# Patient Record
Sex: MALE | Race: WHITE | NOT HISPANIC OR LATINO | Employment: OTHER | URBAN - METROPOLITAN AREA
[De-identification: names, ages, dates, MRNs, and addresses within clinical notes are randomized per-mention and may not be internally consistent; named-entity substitution may affect disease eponyms.]

---

## 2017-01-11 DIAGNOSIS — I10 ESSENTIAL HYPERTENSION, BENIGN: Primary | ICD-10-CM

## 2017-01-11 DIAGNOSIS — E78.5 HYPERLIPIDEMIA LDL GOAL <100: ICD-10-CM

## 2017-01-11 NOTE — TELEPHONE ENCOUNTER
Amlodipine-Benazepril       Last Written Prescription Date: 02/17/16  Last Fill Quantity: 90, # refills: 3    Last Office Visit with [facility], [facility] or [facility] prescribing provider:  08/08/16   Future Office Visit:        BP Readings from Last 3 Encounters:   08/08/16 110/66   02/17/16 114/60   08/17/15 120/72       Atenolol      Last Written Prescription Date: 02/17/16  Last Fill Quantity: 90, # refills: 3    Last Office Visit with [facility], [facility] or [facility] prescribing provider:  08/08/16   Future Office Visit:        BP Readings from Last 3 Encounters:   08/08/16 110/66   02/17/16 114/60   08/17/15 120/72       Gabapentin      Last Written Prescription Date:  02/17/16  Last Fill Quantity: 90,   # refills: 3  Last Office Visit with [facility], [facility] or [facility] prescribing provider: 08/08/16  Future Office visit:       Routing refill request to provider for review/approval because:  Drug not on the [facility], [place] [facility] or "[employer] refill protocol or controlled substance    Lipitor     Last Written Prescription Date: 02/17/16  Last Fill Quantity: 45, # refills: 3  Last Office Visit with [facility], [facility] or "[employer] prescribing provider: 08/08/16       CHOL      148   2/17/2016  HDL       85   2/17/2016  LDL       39   2/17/2016  TRIG      122   2/17/2016  CHOLHDLRATIO      1.7   8/17/2015    Labs showing if normal/abnormal  Lab Results   Component Value Date    CHOL 148 02/17/2016    TRIG 122 02/17/2016    HDL 85 02/17/2016    LDL 39 02/17/2016    VLDL 22 08/17/2015    CHOLHDLRATIO 1.7 08/17/2015

## 2017-01-16 RX ORDER — GABAPENTIN 300 MG/1
CAPSULE ORAL
Qty: 90 CAPSULE | Refills: 3 | Status: SHIPPED | OUTPATIENT
Start: 2017-01-16 | End: 2018-02-13

## 2017-01-16 RX ORDER — ATORVASTATIN CALCIUM 80 MG/1
40 TABLET, FILM COATED ORAL DAILY
Qty: 45 TABLET | Refills: 0 | Status: SHIPPED | OUTPATIENT
Start: 2017-01-16 | End: 2017-04-28

## 2017-01-16 RX ORDER — AMLODIPINE AND BENAZEPRIL HYDROCHLORIDE 10; 20 MG/1; MG/1
1 CAPSULE ORAL DAILY
Qty: 90 CAPSULE | Refills: 0 | Status: SHIPPED | OUTPATIENT
Start: 2017-01-16 | End: 2017-04-28

## 2017-01-16 RX ORDER — ATENOLOL 25 MG/1
25 TABLET ORAL DAILY
Qty: 90 TABLET | Refills: 0 | Status: SHIPPED | OUTPATIENT
Start: 2017-01-16 | End: 2017-04-28

## 2017-01-16 NOTE — TELEPHONE ENCOUNTER
Routing refill request to provider for review/approval because:  Drug not on the [facility] refill protocol-Gabapentin. Pt due for diabetes labs and OV in February.

## 2017-01-19 DIAGNOSIS — E11.21 TYPE 2 DIABETES MELLITUS WITH DIABETIC NEPHROPATHY (H): Primary | ICD-10-CM

## 2017-01-19 NOTE — TELEPHONE ENCOUNTER
Metformin         Last Written Prescription Date: 02/17/2016  Last Fill Quantity: 180, # refills: 3  Last Office Visit with [facility] or [facility] prescribing provider:  02/17/2016   Next 5 appointments (look out 90 days)     Feb 07, 2017  8:20 AM   PHYSICAL with Rakesh Clark MD   [facility] ([facility])    [address]  [facility] [address] [phone]                   BP Readings from Last 3 Encounters:   08/08/16 110/66   02/17/16 114/60   08/17/15 120/72     MICROL      243   2/17/2016  No results found for this basename: microalbumin  CREATININE   Date Value Ref Range Status   08/08/2016 0.67 0.66 - 1.25 mg/dL Final   ]  GFR ESTIMATE   Date Value Ref Range Status   08/08/2016 >90  Non  GFR Calc   >60 mL/min/1.7m2 Final   02/17/2016 >90  Non  GFR Calc   >60 mL/min/1.7m2 Final   08/17/2015 >90  Non  GFR Calc   >60 mL/min/1.7m2 Final     GFR ESTIMATE IF BLACK   Date Value Ref Range Status   08/08/2016 >90   GFR Calc   >60 mL/min/1.7m2 Final   02/17/2016 >90   GFR Calc   >60 mL/min/1.7m2 Final   08/17/2015 >90   GFR Calc   >60 mL/min/1.7m2 Final     CHOL      148   2/17/2016  HDL       85   2/17/2016  LDL       39   2/17/2016  TRIG      122   2/17/2016  CHOLHDLRATIO      1.7   8/17/2015  AST       32   8/8/2016  ALT       32   8/8/2016  A1C      5.3   8/8/2016  A1C      5.0   2/17/2016  A1C      5.6   8/17/2015  A1C      5.3   10/13/2014  A1C      5.9   7/18/2014  POTASSIUM   Date Value Ref Range Status   08/08/2016 4.5 3.4 - 5.3 mmol/L Final

## 2017-02-03 ENCOUNTER — OFFICE VISIT (OUTPATIENT)
Dept: UROLOGY | Facility: CLINIC | Age: 72
End: 2017-02-03
Payer: COMMERCIAL

## 2017-02-03 VITALS
DIASTOLIC BLOOD PRESSURE: 72 MMHG | SYSTOLIC BLOOD PRESSURE: 128 MMHG | WEIGHT: 205 LBS | BODY MASS INDEX: 27.77 KG/M2 | HEART RATE: 78 BPM | HEIGHT: 72 IN

## 2017-02-03 DIAGNOSIS — C61 PROSTATE CANCER (H): Primary | ICD-10-CM

## 2017-02-03 DIAGNOSIS — N40.1 HYPERTROPHY OF PROSTATE WITH URINARY OBSTRUCTION: ICD-10-CM

## 2017-02-03 DIAGNOSIS — N13.8 HYPERTROPHY OF PROSTATE WITH URINARY OBSTRUCTION: ICD-10-CM

## 2017-02-03 LAB — PSA SERPL-MCNC: 0.04 NG/ML (ref 0–4)

## 2017-02-03 PROCEDURE — 36415 COLL VENOUS BLD VENIPUNCTURE: CPT | Performed by: UROLOGY

## 2017-02-03 PROCEDURE — 99212 OFFICE O/P EST SF 10 MIN: CPT | Performed by: UROLOGY

## 2017-02-03 PROCEDURE — 84153 ASSAY OF PSA TOTAL: CPT | Performed by: UROLOGY

## 2017-02-03 ASSESSMENT — PAIN SCALES - GENERAL: PAINLEVEL: NO PAIN (0)

## 2017-02-03 NOTE — PROGRESS NOTES
Kyler is a 71-year-old male who underwent radiation therapy for adenocarcinoma of the prostate in 2012. His PSA remains very low. Today is 0.04. He is having no difficulty voiding or hematuria. He sees Dr. Clark next week for his annual physical.  Past medical history: High blood pressure, type 2 diabetes, hyperlipidemia, eye surgery, nonsmoker  Medications: Baby aspirin, amlodipine/Benzapril, Tenormin, Lipitor, baclofen, calcium carbonate/vitamin D, Neurontin, Glucophage, multivitamin  Allergies: None  Exam: Normal appearance, normal vital signs, alert and oriented, normocephalic, normal respirations, neuro grossly intact  Assessment: History of adenocarcinoma the prostate grade 3+4  Plan: Yearly PSA with Dr. Clark            See me if 3 consecutive rises of PSA or PSA greater than 0.5

## 2017-02-03 NOTE — Clinical Note
2/3/2017       RE: Kyler Rodriguez  [address] [facility] [address]     Dear Colleague,    Thank you for referring your patient, Kyler Rodriguez, to the [facility] [place] [facility]. Please see a copy of my visit note below.    Kyler is a 71-year-old male who underwent radiation therapy for adenocarcinoma of the prostate in 2012. His PSA remains very low. Today is 0.04. He is having no difficulty voiding or hematuria. He sees Dr. Clark next week for his annual physical.  Past medical history: High blood pressure, type 2 diabetes, hyperlipidemia, eye surgery, nonsmoker  Medications: Baby aspirin, amlodipine/Benzapril, Tenormin, Lipitor, baclofen, calcium carbonate/vitamin D, Neurontin, Glucophage, multivitamin  Allergies: None  Exam: Normal appearance, normal vital signs, alert and oriented, normocephalic, normal respirations, neuro grossly intact  Assessment: History of adenocarcinoma the prostate grade 3+4  Plan: Yearly PSA with Dr. Clark            See me if 3 consecutive rises of PSA or PSA greater than 0.5        Again, thank you for allowing me to participate in the care of your patient.      Sincerely,    Keon Espinoza MD

## 2017-02-07 ENCOUNTER — OFFICE VISIT (OUTPATIENT)
Dept: INTERNAL MEDICINE | Facility: CLINIC | Age: 72
End: 2017-02-07
Payer: COMMERCIAL

## 2017-02-07 VITALS
BODY MASS INDEX: 27.45 KG/M2 | HEART RATE: 72 BPM | HEIGHT: 72 IN | SYSTOLIC BLOOD PRESSURE: 128 MMHG | WEIGHT: 202.7 LBS | TEMPERATURE: 98.1 F | RESPIRATION RATE: 16 BRPM | OXYGEN SATURATION: 97 % | DIASTOLIC BLOOD PRESSURE: 60 MMHG

## 2017-02-07 DIAGNOSIS — C61 PROSTATE CANCER (H): ICD-10-CM

## 2017-02-07 DIAGNOSIS — Z00.00 ROUTINE GENERAL MEDICAL EXAMINATION AT A HEALTH CARE FACILITY: Primary | ICD-10-CM

## 2017-02-07 DIAGNOSIS — I10 ESSENTIAL HYPERTENSION, BENIGN: ICD-10-CM

## 2017-02-07 DIAGNOSIS — H61.22 IMPACTED CERUMEN OF LEFT EAR: ICD-10-CM

## 2017-02-07 DIAGNOSIS — E78.5 HYPERLIPIDEMIA LDL GOAL <100: ICD-10-CM

## 2017-02-07 DIAGNOSIS — E11.21 TYPE 2 DIABETES MELLITUS WITH DIABETIC NEPHROPATHY, WITHOUT LONG-TERM CURRENT USE OF INSULIN (H): ICD-10-CM

## 2017-02-07 LAB
ALBUMIN SERPL-MCNC: 3.8 G/DL (ref 3.4–5)
ALBUMIN UR-MCNC: 30 MG/DL
ALP SERPL-CCNC: 79 U/L (ref 40–150)
ALT SERPL W P-5'-P-CCNC: 48 U/L (ref 0–70)
ANION GAP SERPL CALCULATED.3IONS-SCNC: 10 MMOL/L (ref 3–14)
APPEARANCE UR: CLEAR
AST SERPL W P-5'-P-CCNC: 54 U/L (ref 0–45)
BACTERIA #/AREA URNS HPF: ABNORMAL /HPF
BILIRUB SERPL-MCNC: 1 MG/DL (ref 0.2–1.3)
BILIRUB UR QL STRIP: ABNORMAL
BUN SERPL-MCNC: 11 MG/DL (ref 7–30)
CALCIUM SERPL-MCNC: 9.4 MG/DL (ref 8.5–10.1)
CHLORIDE SERPL-SCNC: 100 MMOL/L (ref 94–109)
CHOLEST SERPL-MCNC: 149 MG/DL
CO2 SERPL-SCNC: 29 MMOL/L (ref 20–32)
COLOR UR AUTO: YELLOW
CREAT SERPL-MCNC: 0.72 MG/DL (ref 0.66–1.25)
CREAT UR-MCNC: 170 MG/DL
ERYTHROCYTE [DISTWIDTH] IN BLOOD BY AUTOMATED COUNT: 12.1 % (ref 10–15)
GFR SERPL CREATININE-BSD FRML MDRD: ABNORMAL ML/MIN/1.7M2
GLUCOSE SERPL-MCNC: 118 MG/DL (ref 70–99)
GLUCOSE UR STRIP-MCNC: NEGATIVE MG/DL
HBA1C MFR BLD: 5.4 % (ref 4.3–6)
HCT VFR BLD AUTO: 45.9 % (ref 40–53)
HDLC SERPL-MCNC: 90 MG/DL
HGB BLD-MCNC: 15.7 G/DL (ref 13.3–17.7)
HGB UR QL STRIP: ABNORMAL
HYALINE CASTS #/AREA URNS LPF: ABNORMAL /LPF (ref 0–2)
KETONES UR STRIP-MCNC: 40 MG/DL
LDLC SERPL CALC-MCNC: 44 MG/DL
LEUKOCYTE ESTERASE UR QL STRIP: NEGATIVE
MCH RBC QN AUTO: 32.4 PG (ref 26.5–33)
MCHC RBC AUTO-ENTMCNC: 34.2 G/DL (ref 31.5–36.5)
MCV RBC AUTO: 95 FL (ref 78–100)
MICROALBUMIN UR-MCNC: 92 MG/L
MICROALBUMIN/CREAT UR: 54.35 MG/G CR (ref 0–17)
MUCOUS THREADS #/AREA URNS LPF: PRESENT /LPF
NITRATE UR QL: NEGATIVE
NONHDLC SERPL-MCNC: 59 MG/DL
PH UR STRIP: 5.5 PH (ref 5–7)
PLATELET # BLD AUTO: 134 10E9/L (ref 150–450)
POTASSIUM SERPL-SCNC: 4.4 MMOL/L (ref 3.4–5.3)
PROT SERPL-MCNC: 7.6 G/DL (ref 6.8–8.8)
RBC # BLD AUTO: 4.85 10E12/L (ref 4.4–5.9)
RBC #/AREA URNS AUTO: ABNORMAL /HPF (ref 0–2)
SODIUM SERPL-SCNC: 139 MMOL/L (ref 133–144)
SP GR UR STRIP: 1.01 (ref 1–1.03)
TRIGL SERPL-MCNC: 77 MG/DL
TSH SERPL DL<=0.005 MIU/L-ACNC: 2.78 MU/L (ref 0.4–4)
URN SPEC COLLECT METH UR: ABNORMAL
UROBILINOGEN UR STRIP-ACNC: 1 EU/DL (ref 0.2–1)
WBC # BLD AUTO: 3.8 10E9/L (ref 4–11)
WBC #/AREA URNS AUTO: ABNORMAL /HPF (ref 0–2)

## 2017-02-07 PROCEDURE — 84443 ASSAY THYROID STIM HORMONE: CPT | Performed by: INTERNAL MEDICINE

## 2017-02-07 PROCEDURE — 83036 HEMOGLOBIN GLYCOSYLATED A1C: CPT | Performed by: INTERNAL MEDICINE

## 2017-02-07 PROCEDURE — 85027 COMPLETE CBC AUTOMATED: CPT | Performed by: INTERNAL MEDICINE

## 2017-02-07 PROCEDURE — 82043 UR ALBUMIN QUANTITATIVE: CPT | Performed by: INTERNAL MEDICINE

## 2017-02-07 PROCEDURE — 80053 COMPREHEN METABOLIC PANEL: CPT | Performed by: INTERNAL MEDICINE

## 2017-02-07 PROCEDURE — 99397 PER PM REEVAL EST PAT 65+ YR: CPT | Mod: 25 | Performed by: INTERNAL MEDICINE

## 2017-02-07 PROCEDURE — 69210 REMOVE IMPACTED EAR WAX UNI: CPT | Performed by: INTERNAL MEDICINE

## 2017-02-07 PROCEDURE — 36415 COLL VENOUS BLD VENIPUNCTURE: CPT | Performed by: INTERNAL MEDICINE

## 2017-02-07 PROCEDURE — 81001 URINALYSIS AUTO W/SCOPE: CPT | Performed by: INTERNAL MEDICINE

## 2017-02-07 PROCEDURE — 80061 LIPID PANEL: CPT | Performed by: INTERNAL MEDICINE

## 2017-02-07 NOTE — PROGRESS NOTES
SUBJECTIVE:                                                            Kyler Rodriguez is a 71 year old male who presents for Preventive Visit.      Are you in the first 12 months of your Medicare Part B coverage?  No    Healthy Habits:    Do you get at least three servings of calcium containing foods daily (dairy, green leafy vegetables, etc.)? yes    Amount of exercise or daily activities, outside of work: NO    Problems taking medications regularly No    Medication side effects: No    Have you had an eye exam in the past two years? yes    Do you see a dentist twice per year? yes    Do you have sleep apnea, excessive snoring or daytime drowsiness?no    COGNITIVE SCREEN  1) Repeat 3 items (Banana, Sunrise, Chair)    2) Clock draw: NORMAL  3) 3 item recall: Recalls 3 objects  Results: 3 items recalled: COGNITIVE IMPAIRMENT LESS LIKELY    Mini-CogTM Copyright S Jorge. Licensed by the author for use in [facility]; reprinted with permission (nick@[facility]). All rights reserved.                All Histories reviewed and updated in EPIC as appropriate.  Social History   Substance Use Topics     Smoking status: Never Smoker      Smokeless tobacco: Never Used     Alcohol Use: 0.0 oz/week     0 Standard drinks or equivalent per week      Comment: 2-3 drinks 2x weekly       The patient does not drink >3 drinks per day nor >7 drinks per week.    Today's PHQ-2 Score:   PHQ-2 ( 1999 [employer]) 2/7/2017 2/7/2017   Q1: Little interest or pleasure in doing things 0 0   Q2: Feeling down, depressed or hopeless 0 0   PHQ-2 Score 0 0       Do you feel safe in your environment - Yes    Do you have a Health Care Directive?: Yes: Advance Directive has been received and scanned.    Current providers sharing in care for this patient include:   Patient Care Team:  Rakesh Clark MD as PCP - General (Internal Medicine)      Hearing impairment: No    Ability to successfully perform activities of daily living: Yes, no  assistance needed     Fall risk:  Fallen 2 or more times in the past year?: No  Any fall with injury in the past year?: No    Home safety:  none identified      The following health maintenance items are reviewed in Epic and correct as of today:  Health Maintenance   Topic Date Due     HEPATITIS C SCREENING  07/15/1963     FOOT EXAM Q1 YEAR( NO INBASKET)  10/13/2015     LIPID MONITORING Q6 MO( NO INBASKET)  08/17/2016     EYE EXAM Q1 YEAR( NO INBASKET)  02/01/2017     A1C Q6 MO( NO INBASKET)  02/08/2017     FALL RISK ASSESSMENT  02/17/2017     MICROALBUMIN Q1 YEAR( NO INBASKET)  02/17/2017     CREATININE Q1 YEAR (NO INBASKET)  08/08/2017     INFLUENZA VACCINE (SYSTEM ASSIGNED)  09/01/2017     TETANUS Q10 YR  09/20/2017     TSH W/ FREE T4 REFLEX Q2 YEAR (NO INBASKET)  02/17/2018     ADVANCE DIRECTIVE PLANNING Q5 YRS (NO INBASKET)  09/21/2021     COLON CANCER SCREEN (SYSTEM ASSIGNED)  05/23/2024     PNEUMOCOCCAL  Completed     AORTIC ANEURYSM SCREENING (SYSTEM ASSIGNED)  Completed         No acute complaints, no medication change or new medical conditions.  Has H/O DM. On diet , exercise and PO medications. Blood sugars are controlled. No parestesias. No hypoglycemias.  Has h/o HTN. on medical treatment. BP has been controlled. No side effects from medications. No CP, HA, dizziness. good compliance with medications and low salt diet.  Has H/O hyperlipidemia. On medical treatment and diet. No side effects. No muscle weakness, myalgias or upset stomach.   Has h/o prostate cancer in remission. Follows with urology . Needs PSA yearly now.    Concern for decreased hearing in the left ear .      ROS:  C: NEGATIVE for fever, chills, change in weight  I: NEGATIVE for worrisome rashes, moles or lesions  E: NEGATIVE for vision changes or irritation  E/M: NEGATIVE for ear, mouth and throat problems  R: NEGATIVE for significant cough or SOB  B: NEGATIVE for masses, tenderness or discharge  CV: NEGATIVE for chest pain,  palpitations or peripheral edema  GI: NEGATIVE for nausea, abdominal pain, heartburn, or change in bowel habits  : NEGATIVE for frequency, dysuria, or hematuria  M: NEGATIVE for significant arthralgias or myalgia  N: NEGATIVE for weakness, dizziness or paresthesias  E: NEGATIVE for temperature intolerance, skin/hair changes  H: NEGATIVE for bleeding problems  P: NEGATIVE for changes in mood or affect    Problem list, Medication list, Allergies, and Medical/Social/Surgical histories reviewed in [facility] and updated as appropriate.  OBJECTIVE:                                                            /60 mmHg  Pulse 72  Temp(Src) 98.1  F (36.7  C) (Oral)  Resp 16  Ht 6' (1.829 m)  Wt 202 lb 11.2 oz (91.944 kg)  BMI 27.49 kg/m2  SpO2 97% Estimated body mass index is 27.49 kg/(m^2) as calculated from the following:    Height as of this encounter: 6' (1.829 m).    Weight as of this encounter: 202 lb 11.2 oz (91.944 kg).  EXAM:   GENERAL: healthy, alert and no distress  EYES: Eyes grossly normal to inspection, PERRL and conjunctivae and sclerae normal  HENT: ear canals - left occluded with brown, soft cerumen,post removal TM's normal, nose and mouth without ulcers or lesions  NECK: no adenopathy, no asymmetry, masses, or scars and thyroid normal to palpation  RESP: lungs clear to auscultation - no rales, rhonchi or wheezes  CV: regular rate and rhythm, normal S1 S2, no S3 or S4, no murmur, click or rub, no peripheral edema and peripheral pulses strong  ABDOMEN: soft, nontender, no hepatosplenomegaly, no masses and bowel sounds normal  MS: no gross musculoskeletal defects noted, no edema  SKIN: no suspicious lesions or rashes  NEURO: Normal strength and tone, mentation intact and speech normal  PSYCH: mentation appears normal, affect normal/bright    ASSESSMENT / PLAN:                                                                ICD-10-CM    1. Routine general medical examination at a health care facility  Z00.00 Lipid panel reflex to direct LDL     Hemoglobin A1c     CBC with platelets     Comprehensive metabolic panel     TSH with free T4 reflex     *UA reflex to Microscopic and Culture ([facility] and [facility] (except [place] and [place])     Albumin Random Urine Quantitative   2. Prostate cancer (H) C61    3. Type 2 diabetes mellitus with diabetic nephropathy, without long-term current use of insulin (H) E11.21 Hemoglobin A1c     CBC with platelets     Comprehensive metabolic panel     TSH with free T4 reflex     *UA reflex to Microscopic and Culture ([facility] and [facility] (except [place] and [place])     Albumin Random Urine Quantitative   4. Hyperlipidemia LDL goal <100 E78.5    5. Essential hypertension, benign I10        End of Life Planning:  Patient currently has an advanced directive: Yes.  Practitioner is supportive of decision.    COUNSELING:  Reviewed preventive health counseling, as reflected in patient instructions       Regular exercise       Healthy diet/nutrition       Vision screening       Hearing screening       Dental care       Colon cancer screening       Prostate cancer screening    Left ear canal cerumen was removed with curette, improved hearing reported.       Estimated body mass index is 27.49 kg/(m^2) as calculated from the following:    Height as of this encounter: 6' (1.829 m).    Weight as of this encounter: 202 lb 11.2 oz (91.944 kg).     reports that he has never smoked. He has never used smokeless tobacco.      Appropriate preventive services were discussed with this patient, including applicable screening as appropriate for cardiovascular disease, diabetes, osteopenia/osteoporosis, and glaucoma.  As appropriate for age/gender, discussed screening for colorectal cancer, prostate cancer, breast cancer, and cervical cancer. Checklist reviewing preventive services available has been given to the patient.    Reviewed patients plan of care and  provided an AVS. The Intermediate Care Plan ( asthma action plan, low back pain action plan, and migraine action plan) for Kyler meets the Care Plan requirement. This Care Plan has been established and reviewed with the Patient.    Counseling Resources:  ATP IV Guidelines  Pooled Cohorts Equation Calculator  Breast Cancer Risk Calculator  FRAX Risk Assessment  ICSI Preventive Guidelines  Dietary Guidelines for Americans, 2010  USDA's MyPlate  ASA Prophylaxis  Lung CA Screening    Rakesh Clark MD  [facility]

## 2017-02-07 NOTE — NURSING NOTE
Chief Complaint   Patient presents with     Medicare Visit     annual wellness exam/ fasting       Initial /60 mmHg  Pulse 72  Temp(Src) 98.1  F (36.7  C) (Oral)  Resp 16  Ht 6' (1.829 m)  Wt 202 lb 11.2 oz (91.944 kg)  BMI 27.49 kg/m2  SpO2 97% Estimated body mass index is 27.49 kg/(m^2) as calculated from the following:    Height as of this encounter: 6' (1.829 m).    Weight as of this encounter: 202 lb 11.2 oz (91.944 kg).  Medication Reconciliation: complete

## 2017-02-07 NOTE — MR AVS SNAPSHOT
After Visit Summary   2/7/2017    Kyler Rodriguez    MRN: [id number]           Patient Information     Date Of Birth          1945        Visit Information        Provider Department      2/7/2017 8:20 AM Rakesh Clark MD [facility]        Today's Diagnoses     Routine general medical examination at a health care facility    -  1     Prostate cancer (H)         Type 2 diabetes mellitus with diabetic nephropathy, without long-term current use of insulin (H)         Hyperlipidemia LDL goal <100         Essential hypertension, benign           Care Instructions    You can reach your [facility] Care Team any time of the day by calling [phone].  This number will put you in touch with the 24 hour nurse line even if the clinic is closed.  The clinic hours for [facility] are:  Monday through Thursday 7am to 6pm   Friday 7am to 5pm   Saturday 8am to 12p for Pediatrics only.    To  contact your  please call [phone].  This is a direct number for your care team during clinic hours.    [facility] is now open for your convenience:  Monday through Friday 7:30am to 7pm  Saturday and Sunday 9am to 3pm  They are closed on all major holidays.          Follow-ups after your visit        Who to contact     If you have questions or need follow up information about today's clinic visit or your schedule please contact [facility] directly at [phone].  Normal or non-critical lab and imaging results will be communicated to you by MyChart, letter or phone within 4 business days after the clinic has received the results. If you do not hear from us within 7 days, please contact the clinic through MyChart or phone. If you have a critical or abnormal lab result, we will notify you by phone as soon as possible.  Submit refill requests through [employer] or call your pharmacy and they will forward the refill request to us. Please allow 3 business days for  "your refill to be completed.          Additional Information About Your Visit        MyChart Information     [employer] lets you send messages to your doctor, view your test results, renew your prescriptions, schedule appointments and more. To sign up, go to [email or web address] . Click on \"Log in\" on the left side of the screen, which will take you to the Welcome page. Then click on \"Sign up Now\" on the right side of the page.     You will be asked to enter the access code listed below, as well as some personal information. Please follow the directions to create your username and password.     Your access code is: [email or web address]  Expires: 2017  8:43 AM     Your access code will  in 90 days. If you need help or a new code, please call your [place] clinic or [phone].        Care EveryWhere ID     This is your [facility] EveryWhere ID. This could be used by other organizations to access your [place] medical records  [id number]        Your Vitals Were     Pulse Temperature Respirations Height BMI (Body Mass Index) Pulse Oximetry    72 98.1  F (36.7  C) (Oral) 16 6' (1.829 m) 27.49 kg/m2 97%       Blood Pressure from Last 3 Encounters:   17 128/60   17 128/72   16 110/66    Weight from Last 3 Encounters:   17 202 lb 11.2 oz (91.944 kg)   17 205 lb (92.987 kg)   16 199 lb 6.4 oz (90.447 kg)              We Performed the Following     *UA reflex to Microscopic and Culture ([facility] and [facility] (except [place] and [place])     Albumin Random Urine Quantitative     CBC with platelets     Comprehensive metabolic panel     Hemoglobin A1c     Lipid panel reflex to direct LDL     TSH with free T4 reflex        Primary Care Provider Office Phone # Fax #    Rakesh Clark [phone]       [facility] [address] [facility] [address]        Thank you!     Thank you for choosing [facility]  for your care. " Our goal is always to provide you with excellent care. Hearing back from our patients is one way we can continue to improve our services. Please take a few minutes to complete the written survey that you may receive in the mail after your visit with us. Thank you!             Your Updated Medication List - Protect others around you: Learn how to safely use, store and throw away your medicines at www.disposemymeds.org.          This list is accurate as of: 2/7/17  8:43 AM.  Always use your most recent med list.                   Brand Name Dispense Instructions for use    ACCU-CHEK COMPLETE Kit     1    test daily       amLODIPine-benazepril 10-20 MG per capsule    LOTREL    90 capsule    Take 1 capsule by mouth daily       aspirin 81 MG tablet      1 tablet daily       atenolol 25 MG tablet    TENORMIN    90 tablet    Take 1 tablet (25 mg) by mouth daily       atorvastatin 80 MG tablet    LIPITOR    45 tablet    Take 0.5 tablets (40 mg) by mouth daily       baclofen 10 MG tablet    LIORESAL    90 tablet    Take 1 tablet daily       blood glucose monitoring test strip    ACCU-CHEK COMPACT DRUM    100 strip    by In Vitro route daily. Test one time daily or as directed.       CALTRATE 600+D PLUS PO      Take  by mouth.       gabapentin 300 MG capsule    NEURONTIN    90 capsule    Take 1 capsule by mouth. 1po hs       metFORMIN 1000 MG tablet    GLUCOPHAGE    180 tablet    Take 1 tablet (1,000 mg) by mouth 2 times daily (with meals) with breakfast and dinner.       Multiple vitamin  s Tabs

## 2017-03-02 ENCOUNTER — APPOINTMENT (OUTPATIENT)
Dept: CT IMAGING | Facility: CLINIC | Age: 72
End: 2017-03-02
Attending: EMERGENCY MEDICINE
Payer: MEDICARE

## 2017-03-02 ENCOUNTER — HOSPITAL ENCOUNTER (EMERGENCY)
Facility: CLINIC | Age: 72
Discharge: HOME | End: 2017-03-02
Attending: EMERGENCY MEDICINE | Admitting: EMERGENCY MEDICINE
Payer: MEDICARE

## 2017-03-02 VITALS
RESPIRATION RATE: 18 BRPM | OXYGEN SATURATION: 97 % | DIASTOLIC BLOOD PRESSURE: 91 MMHG | HEART RATE: 69 BPM | TEMPERATURE: 98.2 F | SYSTOLIC BLOOD PRESSURE: 148 MMHG

## 2017-03-02 DIAGNOSIS — R31.0 GROSS HEMATURIA: ICD-10-CM

## 2017-03-02 DIAGNOSIS — N28.9 KIDNEY LESION, NATIVE, RIGHT: ICD-10-CM

## 2017-03-02 DIAGNOSIS — D69.6 THROMBOCYTOPENIA (H): ICD-10-CM

## 2017-03-02 DIAGNOSIS — K40.20 BILATERAL INGUINAL HERNIA WITHOUT OBSTRUCTION OR GANGRENE, RECURRENCE NOT SPECIFIED: ICD-10-CM

## 2017-03-02 LAB
ALBUMIN UR-MCNC: ABNORMAL MG/DL
ANION GAP SERPL CALCULATED.3IONS-SCNC: 10 MMOL/L (ref 3–14)
APPEARANCE UR: ABNORMAL
BACTERIA #/AREA URNS HPF: ABNORMAL /HPF
BILIRUB UR QL STRIP: NEGATIVE
BUN SERPL-MCNC: 8 MG/DL (ref 7–30)
CALCIUM SERPL-MCNC: 9.1 MG/DL (ref 8.5–10.1)
CHLORIDE SERPL-SCNC: 102 MMOL/L (ref 94–109)
CO2 SERPL-SCNC: 27 MMOL/L (ref 20–32)
COLOR UR AUTO: ABNORMAL
CREAT SERPL-MCNC: 0.68 MG/DL (ref 0.66–1.25)
ERYTHROCYTE [DISTWIDTH] IN BLOOD BY AUTOMATED COUNT: 12 % (ref 10–15)
GFR SERPL CREATININE-BSD FRML MDRD: ABNORMAL ML/MIN/1.7M2
GLUCOSE SERPL-MCNC: 141 MG/DL (ref 70–99)
GLUCOSE UR STRIP-MCNC: 50 MG/DL
HCT VFR BLD AUTO: 47.5 % (ref 40–53)
HGB BLD-MCNC: 16.5 G/DL (ref 13.3–17.7)
HGB UR QL STRIP: ABNORMAL
KETONES UR STRIP-MCNC: NEGATIVE MG/DL
LEUKOCYTE ESTERASE UR QL STRIP: NEGATIVE
MCH RBC QN AUTO: 32.7 PG (ref 26.5–33)
MCHC RBC AUTO-ENTMCNC: 34.7 G/DL (ref 31.5–36.5)
MCV RBC AUTO: 94 FL (ref 78–100)
NITRATE UR QL: NEGATIVE
PH UR STRIP: 7 PH (ref 5–7)
PLATELET # BLD AUTO: 108 10E9/L (ref 150–450)
POTASSIUM SERPL-SCNC: 3.6 MMOL/L (ref 3.4–5.3)
RBC # BLD AUTO: 5.05 10E12/L (ref 4.4–5.9)
RBC #/AREA URNS AUTO: >182 /HPF (ref 0–2)
SODIUM SERPL-SCNC: 139 MMOL/L (ref 133–144)
SP GR UR STRIP: 1.02 (ref 1–1.03)
URN SPEC COLLECT METH UR: ABNORMAL
UROBILINOGEN UR STRIP-MCNC: 0 MG/DL (ref 0–2)
WBC # BLD AUTO: 3.8 10E9/L (ref 4–11)
WBC #/AREA URNS AUTO: 0 /HPF (ref 0–2)

## 2017-03-02 PROCEDURE — 99284 EMERGENCY DEPT VISIT MOD MDM: CPT | Mod: 25

## 2017-03-02 PROCEDURE — 80048 BASIC METABOLIC PNL TOTAL CA: CPT | Performed by: EMERGENCY MEDICINE

## 2017-03-02 PROCEDURE — 74176 CT ABD & PELVIS W/O CONTRAST: CPT

## 2017-03-02 PROCEDURE — 51702 INSERT TEMP BLADDER CATH: CPT

## 2017-03-02 PROCEDURE — 85027 COMPLETE CBC AUTOMATED: CPT | Performed by: EMERGENCY MEDICINE

## 2017-03-02 PROCEDURE — 51798 US URINE CAPACITY MEASURE: CPT

## 2017-03-02 PROCEDURE — 81001 URINALYSIS AUTO W/SCOPE: CPT | Performed by: EMERGENCY MEDICINE

## 2017-03-02 ASSESSMENT — ENCOUNTER SYMPTOMS
DYSURIA: 1
HEMATURIA: 1
NAUSEA: 0
DIFFICULTY URINATING: 1
VOMITING: 0
BACK PAIN: 1
FEVER: 0

## 2017-03-02 NOTE — ED AVS SNAPSHOT
[facility] Emergency Department    [address]    [facility] [address]    Phone:  [phone]    Fax:  [phone]                                       Kyler Rodriguez   MRN: [id number]    Department:  [facility] Emergency Department   Date of Visit:  3/2/2017           Patient Information     Date Of Birth          1945        Your diagnoses for this visit were:     Gross hematuria     Kidney lesion, native, right     Bilateral inguinal hernia without obstruction or gangrene, recurrence not specified     Thrombocytopenia (H)        You were seen by Mateo Jeffery MD.      Follow-up Information     Follow up with Keon Espinoza MD. Schedule an appointment as soon as possible for a visit in 2 days.    Specialty:  Urology    Contact information:    [facility]OLOG[facility]  [address]  [facility] [address] [phone]          Follow up with [facility] Emergency Department.    Specialty:  EMERGENCY MEDICINE    Why:  If symptoms worsen    Contact information:    [address]  [facility] [address] [phone]        Discharge Instructions       Follow-up:  Please follow-up with Urology in 2-3 days for re-evaluation and discussion of your visit to the emergency department today.    Home treatments:  Recommended home therapies include fluids, and close monitoring of urination    Return precautions:  Warning signs which should prompt you to return to the ER include worsening bleeding, production of clots, inability to urinate, abdominal pain, fevers, flank pain, or any other new or troubling symptoms.  We are always happy to see you again.        Hematuria: Possible Causes     Many things can lead to blood in the urine (hematuria). The blood may be seen with the eye. Or it may only be seen when the urine is looked at under a microscope. Some of the most common causes of blood in the urine are listed below. Often, no cause  for the blood can be found. This is called idiopathic hematuria.    Kidney or bladder stones are collections of crystals. They form in the urine. Stones may be found anywhere in the urinary tract. But they form most often in the kidneys or bladder. In addition to blood in the urine, they can cause severe pain.   BPH stands for benign prostatic hyperplasia. It is enlargement of the prostate gland. It happens as men age. BPH often causes problems with urination. It sometimes causes blood in the urine.   A urinary tract infection (UTI) is due to bacteria growing in the urinary tract. It can cause blood in the urine. Other symptoms include burning or pain with urination. You may need to urinate often or urgently. You may also have a fever.     Damage to the urinary tract may cause blood in the urine. This damage may be due to a blow or accident. It may also result from the use of a urinary catheter. Very hard exercise may sometimes irritate the urinary tract and cause bleeding.   Cancer may occur anywhere in the urinary tract. A tumor may sometimes cause no symptoms other than bleeding. Other possible causes of bleeding include:    Prostatitis (infection of the prostate gland)    Taking anticoagulant medications    Blockage in the urinary tract    Disease or inflammation of the kidney    Cystic diseases of the kidneys    Sickle cell anemia    Tumors of the urinary tract     [id number] [employer]. [address] [place] [address], [place] [address]. All rights reserved. This information is not intended as a substitute for professional medical care. Always follow your healthcare professional's instructions.              24 Hour Appointment Hotline       To make an appointment at any [facility], call [phone] ([phone]). If you don't have a family doctor or clinic, we will help you find one. [facility] clinics are conveniently located to serve the needs of you and your family.             Review of your  medicines      Our records show that you are taking the medicines listed below. If these are incorrect, please call your family doctor or clinic.        Dose / Directions Last dose taken    ACCU-CHEK COMPLETE Kit   Quantity:  1        test daily   Refills:  0        amLODIPine-benazepril 10-20 MG per capsule   Commonly known as:  LOTREL   Dose:  1 capsule   Quantity:  90 capsule        Take 1 capsule by mouth daily   Refills:  0        aspirin 81 MG tablet        1 tablet daily   Refills:  0        atenolol 25 MG tablet   Commonly known as:  TENORMIN   Dose:  25 mg   Quantity:  90 tablet        Take 1 tablet (25 mg) by mouth daily   Refills:  0        atorvastatin 80 MG tablet   Commonly known as:  LIPITOR   Dose:  40 mg   Quantity:  45 tablet        Take 0.5 tablets (40 mg) by mouth daily   Refills:  0        baclofen 10 MG tablet   Commonly known as:  LIORESAL   Quantity:  90 tablet        Take 1 tablet daily   Refills:  0        blood glucose monitoring test strip   Commonly known as:  ACCU-CHEK COMPACT DRUM   Quantity:  100 strip        by In Vitro route daily. Test one time daily or as directed.   Refills:  1        CALTRATE 600+D PLUS PO        Take  by mouth.   Refills:  0        gabapentin 300 MG capsule   Commonly known as:  NEURONTIN   Quantity:  90 capsule        Take 1 capsule by mouth. 1po hs   Refills:  3        metFORMIN 1000 MG tablet   Commonly known as:  GLUCOPHAGE   Dose:  1000 mg   Quantity:  180 tablet        Take 1 tablet (1,000 mg) by mouth 2 times daily (with meals) with breakfast and dinner.   Refills:  0        Multiple vitamin  s Tabs        Refills:  0                Procedures and tests performed during your visit     Basic metabolic panel (BMP)    CBC (platelets, no diff)    CT Abdomen Pelvis w/o Contrast    Pulse oximetry nursing    UA with Microscopic      Orders Needing Specimen Collection     None      Pending Results     Date and Time Order Name Status Description    3/2/2017 0993  CT Abdomen Pelvis w/o Contrast Preliminary             Pending Culture Results     No orders found from 2/28/2017 to 3/3/2017.             Test Results from your hospital stay     3/2/2017 10:44 AM - Interface, Flexilab Results      Component Results     Component Value Ref Range & Units Status    Color Urine Red  Final    Appearance Urine Cloudy  Final    Glucose Urine 50 (A) NEG mg/dL Final    Bilirubin Urine Negative NEG Final    Ketones Urine Negative NEG mg/dL Final    Specific Gravity Urine 1.017 1.003 - 1.035 Final    Blood Urine Large (A) NEG Final    pH Urine 7.0 5.0 - 7.0 pH Final    Protein Albumin Urine >499  Reviewed, acceptable   (A) NEG mg/dL Final    Urobilinogen mg/dL 0.0 0.0 - 2.0 mg/dL Final    Nitrite Urine Negative NEG Final    Leukocyte Esterase Urine Negative NEG Final    Source Clean catch urine  Final    WBC Urine 0 0 - 2 /HPF Final    RBC Urine >182 (H) 0 - 2 /HPF Final    Bacteria Urine Many (A) NEG /HPF Final         3/2/2017  9:42 AM - Interface, Flexilab Results      Component Results     Component Value Ref Range & Units Status    WBC 3.8 (L) 4.0 - 11.0 10e9/L Final    RBC Count 5.05 4.4 - 5.9 10e12/L Final    Hemoglobin 16.5 13.3 - 17.7 g/dL Final    Hematocrit 47.5 40.0 - 53.0 % Final    MCV 94 78 - 100 fl Final    MCH 32.7 26.5 - 33.0 pg Final    MCHC 34.7 31.5 - 36.5 g/dL Final    RDW 12.0 10.0 - 15.0 % Final    Platelet Count 108 (L) 150 - 450 10e9/L Final         3/2/2017 10:05 AM - Interface, Flexilab Results      Component Results     Component Value Ref Range & Units Status    Sodium 139 133 - 144 mmol/L Final    Potassium 3.6 3.4 - 5.3 mmol/L Final    Chloride 102 94 - 109 mmol/L Final    Carbon Dioxide 27 20 - 32 mmol/L Final    Anion Gap 10 3 - 14 mmol/L Final    Glucose 141 (H) 70 - 99 mg/dL Final    Urea Nitrogen 8 7 - 30 mg/dL Final    Creatinine 0.68 0.66 - 1.25 mg/dL Final    GFR Estimate >90  Non  GFR Calc   >60 mL/min/1.7m2 Final    GFR Estimate If  Black >90   GFR Calc   >60 mL/min/1.7m2 Final    Calcium 9.1 8.5 - 10.1 mg/dL Final         3/2/2017 10:10 AM - Interface, Radiant Ib      Narrative     CT ABDOMEN/PELVIS WITHOUT CONTRAST March 2, 2017 9:44 AM     HISTORY: Right flank pain, hematuria.    TECHNIQUE: Axial images with reconstructions. No IV contrast.  Radiation dose for this scan was reduced using automated exposure  control, adjustment of the mA and/or kV according to patient size, or  iterative reconstruction technique.    COMPARISON: 8/19/2011.    FINDINGS: There may well be a fat-containing hernia at the esophageal  hiatus and this contains a small amount of fluid. Prostate  enlargement. There is diffuse bladder wall thickening. There is some  dependent increased density within the bladder, possibly representing  hemorrhage. These findings could relate to cystitis, outlet  obstruction, or neoplasm. Clinical correlation recommended. There  appears to be a small right posterior bladder diverticulum.  Incidentally noted colonic diverticulosis. There are bilateral  fat-containing inguinal hernias. Within the right inguinal canal,  there is some focal fluid density versus an undescended testicle and  clinical correlation is recommended. There are two right renal lesions  which are indeterminate based upon density. These measure 1.7 and 1.8  cm. No urinary tract stone or hydronephrosis. Unremarkable appendix.  There is subtle peripheral liver lobulation. This could relate to  cirrhosis and clinical correlation is recommended. There are a couple  of tiny stable lung base nodules.        Impression     IMPRESSION:  1. Bladder wall thickening which could relate to cystitis, outlet  obstruction, neoplasm. There may well be some hemorrhage in the  bladder.  2. Bilateral inguinal hernias.  3. Two indeterminate right renal lesions. Follow-up imaging  recommended.  4. There is a suggestion of subtle liver lobulation suggesting  cirrhosis.  Clinical correlation.  5. Additional findings discussed above.                  Clinical Quality Measure: Blood Pressure Screening     Your blood pressure was checked while you were in the emergency department today. The last reading we obtained was  BP: (!) 148/91 . Please read the guidelines below about what these numbers mean and what you should do about them.  If your systolic blood pressure (the top number) is less than 120 and your diastolic blood pressure (the bottom number) is less than 80, then your blood pressure is normal. There is nothing more that you need to do about it.  If your systolic blood pressure (the top number) is 120-139 or your diastolic blood pressure (the bottom number) is 80-89, your blood pressure may be higher than it should be. You should have your blood pressure rechecked within a year by a primary care provider.  If your systolic blood pressure (the top number) is 140 or greater or your diastolic blood pressure (the bottom number) is 90 or greater, you may have high blood pressure. High blood pressure is treatable, but if left untreated over time it can put you at risk for heart attack, stroke, or kidney failure. You should have your blood pressure rechecked by a primary care provider within the next 4 weeks.  If your provider in the emergency department today gave you specific instructions to follow-up with your doctor or provider even sooner than that, you should follow that instruction and not wait for up to 4 weeks for your follow-up visit.        Thank you for choosing [facility]       Thank you for choosing [facility] for your care. Our goal is always to provide you with excellent care. Hearing back from our patients is one way we can continue to improve our services. Please take a few minutes to complete the written survey that you may receive in the mail after you visit with us. Thank you!        Care EveryWhere ID     This is your Care EveryWhere ID. This could be used by other  organizations to access your [facility] medical records  [id number]        After Visit Summary       This is your record. Keep this with you and show to your community pharmacist(s) and doctor(s) at your next visit.

## 2017-03-02 NOTE — ED PROVIDER NOTES
History     Chief Complaint:  Hematuria      The history is provided by the patient.      Kyler Rodriguez is a 71 year old male with history of type 2 DM and prostate cancer (5 years clear 02/17) on aspirin 81 mg who presents with hematuria.  Patient reports undergoing a physical a month prior where there was a trace of blood in his urine and was recommended recheck in 30 days; this is scheduled for next week.  This morning he woke up and urinated at which point it was entirely red with a large clot of blood.  He had a second episode of similar urination following this prompting visit to the emergency department.  He notes some difficulty starting urination during the nights lately and yesterday there was some burning with urination.  He endorses low back pain the last two days, worse on the right side with radiation towards his buttock.  Patient follows with [facility] for his prior prostate cancer reporting he was told to do annual checks with his primary.  He denies fevers, nausea, vomiting, testicular pain, or other complaint.      Allergies:  No known drug allergies      Medications:    Metformin  Amlodipine  Atenolol  Gabapentin  Lipitor  Baclofen  Ritesh+D  Aspirin 81 mg    Past Medical History:    RBBB  Prostate cancer  Type 2 DM  Hyperlipidemia  Hypertrophy of prostate  HTN    Past Surgical History:    Varicose vein stripping  Colonoscopy  Tonsillectomy  Vasectomy  Phacoemulsification clear cornea with IOL, endoscopic cyclophotocoagulation, combined    Family History:    Father - Colorectal cancer, prostate cancer  Brother - Fibromyalgia    Social History:  Presents with spouse, Celeste   Urologist: Dr. Espinoza [facility]   Tobacco use: Never  Alcohol use: 4 drinks daily  PCP: Rakesh Clark    Marital Status:         Review of Systems   Constitutional: Negative for fever.   Gastrointestinal: Negative for nausea and vomiting.   Genitourinary: Positive for difficulty  urinating, dysuria and hematuria. Negative for testicular pain.   Musculoskeletal: Positive for back pain.   All other systems reviewed and are negative.      Physical Exam     Patient Vitals for the past 24 hrs:   BP Temp Temp src Pulse Heart Rate Resp SpO2   03/02/17 1235 (!) 148/91 - - - 75 18 97 %   03/02/17 1232 (!) 148/91 - - - - - 98 %   03/02/17 1127 - - - - - - 96 %   03/02/17 1125 157/85 - - - - - -   03/02/17 0847 165/84 98.2  F (36.8  C) Temporal 69 - 18 99 %        Physical Exam  General:  Well-nourished  Speaking in full sentences  Eyes:  Conjunctiva without injection or scleral icterus  PERRL  ENT:  Moist mucous membranes  Posterior oropharynx clear without erythema or exudate  Nares patent  Pinnae normal  Neck:  Full ROM  No stiffness appreciated  Resp:  Lungs CTAB  No crackles, wheezing or audible rubs  Good air movement  CV:   Normal rate, regular rhythm  S1 and S2 present  No murmur, gallop or rub  GI:  BS present  Abdomen soft without distention  Non-tender to light and deep palpation  No CVA tenderness  No guarding or rebound tenderness  Skin:  Warm, dry, well perfused  No rashes or open wounds on exposed skin  MSK:  Moves all extremities  No focal deformities or swelling  Neuro:  Alert  Answers questions appropriately  Moves all extremities equally  Gait stable  Psych:  Normal affect, normal mood    Emergency Department Course   Imaging:  Radiographic findings were communicated with the patient and family who voiced understanding of the findings.    CT Abdomen/pelvis without contrast:  IMPRESSION:  1. Bladder wall thickening which could relate to cystitis, outlet obstruction, neoplasm. There may well be some hemorrhage in the bladder.  2. Bilateral inguinal hernias.  3. Two indeterminate right renal lesions. Follow-up imaging recommended.  4. There is a suggestion of subtle liver lobulation suggesting cirrhosis. Clinical correlation.  5. Additional findings discussed above.    Preliminary  result per radiology.    Laboratory:  CBC: WBC 3.8 (L),  (L) ow WNL (HGB 16.5)   BMP: Glucose 141 (H) ow WNL (Creatinine 0.68)     UA: Glucose 50, Blood large, Albumin>499, RBC<182 (H), Bacteria many, o/w Negative     Emergency Department Course:  Past medical records, nursing notes, and vitals reviewed.  0904: I performed an exam of the patient and obtained history, as documented above.   IV inserted and blood drawn.   The patient was sent for a CT while in the emergency department, findings above.   Thornton catheter placed and bladder irrigated then thornton removed.  1014: I rechecked the patient. Explained findings to patient and spouse.   I personally reviewed the laboratory results with the Patient and spouse and answered all related questions prior to discharge.   1244: I rechecked the patient.  Findings and plan explained to the Patient and spouse. Patient discharged home with instructions regarding supportive care, medications, and reasons to return. The importance of close follow-up was reviewed.      Impression & Plan    Medical Decision Making:  Kyler Rodriguez is a very pleasant 71 year old male with past medical history significant for prostate cancer presenting to the emergency department for evaluation of hematuria.  VS on presentation reveal elevated blood pressure which improved during his emergency department course.  A broad differential was considered including, but not limited to, cystitis, prostatic hypertrophy, prostatitis, nephrolithiasis, malignancy, coagulopathy, among others.  At this point, exact cause of patient's hematuria not entirely clear.  Urinalysis did reveal significant hematuria although no current findings of an infection with negative nitrites, negative leukocyte esterase, and 0 WBC's.  Given patient's advanced age and prior history of prostate cancer, advanced imaging was obtained.  Bladder wall is noted to be thickened, related to possible cystitis, obstruction versus  neoplasm.  Incidentally noted are 2 indeterminate right renal lesions as well as subtle liver lobulation, the results of which were relayed to the patient as well as the need for close follow up.  There is no current evidence of ureteral obstruction nor ureteral stones.  Hemoglobin presently normal at 16.5.  Patient does have thrombocytopenia at 108 though this is roughly within range of previous values.  He is otherwise not on anticoagulation complicating current presentation aside from Aspirin.  Here in the emergency department, a thornton catheter was placed and bladder was thoroughly irrigated.  Resultant urine outflow was pink tinged without ongoing clots.  Thornton was successfully removed and he was able to void spontaneously.  At this point, given the patient's stability in symptoms, above workup, and stable VS I feel outpatient management is safe and reasonable.  I have recommended he follow up closely with his urologist, Dr. Espinoza, later this week or early next week for evaluation and likely cystoscopy for further evaluation of possible bladder malignancy.  Patient encouraged to monitor symptoms closely.  We discussed the possibility of bladder outlet obstruction although given he is able to void spontaneously here in the ED I feel that indwelling thornton catheter poses increased risk for urinary infection.  Nonetheless, patient encouraged to return to the ER with worsening pain, inability to urinate, fever, chills, vomiting, or any other concerns.  Patient and wife felt comfortable returning home at this time and all questions answered prior to discharge.     Diagnosis:    ICD-10-CM   1. Gross hematuria R31.0   2. Kidney lesion, native, right N28.9   3. Bilateral inguinal hernia without obstruction or gangrene, recurrence not specified K40.20   4. Thrombocytopenia (H) D69.6       Disposition:  Discharged to home with plan as outlined.      Gabo Alcaraz  3/2/2017   [facility] EMERGENCY  DEPARTMENT    I, Gabo Alcaraz, am serving as a scribe at 9:04 AM on 3/2/2017 to document services personally performed by Mateo Jeffery MD based on my observations and the provider's statements to me.         Mateo Jeffery MD  03/02/17 7481

## 2017-03-02 NOTE — ED NOTES
In Triage: ABC's intact. Alert and oriented x 3.  Pt reports waking up this morning and urinating blood. Denies pain or this happening in the past. States when he had a physical exam about one month ago, there was a trace amount of blood in his urine. Denies other s/s.

## 2017-03-02 NOTE — DISCHARGE INSTRUCTIONS
Follow-up:  Please follow-up with Urology in 2-3 days for re-evaluation and discussion of your visit to the emergency department today.    Home treatments:  Recommended home therapies include fluids, and close monitoring of urination    Return precautions:  Warning signs which should prompt you to return to the ER include worsening bleeding, production of clots, inability to urinate, abdominal pain, fevers, flank pain, or any other new or troubling symptoms.  We are always happy to see you again.        Hematuria: Possible Causes     Many things can lead to blood in the urine (hematuria). The blood may be seen with the eye. Or it may only be seen when the urine is looked at under a microscope. Some of the most common causes of blood in the urine are listed below. Often, no cause for the blood can be found. This is called idiopathic hematuria.    Kidney or bladder stones are collections of crystals. They form in the urine. Stones may be found anywhere in the urinary tract. But they form most often in the kidneys or bladder. In addition to blood in the urine, they can cause severe pain.   BPH stands for benign prostatic hyperplasia. It is enlargement of the prostate gland. It happens as men age. BPH often causes problems with urination. It sometimes causes blood in the urine.   A urinary tract infection (UTI) is due to bacteria growing in the urinary tract. It can cause blood in the urine. Other symptoms include burning or pain with urination. You may need to urinate often or urgently. You may also have a fever.     Damage to the urinary tract may cause blood in the urine. This damage may be due to a blow or accident. It may also result from the use of a urinary catheter. Very hard exercise may sometimes irritate the urinary tract and cause bleeding.   Cancer may occur anywhere in the urinary tract. A tumor may sometimes cause no symptoms other than bleeding. Other possible causes of bleeding include:    Prostatitis  (infection of the prostate gland)    Taking anticoagulant medications    Blockage in the urinary tract    Disease or inflammation of the kidney    Cystic diseases of the kidneys    Sickle cell anemia    Tumors of the urinary tract     [id number] [employer]. [address] [place] [address], [place] [address]. All rights reserved. This information is not intended as a substitute for professional medical care. Always follow your healthcare professional's instructions.

## 2017-03-02 NOTE — ED AVS SNAPSHOT
[facility] Emergency Department    [address]    [facility] [address]    Phone:  [phone]    Fax:  [phone]                                       Kyler Rodriguez   MRN: [id number]    Department:  [facility] Emergency Department   Date of Visit:  3/2/2017           After Visit Summary Signature Page     I have received my discharge instructions, and my questions have been answered. I have discussed any challenges I see with this plan with the nurse or doctor.    ..........................................................................................................................................  Patient/Patient Representative Signature      ..........................................................................................................................................  Patient Representative Print Name and Relationship to Patient    ..................................................               ................................................  Date                                            Time    ..........................................................................................................................................  Reviewed by Signature/Title    ...................................................              ..............................................  Date                                                            Time

## 2017-03-03 ENCOUNTER — TELEPHONE (OUTPATIENT)
Dept: INTERNAL MEDICINE | Facility: CLINIC | Age: 72
End: 2017-03-03

## 2017-03-03 NOTE — TELEPHONE ENCOUNTER
Pt calls, reports he was instructed by PCP after 02/07 labs to have a f/u UA in 1 month d/t trace blood. Indicates he was in ER yesterday with hematuria. He's f/u with Urologist on Wed. Wanted to let PCP know that there were labs, UA, and CT scan done in ER and does not plan to f/u with the UA recommended by PCP d/t these.    Sent to MD as FYI.

## 2017-03-08 ENCOUNTER — OFFICE VISIT (OUTPATIENT)
Dept: UROLOGY | Facility: CLINIC | Age: 72
End: 2017-03-08
Payer: COMMERCIAL

## 2017-03-08 VITALS
DIASTOLIC BLOOD PRESSURE: 60 MMHG | WEIGHT: 200 LBS | HEART RATE: 60 BPM | HEIGHT: 73 IN | BODY MASS INDEX: 26.51 KG/M2 | SYSTOLIC BLOOD PRESSURE: 128 MMHG

## 2017-03-08 DIAGNOSIS — R31.0 GROSS HEMATURIA: ICD-10-CM

## 2017-03-08 DIAGNOSIS — N40.1 HYPERTROPHY OF PROSTATE WITH URINARY OBSTRUCTION: Primary | ICD-10-CM

## 2017-03-08 DIAGNOSIS — N13.8 HYPERTROPHY OF PROSTATE WITH URINARY OBSTRUCTION: Primary | ICD-10-CM

## 2017-03-08 LAB
ALBUMIN UR-MCNC: 100 MG/DL
APPEARANCE UR: CLEAR
BILIRUB UR QL STRIP: ABNORMAL
COLOR UR AUTO: YELLOW
GLUCOSE UR STRIP-MCNC: NEGATIVE MG/DL
HGB UR QL STRIP: ABNORMAL
KETONES UR STRIP-MCNC: 15 MG/DL
LEUKOCYTE ESTERASE UR QL STRIP: NEGATIVE
NITRATE UR QL: NEGATIVE
PH UR STRIP: 5.5 PH (ref 5–7)
SP GR UR STRIP: 1.02 (ref 1–1.03)
URN SPEC COLLECT METH UR: ABNORMAL
UROBILINOGEN UR STRIP-ACNC: 2 EU/DL (ref 0.2–1)

## 2017-03-08 PROCEDURE — 88120 CYTP URNE 3-5 PROBES EA SPEC: CPT | Performed by: UROLOGY

## 2017-03-08 PROCEDURE — 99212 OFFICE O/P EST SF 10 MIN: CPT | Mod: 25 | Performed by: UROLOGY

## 2017-03-08 PROCEDURE — 52000 CYSTOURETHROSCOPY: CPT | Performed by: UROLOGY

## 2017-03-08 PROCEDURE — 81003 URINALYSIS AUTO W/O SCOPE: CPT | Performed by: UROLOGY

## 2017-03-08 RX ORDER — CIPROFLOXACIN 500 MG/1
500 TABLET, FILM COATED ORAL ONCE
Qty: 1 TABLET | Refills: 0 | Status: SHIPPED | OUTPATIENT
Start: 2017-03-08 | End: 2017-03-08

## 2017-03-08 ASSESSMENT — PAIN SCALES - GENERAL: PAINLEVEL: NO PAIN (0)

## 2017-03-08 NOTE — MR AVS SNAPSHOT
"              After Visit Summary   3/8/2017    Kyler Rodriguez    MRN: [id number]           Patient Information     Date Of Birth          1945        Visit Information        Provider Department      3/8/2017 9:20 AM Keon Espinoza MD; UA CYF [facility] [place]        Today's Diagnoses     Hypertrophy of prostate with urinary obstruction    -  1    Gross hematuria          Care Instructions    Please Ritesh [phone] to schedule CT Scan        Follow-ups after your visit        Future tests that were ordered for you today     Open Future Orders        Priority Expected Expires Ordered    CT Abdomen Pelvis w Contrast [BYE036] Routine  3/8/2018 3/8/2017            Who to contact     If you have questions or need follow up information about today's clinic visit or your schedule please contact [facility] [place] directly at [phone].  Normal or non-critical lab and imaging results will be communicated to you by MyChart, letter or phone within 4 business days after the clinic has received the results. If you do not hear from us within 7 days, please contact the clinic through MyChart or phone. If you have a critical or abnormal lab result, we will notify you by phone as soon as possible.  Submit refill requests through [employer] or call your pharmacy and they will forward the refill request to us. Please allow 3 business days for your refill to be completed.          Additional Information About Your Visit        Care EveryWhere ID     This is your Care EveryWhere ID. This could be used by other organizations to access your [place] medical records  [id number]        Your Vitals Were     Pulse Height BMI (Body Mass Index)             60 1.854 m (6' 1\") 26.39 kg/m2          Blood Pressure from Last 3 Encounters:   03/08/17 128/60   03/02/17 (!) 148/91   02/07/17 128/60    Weight from Last 3 Encounters:   03/08/17 90.7 kg (200 lb)   02/07/17 " 91.9 kg (202 lb 11.2 oz)   02/03/17 93 kg (205 lb)              We Performed the Following     CYSTOSCOPY AND TREATMENT (93844)     FISH BLADDER CANCER     UA without Microscopic          Today's Medication Changes          These changes are accurate as of: 3/8/17  9:36 AM.  If you have any questions, ask your nurse or doctor.               Start taking these medicines.        Dose/Directions    ciprofloxacin 500 MG tablet   Commonly known as:  CIPRO   Used for:  Gross hematuria   Started by:  Keon Espinoza MD        Dose:  500 mg   Take 1 tablet (500 mg) by mouth once for 1 dose   Quantity:  1 tablet   Refills:  0            Where to get your medicines      These medications were sent to [facility], [place] - [address]  [address] [facility] [address], [facility] [address]     Phone:  [phone]     ciprofloxacin 500 MG tablet                Primary Care Provider Office Phone # Fax #    Rakesh Clark [phone]       [facility] [address] [place] [address]        Thank you!     Thank you for choosing [facility] [place]  for your care. Our goal is always to provide you with excellent care. Hearing back from our patients is one way we can continue to improve our services. Please take a few minutes to complete the written survey that you may receive in the mail after your visit with us. Thank you!             Your Updated Medication List - Protect others around you: Learn how to safely use, store and throw away your medicines at www.disposemymeds.org.          This list is accurate as of: 3/8/17  9:36 AM.  Always use your most recent med list.                   Brand Name Dispense Instructions for use    ACCU-CHEK COMPLETE Kit     1    test daily       amLODIPine-benazepril 10-20 MG per capsule    LOTREL    90 capsule    Take 1 capsule by mouth daily       aspirin 81 MG tablet      1 tablet daily       atenolol 25  MG tablet    TENORMIN    90 tablet    Take 1 tablet (25 mg) by mouth daily       atorvastatin 80 MG tablet    LIPITOR    45 tablet    Take 0.5 tablets (40 mg) by mouth daily       baclofen 10 MG tablet    LIORESAL    90 tablet    Take 1 tablet daily       blood glucose monitoring test strip    ACCU-CHEK COMPACT DRUM    100 strip    by In Vitro route daily. Test one time daily or as directed.       CALTRATE 600+D PLUS PO      Take  by mouth.       ciprofloxacin 500 MG tablet    CIPRO    1 tablet    Take 1 tablet (500 mg) by mouth once for 1 dose       gabapentin 300 MG capsule    NEURONTIN    90 capsule    Take 1 capsule by mouth. 1po hs       metFORMIN 1000 MG tablet    GLUCOPHAGE    180 tablet    Take 1 tablet (1,000 mg) by mouth 2 times daily (with meals) with breakfast and dinner.       Multiple vitamin  s Tabs

## 2017-03-08 NOTE — LETTER
3/8/2017       RE: Kyler Rodrigeuz  [address] [facility] [address]     Dear Colleague,    Thank you for referring your patient, Kyler Rodriguez, to the [facility] [place] at [facility]. Please see a copy of my visit note below.    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 He was seen a month ago doing well. Unfortunately he was in the emergency room a week or so ago with gross hematuria. His CT scan without contrast shows a thickened bladder wall. Urinalysis showed only blood and protein. He has 2 indeterminate right renal masses, one at the upper pole and one at the lower pole which was seen on 2011 CT scan without contrast and probably has changed little. He now returns for cystoscopy, urine fish test and may need a CT scan with contrast  Past medical history recently reviewed  Medications: Multiple vitamin, Glucophage, Neurontin, Caltrate, baclofen, Lipitor, Tenormin, baby aspirin, Lotrel  Allergies: None  Urinalysis: PH 5.5, specific gravity 1.020, large blood, negative leukocyte esterase and nitrite. Minimal protein  Flexible cystoscopy-normal urethra, inflamed and friable prostatic fossa, very trabeculated bladder with patchy subcutaneous hemorrhages, no tumors are raised erythema, no stones.  Normal appearance, normal vital signs, alert and oriented, normocephalic, normal respirations, normal external genitalia-uncircumcised, no glanular lesions.  Assessment: Gross hematuria probably related to radiation, 2 right renal lesions-probably cysts  Plan: Cipro 500 mg ×1 today. Stop baby aspirin, urine fish test, CT scan with contrast    Again, thank you for allowing me to participate in the care of your patient.      Sincerely,    Keon Espinoza MD

## 2017-03-10 ENCOUNTER — HOSPITAL ENCOUNTER (OUTPATIENT)
Dept: CT IMAGING | Facility: CLINIC | Age: 72
Discharge: HOME | End: 2017-03-10
Attending: UROLOGY | Admitting: UROLOGY
Payer: MEDICARE

## 2017-03-10 DIAGNOSIS — N42.1: Primary | ICD-10-CM

## 2017-03-10 DIAGNOSIS — R31.0 GROSS HEMATURIA: ICD-10-CM

## 2017-03-10 LAB — RADIOLOGIST FLAGS: ABNORMAL

## 2017-03-10 PROCEDURE — 25500064 ZZH RX 255 OP 636: Performed by: RADIOLOGY

## 2017-03-10 PROCEDURE — 74178 CT ABD&PLV WO CNTR FLWD CNTR: CPT

## 2017-03-10 PROCEDURE — 25000128 H RX IP 250 OP 636: Performed by: RADIOLOGY

## 2017-03-10 RX ORDER — FINASTERIDE 5 MG/1
5 TABLET, FILM COATED ORAL DAILY
Qty: 90 TABLET | Refills: 3 | Status: SHIPPED | OUTPATIENT
Start: 2017-03-10 | End: 2017-08-18

## 2017-03-10 RX ORDER — IOPAMIDOL 755 MG/ML
500 INJECTION, SOLUTION INTRAVASCULAR ONCE
Status: COMPLETED | OUTPATIENT
Start: 2017-03-10 | End: 2017-03-10

## 2017-03-10 RX ADMIN — SODIUM CHLORIDE 65 ML: 9 INJECTION, SOLUTION INTRAVENOUS at 07:54

## 2017-03-10 RX ADMIN — IOPAMIDOL 100 ML: 755 INJECTION, SOLUTION INTRAVENOUS at 07:54

## 2017-03-16 LAB — COPATH REPORT: NORMAL

## 2017-04-28 DIAGNOSIS — E78.5 HYPERLIPIDEMIA LDL GOAL <100: ICD-10-CM

## 2017-04-28 DIAGNOSIS — E11.21 TYPE 2 DIABETES MELLITUS WITH DIABETIC NEPHROPATHY (H): ICD-10-CM

## 2017-04-28 DIAGNOSIS — I10 ESSENTIAL HYPERTENSION, BENIGN: ICD-10-CM

## 2017-04-28 DIAGNOSIS — M62.830 BACK MUSCLE SPASM: ICD-10-CM

## 2017-04-28 RX ORDER — ATENOLOL 25 MG/1
TABLET ORAL
Qty: 90 TABLET | Refills: 1 | Status: SHIPPED | OUTPATIENT
Start: 2017-04-28 | End: 2017-12-05

## 2017-04-28 RX ORDER — ATORVASTATIN CALCIUM 80 MG/1
TABLET, FILM COATED ORAL
Qty: 45 TABLET | Refills: 1 | Status: SHIPPED | OUTPATIENT
Start: 2017-04-28 | End: 2017-12-05

## 2017-04-28 RX ORDER — AMLODIPINE AND BENAZEPRIL HYDROCHLORIDE 10; 20 MG/1; MG/1
CAPSULE ORAL
Qty: 90 CAPSULE | Refills: 1 | Status: SHIPPED | OUTPATIENT
Start: 2017-04-28 | End: 2017-12-05

## 2017-04-28 RX ORDER — BACLOFEN 10 MG/1
TABLET ORAL
Qty: 90 TABLET | Refills: 0 | Status: SHIPPED | OUTPATIENT
Start: 2017-04-28 | End: 2017-07-05

## 2017-04-28 NOTE — TELEPHONE ENCOUNTER
Last OV-2/7/17      Lab Results   Component Value Date    A1C 5.4 02/07/2017    A1C 5.3 08/08/2016    A1C 5.0 02/17/2016    A1C 5.6 08/17/2015    A1C 5.3 10/13/2014

## 2017-07-05 DIAGNOSIS — M62.830 BACK MUSCLE SPASM: ICD-10-CM

## 2017-07-05 RX ORDER — BACLOFEN 10 MG/1
TABLET ORAL
Qty: 90 TABLET | Refills: 0 | Status: SHIPPED | OUTPATIENT
Start: 2017-07-05 | End: 2017-10-10

## 2017-07-05 NOTE — TELEPHONE ENCOUNTER
Baclofen      Last Written Prescription Date:  04/28/17  Last Fill Quantity: 90,   # refills: 0  Last Office Visit with [facility] prescribing provider: 02/07/17  Future Office visit:    Next 5 appointments (look out 90 days)     Aug 11, 2017  8:00 AM CDT   SHORT with Rakesh Clark MD   [facility]    [address]  [facility] [address]   [phone]                   Routing refill request to provider for review/approval because:  Drug not on the [facility] refill protocol or controlled substance

## 2017-07-18 ENCOUNTER — TRANSFERRED RECORDS (OUTPATIENT)
Dept: HEALTH INFORMATION MANAGEMENT | Facility: CLINIC | Age: 72
End: 2017-07-18

## 2017-08-11 ENCOUNTER — OFFICE VISIT (OUTPATIENT)
Dept: INTERNAL MEDICINE | Facility: CLINIC | Age: 72
End: 2017-08-11
Payer: COMMERCIAL

## 2017-08-11 VITALS
WEIGHT: 181 LBS | SYSTOLIC BLOOD PRESSURE: 116 MMHG | HEART RATE: 62 BPM | DIASTOLIC BLOOD PRESSURE: 62 MMHG | TEMPERATURE: 98.1 F | BODY MASS INDEX: 23.99 KG/M2 | HEIGHT: 73 IN | OXYGEN SATURATION: 99 %

## 2017-08-11 DIAGNOSIS — M79.672 LEFT FOOT PAIN: ICD-10-CM

## 2017-08-11 DIAGNOSIS — E78.5 HYPERLIPIDEMIA LDL GOAL <100: ICD-10-CM

## 2017-08-11 DIAGNOSIS — I10 ESSENTIAL HYPERTENSION, BENIGN: ICD-10-CM

## 2017-08-11 DIAGNOSIS — E11.21 TYPE 2 DIABETES MELLITUS WITH DIABETIC NEPHROPATHY, WITHOUT LONG-TERM CURRENT USE OF INSULIN (H): Primary | ICD-10-CM

## 2017-08-11 LAB
ALBUMIN SERPL-MCNC: 3.7 G/DL (ref 3.4–5)
ALP SERPL-CCNC: 109 U/L (ref 40–150)
ALT SERPL W P-5'-P-CCNC: 22 U/L (ref 0–70)
ANION GAP SERPL CALCULATED.3IONS-SCNC: 7 MMOL/L (ref 3–14)
AST SERPL W P-5'-P-CCNC: 19 U/L (ref 0–45)
BILIRUB SERPL-MCNC: 0.4 MG/DL (ref 0.2–1.3)
BUN SERPL-MCNC: 11 MG/DL (ref 7–30)
CALCIUM SERPL-MCNC: 9.4 MG/DL (ref 8.5–10.1)
CHLORIDE SERPL-SCNC: 102 MMOL/L (ref 94–109)
CO2 SERPL-SCNC: 29 MMOL/L (ref 20–32)
CREAT SERPL-MCNC: 0.75 MG/DL (ref 0.66–1.25)
GFR SERPL CREATININE-BSD FRML MDRD: NORMAL ML/MIN/1.7M2
GLUCOSE SERPL-MCNC: 99 MG/DL (ref 70–99)
HBA1C MFR BLD: 5.1 % (ref 4.3–6)
POTASSIUM SERPL-SCNC: 4.4 MMOL/L (ref 3.4–5.3)
PROT SERPL-MCNC: 7.5 G/DL (ref 6.8–8.8)
SODIUM SERPL-SCNC: 138 MMOL/L (ref 133–144)
URATE SERPL-MCNC: 5.2 MG/DL (ref 3.5–7.2)

## 2017-08-11 PROCEDURE — 36415 COLL VENOUS BLD VENIPUNCTURE: CPT | Performed by: INTERNAL MEDICINE

## 2017-08-11 PROCEDURE — 99214 OFFICE O/P EST MOD 30 MIN: CPT | Performed by: INTERNAL MEDICINE

## 2017-08-11 PROCEDURE — 83036 HEMOGLOBIN GLYCOSYLATED A1C: CPT | Performed by: INTERNAL MEDICINE

## 2017-08-11 PROCEDURE — 84550 ASSAY OF BLOOD/URIC ACID: CPT | Performed by: INTERNAL MEDICINE

## 2017-08-11 PROCEDURE — 80053 COMPREHEN METABOLIC PANEL: CPT | Performed by: INTERNAL MEDICINE

## 2017-08-11 NOTE — PROGRESS NOTES
.  SUBJECTIVE:                                                    Kyler Rodriguez is a 72 year old male who presents to clinic today for the following health issues:    Patient is seen for a follow up visit.  No acute complaints, no medication change or new medical conditions.      Diabetes Follow-up      Patient is checking blood sugars: rarely.  Results avg 120    Diabetic concerns: None     Symptoms of hypoglycemia (low blood sugar): none     Paresthesias (numbness or burning in feet) or sores: No     Date of last diabetic eye exam: 4-2017  Has H/O DM. On diet , exercise and Metformin. Blood sugars are controlled. No parestesias. No hypoglycemias.    Hyperlipidemia Follow-Up      Rate your low fat/cholesterol diet?: good    Taking statin?  Yes, no muscle aches from statin    Other lipid medications/supplements?:  none  Has H/O hyperlipidemia. On medical treatment and diet. No side effects. No muscle weakness, myalgias or upset stomach.     Hypertension Follow-up      Outpatient blood pressures are not being checked.    Low Salt Diet: no added salt  Has h/o HTN. on medical treatment. BP has been controlled. No side effects from medications. No CP, HA, dizziness. good compliance with medications and low salt diet.    Has h/o prostate cancer, follows with urology. Has had episode of hematuria. On Proscar. Reports improved symptoms.       Amount of exercise or physical activity: walks    Problems taking medications regularly: No    Medication side effects: none  Diet: low salt, low fat/cholesterol and diabetic    Reports left foot pain- on the top of the foot with ambulation, also left toe pain on and off       Problem list and histories reviewed & adjusted, as indicated.  Additional history: as documented    Patient Active Problem List   Diagnosis     Essential hypertension, benign     Other and unspecified disc disorder of cervical region     Hypertrophy of prostate with urinary obstruction     Type 2 diabetes  mellitus with diabetic nephropathy (H)     HYPERLIPIDEMIA LDL GOAL <100     Prostate cancer (H)     Advance Care Planning     RBBB     Past Surgical History:   Procedure Laterality Date     C NONSPECIFIC PROCEDURE  1998    varicose vein stripping     C NONSPECIFIC PROCEDURE  1998    colonoscopy     CYSTOSCOPY       EYE SURGERY      cataract left eye     HC REMOVAL OF TONSILS,12+ Y/O       HC REMOVAL OF TONSILS,<11 Y/O       HC VASECTOMY UNILAT/BILAT W POSTOP SEMEN       PHACOEMULSIFICATION CLEAR CORNEA W/ STANDARD IOL IMPLANT, ENDOSCOPIC CYCLOPHOTOCOAGULATION, COMBINED  7/31/2014    Procedure: COMBINED PHACOEMULSIFICATION CLEAR CORNEA WITH STANDARD INTRAOCULAR LENS IMPLANT, ENDOSCOPIC CYCLOPHOTOCOAGULATION;  Surgeon: Leroy Crews MD;  Location:  [facility]     VASECTOMY         Social History   Substance Use Topics     Smoking status: Never Smoker     Smokeless tobacco: Never Used     Alcohol use 0.0 oz/week     0 Standard drinks or equivalent per week      Comment: 4 drinks/daily     Family History   Problem Relation Age of Onset     Cancer - colorectal Father      derceased@[email or web address]     Prostate Cancer Father      Musculoskeletal Disorder Brother      fibermyalgia     Prostate Cancer Paternal Grandfather          Current Outpatient Prescriptions   Medication Sig Dispense Refill     baclofen (LIORESAL) 10 MG tablet TAKE 1 TABLET EVERY DAY 90 tablet 0     atenolol (TENORMIN) 25 MG tablet TAKE 1 TABLET EVERY DAY 90 tablet 1     amLODIPine-benazepril (LOTREL) 10-20 MG per capsule TAKE 1 CAPSULE EVERY DAY 90 capsule 1     metFORMIN (GLUCOPHAGE) 1000 MG tablet TAKE 1 TABLET TWICE DAILY WITH BREAKFAST  AND WITH DINNER 180 tablet 1     atorvastatin (LIPITOR) 80 MG tablet TAKE 1/2 TABLET EVERY DAY 45 tablet 1     finasteride (PROSCAR) 5 MG tablet Take 1 tablet (5 mg) by mouth daily 90 tablet 3     gabapentin (NEURONTIN) 300 MG capsule Take 1 capsule by mouth. 1po hs 90 capsule 3     blood glucose (ACCU-CHEK COMPACT DRUM)  "test strip by In Vitro route daily. Test one time daily or as directed. 100 strip 1     Calcium Carbonate-Vit D-Min (CALTRATE 600+D PLUS PO) Take  by mouth.       ASPIRIN 81 MG OR TABS 1 tablet daily  0     ACCU-CHEK COMPLETE KIT test daily 1 0     MULTIPLE VITAMINS OR TABS            Reviewed and updated as needed this visit by clinical staffTobacco  Allergies  Meds  Med Hx  Surg Hx  Fam Hx  Soc Hx      Reviewed and updated as needed this visit by Provider         ROS:  Constitutional, HEENT, cardiovascular, pulmonary, gi and gu systems are negative, except as otherwise noted.      OBJECTIVE:   /62 (BP Location: Left arm, Patient Position: Sitting, Cuff Size: Adult Large)  Pulse 62  Temp 98.1  F (36.7  C) (Oral)  Ht 6' 1\" (1.854 m)  Wt 181 lb (82.1 kg)  SpO2 99%  BMI 23.88 kg/m2  Body mass index is 23.88 kg/(m^2).   GENERAL: healthy, alert and no distress  NECK: no adenopathy, no asymmetry, masses, or scars and thyroid normal to palpation  RESP: lungs clear to auscultation - no rales, rhonchi or wheezes  CV: regular rate and rhythm, normal S1 S2, no S3 or S4, no murmur, click or rub, no peripheral edema and peripheral pulses strong  ABDOMEN: soft, nontender, no hepatosplenomegaly, no masses and bowel sounds normal  MS: no gross musculoskeletal defects noted, no edema, OA changed of the left foot and great toe    Diagnostic Test Results:  Results for orders placed or performed in visit on 08/11/17   Hemoglobin A1c   Result Value Ref Range    Hemoglobin A1C 5.1 4.3 - 6.0 %       ASSESSMENT/PLAN:     Problem List Items Addressed This Visit     Essential hypertension, benign    Relevant Orders    Comprehensive metabolic panel (Completed)    Type 2 diabetes mellitus with diabetic nephropathy (H) - Primary    Relevant Orders    Hemoglobin A1c (Completed)    HYPERLIPIDEMIA LDL GOAL <100      Other Visit Diagnoses     Left foot pain        Relevant Orders    Uric acid (Completed)           Assess lab " work  Cont treatment   Monitor BG, HTN   Follow up with urology     Follow-Up:in 6 months     Rakesh Clark MD  [facility]

## 2017-08-11 NOTE — NURSING NOTE
"Chief Complaint   Patient presents with     Diabetes     DM, HTN, Lipids       Initial /62 (BP Location: Left arm, Patient Position: Sitting, Cuff Size: Adult Large)  Pulse 62  Temp 98.1  F (36.7  C) (Oral)  Ht 6' 1\" (1.854 m)  Wt 181 lb (82.1 kg)  SpO2 99%  BMI 23.88 kg/m2 Estimated body mass index is 23.88 kg/(m^2) as calculated from the following:    Height as of this encounter: 6' 1\" (1.854 m).    Weight as of this encounter: 181 lb (82.1 kg).  Medication Reconciliation: complete    "

## 2017-08-11 NOTE — MR AVS SNAPSHOT
"              After Visit Summary   8/11/2017    Kyler Rodriguez    MRN: [id number]           Patient Information     Date Of Birth          1945        Visit Information        Provider Department      8/11/2017 8:00 AM Rakesh Clark MD [facility]        Today's Diagnoses     Type 2 diabetes mellitus with diabetic nephropathy, without long-term current use of insulin (H)    -  1    Essential hypertension, benign        Hyperlipidemia LDL goal <100        Left foot pain           Follow-ups after your visit        Your next 10 appointments already scheduled     Aug 16, 2017 10:40 AM CDT   Return Visit with Keon Espinoza MD   [facility] [place] ([facility])    [address]  [address]  [facility] [address] [phone]              Who to contact     If you have questions or need follow up information about today's clinic visit or your schedule please contact [facility] directly at [phone].  Normal or non-critical lab and imaging results will be communicated to you by [employer], letter or phone within 4 business days after the clinic has received the results. If you do not hear from us within 7 days, please contact the clinic through [employer] or phone. If you have a critical or abnormal lab result, we will notify you by phone as soon as possible.  Submit refill requests through [employer] or call your pharmacy and they will forward the refill request to us. Please allow 3 business days for your refill to be completed.          Additional Information About Your Visit        MyChart Information     [employer] lets you send messages to your doctor, view your test results, renew your prescriptions, schedule appointments and more. To sign up, go to [email or web address] . Click on \"Log in\" on the left side of the screen, which will take you to the Welcome page. Then click on \"Sign up Now\" on the right side of the page. " "    You will be asked to enter the access code listed below, as well as some personal information. Please follow the directions to create your username and password.     Your access code is: [email or web address]  Expires: 2017  9:53 AM     Your access code will  in 90 days. If you need help or a new code, please call your [place] clinic or [phone].        Care EveryWhere ID     This is your Care EveryWhere ID. This could be used by other organizations to access your [place] medical records  [id number]        Your Vitals Were     Pulse Temperature Height Pulse Oximetry BMI (Body Mass Index)       62 98.1  F (36.7  C) (Oral) 6' 1\" (1.854 m) 99% 23.88 kg/m2        Blood Pressure from Last 3 Encounters:   17 116/62   17 128/60   17 (!) 148/91    Weight from Last 3 Encounters:   17 181 lb (82.1 kg)   17 200 lb (90.7 kg)   17 202 lb 11.2 oz (91.9 kg)              We Performed the Following     Comprehensive metabolic panel     Hemoglobin A1c     Uric acid        Primary Care Provider Office Phone # Fax #    Rakesh Clark [phone]       [address] [facility] [address]        Equal Access to Services     TI CONNORS : Hadii aad ku hadasho Soomaali, waaxda luqadaha, qaybta kaalmada flaco, wesley holloway . So [facility] [phone].    ATENCIÓN: Si habla español, tiene a olivsa disposición servicios gratuitos de asistencia lingüística. Lyndsay al [phone].    We comply with applicable federal civil rights laws and [place] laws. We do not discriminate on the basis of race, color, national origin, age, disability sex, sexual orientation or gender identity.            Thank you!     Thank you for choosing [facility]  for your care. Our goal is always to provide you with excellent care. Hearing back from our patients is one way we can continue to improve our services. Please take a few minutes to complete " the written survey that you may receive in the mail after your visit with us. Thank you!             Your Updated Medication List - Protect others around you: Learn how to safely use, store and throw away your medicines at www.disposemymeds.org.          This list is accurate as of: 8/11/17  9:53 AM.  Always use your most recent med list.                   Brand Name Dispense Instructions for use Diagnosis    ACCU-CHEK COMPLETE Kit     1    test daily    Type II or unspecified type diabetes mellitus without mention of complication, not stated as uncontrolled       amLODIPine-benazepril 10-20 MG per capsule    LOTREL    90 capsule    TAKE 1 CAPSULE EVERY DAY    Essential hypertension, benign       aspirin 81 MG tablet      1 tablet daily    Essential hypertension, benign, Type II or unspecified type diabetes mellitus without mention of complication, not stated as uncontrolled, Pure hypercholesterolemia, Hypertrophy of prostate with urinary obstruction and other lower urinary tract symptoms (LUTS), Elevated prostate specific antigen (PSA)       atenolol 25 MG tablet    TENORMIN    90 tablet    TAKE 1 TABLET EVERY DAY    Essential hypertension, benign       atorvastatin 80 MG tablet    LIPITOR    45 tablet    TAKE 1/2 TABLET EVERY DAY    Hyperlipidemia LDL goal <100       baclofen 10 MG tablet    LIORESAL    90 tablet    TAKE 1 TABLET EVERY DAY    Back muscle spasm       blood glucose monitoring test strip    ACCU-CHEK COMPACT DRUM    100 strip    by In Vitro route daily. Test one time daily or as directed.    Type II or unspecified type diabetes mellitus without mention of complication, not stated as uncontrolled       CALTRATE 600+D PLUS PO      Take  by mouth.    Type 2 diabetes, HbA1c goal < 7% (H), Hyperlipidemia LDL goal <100, Prostate cancer (H), Essential hypertension, benign       finasteride 5 MG tablet    PROSCAR    90 tablet    Take 1 tablet (5 mg) by mouth daily    Prostatic hemorrhage       gabapentin 300  MG capsule    NEURONTIN    90 capsule    Take 1 capsule by mouth. 1po hs    Essential hypertension, benign       metFORMIN 1000 MG tablet    GLUCOPHAGE    180 tablet    TAKE 1 TABLET TWICE DAILY WITH BREAKFAST  AND WITH DINNER    Type 2 diabetes mellitus with diabetic nephropathy (H)       Multiple vitamin  s Tabs

## 2017-08-16 ENCOUNTER — OFFICE VISIT (OUTPATIENT)
Dept: UROLOGY | Facility: CLINIC | Age: 72
End: 2017-08-16
Payer: COMMERCIAL

## 2017-08-16 VITALS
HEART RATE: 70 BPM | HEIGHT: 72 IN | WEIGHT: 185 LBS | BODY MASS INDEX: 25.06 KG/M2 | SYSTOLIC BLOOD PRESSURE: 134 MMHG | DIASTOLIC BLOOD PRESSURE: 68 MMHG

## 2017-08-16 DIAGNOSIS — C61 PROSTATE CANCER (H): ICD-10-CM

## 2017-08-16 DIAGNOSIS — N13.8 HYPERTROPHY OF PROSTATE WITH URINARY OBSTRUCTION: Primary | ICD-10-CM

## 2017-08-16 DIAGNOSIS — N40.1 HYPERTROPHY OF PROSTATE WITH URINARY OBSTRUCTION: Primary | ICD-10-CM

## 2017-08-16 DIAGNOSIS — N28.89 RENAL MASS, RIGHT: ICD-10-CM

## 2017-08-16 LAB
ALBUMIN UR-MCNC: NEGATIVE MG/DL
APPEARANCE UR: CLEAR
BILIRUB UR QL STRIP: NEGATIVE
COLOR UR AUTO: YELLOW
GLUCOSE UR STRIP-MCNC: NEGATIVE MG/DL
HGB UR QL STRIP: NEGATIVE
KETONES UR STRIP-MCNC: NEGATIVE MG/DL
LEUKOCYTE ESTERASE UR QL STRIP: NEGATIVE
NITRATE UR QL: NEGATIVE
PH UR STRIP: 7 PH (ref 5–7)
PSA SERPL-MCNC: <0.04 NG/ML (ref 0–4)
SOURCE: NORMAL
SP GR UR STRIP: 1.01 (ref 1–1.03)
UROBILINOGEN UR STRIP-ACNC: 1 EU/DL (ref 0.2–1)

## 2017-08-16 PROCEDURE — 84153 ASSAY OF PSA TOTAL: CPT | Performed by: UROLOGY

## 2017-08-16 PROCEDURE — 36415 COLL VENOUS BLD VENIPUNCTURE: CPT | Performed by: UROLOGY

## 2017-08-16 PROCEDURE — 99213 OFFICE O/P EST LOW 20 MIN: CPT | Performed by: UROLOGY

## 2017-08-16 PROCEDURE — 81003 URINALYSIS AUTO W/O SCOPE: CPT | Performed by: UROLOGY

## 2017-08-16 ASSESSMENT — PAIN SCALES - GENERAL: PAINLEVEL: NO PAIN (0)

## 2017-08-16 NOTE — MR AVS SNAPSHOT
After Visit Summary   8/16/2017    Kyler Rodriguez    MRN: [id number]           Patient Information     Date Of Birth          1945        Visit Information        Provider Department      8/16/2017 10:40 AM Keon Espinoza MD [facility] [place]        Today's Diagnoses     Hypertrophy of prostate with urinary obstruction    -  1    Prostate cancer (H)        Renal mass, right           Follow-ups after your visit        Follow-up notes from your care team     Return in about 6 months (around 2/16/2018) for PSA.      Your next 10 appointments already scheduled     Aug 22, 2017 10:00 AM CDT   US RENAL COMPLETE with RSUS2   [facility] ([facility])    [address]  [facility] [address] [phone]           Please bring a list of your medicines (including vitamins, minerals and over-the-counter drugs). Also, tell your doctor about any allergies you may have. Wear comfortable clothes and leave your valuables at home.  You do not need to do anything special to prepare for your exam.  Please call the Imaging Department at your exam site with any questions.            Feb 16, 2018 10:00 AM CST   Return Visit with Keon Espinoza MD   [facility] ([facility] [place])    [address]  [address]  [facility] [address] [phone]              Future tests that were ordered for you today     Open Future Orders        Priority Expected Expires Ordered    PSA Diag Urologic Phys Routine 2/16/2018 8/16/2018 8/16/2017    US Renal Complete [[id number]] Routine  8/16/2018 8/16/2017            Who to contact     If you have questions or need follow up information about today's clinic visit or your schedule please contact [facility] directly at [phone].  Normal or non-critical lab and imaging results will be  "communicated to you by MyChart, letter or phone within 4 business days after the clinic has received the results. If you do not hear from us within 7 days, please contact the clinic through [employer] or phone. If you have a critical or abnormal lab result, we will notify you by phone as soon as possible.  Submit refill requests through [employer] or call your pharmacy and they will forward the refill request to us. Please allow 3 business days for your refill to be completed.          Additional Information About Your Visit        [employer] Information     [employer] lets you send messages to your doctor, view your test results, renew your prescriptions, schedule appointments and more. To sign up, go to [email or web address] . Click on \"Log in\" on the left side of the screen, which will take you to the Welcome page. Then click on \"Sign up Now\" on the right side of the page.     You will be asked to enter the access code listed below, as well as some personal information. Please follow the directions to create your username and password.     Your access code is: [email or web address]  Expires: 2017  9:53 AM     Your access code will  in 90 days. If you need help or a new code, please call your [place] clinic or [phone].        Care EveryWhere ID     This is your Care EveryWhere ID. This could be used by other organizations to access your [place] medical records  [id number]        Your Vitals Were     Pulse Height BMI (Body Mass Index)             70 1.829 m (6') 25.09 kg/m2          Blood Pressure from Last 3 Encounters:   17 134/68   17 116/62   17 128/60    Weight from Last 3 Encounters:   17 83.9 kg (185 lb)   17 82.1 kg (181 lb)   17 90.7 kg (200 lb)              We Performed the Following     PSA Diag Urologic Phys     UA without Microscopic        Primary Care Provider Office Phone # Fax #    Rakesh Clark [phone]       [address] " [facility] [address]        Equal Access to Services     [facility] WAYLON : Hadii ernestine sandoval kuldeep Somary, waaxda luqadaha, qaybta kapaigeque houterranceque, wesley zendejasagustinaben altman. So [facility] [phone].    ATENCIÓN: Si habla español, tiene a olivas disposición servicios gratuitos de asistencia lingüística. Katarzynaame al [phone].    We comply with applicable federal civil rights laws and [place] laws. We do not discriminate on the basis of race, color, national origin, age, disability sex, sexual orientation or gender identity.            Thank you!     Thank you for choosing [facility] [place]  for your care. Our goal is always to provide you with excellent care. Hearing back from our patients is one way we can continue to improve our services. Please take a few minutes to complete the written survey that you may receive in the mail after your visit with us. Thank you!             Your Updated Medication List - Protect others around you: Learn how to safely use, store and throw away your medicines at www.disposemymeds.org.          This list is accurate as of: 8/16/17 11:11 AM.  Always use your most recent med list.                   Brand Name Dispense Instructions for use Diagnosis    ACCU-CHEK COMPLETE Kit     1    test daily    Type II or unspecified type diabetes mellitus without mention of complication, not stated as uncontrolled       amLODIPine-benazepril 10-20 MG per capsule    LOTREL    90 capsule    TAKE 1 CAPSULE EVERY DAY    Essential hypertension, benign       aspirin 81 MG tablet      1 tablet daily    Essential hypertension, benign, Type II or unspecified type diabetes mellitus without mention of complication, not stated as uncontrolled, Pure hypercholesterolemia, Hypertrophy of prostate with urinary obstruction and other lower urinary tract symptoms (LUTS), Elevated prostate specific antigen (PSA)       atenolol 25 MG tablet    TENORMIN    90 tablet     TAKE 1 TABLET EVERY DAY    Essential hypertension, benign       atorvastatin 80 MG tablet    LIPITOR    45 tablet    TAKE 1/2 TABLET EVERY DAY    Hyperlipidemia LDL goal <100       baclofen 10 MG tablet    LIORESAL    90 tablet    TAKE 1 TABLET EVERY DAY    Back muscle spasm       blood glucose monitoring test strip    ACCU-CHEK COMPACT DRUM    100 strip    by In Vitro route daily. Test one time daily or as directed.    Type II or unspecified type diabetes mellitus without mention of complication, not stated as uncontrolled       CALTRATE 600+D PLUS PO      Take  by mouth.    Type 2 diabetes, HbA1c goal < 7% (H), Hyperlipidemia LDL goal <100, Prostate cancer (H), Essential hypertension, benign       finasteride 5 MG tablet    PROSCAR    90 tablet    Take 1 tablet (5 mg) by mouth daily    Prostatic hemorrhage       gabapentin 300 MG capsule    NEURONTIN    90 capsule    Take 1 capsule by mouth. 1po hs    Essential hypertension, benign       metFORMIN 1000 MG tablet    GLUCOPHAGE    180 tablet    TAKE 1 TABLET TWICE DAILY WITH BREAKFAST  AND WITH DINNER    Type 2 diabetes mellitus with diabetic nephropathy (H)       Multiple vitamin  s Tabs

## 2017-08-16 NOTE — NURSING NOTE
Chief Complaint   Patient presents with     Psa Screening     Patient is here for 6 month follow up and PSA.     Nicolas Gomez LPN 10:30 AM August 16, 2017

## 2017-08-16 NOTE — LETTER
8/16/2017       RE: Kyler Rodriguez  [address] [address]     Dear Colleague,    Thank you for referring your patient, Kyler Rodriguez, to the [facility] [place] [facility]. Please see a copy of my visit note below.    Kyler Rodriguez is a pleasant 72-year-old male who underwent radiation therapy for adenocarcinoma of the prostate. His PSA at diagnosis was 33. He underwent hormonal therapy and external radiation including the prostate, seminal vesicles and pelvic lymph nodes. His PSA remains undetectable. He is having no further bleeding and has a normal urinalysis today.  He was evaluated for hematuria earlier in the year. He had a cystoscopy that showed a radiated prostate that bled easily, no bladder tumors or stones. His CT scan showed a stable right lower pole renal cyst and a new 1.5 cm right upper pole mass limits slightly enhanced.  His urine fish test was negative.  Other past medical history: Hypertension, type 2 diabetes, nonsmoker  Medications: Lotrel, low-dose aspirin, atenolol, Lipitor, baclofen, calcium carbonate/vitamin D, finasteride, gabapentin, metformin, multiple vitamin  Allergies: None  Exam: Normal appearance, normal vital signs, alert and oriented, normocephalic, normal respirations, neuro grossly intact  PSA: Undetectable  Assessment: New right upper pole renal mass that enhances-discussed observation, alternatives  Prostate cancer: No evidence of disease  Hematuria: Resolved and secondary to radiation  Plan: Renal ultrasound next week to follow right upper pole mass-do not plan biopsy or surgery if no change in size in the future or ultrasound shows cyst  PSA in 6 months. Continue finasteride    Again, thank you for allowing me to participate in the care of your patient.    Sincerely,  Keon Espinoza MD

## 2017-08-18 DIAGNOSIS — N42.1: ICD-10-CM

## 2017-08-18 RX ORDER — FINASTERIDE 5 MG/1
5 TABLET, FILM COATED ORAL DAILY
Qty: 90 TABLET | Refills: 3 | Status: SHIPPED | OUTPATIENT
Start: 2017-08-18 | End: 2017-09-01

## 2017-08-22 ENCOUNTER — HOSPITAL ENCOUNTER (OUTPATIENT)
Dept: ULTRASOUND IMAGING | Facility: CLINIC | Age: 72
Discharge: HOME | End: 2017-08-22
Attending: UROLOGY | Admitting: UROLOGY
Payer: MEDICARE

## 2017-08-22 DIAGNOSIS — N28.89 RENAL MASS, RIGHT: ICD-10-CM

## 2017-08-22 PROCEDURE — 76770 US EXAM ABDO BACK WALL COMP: CPT

## 2017-08-28 ENCOUNTER — TELEPHONE (OUTPATIENT)
Dept: UROLOGY | Facility: CLINIC | Age: 72
End: 2017-08-28

## 2017-08-29 DIAGNOSIS — N28.89 RIGHT RENAL MASS: Primary | ICD-10-CM

## 2017-08-31 ENCOUNTER — HOSPITAL ENCOUNTER (OUTPATIENT)
Dept: CT IMAGING | Facility: CLINIC | Age: 72
Discharge: HOME | End: 2017-08-31
Attending: UROLOGY | Admitting: UROLOGY
Payer: MEDICARE

## 2017-08-31 DIAGNOSIS — N28.89 RIGHT RENAL MASS: Primary | ICD-10-CM

## 2017-08-31 DIAGNOSIS — N28.89 RIGHT RENAL MASS: ICD-10-CM

## 2017-08-31 PROCEDURE — 74178 CT ABD&PLV WO CNTR FLWD CNTR: CPT

## 2017-08-31 PROCEDURE — 25000128 H RX IP 250 OP 636: Performed by: RADIOLOGY

## 2017-08-31 RX ORDER — IOPAMIDOL 755 MG/ML
500 INJECTION, SOLUTION INTRAVASCULAR ONCE
Status: COMPLETED | OUTPATIENT
Start: 2017-08-31 | End: 2017-08-31

## 2017-08-31 RX ADMIN — SODIUM CHLORIDE 53 ML: 9 INJECTION, SOLUTION INTRAVENOUS at 08:19

## 2017-08-31 RX ADMIN — IOPAMIDOL 93 ML: 755 INJECTION, SOLUTION INTRAVENOUS at 08:19

## 2017-09-01 DIAGNOSIS — N42.1: ICD-10-CM

## 2017-09-01 RX ORDER — FINASTERIDE 5 MG/1
5 TABLET, FILM COATED ORAL DAILY
Qty: 90 TABLET | Refills: 3 | Status: SHIPPED | OUTPATIENT
Start: 2017-09-01 | End: 2018-10-18

## 2017-10-10 DIAGNOSIS — M62.830 BACK MUSCLE SPASM: ICD-10-CM

## 2017-10-10 RX ORDER — BACLOFEN 10 MG/1
TABLET ORAL
Qty: 90 TABLET | Refills: 0 | Status: SHIPPED | OUTPATIENT
Start: 2017-10-10 | End: 2017-12-06

## 2017-10-10 NOTE — TELEPHONE ENCOUNTER
Baclofen       Last Written Prescription Date: 7/5/17  Last Fill Quantity: 90,  # refills: 0   Last Office Visit with G, P or [facility] prescribing provider: 8/11/17

## 2017-11-24 ENCOUNTER — TELEPHONE (OUTPATIENT)
Dept: INTERNAL MEDICINE | Facility: CLINIC | Age: 72
End: 2017-11-24

## 2017-11-24 DIAGNOSIS — R25.1 TREMOR: Primary | ICD-10-CM

## 2017-11-24 NOTE — TELEPHONE ENCOUNTER
Patient called and stated he would like PCP to provided a referral to see a neurologist due to patient having some slight tremors to his hands. Patient is concerned with the development of Parkinson's.      PCP please advise.   Thank you

## 2017-12-05 DIAGNOSIS — I10 ESSENTIAL HYPERTENSION, BENIGN: ICD-10-CM

## 2017-12-05 DIAGNOSIS — E11.21 TYPE 2 DIABETES MELLITUS WITH DIABETIC NEPHROPATHY (H): ICD-10-CM

## 2017-12-05 DIAGNOSIS — E78.5 HYPERLIPIDEMIA LDL GOAL <100: ICD-10-CM

## 2017-12-06 DIAGNOSIS — M62.830 BACK MUSCLE SPASM: ICD-10-CM

## 2017-12-06 NOTE — TELEPHONE ENCOUNTER
Routing refill request to provider for review/approval because:  Drug not on the [facility] refill protocol     Please advise, thanks.

## 2017-12-08 RX ORDER — BACLOFEN 10 MG/1
TABLET ORAL
Qty: 90 TABLET | Refills: 0 | Status: SHIPPED | OUTPATIENT
Start: 2017-12-08 | End: 2018-02-13

## 2017-12-09 RX ORDER — ATENOLOL 25 MG/1
TABLET ORAL
Qty: 90 TABLET | Refills: 0 | Status: SHIPPED | OUTPATIENT
Start: 2017-12-09 | End: 2018-02-13

## 2017-12-09 RX ORDER — AMLODIPINE AND BENAZEPRIL HYDROCHLORIDE 10; 20 MG/1; MG/1
CAPSULE ORAL
Qty: 90 CAPSULE | Refills: 0 | Status: SHIPPED | OUTPATIENT
Start: 2017-12-09 | End: 2018-02-13

## 2017-12-09 RX ORDER — ATORVASTATIN CALCIUM 80 MG/1
TABLET, FILM COATED ORAL
Qty: 45 TABLET | Refills: 0 | Status: SHIPPED | OUTPATIENT
Start: 2017-12-09 | End: 2018-02-13

## 2018-01-15 ENCOUNTER — TRANSFERRED RECORDS (OUTPATIENT)
Dept: HEALTH INFORMATION MANAGEMENT | Facility: CLINIC | Age: 73
End: 2018-01-15

## 2018-01-17 ENCOUNTER — TRANSFERRED RECORDS (OUTPATIENT)
Dept: HEALTH INFORMATION MANAGEMENT | Facility: CLINIC | Age: 73
End: 2018-01-17

## 2018-01-31 ENCOUNTER — TRANSFERRED RECORDS (OUTPATIENT)
Dept: HEALTH INFORMATION MANAGEMENT | Facility: CLINIC | Age: 73
End: 2018-01-31

## 2018-02-11 ASSESSMENT — ACTIVITIES OF DAILY LIVING (ADL)
I_NEED_ASSISTANCE_FOR_THE_FOLLOWING_DAILY_ACTIVITIES:: NO ASSISTANCE IS NEEDED
CURRENT_FUNCTION: NO ASSISTANCE NEEDED

## 2018-02-13 ENCOUNTER — OFFICE VISIT (OUTPATIENT)
Dept: INTERNAL MEDICINE | Facility: CLINIC | Age: 73
End: 2018-02-13
Payer: COMMERCIAL

## 2018-02-13 VITALS
BODY MASS INDEX: 25.23 KG/M2 | SYSTOLIC BLOOD PRESSURE: 124 MMHG | HEART RATE: 65 BPM | DIASTOLIC BLOOD PRESSURE: 62 MMHG | TEMPERATURE: 98.2 F | WEIGHT: 186.3 LBS | OXYGEN SATURATION: 98 % | HEIGHT: 72 IN

## 2018-02-13 DIAGNOSIS — E11.21 TYPE 2 DIABETES MELLITUS WITH DIABETIC NEPHROPATHY, WITHOUT LONG-TERM CURRENT USE OF INSULIN (H): ICD-10-CM

## 2018-02-13 DIAGNOSIS — Z00.00 ROUTINE GENERAL MEDICAL EXAMINATION AT A HEALTH CARE FACILITY: Primary | ICD-10-CM

## 2018-02-13 DIAGNOSIS — M62.830 BACK MUSCLE SPASM: ICD-10-CM

## 2018-02-13 DIAGNOSIS — N28.89 RENAL MASS, RIGHT: ICD-10-CM

## 2018-02-13 DIAGNOSIS — I10 ESSENTIAL HYPERTENSION, BENIGN: ICD-10-CM

## 2018-02-13 DIAGNOSIS — Z23 NEED FOR VACCINATION: ICD-10-CM

## 2018-02-13 DIAGNOSIS — E78.5 HYPERLIPIDEMIA LDL GOAL <100: ICD-10-CM

## 2018-02-13 DIAGNOSIS — C61 PROSTATE CANCER (H): ICD-10-CM

## 2018-02-13 LAB
ALBUMIN SERPL-MCNC: 4 G/DL (ref 3.4–5)
ALBUMIN UR-MCNC: NEGATIVE MG/DL
ALP SERPL-CCNC: 90 U/L (ref 40–150)
ALT SERPL W P-5'-P-CCNC: 10 U/L (ref 0–70)
ANION GAP SERPL CALCULATED.3IONS-SCNC: 4 MMOL/L (ref 3–14)
APPEARANCE UR: CLEAR
AST SERPL W P-5'-P-CCNC: 25 U/L (ref 0–45)
BILIRUB SERPL-MCNC: 0.6 MG/DL (ref 0.2–1.3)
BILIRUB UR QL STRIP: NEGATIVE
BUN SERPL-MCNC: 15 MG/DL (ref 7–30)
CALCIUM SERPL-MCNC: 9.7 MG/DL (ref 8.5–10.1)
CHLORIDE SERPL-SCNC: 101 MMOL/L (ref 94–109)
CHOLEST SERPL-MCNC: 113 MG/DL
CO2 SERPL-SCNC: 32 MMOL/L (ref 20–32)
COLOR UR AUTO: YELLOW
CREAT SERPL-MCNC: 0.75 MG/DL (ref 0.66–1.25)
CREAT UR-MCNC: 102 MG/DL
ERYTHROCYTE [DISTWIDTH] IN BLOOD BY AUTOMATED COUNT: 11.9 % (ref 10–15)
GFR SERPL CREATININE-BSD FRML MDRD: >90 ML/MIN/1.7M2
GLUCOSE SERPL-MCNC: 103 MG/DL (ref 70–99)
GLUCOSE UR STRIP-MCNC: NEGATIVE MG/DL
HBA1C MFR BLD: 5.5 % (ref 4.3–6)
HCT VFR BLD AUTO: 47.3 % (ref 40–53)
HDLC SERPL-MCNC: 49 MG/DL
HGB BLD-MCNC: 16.3 G/DL (ref 13.3–17.7)
HGB UR QL STRIP: NEGATIVE
KETONES UR STRIP-MCNC: NEGATIVE MG/DL
LDLC SERPL CALC-MCNC: 44 MG/DL
LEUKOCYTE ESTERASE UR QL STRIP: NEGATIVE
MCH RBC QN AUTO: 30.6 PG (ref 26.5–33)
MCHC RBC AUTO-ENTMCNC: 34.5 G/DL (ref 31.5–36.5)
MCV RBC AUTO: 89 FL (ref 78–100)
MICROALBUMIN UR-MCNC: 8 MG/L
MICROALBUMIN/CREAT UR: 7.99 MG/G CR (ref 0–17)
NITRATE UR QL: NEGATIVE
NONHDLC SERPL-MCNC: 64 MG/DL
PH UR STRIP: 6 PH (ref 5–7)
PLATELET # BLD AUTO: 175 10E9/L (ref 150–450)
POTASSIUM SERPL-SCNC: 4.3 MMOL/L (ref 3.4–5.3)
PROT SERPL-MCNC: 7.7 G/DL (ref 6.8–8.8)
RBC # BLD AUTO: 5.33 10E12/L (ref 4.4–5.9)
SODIUM SERPL-SCNC: 137 MMOL/L (ref 133–144)
SOURCE: NORMAL
SP GR UR STRIP: 1.01 (ref 1–1.03)
TRIGL SERPL-MCNC: 98 MG/DL
TSH SERPL DL<=0.005 MIU/L-ACNC: 2.48 MU/L (ref 0.4–4)
UROBILINOGEN UR STRIP-ACNC: 0.2 EU/DL (ref 0.2–1)
WBC # BLD AUTO: 4.9 10E9/L (ref 4–11)

## 2018-02-13 PROCEDURE — 80053 COMPREHEN METABOLIC PANEL: CPT | Performed by: UROLOGY

## 2018-02-13 PROCEDURE — G0103 PSA SCREENING: HCPCS | Performed by: UROLOGY

## 2018-02-13 PROCEDURE — 85027 COMPLETE CBC AUTOMATED: CPT | Performed by: UROLOGY

## 2018-02-13 PROCEDURE — 90714 TD VACC NO PRESV 7 YRS+ IM: CPT | Performed by: INTERNAL MEDICINE

## 2018-02-13 PROCEDURE — 81003 URINALYSIS AUTO W/O SCOPE: CPT | Performed by: INTERNAL MEDICINE

## 2018-02-13 PROCEDURE — 83036 HEMOGLOBIN GLYCOSYLATED A1C: CPT | Performed by: UROLOGY

## 2018-02-13 PROCEDURE — 82043 UR ALBUMIN QUANTITATIVE: CPT | Performed by: INTERNAL MEDICINE

## 2018-02-13 PROCEDURE — 90471 IMMUNIZATION ADMIN: CPT | Performed by: INTERNAL MEDICINE

## 2018-02-13 PROCEDURE — 36415 COLL VENOUS BLD VENIPUNCTURE: CPT | Performed by: UROLOGY

## 2018-02-13 PROCEDURE — 80061 LIPID PANEL: CPT | Performed by: UROLOGY

## 2018-02-13 PROCEDURE — 84443 ASSAY THYROID STIM HORMONE: CPT | Performed by: UROLOGY

## 2018-02-13 PROCEDURE — 99397 PER PM REEVAL EST PAT 65+ YR: CPT | Mod: 25 | Performed by: INTERNAL MEDICINE

## 2018-02-13 RX ORDER — CARBIDOPA AND LEVODOPA 25; 100 MG/1; MG/1
2 TABLET ORAL 3 TIMES DAILY
COMMUNITY

## 2018-02-13 RX ORDER — GABAPENTIN 300 MG/1
CAPSULE ORAL
Qty: 90 CAPSULE | Refills: 3 | Status: SHIPPED | OUTPATIENT
Start: 2018-02-13 | End: 2019-02-18

## 2018-02-13 RX ORDER — AMLODIPINE AND BENAZEPRIL HYDROCHLORIDE 10; 20 MG/1; MG/1
CAPSULE ORAL
Qty: 90 CAPSULE | Refills: 3 | Status: SHIPPED | OUTPATIENT
Start: 2018-02-13 | End: 2019-02-07

## 2018-02-13 RX ORDER — ATENOLOL 25 MG/1
TABLET ORAL
Qty: 90 TABLET | Refills: 3 | Status: SHIPPED | OUTPATIENT
Start: 2018-02-13 | End: 2019-02-07

## 2018-02-13 RX ORDER — BACLOFEN 10 MG/1
TABLET ORAL
Qty: 90 TABLET | Refills: 1 | Status: SHIPPED | OUTPATIENT
Start: 2018-02-13 | End: 2018-08-26

## 2018-02-13 RX ORDER — ATORVASTATIN CALCIUM 80 MG/1
TABLET, FILM COATED ORAL
Qty: 45 TABLET | Refills: 3 | Status: SHIPPED | OUTPATIENT
Start: 2018-02-13 | End: 2019-02-07

## 2018-02-13 ASSESSMENT — ACTIVITIES OF DAILY LIVING (ADL): CURRENT_FUNCTION: NO ASSISTANCE NEEDED

## 2018-02-13 NOTE — PROGRESS NOTES
SUBJECTIVE:   Kyler Rodriguez is a 72 year old male who presents for Preventive Visit.      Are you in the first 12 months of your Medicare coverage?  No    Physical   Annual:     Getting at least 3 servings of Calcium per day::  NO    Bi-annual eye exam::  Yes    Dental care twice a year::  Yes    Sleep apnea or symptoms of sleep apnea::  None    Diet::  Regular (no restrictions)    Frequency of exercise::  None    Taking medications regularly::  Yes    Medication side effects::  Not applicable    Additional concerns today::  No    Ability to successfully perform activities of daily living: no assistance needed  Home Safety:  Throw rugs in the hallway  Hearing Impairment: no hearing concerns      Ability to successfully perform activities of daily living: Yes, no assistance needed  Home safety:  none identified   Hearing impairment: Yes, none    Fall risk:  Fallen 2 or more times in the past year?: No  Any fall with injury in the past year?: No    COGNITIVE SCREEN  1) Repeat 3 items (Banana, Sunrise, Chair)    2) Clock draw: NORMAL  3) 3 item recall: Recalls 3 objects  Results: 3 items recalled: COGNITIVE IMPAIRMENT LESS LIKELY    Mini-CogTM Copyright S Jorge. Licensed by the author for use in [facility]; reprinted with permission (nick@[email or web address]). All rights reserved.        Reviewed and updated as needed this visit by clinical staff         Reviewed and updated as needed this visit by Provider        Social History   Substance Use Topics     Smoking status: Never Smoker     Smokeless tobacco: Never Used     Alcohol use 0.0 oz/week     0 Standard drinks or equivalent per week      Comment: 4 drinks/daily       Alcohol Use 2/11/2018   If you drink alcohol, do you typically have greater than 3 drinks per day OR greater than 7 drinks per week?   No           PROBLEMS TO ADD ON...  Has h/o HTN. on medical treatment. BP has been controlled. No side effects from medications. No CP, HA, dizziness. good  compliance with medications and low salt diet.  Has H/O hyperlipidemia. On medical treatment and diet. No side effects. No muscle weakness, myalgias or upset stomach.   Has H/O DM. On diet , exercise and PO medications. Blood sugars are controlled. No parestesias. No hypoglycemias.  Has h/o prostate cancer. Post radiation, no recurrence. Has a kidney mass. Follows with urology. No hematuria.   Seeing neurology. Possible Parkinson's disease. On Sinemet. Has hands tremor.     Today's PHQ-2 Score:   PHQ-2 ( 1999 [employer]) 2/11/2018   Q1: Little interest or pleasure in doing things 0   Q2: Feeling down, depressed or hopeless 0   PHQ-2 Score 0   Q1: Little interest or pleasure in doing things Not at all   Q2: Feeling down, depressed or hopeless Not at all   PHQ-2 Score 0       Do you feel safe in your environment - Yes    Do you have a Health Care Directive?: No: Advance care planning was reviewed with patient; patient declined at this time.    Current providers sharing in care for this patient include:   Patient Care Team:  Rakesh Clark MD as PCP - General (Internal Medicine)    The following health maintenance items are reviewed in Epic and correct as of today:  Health Maintenance   Topic Date Due     HEPATITIS C SCREENING  07/15/1963     FOOT EXAM Q1 YEAR  10/13/2015     LIPID MONITORING Q6 MO  08/07/2017     INFLUENZA VACCINE (SYSTEM ASSIGNED)  09/01/2017     TETANUS Q10 YR  09/20/2017     MICROALBUMIN Q1 YEAR  02/07/2018     A1C Q6 MO  02/11/2018     FALL RISK ASSESSMENT  02/07/2018     EYE EXAM Q1 YEAR  07/18/2018     CREATININE Q1 YEAR  08/11/2018     TSH W/ FREE T4 REFLEX Q2 YEAR  02/07/2019     ADVANCE DIRECTIVE PLANNING Q5 YRS  09/21/2021     COLON CANCER SCREEN (SYSTEM ASSIGNED)  05/23/2024     PNEUMOCOCCAL  Completed     AORTIC ANEURYSM SCREENING (SYSTEM ASSIGNED)  Completed     Labs reviewed in EPIC        Review of Systems  C: NEGATIVE for fever, chills, change in weight  I: NEGATIVE for worrisome  rashes, moles or lesions  E: NEGATIVE for vision changes or irritation  E/M: NEGATIVE for ear, mouth and throat problems  R: NEGATIVE for significant cough or SOB  B: NEGATIVE for masses, tenderness or discharge  CV: NEGATIVE for chest pain, palpitations or peripheral edema  GI: NEGATIVE for nausea, abdominal pain, heartburn, or change in bowel habits  : NEGATIVE for frequency, dysuria, or hematuria  M: NEGATIVE for significant arthralgias or myalgia  N: NEGATIVE for weakness, dizziness or paresthesias  E: NEGATIVE for temperature intolerance, skin/hair changes  H: NEGATIVE for bleeding problems  P: NEGATIVE for changes in mood or affect    OBJECTIVE:   There were no vitals taken for this visit. Estimated body mass index is 25.09 kg/(m^2) as calculated from the following:    Height as of 8/16/17: 6' (1.829 m).    Weight as of 8/16/17: 185 lb (83.9 kg).  Physical Exam  GENERAL: healthy, alert and no distress  EYES: Eyes grossly normal to inspection, PERRL and conjunctivae and sclerae normal  HENT: ear canals and TM's normal, nose and mouth without ulcers or lesions  NECK: no adenopathy, no asymmetry, masses, or scars and thyroid normal to palpation  RESP: lungs clear to auscultation - no rales, rhonchi or wheezes  CV: regular rate and rhythm, normal S1 S2, no S3 or S4, no murmur, click or rub, no peripheral edema and peripheral pulses strong  ABDOMEN: soft, nontender, no hepatosplenomegaly, no masses and bowel sounds normal  MS: no gross musculoskeletal defects noted, no edema  SKIN: no suspicious lesions or rashes  NEURO: Normal strength and tone, mentation intact and speech normal, resting fine hands tremor   PSYCH: mentation appears normal, affect normal/bright    ASSESSMENT / PLAN:       ICD-10-CM    1. Routine general medical examination at a health care facility Z00.00 CBC with platelets     Comprehensive metabolic panel     Lipid panel reflex to direct LDL Fasting     TSH with free T4 reflex     Prostate  spec antigen screen     Hemoglobin A1c     *UA reflex to Microscopic     Albumin Random Urine Quantitative with Creat Ratio   2. Hyperlipidemia LDL goal <100 E78.5 atorvastatin (LIPITOR) 80 MG tablet     Lipid panel reflex to direct LDL Fasting   3. BENIGN HYPERTENSION I10 atenolol (TENORMIN) 25 MG tablet     amLODIPine-benazepril (LOTREL) 10-20 MG per capsule     gabapentin (NEURONTIN) 300 MG capsule     CBC with platelets     Comprehensive metabolic panel     TSH with free T4 reflex   4. Back muscle spasm M62.830 baclofen (LIORESAL) 10 MG tablet   5. Type 2 diabetes mellitus with diabetic nephropathy, without long-term current use of insulin (H) E11.21 metFORMIN (GLUCOPHAGE) 1000 MG tablet     TSH with free T4 reflex     Hemoglobin A1c     *UA reflex to Microscopic     Albumin Random Urine Quantitative with Creat Ratio   6. Renal mass, right N28.89 PSA Diag Urologic Phys   7. Prostate cancer (H) C61 PSA Diag Urologic Phys     Prostate spec antigen screen       End of Life Planning:  Patient currently has an advanced directive: Yes.  Practitioner is supportive of decision.    COUNSELING:  Reviewed preventive health counseling, as reflected in patient instructions       Regular exercise       Healthy diet/nutrition       Vision screening       Hearing screening       Dental care       Colon cancer screening       Prostate cancer screening        Estimated body mass index is 25.09 kg/(m^2) as calculated from the following:    Height as of 8/16/17: 6' (1.829 m).    Weight as of 8/16/17: 185 lb (83.9 kg).     reports that he has never smoked. He has never used smokeless tobacco.      Appropriate preventive services were discussed with this patient, including applicable screening as appropriate for cardiovascular disease, diabetes, osteopenia/osteoporosis, and glaucoma.  As appropriate for age/gender, discussed screening for colorectal cancer, prostate cancer, breast cancer, and cervical cancer. Checklist reviewing  preventive services available has been given to the patient.    Reviewed patients plan of care and provided an AVS. The Intermediate Care Plan ( asthma action plan, low back pain action plan, and migraine action plan) for Kyler meets the Care Plan requirement. This Care Plan has been established and reviewed with the Patient.    Counseling Resources:  ATP IV Guidelines  Pooled Cohorts Equation Calculator  Breast Cancer Risk Calculator  FRAX Risk Assessment  ICSI Preventive Guidelines  Dietary Guidelines for Americans, 2010  USDA's MyPlate  ASA Prophylaxis  Lung CA Screening    Rakesh Clark MD  [facility]

## 2018-02-13 NOTE — LETTER
[facility]  [address], [address]  [place] [address]  [phone]        February 14, 2018    Kyler Rodriguez  [address] [address]            Dear Mr. Kyler Rodriguez:      The results of your recent labs were within normal limits.  If you have any further questions or problems, please contact our office.    Sincerely,        Rakesh Clark M.D.

## 2018-02-13 NOTE — MR AVS SNAPSHOT
After Visit Summary   2/13/2018    Kyler Rodriguez    MRN: [id number]           Patient Information     Date Of Birth          1945        Visit Information        Provider Department      2/13/2018 8:20 AM Rakesh Clark MD [facility]        Today's Diagnoses     Routine general medical examination at a health care facility    -  1    Hyperlipidemia LDL goal <100        BENIGN HYPERTENSION        Back muscle spasm        Type 2 diabetes mellitus with diabetic nephropathy, without long-term current use of insulin (H)        Renal mass, right        Prostate cancer (H)          Care Instructions      Preventive Health Recommendations:       Male Ages 65 and over    Yearly exam:             See your health care provider every year in order to  o   Review health changes.   o   Discuss preventive care.    o   Review your medicines if your doctor has prescribed any.    Talk with your health care provider about whether you should have a test to screen for prostate cancer (PSA).    Every 3 years, have a diabetes test (fasting glucose). If you are at risk for diabetes, you should have this test more often.    Every 5 years, have a cholesterol test. Have this test more often if you are at risk for high cholesterol or heart disease.     Every 10 years, have a colonoscopy. Or, have a yearly FIT test (stool test). These exams will check for colon cancer.    Talk to with your health care provider about screening for Abdominal Aortic Aneurysm if you have a family history of AAA or have a history of smoking.  Shots:     Get a flu shot each year.     Get a tetanus shot every 10 years.     Talk to your doctor about your pneumonia vaccines. There are now two you should receive - Pneumovax (PPSV 23) and Prevnar (PCV 13).    Talk to your doctor about a shingles vaccine.     Talk to your doctor about the hepatitis B vaccine.  Nutrition:     Eat at least 5 servings of fruits and vegetables each  day.     Eat whole-grain bread, whole-wheat pasta and brown rice instead of white grains and rice.     Talk to your doctor about Calcium and Vitamin D.   Lifestyle    Exercise for at least 150 minutes a week (30 minutes a day, 5 days a week). This will help you control your weight and prevent disease.     Limit alcohol to one drink per day.     No smoking.     Wear sunscreen to prevent skin cancer.     See your dentist every six months for an exam and cleaning.     See your eye doctor every 1 to 2 years to screen for conditions such as glaucoma, macular degeneration and cataracts.          Follow-ups after your visit        Follow-up notes from your care team     Return in about 6 months (around 8/13/2018) for Routine Visit.      Your next 10 appointments already scheduled     Feb 14, 2018  8:30 AM CST   (Arrive by 8:15 AM)   CT ABDOMEN PELVIS W/O & W CONTRAST with [id number] ([facility])    [address] [address] [address] [phone]           Please bring any scans or X-rays taken at other hospitals, if similar tests were done. Also bring a list of your medicines, including vitamins, minerals and over-the-counter drugs. It is safest to leave personal items at home.  Be sure to tell your doctor:   If you have any allergies.   If there s any chance you are pregnant.   If you are breastfeeding.  You may have contrast for this exam. To prepare:   Do not eat or drink for 2 hours before your exam. If you need to take medicine, you may take it with small sips of water. (We may ask you to take liquid medicine as well.)   The day before your exam, drink extra fluids at least six 8-ounce glasses (unless your doctor tells you to restrict your fluids).   You will be given instructions on how to drink the contrast.  Patients over 70 or patients with diabetes or kidney problems:   If you haven t had a blood test (creatinine test) within the last  30 days, the Cardiologist/Radiologist may require you to get this test prior to your exam.  If you have diabetes:   Continue to take your metformin medication on the day of your exam  Please wear loose clothing, such as a sweat suit or jogging clothes. Avoid snaps, zippers and other metal. We may ask you to undress and put on a hospital gown.  If you have any questions, please call the Imaging Department where you will have your exam.            Feb 16, 2018 10:00 AM CST   Return Visit with Keon Espinoza MD   [facility] ([facility])    [address]  [address]  [facility] [address] [phone]              Who to contact     If you have questions or need follow up information about today's clinic visit or your schedule please contact [facility] directly at [phone].  Normal or non-critical lab and imaging results will be communicated to you by MyChart, letter or phone within 4 business days after the clinic has received the results. If you do not hear from us within 7 days, please contact the clinic through AtTask[employer] or phone. If you have a critical or abnormal lab result, we will notify you by phone as soon as possible.  Submit refill requests through [employer] or call your pharmacy and they will forward the refill request to us. Please allow 3 business days for your refill to be completed.          Additional Information About Your Visit        AtTaskhart Information     Leeo gives you secure access to your electronic health record. If you see a primary care provider, you can also send messages to your care team and make appointments. If you have questions, please call your primary care clinic.  If you do not have a primary care provider, please call [phone] and they will assist you.        Care EveryWhere ID     This is your Care EveryWhere ID. This could be used by other organizations to access your [facility]  records  [id number]        Your Vitals Were     Pulse Temperature Height Pulse Oximetry BMI (Body Mass Index)       65 98.2  F (36.8  C) (Oral) 6' (1.829 m) 98% 25.27 kg/m2        Blood Pressure from Last 3 Encounters:   02/13/18 124/62   08/16/17 134/68   08/11/17 116/62    Weight from Last 3 Encounters:   02/13/18 186 lb 4.8 oz (84.5 kg)   08/16/17 185 lb (83.9 kg)   08/11/17 181 lb (82.1 kg)              We Performed the Following     *UA reflex to Microscopic     Albumin Random Urine Quantitative with Creat Ratio     CBC with platelets     Comprehensive metabolic panel     Hemoglobin A1c     Lipid panel reflex to direct LDL Fasting     Prostate spec antigen screen     PSA Diag Urologic Phys     TSH with free T4 reflex          Today's Medication Changes          These changes are accurate as of 2/13/18  8:55 AM.  If you have any questions, ask your nurse or doctor.               These medicines have changed or have updated prescriptions.        Dose/Directions    amLODIPine-benazepril 10-20 MG per capsule   Commonly known as:  LOTREL   This may have changed:  See the new instructions.   Used for:  Essential hypertension, benign   Changed by:  Rakesh Clark MD        TAKE 1 CAPSULE EVERY DAY   Quantity:  90 capsule   Refills:  3       atenolol 25 MG tablet   Commonly known as:  TENORMIN   This may have changed:  See the new instructions.   Used for:  Essential hypertension, benign   Changed by:  Rakesh Clark MD        TAKE 1 TABLET EVERY DAY   Quantity:  90 tablet   Refills:  3       atorvastatin 80 MG tablet   Commonly known as:  LIPITOR   This may have changed:  See the new instructions.   Used for:  Hyperlipidemia LDL goal <100   Changed by:  Rakesh Clark MD        TAKE 1/2 TABLET EVERY DAY   Quantity:  45 tablet   Refills:  3       baclofen 10 MG tablet   Commonly known as:  LIORESAL   This may have changed:  See the new instructions.   Used for:  Back muscle spasm   Changed by:   Rakesh Clark MD        TAKE 1 TABLET EVERY DAY   Quantity:  90 tablet   Refills:  1       metFORMIN 1000 MG tablet   Commonly known as:  GLUCOPHAGE   This may have changed:  See the new instructions.   Used for:  Type 2 diabetes mellitus with diabetic nephropathy, without long-term current use of insulin (H)   Changed by:  Rakesh Clark MD        TAKE 1 TABLET TWICE DAILY WITH BREAKFAST  AND WITH DINNER   Quantity:  180 tablet   Refills:  1            Where to get your medicines      These medications were sent to [facility] - [facility] [address]  [address], [facility] [address]     Phone:  [phone]     amLODIPine-benazepril 10-20 MG per capsule    atenolol 25 MG tablet    atorvastatin 80 MG tablet    baclofen 10 MG tablet    gabapentin 300 MG capsule    metFORMIN 1000 MG tablet                Primary Care Provider Office Phone # Fax #    Rakesh Clark [phone]       [address] [facility] [address]        Equal Access to Services     [facility]: Hadii aad ku hadasho Soomaali, waaxda luqadaha, qaybta kaalmada adeegyada, waxay idiin hayaan adeeg kharash la'jameson . So [facility] [phone].    ATENCIÓN: Si habla español, tiene a olivas disposición servicios gratuitos de asistencia lingüística. [facility] [phone].    We comply with applicable federal civil rights laws and [place] laws. We do not discriminate on the basis of race, color, national origin, age, disability, sex, sexual orientation, or gender identity.            Thank you!     Thank you for choosing [facility]  for your care. Our goal is always to provide you with excellent care. Hearing back from our patients is one way we can continue to improve our services. Please take a few minutes to complete the written survey that you may receive in the mail after your visit with us. Thank you!             Your Updated Medication List - Protect others around  you: Learn how to safely use, store and throw away your medicines at www.disposemymeds.org.          This list is accurate as of 2/13/18  8:55 AM.  Always use your most recent med list.                   Brand Name Dispense Instructions for use Diagnosis    ACCU-CHEK COMPLETE Kit     1    test daily    Type II or unspecified type diabetes mellitus without mention of complication, not stated as uncontrolled       amLODIPine-benazepril 10-20 MG per capsule    LOTREL    90 capsule    TAKE 1 CAPSULE EVERY DAY    Essential hypertension, benign       aspirin 81 MG tablet      1 tablet daily    Essential hypertension, benign, Type II or unspecified type diabetes mellitus without mention of complication, not stated as uncontrolled, Pure hypercholesterolemia, Hypertrophy of prostate with urinary obstruction and other lower urinary tract symptoms (LUTS), Elevated prostate specific antigen (PSA)       atenolol 25 MG tablet    TENORMIN    90 tablet    TAKE 1 TABLET EVERY DAY    Essential hypertension, benign       atorvastatin 80 MG tablet    LIPITOR    45 tablet    TAKE 1/2 TABLET EVERY DAY    Hyperlipidemia LDL goal <100       baclofen 10 MG tablet    LIORESAL    90 tablet    TAKE 1 TABLET EVERY DAY    Back muscle spasm       blood glucose monitoring test strip    ACCU-CHEK COMPACT DRUM    100 strip    by In Vitro route daily. Test one time daily or as directed.    Type II or unspecified type diabetes mellitus without mention of complication, not stated as uncontrolled       CALTRATE 600+D PLUS PO      Take  by mouth.    Type 2 diabetes, HbA1c goal < 7% (H), Hyperlipidemia LDL goal <100, Prostate cancer (H), Essential hypertension, benign       carbidopa-levodopa  MG per tablet    SINEMET     Take 1 tablet by mouth 3 times daily        finasteride 5 MG tablet    PROSCAR    90 tablet    Take 1 tablet (5 mg) by mouth daily    Prostatic hemorrhage       gabapentin 300 MG capsule    NEURONTIN    90 capsule    Take 1 capsule  by mouth. 1po hs    Essential hypertension, benign       metFORMIN 1000 MG tablet    GLUCOPHAGE    180 tablet    TAKE 1 TABLET TWICE DAILY WITH BREAKFAST  AND WITH DINNER    Type 2 diabetes mellitus with diabetic nephropathy, without long-term current use of insulin (H)       Multiple vitamin  s Tabs

## 2018-02-13 NOTE — NURSING NOTE
Chief Complaint   Patient presents with     Medicare Visit     Pt is fasting       Initial /62 (BP Location: Left arm, Patient Position: Sitting, Cuff Size: Adult Regular)  Pulse 65  Temp 98.2  F (36.8  C) (Oral)  Ht 6' (1.829 m)  Wt 186 lb 4.8 oz (84.5 kg)  SpO2 98%  BMI 25.27 kg/m2 Estimated body mass index is 25.27 kg/(m^2) as calculated from the following:    Height as of this encounter: 6' (1.829 m).    Weight as of this encounter: 186 lb 4.8 oz (84.5 kg).  Medication Reconciliation: complete   Jeo Jimenez MA

## 2018-02-14 ENCOUNTER — HOSPITAL ENCOUNTER (OUTPATIENT)
Dept: CT IMAGING | Facility: CLINIC | Age: 73
Discharge: HOME | End: 2018-02-14
Attending: UROLOGY | Admitting: UROLOGY
Payer: MEDICARE

## 2018-02-14 DIAGNOSIS — N28.89 RIGHT RENAL MASS: ICD-10-CM

## 2018-02-14 LAB — PSA SERPL-ACNC: 0.02 UG/L (ref 0–4)

## 2018-02-14 PROCEDURE — 74178 CT ABD&PLV WO CNTR FLWD CNTR: CPT

## 2018-02-14 PROCEDURE — 25000128 H RX IP 250 OP 636: Performed by: RADIOLOGY

## 2018-02-14 RX ORDER — IOPAMIDOL 755 MG/ML
500 INJECTION, SOLUTION INTRAVASCULAR ONCE
Status: COMPLETED | OUTPATIENT
Start: 2018-02-14 | End: 2018-02-14

## 2018-02-14 RX ADMIN — SODIUM CHLORIDE 53 ML: 9 INJECTION, SOLUTION INTRAVENOUS at 08:20

## 2018-02-14 RX ADMIN — IOPAMIDOL 93 ML: 755 INJECTION, SOLUTION INTRAVENOUS at 08:20

## 2018-02-16 ENCOUNTER — OFFICE VISIT (OUTPATIENT)
Dept: UROLOGY | Facility: CLINIC | Age: 73
End: 2018-02-16
Payer: COMMERCIAL

## 2018-02-16 VITALS
BODY MASS INDEX: 25.06 KG/M2 | WEIGHT: 185 LBS | OXYGEN SATURATION: 98 % | HEIGHT: 72 IN | SYSTOLIC BLOOD PRESSURE: 120 MMHG | HEART RATE: 64 BPM | DIASTOLIC BLOOD PRESSURE: 60 MMHG

## 2018-02-16 DIAGNOSIS — C61 PROSTATE CANCER (H): Primary | ICD-10-CM

## 2018-02-16 DIAGNOSIS — N28.89 RIGHT RENAL MASS: ICD-10-CM

## 2018-02-16 LAB
ALBUMIN UR-MCNC: NEGATIVE MG/DL
APPEARANCE UR: CLEAR
BILIRUB UR QL STRIP: NEGATIVE
COLOR UR AUTO: YELLOW
GLUCOSE UR STRIP-MCNC: 100 MG/DL
HGB UR QL STRIP: NEGATIVE
KETONES UR STRIP-MCNC: NEGATIVE MG/DL
LEUKOCYTE ESTERASE UR QL STRIP: NEGATIVE
NITRATE UR QL: NEGATIVE
PH UR STRIP: 7 PH (ref 5–7)
SOURCE: ABNORMAL
SP GR UR STRIP: 1.01 (ref 1–1.03)
UROBILINOGEN UR STRIP-ACNC: 0.2 EU/DL (ref 0.2–1)

## 2018-02-16 PROCEDURE — 99213 OFFICE O/P EST LOW 20 MIN: CPT | Performed by: UROLOGY

## 2018-02-16 PROCEDURE — 81003 URINALYSIS AUTO W/O SCOPE: CPT | Performed by: UROLOGY

## 2018-02-16 ASSESSMENT — PAIN SCALES - GENERAL: PAINLEVEL: NO PAIN (0)

## 2018-02-16 NOTE — LETTER
2/16/2018       RE: Kyler Rodriguez  [address] [address] [facility] [address]     Dear Colleague,    Thank you for referring your patient, Kyler Rodriguez, to the [facility] [place] at [facility]. Please see a copy of my visit note below.    Kyler Rodriguez is a 72-year-old male with prostate cancer and a upper pole right renal mass that appears to be complex and cystic. On CT scan recently the mass is unchanged from a year ago. The patient is having no hematuria or difficulty voiding.  PSA after hormonal therapy and radiation therapy is stable at 0.02  Other past medical history: Hypertension, hyperlipidemia, type 2 diabetes, cataract surgery, varicose vein stripping, vasectomy, cystoscopy  Allergies: None  Medications: Amlodipine/benazepril, low-dose aspirin, Tenormin, Lipitor, baclofen, calcium carbonate/vitamin D, Sinemet, finasteride, Neurontin, Glucophage, multiple vitamin  Exam: Normal appearance, normal vital signs. Alert and oriented, normocephalic, normal respirations, neuro grossly intact  Urinalysis: Normal  Assessment: Stable right upper pole renal cystic mass                         Adenocarcinoma the prostate  Plan: PSA every 6 months and see me. Repeat CT abdomen in 1 year    Again, thank you for allowing me to participate in the care of your patient.      Sincerely,    Keon Espinoza MD

## 2018-02-16 NOTE — PROGRESS NOTES
Kyler Rodriguez is a 72-year-old male with prostate cancer and a upper pole right renal mass that appears to be complex and cystic. On CT scan recently the mass is unchanged from a year ago. The patient is having no hematuria or difficulty voiding.  PSA after hormonal therapy and radiation therapy is stable at 0.02  Other past medical history: Hypertension, hyperlipidemia, type 2 diabetes, cataract surgery, varicose vein stripping, vasectomy, cystoscopy  Allergies: None  Medications: Amlodipine/benazepril, low-dose aspirin, Tenormin, Lipitor, baclofen, calcium carbonate/vitamin D, Sinemet, finasteride, Neurontin, Glucophage, multiple vitamin  Exam: Normal appearance, normal vital signs. Alert and oriented, normocephalic, normal respirations, neuro grossly intact  Urinalysis: Normal  Assessment: Stable right upper pole renal cystic mass                         Adenocarcinoma the prostate  Plan: PSA every 6 months and see me. Repeat CT abdomen in 1 year

## 2018-02-16 NOTE — NURSING NOTE
Chief Complaint   Patient presents with     Prostate Cancer     Patient here today talk about PSA  result and Exam and UA       UA RESULTS:  Recent Labs   Lab Test  02/16/18   0944   03/02/17   0855   COLOR  Yellow   < >  Red   APPEARANCE  Clear   < >  Cloudy   URINEGLC  100*   < >  50*   URINEBILI  Negative   < >  Negative   URINEKETONE  Negative   < >  Negative   SG  1.010   < >  1.017   UBLD  Negative   < >  Large*   URINEPH  7.0   < >  7.0   PROTEIN  Negative   < >  >499  Reviewed, acceptable  *   UROBILINOGEN  0.2   < >   --    NITRITE  Negative   < >  Negative   LEUKEST  Negative   < >  Negative   RBCU   --    --   >182*   WBCU   --    --   0    < > = values in this interval not displayed.     Yany Chairez MA

## 2018-02-16 NOTE — MR AVS SNAPSHOT
After Visit Summary   2/16/2018    Kyler Rodriguez    MRN: [id number]           Patient Information     Date Of Birth          1945        Visit Information        Provider Department      2/16/2018 10:00 AM Keon Espinoza MD [facility] [place]        Today's Diagnoses     Prostate cancer (H)    -  1    Right renal mass           Follow-ups after your visit        Follow-up notes from your care team     Return in about 6 months (around 8/16/2018) for PSA.      Your next 10 appointments already scheduled     Aug 13, 2018  8:20 AM CDT   SHORT with Rakesh Clark MD   [facility]    [address]  [facility] [address] [phone]            Aug 17, 2018 10:20 AM CDT   (Arrive by 10:00 AM)   Return Visit with Keon Espinoza MD   [facility] ([facility])    [address]  [address]  [facility] [id number] [phone]              Future tests that were ordered for you today     Open Future Orders        Priority Expected Expires Ordered    CT Abdomen Pelvis Hematuria w/wo IV Contrast Routine 2/16/2019 2/16/2019 2/16/2018    PSA Diag Urologic Phys Routine 8/16/2018 2/16/2019 2/16/2018            Who to contact     If you have questions or need follow up information about today's clinic visit or your schedule please contact [facility] directly at [phone].  Normal or non-critical lab and imaging results will be communicated to you by MyChart, letter or phone within 4 business days after the clinic has received the results. If you do not hear from us within 7 days, please contact the clinic through [employer] In[employer] or phone. If you have a critical or abnormal lab result, we will notify you by phone as soon as possible.  Submit refill requests through [employer] or call your pharmacy and they will forward the  refill request to us. Please allow 3 business days for your refill to be completed.          Additional Information About Your Visit        MyChart Information     [employer] gives you secure access to your electronic health record. If you see a primary care provider, you can also send messages to your care team and make appointments. If you have questions, please call your primary care clinic.  If you do not have a primary care provider, please call [phone] and they will assist you.        Care EveryWhere ID     This is your Care EveryWhere ID. This could be used by other organizations to access your [place] medical records  [id number]        Your Vitals Were     Pulse Height Pulse Oximetry BMI (Body Mass Index)          64 1.829 m (6') 98% 25.09 kg/m2         Blood Pressure from Last 3 Encounters:   02/16/18 120/60   02/13/18 124/62   08/16/17 134/68    Weight from Last 3 Encounters:   02/16/18 83.9 kg (185 lb)   02/13/18 84.5 kg (186 lb 4.8 oz)   08/16/17 83.9 kg (185 lb)              We Performed the Following     UA without Microscopic        Primary Care Provider Office Phone # Fax #    Rakesh Clark [phone]       [address] [facility] [address]        Equal Access to Services     [facility]: Hadii aad ku hadasho Soomaali, waaxda luqadaha, qaybta kaalmada adeegyada, waxay idiin hayaan savi khbrian laana paula altman. So [facility] [phone].    ATENCIÓN: Si habla español, tiene a olivas disposición servicios gratuitos de asistencia lingüística. ame al [phone].    We comply with applicable federal civil rights laws and [place] laws. We do not discriminate on the basis of race, color, national origin, age, disability, sex, sexual orientation, or gender identity.            Thank you!     Thank you for choosing [facility] [place]  for your care. Our goal is always to provide you with excellent care. Hearing back from our patients is one way we  can continue to improve our services. Please take a few minutes to complete the written survey that you may receive in the mail after your visit with us. Thank you!             Your Updated Medication List - Protect others around you: Learn how to safely use, store and throw away your medicines at www.disposemymeds.org.          This list is accurate as of 2/16/18 10:19 AM.  Always use your most recent med list.                   Brand Name Dispense Instructions for use Diagnosis    ACCU-CHEK COMPLETE Kit     1    test daily    Type II or unspecified type diabetes mellitus without mention of complication, not stated as uncontrolled       amLODIPine-benazepril 10-20 MG per capsule    LOTREL    90 capsule    TAKE 1 CAPSULE EVERY DAY    Essential hypertension, benign       aspirin 81 MG tablet      1 tablet daily    Essential hypertension, benign, Type II or unspecified type diabetes mellitus without mention of complication, not stated as uncontrolled, Pure hypercholesterolemia, Hypertrophy of prostate with urinary obstruction and other lower urinary tract symptoms (LUTS), Elevated prostate specific antigen (PSA)       atenolol 25 MG tablet    TENORMIN    90 tablet    TAKE 1 TABLET EVERY DAY    Essential hypertension, benign       atorvastatin 80 MG tablet    LIPITOR    45 tablet    TAKE 1/2 TABLET EVERY DAY    Hyperlipidemia LDL goal <100       baclofen 10 MG tablet    LIORESAL    90 tablet    TAKE 1 TABLET EVERY DAY    Back muscle spasm       blood glucose monitoring test strip    ACCU-CHEK COMPACT DRUM    100 strip    by In Vitro route daily. Test one time daily or as directed.    Type II or unspecified type diabetes mellitus without mention of complication, not stated as uncontrolled       CALTRATE 600+D PLUS PO      Take  by mouth.    Type 2 diabetes, HbA1c goal < 7% (H), Hyperlipidemia LDL goal <100, Prostate cancer (H), Essential hypertension, benign       carbidopa-levodopa  MG per tablet    SINEMET      Take 1 tablet by mouth 3 times daily        finasteride 5 MG tablet    PROSCAR    90 tablet    Take 1 tablet (5 mg) by mouth daily    Prostatic hemorrhage       gabapentin 300 MG capsule    NEURONTIN    90 capsule    Take 1 capsule by mouth. 1po hs    Essential hypertension, benign       metFORMIN 1000 MG tablet    GLUCOPHAGE    180 tablet    TAKE 1 TABLET TWICE DAILY WITH BREAKFAST  AND WITH DINNER    Type 2 diabetes mellitus with diabetic nephropathy, without long-term current use of insulin (H)       Multiple vitamin  s Tabs

## 2018-03-16 ENCOUNTER — TRANSFERRED RECORDS (OUTPATIENT)
Dept: HEALTH INFORMATION MANAGEMENT | Facility: CLINIC | Age: 73
End: 2018-03-16

## 2018-08-06 ENCOUNTER — TRANSFERRED RECORDS (OUTPATIENT)
Dept: HEALTH INFORMATION MANAGEMENT | Facility: CLINIC | Age: 73
End: 2018-08-06

## 2018-08-10 DIAGNOSIS — C61 PROSTATE CANCER (H): Primary | ICD-10-CM

## 2018-08-13 ENCOUNTER — OFFICE VISIT (OUTPATIENT)
Dept: INTERNAL MEDICINE | Facility: CLINIC | Age: 73
End: 2018-08-13
Payer: COMMERCIAL

## 2018-08-13 VITALS
DIASTOLIC BLOOD PRESSURE: 67 MMHG | RESPIRATION RATE: 16 BRPM | HEART RATE: 61 BPM | TEMPERATURE: 98.8 F | SYSTOLIC BLOOD PRESSURE: 108 MMHG | OXYGEN SATURATION: 97 % | HEIGHT: 72 IN | BODY MASS INDEX: 26.18 KG/M2 | WEIGHT: 193.3 LBS

## 2018-08-13 DIAGNOSIS — I10 ESSENTIAL HYPERTENSION, BENIGN: ICD-10-CM

## 2018-08-13 DIAGNOSIS — E11.21 TYPE 2 DIABETES MELLITUS WITH DIABETIC NEPHROPATHY, WITHOUT LONG-TERM CURRENT USE OF INSULIN (H): Primary | ICD-10-CM

## 2018-08-13 DIAGNOSIS — E78.5 HYPERLIPIDEMIA LDL GOAL <100: ICD-10-CM

## 2018-08-13 DIAGNOSIS — C61 PROSTATE CANCER (H): ICD-10-CM

## 2018-08-13 LAB
ALBUMIN SERPL-MCNC: 4 G/DL (ref 3.4–5)
ALP SERPL-CCNC: 67 U/L (ref 40–150)
ALT SERPL W P-5'-P-CCNC: 11 U/L (ref 0–70)
ANION GAP SERPL CALCULATED.3IONS-SCNC: 11 MMOL/L (ref 3–14)
AST SERPL W P-5'-P-CCNC: 39 U/L (ref 0–45)
BILIRUB SERPL-MCNC: 0.7 MG/DL (ref 0.2–1.3)
BUN SERPL-MCNC: 8 MG/DL (ref 7–30)
CALCIUM SERPL-MCNC: 8.9 MG/DL (ref 8.5–10.1)
CHLORIDE SERPL-SCNC: 97 MMOL/L (ref 94–109)
CO2 SERPL-SCNC: 30 MMOL/L (ref 20–32)
CREAT SERPL-MCNC: 0.68 MG/DL (ref 0.66–1.25)
GFR SERPL CREATININE-BSD FRML MDRD: >90 ML/MIN/1.7M2
GLUCOSE SERPL-MCNC: 105 MG/DL (ref 70–99)
HBA1C MFR BLD: 4.8 % (ref 0–5.6)
POTASSIUM SERPL-SCNC: 4.5 MMOL/L (ref 3.4–5.3)
PROT SERPL-MCNC: 7.4 G/DL (ref 6.8–8.8)
SODIUM SERPL-SCNC: 138 MMOL/L (ref 133–144)

## 2018-08-13 PROCEDURE — 80053 COMPREHEN METABOLIC PANEL: CPT | Performed by: INTERNAL MEDICINE

## 2018-08-13 PROCEDURE — 83036 HEMOGLOBIN GLYCOSYLATED A1C: CPT | Performed by: INTERNAL MEDICINE

## 2018-08-13 PROCEDURE — 99214 OFFICE O/P EST MOD 30 MIN: CPT | Performed by: INTERNAL MEDICINE

## 2018-08-13 PROCEDURE — 36415 COLL VENOUS BLD VENIPUNCTURE: CPT | Performed by: INTERNAL MEDICINE

## 2018-08-13 NOTE — LETTER
August 14, 2018      Kyler Rodriguez  [address] [address] [address]        Dear ,    We are writing to inform you of your test results.    Normal results.    Resulted Orders   Comprehensive metabolic panel   Result Value Ref Range    Sodium 138 133 - 144 mmol/L    Potassium 4.5 3.4 - 5.3 mmol/L    Chloride 97 94 - 109 mmol/L    Carbon Dioxide 30 20 - 32 mmol/L    Anion Gap 11 3 - 14 mmol/L    Glucose 105 (H) 70 - 99 mg/dL      Comment:      Fasting specimen    Urea Nitrogen 8 7 - 30 mg/dL    Creatinine 0.68 0.66 - 1.25 mg/dL    GFR Estimate >90 >60 mL/min/1.7m2      Comment:      Non  GFR Calc    GFR Estimate If Black >90 >60 mL/min/1.7m2      Comment:       GFR Calc    Calcium 8.9 8.5 - 10.1 mg/dL    Bilirubin Total 0.7 0.2 - 1.3 mg/dL    Albumin 4.0 3.4 - 5.0 g/dL    Protein Total 7.4 6.8 - 8.8 g/dL    Alkaline Phosphatase 67 40 - 150 U/L    ALT 11 0 - 70 U/L    AST 39 0 - 45 U/L   Hemoglobin A1c   Result Value Ref Range    Hemoglobin A1C 4.8 0 - 5.6 %      Comment:      Normal <5.7% Prediabetes 5.7-6.4%  Diabetes 6.5% or higher - adopted from ADA   consensus guidelines.         If you have any questions or concerns, please call the clinic at the number listed above.       Sincerely,        Rakesh Clark MD

## 2018-08-13 NOTE — NURSING NOTE
Vital signs:  Temp: 98.8  F (37.1  C) Temp src: Oral BP: 108/67 Pulse: 61   Resp: 16 SpO2: 97 %     Height: 6' (182.9 cm) Weight: 193 lb 4.8 oz (87.7 kg)  Estimated body mass index is 26.22 kg/(m^2) as calculated from the following:    Height as of this encounter: 6' (1.829 m).    Weight as of this encounter: 193 lb 4.8 oz (87.7 kg).

## 2018-08-13 NOTE — MR AVS SNAPSHOT
After Visit Summary   8/13/2018    Kyler Rodriguez    MRN: [id number]           Patient Information     Date Of Birth          1945        Visit Information        Provider Department      8/13/2018 8:20 AM Rakesh Clark MD [facility]        Today's Diagnoses     Type 2 diabetes mellitus with diabetic nephropathy, without long-term current use of insulin (H)    -  1       Follow-ups after your visit        Follow-up notes from your care team     Return in about 6 months (around 2/13/2019) for Physical Exam.      Your next 10 appointments already scheduled     Aug 17, 2018 10:30 AM CDT   (Arrive by 10:10 AM)   Return Visit with Keon Espinoza MD   [facility] [place] ([facility])    [address]  [address]  [place] [address] [phone]              Who to contact     If you have questions or need follow up information about today's clinic visit or your schedule please contact [facility] directly at [phone].  Normal or non-critical lab and imaging results will be communicated to you by MyChart, letter or phone within 4 business days after the clinic has received the results. If you do not hear from us within 7 days, please contact the clinic through MyChart or phone. If you have a critical or abnormal lab result, we will notify you by phone as soon as possible.  Submit refill requests through [employer] or call your pharmacy and they will forward the refill request to us. Please allow 3 business days for your refill to be completed.          Additional Information About Your Visit        MyChart Information     [employer] gives you secure access to your electronic health record. If you see a primary care provider, you can also send messages to your care team and make appointments. If you have questions, please call your primary care clinic.  If you do not have a primary care provider, please call  [phone] and they will assist you.        Care EveryWhere ID     This is your Care EveryWhere ID. This could be used by other organizations to access your [place] medical records  [id number]        Your Vitals Were     Pulse Temperature Respirations Height Pulse Oximetry BMI (Body Mass Index)    61 98.8  F (37.1  C) (Oral) 16 6' (1.829 m) 97% 26.22 kg/m2       Blood Pressure from Last 3 Encounters:   08/13/18 108/67   02/16/18 120/60   02/13/18 124/62    Weight from Last 3 Encounters:   08/13/18 193 lb 4.8 oz (87.7 kg)   02/16/18 185 lb (83.9 kg)   02/13/18 186 lb 4.8 oz (84.5 kg)              We Performed the Following     Comprehensive metabolic panel     Hemoglobin A1c        Primary Care Provider Office Phone # Fax #    Rakesh Clark [phone]       [address] [facility] [address]        Equal Access to Services     [facility] : Hadii aad ku hadasho Soomaali, waaxda luqadaha, qaybta kaalmada adeegyada, waxay idiin haythiagon savi holloway . So [facility] [phone].    ATENCIÓN: Si habla español, tiene a olivas disposición servicios gratuitos de asistencia lingüística. Llame al [phone].    We comply with applicable federal civil rights laws and [place] laws. We do not discriminate on the basis of race, color, national origin, age, disability, sex, sexual orientation, or gender identity.            Thank you!     Thank you for choosing [facility]  for your care. Our goal is always to provide you with excellent care. Hearing back from our patients is one way we can continue to improve our services. Please take a few minutes to complete the written survey that you may receive in the mail after your visit with us. Thank you!             Your Updated Medication List - Protect others around you: Learn how to safely use, store and throw away your medicines at www.disposemymeds.org.          This list is accurate as of 8/13/18  9:08 AM.  Always use your most  recent med list.                   Brand Name Dispense Instructions for use Diagnosis    ACCU-CHEK COMPLETE Kit     1    test daily    Type II or unspecified type diabetes mellitus without mention of complication, not stated as uncontrolled       amLODIPine-benazepril 10-20 MG per capsule    LOTREL    90 capsule    TAKE 1 CAPSULE EVERY DAY    Essential hypertension, benign       aspirin 81 MG tablet      1 tablet daily    Essential hypertension, benign, Type II or unspecified type diabetes mellitus without mention of complication, not stated as uncontrolled, Pure hypercholesterolemia, Hypertrophy of prostate with urinary obstruction and other lower urinary tract symptoms (LUTS), Elevated prostate specific antigen (PSA)       atenolol 25 MG tablet    TENORMIN    90 tablet    TAKE 1 TABLET EVERY DAY    Essential hypertension, benign       atorvastatin 80 MG tablet    LIPITOR    45 tablet    TAKE 1/2 TABLET EVERY DAY    Hyperlipidemia LDL goal <100       baclofen 10 MG tablet    LIORESAL    90 tablet    TAKE 1 TABLET EVERY DAY    Back muscle spasm       blood glucose monitoring test strip    ACCU-CHEK COMPACT DRUM    100 strip    by In Vitro route daily. Test one time daily or as directed.    Type II or unspecified type diabetes mellitus without mention of complication, not stated as uncontrolled       CALTRATE 600+D PLUS PO      Take  by mouth.    Type 2 diabetes, HbA1c goal < 7% (H), Hyperlipidemia LDL goal <100, Prostate cancer (H), Essential hypertension, benign       carbidopa-levodopa  MG per tablet    SINEMET     Take 1 tablet by mouth 3 times daily        finasteride 5 MG tablet    PROSCAR    90 tablet    Take 1 tablet (5 mg) by mouth daily    Prostatic hemorrhage       gabapentin 300 MG capsule    NEURONTIN    90 capsule    Take 1 capsule by mouth. 1po hs    Essential hypertension, benign       metFORMIN 1000 MG tablet    GLUCOPHAGE    180 tablet    TAKE 1 TABLET TWICE DAILY WITH BREAKFAST  AND WITH  DINNER    Type 2 diabetes mellitus with diabetic nephropathy, without long-term current use of insulin (H)       Multiple vitamin  s Tabs

## 2018-08-13 NOTE — PROGRESS NOTES
SUBJECTIVE:   Kyler Rodriguez is a 73 year old male who presents to clinic today for the following health issues:    No acute complaints, no medication change or new medical conditions.    Diabetes Follow-up  Has H/O DM. On diet , exercise and Metformin. Blood sugars are controlled. No parestesias. No hypoglycemias.      Patient is checking blood sugars: not at all    Diabetic concerns: None     Symptoms of hypoglycemia (low blood sugar): none     Paresthesias (numbness or burning in feet) or sores: No     Date of last diabetic eye exam: 08/06/18    Diabetes Management Resources    Hyperlipidemia Follow-Up  Has H/O hyperlipidemia. On medical treatment and diet. No side effects. No muscle weakness, myalgias or upset stomach.       Rate your low fat/cholesterol diet?: good    Taking statin?  Yes, no muscle aches from statin    Other lipid medications/supplements?:  none    Hypertension Follow-up  Has h/o HTN. on medical treatment. BP has been controlled. No side effects from medications. No CP, HA, dizziness. good compliance with medications and low salt diet.      Outpatient blood pressures are not being checked.    Low Salt Diet: low salt    BP Readings from Last 2 Encounters:   02/16/18 120/60   02/13/18 124/62     Hemoglobin A1C (%)   Date Value   02/13/2018 5.5   08/11/2017 5.1     LDL Cholesterol Calculated (mg/dL)   Date Value   02/13/2018 44   02/07/2017 44       Amount of exercise or physical activity: None    Problems taking medications regularly: No    Medication side effects: none    Diet: regular (no restrictions)        PROBLEMS TO ADD ON...  Has h/o prostate cancer, follows with urology. No recurrence.     Problem list and histories reviewed & adjusted, as indicated.  Additional history: as documented    Patient Active Problem List   Diagnosis     Essential hypertension, benign     Other and unspecified disc disorder of cervical region     Hypertrophy of prostate with urinary obstruction     Type 2  diabetes mellitus with diabetic nephropathy (H)     HYPERLIPIDEMIA LDL GOAL <100     Prostate cancer (H)     Advance Care Planning     RBBB     Past Surgical History:   Procedure Laterality Date     C NONSPECIFIC PROCEDURE  1998    varicose vein stripping     C NONSPECIFIC PROCEDURE  1998    colonoscopy     CYSTOSCOPY       EYE SURGERY      cataract left eye     HC REMOVAL OF TONSILS,12+ Y/O       HC REMOVAL OF TONSILS,<11 Y/O       HC VASECTOMY UNILAT/BILAT W POSTOP SEMEN       PHACOEMULSIFICATION CLEAR CORNEA W/ STANDARD IOL IMPLANT, ENDOSCOPIC CYCLOPHOTOCOAGULATION, COMBINED  7/31/2014    Procedure: COMBINED PHACOEMULSIFICATION CLEAR CORNEA WITH STANDARD INTRAOCULAR LENS IMPLANT, ENDOSCOPIC CYCLOPHOTOCOAGULATION;  Surgeon: Leroy Crews MD;  Location:  [facility]     VASECTOMY         Social History   Substance Use Topics     Smoking status: Never Smoker     Smokeless tobacco: Never Used     Alcohol use 0.0 oz/week     0 Standard drinks or equivalent per week      Comment: 4 drinks/daily     Family History   Problem Relation Age of Onset     Cancer - colorectal Father      [email or web address]     Prostate Cancer Father      Musculoskeletal Disorder Brother      fibermyalgia     Prostate Cancer Paternal Grandfather          Current Outpatient Prescriptions   Medication Sig Dispense Refill     amLODIPine-benazepril (LOTREL) 10-20 MG per capsule TAKE 1 CAPSULE EVERY DAY 90 capsule 3     atenolol (TENORMIN) 25 MG tablet TAKE 1 TABLET EVERY DAY 90 tablet 3     atorvastatin (LIPITOR) 80 MG tablet TAKE 1/2 TABLET EVERY DAY 45 tablet 3     baclofen (LIORESAL) 10 MG tablet TAKE 1 TABLET EVERY DAY 90 tablet 1     Calcium Carbonate-Vit D-Min (CALTRATE 600+D PLUS PO) Take  by mouth.       carbidopa-levodopa (SINEMET)  MG per tablet Take 1 tablet by mouth 3 times daily       finasteride (PROSCAR) 5 MG tablet Take 1 tablet (5 mg) by mouth daily 90 tablet 3     gabapentin (NEURONTIN) 300 MG capsule Take 1 capsule by mouth.  1po hs 90 capsule 3     metFORMIN (GLUCOPHAGE) 1000 MG tablet TAKE 1 TABLET TWICE DAILY WITH BREAKFAST  AND WITH DINNER 180 tablet 1     MULTIPLE VITAMINS OR TABS        ACCU-CHEK COMPLETE KIT test daily 1 0     ASPIRIN 81 MG OR TABS 1 tablet daily  0     blood glucose (ACCU-CHEK COMPACT DRUM) test strip by In Vitro route daily. Test one time daily or as directed. 100 strip 1       Reviewed and updated as needed this visit by clinical staff       Reviewed and updated as needed this visit by Provider         ROS:  Constitutional, HEENT, cardiovascular, pulmonary, gi and gu systems are negative, except as otherwise noted.    OBJECTIVE:     /67  Pulse 61  Temp 98.8  F (37.1  C) (Oral)  Resp 16  Ht 6' (1.829 m)  Wt 193 lb 4.8 oz (87.7 kg)  SpO2 97%  BMI 26.22 kg/m2  Body mass index is 26.22 kg/(m^2).   GENERAL: healthy, alert and no distress  EYES: Eyes grossly normal to inspection, PERRL and conjunctivae and sclerae normal  HENT: ear canals- obstructed with soft , brown cerumen and TM's normal, nose and mouth without ulcers or lesions  NECK: no adenopathy, no asymmetry, masses, or scars and thyroid normal to palpation  RESP: lungs clear to auscultation - no rales, rhonchi or wheezes  CV: regular rate and rhythm, normal S1 S2, no S3 or S4, no murmur, click or rub, no peripheral edema and peripheral pulses strong  ABDOMEN: soft, nontender, no hepatosplenomegaly, no masses and bowel sounds normal  MS: no gross musculoskeletal defects noted, no edema  SKIN: no suspicious lesions or rashes    Diagnostic Test Results:  none     ASSESSMENT/PLAN:     Problem List Items Addressed This Visit     Essential hypertension, benign    Type 2 diabetes mellitus with diabetic nephropathy (H) - Primary    Relevant Orders    Comprehensive metabolic panel    Hemoglobin A1c    HYPERLIPIDEMIA LDL GOAL <100    Prostate cancer (H)         Bilateral cerumen form external ear canals removed with curette , improved hearing     Assess  lab work   Cont treatment,   Follow up with urology     Follow-Up:in 6 month for physical exam     Rakesh Clark MD  [facility]

## 2018-08-17 ENCOUNTER — OFFICE VISIT (OUTPATIENT)
Dept: UROLOGY | Facility: CLINIC | Age: 73
End: 2018-08-17
Payer: COMMERCIAL

## 2018-08-17 VITALS
HEART RATE: 62 BPM | WEIGHT: 195 LBS | DIASTOLIC BLOOD PRESSURE: 60 MMHG | OXYGEN SATURATION: 97 % | BODY MASS INDEX: 26.41 KG/M2 | HEIGHT: 72 IN | SYSTOLIC BLOOD PRESSURE: 110 MMHG

## 2018-08-17 DIAGNOSIS — N28.89 RIGHT RENAL MASS: Primary | ICD-10-CM

## 2018-08-17 DIAGNOSIS — C61 PROSTATE CANCER (H): ICD-10-CM

## 2018-08-17 LAB
ALBUMIN UR-MCNC: NEGATIVE MG/DL
APPEARANCE UR: CLEAR
BILIRUB UR QL STRIP: NEGATIVE
COLOR UR AUTO: YELLOW
GLUCOSE UR STRIP-MCNC: NEGATIVE MG/DL
HGB UR QL STRIP: NEGATIVE
KETONES UR STRIP-MCNC: 15 MG/DL
LEUKOCYTE ESTERASE UR QL STRIP: NEGATIVE
NITRATE UR QL: NEGATIVE
PH UR STRIP: 6.5 PH (ref 5–7)
PSA SERPL-MCNC: <0.04 NG/ML (ref 0–4)
SOURCE: ABNORMAL
SP GR UR STRIP: 1.01 (ref 1–1.03)
UROBILINOGEN UR STRIP-ACNC: 0.2 EU/DL (ref 0.2–1)

## 2018-08-17 PROCEDURE — 99213 OFFICE O/P EST LOW 20 MIN: CPT | Performed by: UROLOGY

## 2018-08-17 PROCEDURE — 81003 URINALYSIS AUTO W/O SCOPE: CPT | Performed by: UROLOGY

## 2018-08-17 PROCEDURE — 84153 ASSAY OF PSA TOTAL: CPT | Performed by: UROLOGY

## 2018-08-17 ASSESSMENT — PAIN SCALES - GENERAL: PAINLEVEL: NO PAIN (0)

## 2018-08-17 NOTE — LETTER
8/17/2018       RE: Kyler Rodriguez  [address] [address]     Dear Colleague,    Thank you for referring your patient, Kyler Rodriguez, to the [facility] [place] at [facility]. Please see a copy of my visit note below.    Kyler Rodriguez is a 73-year-old male with prostate cancer and a cystic right renal mass that is followed with CT scans. His prostate cancer was diagnosed in 2012 and was treated with hormone therapy and external beam radiation with extension to the pelvic lymph nodes.  His PSA is undetectable this morning. He is having no difficulty voiding, dysuria or hematuria. He has no right flank pain  His cystic right renal mass is in the upper pole and is felt to be a complex but benign cyst  Other past medical history: Parkinson's disease, Hypertension, type 2 diabetes, hyperlipidemia, cataract surgery, varicose vein surgery, cystoscopy, vasectomy, nonsmoker  Family history: Colon cancer, prostate cancer  Medications: Lotrel, low-dose aspirin, atenolol, atorvastatin, baclofen, calcium carbonate/vitamin D, Sinemet, finasteride, Neurontin, metformin, multivitamin  Allergies: None  Exam: Alert, oriented, normal vital signs, normal appearance. Normocephalic, normal respirations, neuro grossly intact  Urinalysis: Normal  Assessment: Grade 3+4 adenocarcinoma the prostate-no recurrence  Cystic right renal mass  Plan: PSA, CT scan of abdomen next March    Again, thank you for allowing me to participate in the care of your patient.      Sincerely,    Keon Espinoza MD

## 2018-08-17 NOTE — PROGRESS NOTES
Kyler Rodriguez is a 73-year-old male with prostate cancer and a cystic right renal mass that is followed with CT scans. His prostate cancer was diagnosed in 2012 and was treated with hormone therapy and external beam radiation with extension to the pelvic lymph nodes.  His PSA is undetectable this morning. He is having no difficulty voiding, dysuria or hematuria. He has no right flank pain  His cystic right renal mass is in the upper pole and is felt to be a complex but benign cyst  Other past medical history: Parkinson's disease, Hypertension, type 2 diabetes, hyperlipidemia, cataract surgery, varicose vein surgery, cystoscopy, vasectomy, nonsmoker  Family history: Colon cancer, prostate cancer  Medications: Lotrel, low-dose aspirin, atenolol, atorvastatin, baclofen, calcium carbonate/vitamin D, Sinemet, finasteride, Neurontin, metformin, multivitamin  Allergies: None  Exam: Alert, oriented, normal vital signs, normal appearance. Normocephalic, normal respirations, neuro grossly intact  Urinalysis: Normal  Assessment: Grade 3+4 adenocarcinoma the prostate-no recurrence  Cystic right renal mass  Plan: PSA, CT scan of abdomen next March

## 2018-08-17 NOTE — NURSING NOTE
Chief Complaint   Patient presents with     Hx of Prostate Cancer     Patient today for [facility] PSA and Urine        UA RESULTS:  Recent Labs   Lab Test  08/17/18   1014   03/02/17   0855   COLOR  Yellow   < >  Red   APPEARANCE  Clear   < >  Cloudy   URINEGLC  Negative   < >  50*   URINEBILI  Negative   < >  Negative   URINEKETONE  15*   < >  Negative   SG  1.015   < >  1.017   UBLD  Negative   < >  Large*   URINEPH  6.5   < >  7.0   PROTEIN  Negative   < >  >499  Reviewed, acceptable  *   UROBILINOGEN  0.2   < >   --    NITRITE  Negative   < >  Negative   LEUKEST  Negative   < >  Negative   RBCU   --    --   >182*   WBCU   --    --   0    < > = values in this interval not displayed.                 Yany Chairez, [facility]

## 2018-08-17 NOTE — MR AVS SNAPSHOT
After Visit Summary   8/17/2018    Kyler Rodriguez    MRN: [id number]           Patient Information     Date Of Birth          1945        Visit Information        Provider Department      8/17/2018 10:30 AM Keon Espinoza MD [facility]        Today's Diagnoses     Right renal mass    -  1    Prostate cancer (H)           Follow-ups after your visit        Your next 10 appointments already scheduled     Feb 18, 2019  8:00 AM CST   PHYSICAL with Rakesh Clark MD   [facility]    [address]  [facility] [address] [phone]              Future tests that were ordered for you today     Open Future Orders        Priority Expected Expires Ordered    PSA Diag Urologic Phys Routine 3/15/2019 8/17/2019 8/17/2018    CT Abdomen Pelvis w/o & w Contrast Routine 3/17/2019 8/17/2019 8/17/2018            Who to contact     If you have questions or need follow up information about today's clinic visit or your schedule please contact [facility] directly at [phone].  Normal or non-critical lab and imaging results will be communicated to you by [employer], letter or phone within 4 business days after the clinic has received the results. If you do not hear from us within 7 days, please contact the clinic through [employer] or phone. If you have a critical or abnormal lab result, we will notify you by phone as soon as possible.  Submit refill requests through [employer] or call your pharmacy and they will forward the refill request to us. Please allow 3 business days for your refill to be completed.          Additional Information About Your Visit        MyChart Information     [employer] gives you secure access to your electronic health record. If you see a primary care provider, you can also send messages to your care team and make appointments. If you have questions,  please call your primary care clinic.  If you do not have a primary care provider, please call [phone] and they will assist you.        Care EveryWhere ID     This is your Care EveryWhere ID. This could be used by other organizations to access your [place] medical records  [id number]        Your Vitals Were     Pulse Height Pulse Oximetry BMI (Body Mass Index)          62 1.829 m (6') 97% 26.45 kg/m2         Blood Pressure from Last 3 Encounters:   08/17/18 110/60   08/13/18 108/67   02/16/18 120/60    Weight from Last 3 Encounters:   08/17/18 88.5 kg (195 lb)   08/13/18 87.7 kg (193 lb 4.8 oz)   02/16/18 83.9 kg (185 lb)              We Performed the Following     PSA Diag Urologic Phys     UA without Microscopic        Primary Care Provider Office Phone # Fax #    Rakesh Clark [phone]       [address]  [facility] [address]        Equal Access to Services     [facility]: Hadii aad ku hadasho Soomaali, waaxda luqadaha, qaybta kaalmada adeegyada, waxakiko harrison hayjameson holloway . So [facility] [phone].    ATENCIÓN: Si habla español, tiene a olivas disposición servicios gratuitos de asistencia lingüística. LlWhite [facility] [phone].    We comply with applicable federal civil rights laws and [place] laws. We do not discriminate on the basis of race, color, national origin, age, disability, sex, sexual orientation, or gender identity.            Thank you!     Thank you for choosing [facility] [place]  for your care. Our goal is always to provide you with excellent care. Hearing back from our patients is one way we can continue to improve our services. Please take a few minutes to complete the written survey that you may receive in the mail after your visit with us. Thank you!             Your Updated Medication List - Protect others around you: Learn how to safely use, store and throw away your medicines at www.disposemymeds.org.           This list is accurate as of 8/17/18 11:02 AM.  Always use your most recent med list.                   Brand Name Dispense Instructions for use Diagnosis    ACCU-CHEK COMPLETE Kit     1    test daily    Type II or unspecified type diabetes mellitus without mention of complication, not stated as uncontrolled       amLODIPine-benazepril 10-20 MG per capsule    LOTREL    90 capsule    TAKE 1 CAPSULE EVERY DAY    Essential hypertension, benign       aspirin 81 MG tablet      1 tablet daily    Essential hypertension, benign, Type II or unspecified type diabetes mellitus without mention of complication, not stated as uncontrolled, Pure hypercholesterolemia, Hypertrophy of prostate with urinary obstruction and other lower urinary tract symptoms (LUTS), Elevated prostate specific antigen (PSA)       atenolol 25 MG tablet    TENORMIN    90 tablet    TAKE 1 TABLET EVERY DAY    Essential hypertension, benign       atorvastatin 80 MG tablet    LIPITOR    45 tablet    TAKE 1/2 TABLET EVERY DAY    Hyperlipidemia LDL goal <100       baclofen 10 MG tablet    LIORESAL    90 tablet    TAKE 1 TABLET EVERY DAY    Back muscle spasm       blood glucose monitoring test strip    ACCU-CHEK COMPACT DRUM    100 strip    by In Vitro route daily. Test one time daily or as directed.    Type II or unspecified type diabetes mellitus without mention of complication, not stated as uncontrolled       CALTRATE 600+D PLUS PO      Take  by mouth.    Type 2 diabetes, HbA1c goal < 7% (H), Hyperlipidemia LDL goal <100, Prostate cancer (H), Essential hypertension, benign       carbidopa-levodopa  MG per tablet    SINEMET     Take 1 tablet by mouth 3 times daily        finasteride 5 MG tablet    PROSCAR    90 tablet    Take 1 tablet (5 mg) by mouth daily    Prostatic hemorrhage       gabapentin 300 MG capsule    NEURONTIN    90 capsule    Take 1 capsule by mouth. 1po hs    Essential hypertension, benign       metFORMIN 1000 MG tablet    GLUCOPHAGE     180 tablet    TAKE 1 TABLET TWICE DAILY WITH BREAKFAST  AND WITH DINNER    Type 2 diabetes mellitus with diabetic nephropathy, without long-term current use of insulin (H)       Multiple vitamin  s Tabs

## 2018-08-26 DIAGNOSIS — M62.830 BACK MUSCLE SPASM: ICD-10-CM

## 2018-08-26 DIAGNOSIS — E11.21 TYPE 2 DIABETES MELLITUS WITH DIABETIC NEPHROPATHY, WITHOUT LONG-TERM CURRENT USE OF INSULIN (H): ICD-10-CM

## 2018-08-27 NOTE — TELEPHONE ENCOUNTER
"Requested Prescriptions   Pending Prescriptions Disp Refills     metFORMIN (GLUCOPHAGE) 1000 MG tablet [Pharmacy Med Name: METFORMIN HCL 1000 MG Tablet] 180 tablet 1    Last Written Prescription Date:  02/13/2018  Last Fill Quantity: 180,  # refills: 1   Last office visit: 8/13/2018 with prescribing provider:     Future Office Visit:   Sig: TAKE 1 TABLET TWICE DAILY WITH BREAKFAST  AND WITH DINNER    Biguanide Agents Passed    8/26/2018  9:09 AM       Passed - Blood pressure less than 140/90 in past 6 months    BP Readings from Last 3 Encounters:   08/17/18 110/60   08/13/18 108/67   02/16/18 120/60                Passed - Patient has documented LDL within the past 12 mos.    Recent Labs   Lab Test  02/13/18   0857   LDL  44            Passed - Patient has had a Microalbumin in the past 15 mos.    Recent Labs   Lab Test  02/13/18   0857   MICROL  8   UMALCR  7.99            Passed - Patient is age 10 or older       Passed - Patient has documented A1c within the specified period of time.    If HgbA1C is 8 or greater, it needs to be on file within the past 3 months.  If less than 8, must be on file within the past 6 months.     Recent Labs   Lab Test  08/13/18   0916   A1C  4.8            Passed - Patient's CR is NOT>1.4 OR Patient's EGFR is NOT<45 within past 12 mos.    Recent Labs   Lab Test  08/13/18   0916   GFRESTIMATED  >90   GFRESTBLACK  >90       Recent Labs   Lab Test  08/13/18   0916   CR  0.68            Passed - Patient does NOT have a diagnosis of CHF.       Passed - Recent (6 mo) or future (30 days) visit within the authorizing provider's specialty    Patient had office visit in the last 6 months or has a visit in the next 30 days with authorizing provider or within the authorizing provider's specialty.  See \"Patient Info\" tab in inbasket, or \"Choose Columns\" in Meds & Orders section of the refill encounter.            baclofen (LIORESAL) 10 MG tablet [Pharmacy Med Name: BACLOFEN 10 MG Tablet] 90 tablet " 1    Last Written Prescription Date:  02/13/2018  Last Fill Quantity: 90,  # refills: 1   Last office visit: 8/13/2018 with prescribing provider:     Future Office Visit:   Sig: TAKE 1 TABLET EVERY DAY    There is no refill protocol information for this order

## 2018-08-29 RX ORDER — BACLOFEN 10 MG/1
TABLET ORAL
Qty: 90 TABLET | Refills: 1 | Status: SHIPPED | OUTPATIENT
Start: 2018-08-29 | End: 2019-02-07

## 2018-08-29 NOTE — TELEPHONE ENCOUNTER
Routing refill request to provider for review/approval because:  Drug not on the [facility] refill protocol for baclofen    José Luis Flowers RN, BSN

## 2018-10-18 DIAGNOSIS — N42.1: ICD-10-CM

## 2018-10-19 RX ORDER — FINASTERIDE 5 MG/1
TABLET, FILM COATED ORAL
Qty: 90 TABLET | Refills: 3 | Status: SHIPPED | OUTPATIENT
Start: 2018-10-19 | End: 2019-08-05

## 2019-02-07 DIAGNOSIS — M62.830 BACK MUSCLE SPASM: ICD-10-CM

## 2019-02-07 DIAGNOSIS — E11.21 TYPE 2 DIABETES MELLITUS WITH DIABETIC NEPHROPATHY, WITHOUT LONG-TERM CURRENT USE OF INSULIN (H): ICD-10-CM

## 2019-02-07 DIAGNOSIS — E78.5 HYPERLIPIDEMIA LDL GOAL <100: ICD-10-CM

## 2019-02-07 DIAGNOSIS — I10 ESSENTIAL HYPERTENSION, BENIGN: ICD-10-CM

## 2019-02-07 NOTE — TELEPHONE ENCOUNTER
Requested Prescriptions   Pending Prescriptions Disp Refills     baclofen (LIORESAL) 10 MG tablet [Pharmacy Med Name: BACLOFEN 10 MG Tablet]  Last Written Prescription Date:  8/29/2018  Last Fill Quantity: 90,  # refills: 1   Last office visit: 8/13/2018 with prescribing provider:     Future Office Visit:   Next 5 appointments (look out 90 days)    Feb 18, 2019  8:00 AM CST  [facility] [address]  [facility] [address]  [phone]        90 tablet 1     Sig: TAKE 1 TABLET EVERY DAY    There is no refill protocol information for this order        metFORMIN (GLUCOPHAGE) 1000 MG tablet [Pharmacy Med Name: METFORMIN HCL 1000 MG Tablet]  Last Written Prescription Date:  8/29/2018  Last Fill Quantity: 180,  # refills: 1   Last office visit: 8/13/2018 with prescribing provider:     Future Office Visit:   Next 5 appointments (look out 90 days)    Feb 18, 2019  8:00 AM CST  PHYS[facility] with [facility]  [facility] [address]  [facility] [address]  [phone]        180 tablet 1     Sig: TAKE 1 TABLET TWICE DAILY WITH BREAKFAST  AND WITH DINNER    Biguanide Agents Passed - 2/7/2019  1:27 PM       Passed - Blood pressure less than 140/90 in past 6 months    BP Readings from Last 3 Encounters:   08/17/18 110/60   08/13/18 108/67   02/16/18 120/60                Passed - Patient has documented LDL within the past 12 mos.    Recent Labs   Lab Test 02/13/18  0857   LDL 44            Passed - Patient has had a Microalbumin in the past 15 mos.    Recent Labs   Lab Test 02/13/18  0857   MICROL 8   UMALCR 7.99            Passed - Patient is age 10 or older       Passed - Patient has documented A1c within the specified period of time.    If HgbA1C is 8 or greater, it needs to be on file within the past 3 months.  If less than 8, must be on file within the past 6  "months.     Recent Labs   Lab Test 08/13/18  0916   A1C 4.8            Passed - Patient's CR is NOT>1.4 OR Patient's EGFR is NOT<45 within past 12 mos.    Recent Labs   Lab Test 08/13/18  0916   GFRESTIMATED >90   GFRESTBLACK >90       Recent Labs   Lab Test 08/13/18  0916   CR 0.68            Passed - Patient does NOT have a diagnosis of CHF.       Passed - Medication is active on med list       Passed - Recent (6 mo) or future (30 days) visit within the authorizing provider's specialty    Patient had office visit in the last 6 months or has a visit in the next 30 days with authorizing provider or within the authorizing provider's specialty.  See \"Patient Info\" tab in inbasket, or \"Choose Columns\" in Meds & Orders section of the refill encounter.            atenolol (TENORMIN) 25 MG tablet [Pharmacy Med Name: ATENOLOL 25 MG Tablet]  Last Written Prescription Date:  2/13/2018  Last Fill Quantity: 90,  # refills: 3   Last office visit: 8/13/2018 with prescribing provider:     Future Office Visit:   Next 5 appointments (look out 90 days)    Feb 18, 2019  8:00 AM CST  PHYSICAL with Rakesh Clark MD  [facility] [address] [place] [id number] [phone]        90 tablet 3     Sig: TAKE 1 TABLET EVERY DAY    Beta-Blockers Protocol Passed - 2/7/2019  1:27 PM       Passed - Blood pressure under 140/90 in past 12 months    BP Readings from Last 3 Encounters:   08/17/18 110/60   08/13/18 108/67   02/16/18 120/60                Passed - Patient is age 6 or older       Passed - Recent (12 mo) or future (30 days) visit within the authorizing provider's specialty    Patient had office visit in the last 12 months or has a visit in the next 30 days with authorizing provider or within the authorizing provider's specialty.  See \"Patient Info\" tab in inbasket, or \"Choose Columns\" in Meds & Orders section of the refill encounter.             Passed - Medication is " "active on med list        amLODIPine-benazepril (LOTREL) 10-20 MG capsule [Pharmacy Med Name: AMLODIPINE BESYLATE/BENAZEPRIL HYDROCHLORIDE 10-20  Last Written Prescription Date:  2/13/2018  Last Fill Quantity: 90,  # refills: 3   Last office visit: 8/13/2018 with prescribing provider:     Future Office Visit:   Next 5 appointments (look out 90 days)    Feb 18, 2019  8:00 AM CST  PHYSICAL with Rakesh Clark MD  [facility] [address]  [facility] [address]  [phone]        MG Capsule] 90 capsule 3     Sig: TAKE 1 CAPSULE EVERY DAY    Calcium Channel Blockers Protocol  Passed - 2/7/2019  1:27 PM       Passed - Blood pressure under 140/90 in past 12 months    BP Readings from Last 3 Encounters:   08/17/18 110/60   08/13/18 108/67   02/16/18 120/60                Passed - Recent (12 mo) or future (30 days) visit within the authorizing provider's specialty    Patient had office visit in the last 12 months or has a visit in the next 30 days with authorizing provider or within the authorizing provider's specialty.  See \"Patient Info\" tab in inbasket, or \"Choose Columns\" in Meds & Orders section of the refill encounter.             Passed - Medication is active on med list       Passed - Patient is age 18 or older       Passed - Normal serum creatinine on file in past 12 months    Recent Labs   Lab Test 08/13/18  0916  08/19/11  1043   CR 0.68   < >  --    CREAT  --   --  0.8    < > = values in this interval not displayed.             atorvastatin (LIPITOR) 80 MG tablet [Pharmacy Med Name: ATORVASTATIN CALCIUM 80 MG Tablet]  Last Written Prescription Date:  2/13/2018  Last Fill Quantity: 45,  # refills: 3   Last office visit: 8/13/2018 with prescribing provider:     Future Office Visit:   Next 5 appointments (look out 90 days)    Feb 18, 2019  8:00 AM CST  PHYSICAL with Rakesh Clark MD  [facility] [address]  [facility] [address]  [phone]        45 tablet 3     Sig: TAKE 1/2 TABLET EVERY DAY    Statins Protocol Passed - 2/7/2019  1:27 PM       Passed - LDL on file in past 12 months    Recent Labs   Lab Test 02/13/18  0857   LDL 44            Passed - No abnormal creatine kinase in past 12 months    No lab results found.            Passed - Recent (12 mo) or future (30 days) visit within the authorizing provider's specialty    Patient had office visit in the last 12 months or has a visit in the next 30 days with authorizing provider or within the authorizing provider's specialty.  See \"Patient Info\" tab in inbasket, or \"Choose Columns\" in Meds & Orders section of the refill encounter.             Passed - Medication is active on med list       Passed - Patient is age 18 or older        "

## 2019-02-11 RX ORDER — ATORVASTATIN CALCIUM 80 MG/1
TABLET, FILM COATED ORAL
Qty: 45 TABLET | Refills: 0 | Status: SHIPPED | OUTPATIENT
Start: 2019-02-11 | End: 2019-05-09

## 2019-02-11 RX ORDER — BACLOFEN 10 MG/1
TABLET ORAL
Qty: 90 TABLET | Refills: 1 | Status: SHIPPED | OUTPATIENT
Start: 2019-02-11 | End: 2019-08-05

## 2019-02-11 RX ORDER — AMLODIPINE AND BENAZEPRIL HYDROCHLORIDE 10; 20 MG/1; MG/1
CAPSULE ORAL
Qty: 90 CAPSULE | Refills: 0 | Status: SHIPPED | OUTPATIENT
Start: 2019-02-11 | End: 2019-05-09

## 2019-02-11 RX ORDER — ATENOLOL 25 MG/1
TABLET ORAL
Qty: 90 TABLET | Refills: 0 | Status: SHIPPED | OUTPATIENT
Start: 2019-02-11 | End: 2019-05-09

## 2019-02-11 NOTE — TELEPHONE ENCOUNTER
Baclofen - Routing refill request to provider for review/approval because:  Drug not on the [facility] refill protocol     All other medications - Medication is being filled for 1 time refill only due to:  Upcoming apt for 6 month diabetes check

## 2019-02-16 ASSESSMENT — ENCOUNTER SYMPTOMS
NAUSEA: 0
FEVER: 0
SORE THROAT: 0
HEMATOCHEZIA: 0
COUGH: 0
JOINT SWELLING: 0
HEMATURIA: 0
PARESTHESIAS: 0
DIZZINESS: 0
ABDOMINAL PAIN: 0
CHILLS: 0
FREQUENCY: 0
WEAKNESS: 0
HEARTBURN: 0
NERVOUS/ANXIOUS: 0
MYALGIAS: 0
ARTHRALGIAS: 0
DYSURIA: 0
SHORTNESS OF BREATH: 0
CONSTIPATION: 0
DIARRHEA: 0
PALPITATIONS: 0
EYE PAIN: 0
HEADACHES: 0

## 2019-02-16 ASSESSMENT — ACTIVITIES OF DAILY LIVING (ADL): CURRENT_FUNCTION: NO ASSISTANCE NEEDED

## 2019-02-18 ENCOUNTER — TRANSFERRED RECORDS (OUTPATIENT)
Dept: HEALTH INFORMATION MANAGEMENT | Facility: CLINIC | Age: 74
End: 2019-02-18

## 2019-02-18 ENCOUNTER — OFFICE VISIT (OUTPATIENT)
Dept: INTERNAL MEDICINE | Facility: CLINIC | Age: 74
End: 2019-02-18
Payer: MEDICARE

## 2019-02-18 VITALS
RESPIRATION RATE: 12 BRPM | WEIGHT: 202 LBS | BODY MASS INDEX: 27.36 KG/M2 | TEMPERATURE: 98.7 F | SYSTOLIC BLOOD PRESSURE: 130 MMHG | HEART RATE: 66 BPM | OXYGEN SATURATION: 97 % | DIASTOLIC BLOOD PRESSURE: 60 MMHG | HEIGHT: 72 IN

## 2019-02-18 DIAGNOSIS — C61 PROSTATE CANCER (H): ICD-10-CM

## 2019-02-18 DIAGNOSIS — E11.21 TYPE 2 DIABETES MELLITUS WITH DIABETIC NEPHROPATHY, WITHOUT LONG-TERM CURRENT USE OF INSULIN (H): ICD-10-CM

## 2019-02-18 DIAGNOSIS — Z00.00 ENCOUNTER FOR PREVENTATIVE ADULT HEALTH CARE EXAMINATION: Primary | ICD-10-CM

## 2019-02-18 DIAGNOSIS — E78.5 HYPERLIPIDEMIA LDL GOAL <100: ICD-10-CM

## 2019-02-18 DIAGNOSIS — I10 ESSENTIAL HYPERTENSION, BENIGN: ICD-10-CM

## 2019-02-18 DIAGNOSIS — G20.C PARKINSONISM, UNSPECIFIED PARKINSONISM TYPE (H): ICD-10-CM

## 2019-02-18 DIAGNOSIS — Z12.11 SPECIAL SCREENING FOR MALIGNANT NEOPLASMS, COLON: ICD-10-CM

## 2019-02-18 LAB
ALBUMIN SERPL-MCNC: 4 G/DL (ref 3.4–5)
ALP SERPL-CCNC: 69 U/L (ref 40–150)
ALT SERPL W P-5'-P-CCNC: 10 U/L (ref 0–70)
ANION GAP SERPL CALCULATED.3IONS-SCNC: 11 MMOL/L (ref 3–14)
AST SERPL W P-5'-P-CCNC: 31 U/L (ref 0–45)
BILIRUB SERPL-MCNC: 0.8 MG/DL (ref 0.2–1.3)
BUN SERPL-MCNC: 9 MG/DL (ref 7–30)
CALCIUM SERPL-MCNC: 9.7 MG/DL (ref 8.5–10.1)
CHLORIDE SERPL-SCNC: 96 MMOL/L (ref 94–109)
CHOLEST SERPL-MCNC: 155 MG/DL
CO2 SERPL-SCNC: 29 MMOL/L (ref 20–32)
CREAT SERPL-MCNC: 0.7 MG/DL (ref 0.66–1.25)
ERYTHROCYTE [DISTWIDTH] IN BLOOD BY AUTOMATED COUNT: 13 % (ref 10–15)
GFR SERPL CREATININE-BSD FRML MDRD: >90 ML/MIN/{1.73_M2}
GLUCOSE SERPL-MCNC: 106 MG/DL (ref 70–99)
HBA1C MFR BLD: 5 % (ref 0–5.6)
HCT VFR BLD AUTO: 43.5 % (ref 40–53)
HDLC SERPL-MCNC: 99 MG/DL
HGB BLD-MCNC: 14.9 G/DL (ref 13.3–17.7)
LDLC SERPL CALC-MCNC: 43 MG/DL
MCH RBC QN AUTO: 33 PG (ref 26.5–33)
MCHC RBC AUTO-ENTMCNC: 34.3 G/DL (ref 31.5–36.5)
MCV RBC AUTO: 96 FL (ref 78–100)
NONHDLC SERPL-MCNC: 56 MG/DL
PLATELET # BLD AUTO: 134 10E9/L (ref 150–450)
POTASSIUM SERPL-SCNC: 4.2 MMOL/L (ref 3.4–5.3)
PROT SERPL-MCNC: 7.4 G/DL (ref 6.8–8.8)
RBC # BLD AUTO: 4.52 10E12/L (ref 4.4–5.9)
SODIUM SERPL-SCNC: 136 MMOL/L (ref 133–144)
TRIGL SERPL-MCNC: 67 MG/DL
TSH SERPL DL<=0.005 MIU/L-ACNC: 3.18 MU/L (ref 0.4–4)
WBC # BLD AUTO: 4.3 10E9/L (ref 4–11)

## 2019-02-18 PROCEDURE — 99213 OFFICE O/P EST LOW 20 MIN: CPT | Mod: 25 | Performed by: INTERNAL MEDICINE

## 2019-02-18 PROCEDURE — 36415 COLL VENOUS BLD VENIPUNCTURE: CPT | Performed by: INTERNAL MEDICINE

## 2019-02-18 PROCEDURE — 83036 HEMOGLOBIN GLYCOSYLATED A1C: CPT | Performed by: INTERNAL MEDICINE

## 2019-02-18 PROCEDURE — G0439 PPPS, SUBSEQ VISIT: HCPCS | Performed by: INTERNAL MEDICINE

## 2019-02-18 PROCEDURE — 84443 ASSAY THYROID STIM HORMONE: CPT | Performed by: INTERNAL MEDICINE

## 2019-02-18 PROCEDURE — 82043 UR ALBUMIN QUANTITATIVE: CPT | Performed by: INTERNAL MEDICINE

## 2019-02-18 PROCEDURE — 80061 LIPID PANEL: CPT | Performed by: INTERNAL MEDICINE

## 2019-02-18 PROCEDURE — 80053 COMPREHEN METABOLIC PANEL: CPT | Performed by: INTERNAL MEDICINE

## 2019-02-18 PROCEDURE — 85027 COMPLETE CBC AUTOMATED: CPT | Performed by: INTERNAL MEDICINE

## 2019-02-18 ASSESSMENT — ACTIVITIES OF DAILY LIVING (ADL): CURRENT_FUNCTION: NO ASSISTANCE NEEDED

## 2019-02-18 ASSESSMENT — ENCOUNTER SYMPTOMS
PALPITATIONS: 0
DIARRHEA: 0
SORE THROAT: 0
HEADACHES: 0
FEVER: 0
FREQUENCY: 0
HEARTBURN: 0
HEMATURIA: 0
SHORTNESS OF BREATH: 0
NERVOUS/ANXIOUS: 0
NAUSEA: 0
EYE PAIN: 0
DIZZINESS: 0
MYALGIAS: 0
CONSTIPATION: 0
WEAKNESS: 0
DYSURIA: 0
JOINT SWELLING: 0
ARTHRALGIAS: 0
HEMATOCHEZIA: 0
CHILLS: 0
COUGH: 0
ABDOMINAL PAIN: 0
PARESTHESIAS: 0

## 2019-02-18 ASSESSMENT — MIFFLIN-ST. JEOR: SCORE: 1704.27

## 2019-02-18 NOTE — NURSING NOTE
Vital signs:  Temp: 98.7  F (37.1  C) Temp src: Oral BP: 130/60 Pulse: 66   Resp: 12 SpO2: 97 %     Height: 182.9 cm (6') Weight: 91.6 kg (202 lb)  Estimated body mass index is 27.4 kg/m  as calculated from the following:    Height as of this encounter: 1.829 m (6').    Weight as of this encounter: 91.6 kg (202 lb).

## 2019-02-18 NOTE — LETTER
[facility]  [address], [address]  [place] [address]  [phone]        February 20, 2019    Kyler Rodriguez  [address] [facility] [address]            Dear Mr. Kyler Rodriguez:      The results of your recent test show Slightly elevated glucose and low platelets, though this is chronic. No change. Rest of the results are normal. Follow up in 6 months. If you have any further questions or problems, please contact our office.    Sincerely,    Rakesh Clark M.D.  Results for orders placed or performed in visit on 02/18/19   CBC with platelets   Result Value Ref Range    WBC 4.3 4.0 - 11.0 10e9/L    RBC Count 4.52 4.4 - 5.9 10e12/L    Hemoglobin 14.9 13.3 - 17.7 g/dL    Hematocrit 43.5 40.0 - 53.0 %    MCV 96 78 - 100 fl    MCH 33.0 26.5 - 33.0 pg    MCHC 34.3 31.5 - 36.5 g/dL    RDW 13.0 10.0 - 15.0 %    Platelet Count 134 (L) 150 - 450 10e9/L   Comprehensive metabolic panel   Result Value Ref Range    Sodium 136 133 - 144 mmol/L    Potassium 4.2 3.4 - 5.3 mmol/L    Chloride 96 94 - 109 mmol/L    Carbon Dioxide 29 20 - 32 mmol/L    Anion Gap 11 3 - 14 mmol/L    Glucose 106 (H) 70 - 99 mg/dL    Urea Nitrogen 9 7 - 30 mg/dL    Creatinine 0.70 0.66 - 1.25 mg/dL    GFR Estimate >90 >60 mL/min/[1.73_m2]    GFR Estimate If Black >90 >60 mL/min/[1.73_m2]    Calcium 9.7 8.5 - 10.1 mg/dL    Bilirubin Total 0.8 0.2 - 1.3 mg/dL    Albumin 4.0 3.4 - 5.0 g/dL    Protein Total 7.4 6.8 - 8.8 g/dL    Alkaline Phosphatase 69 40 - 150 U/L    ALT 10 0 - 70 U/L    AST 31 0 - 45 U/L   Lipid panel reflex to direct LDL Fasting   Result Value Ref Range    Cholesterol 155 <200 mg/dL    Triglycerides 67 <150 mg/dL    HDL Cholesterol 99 >39 mg/dL    LDL Cholesterol Calculated 43 <100 mg/dL    Non HDL Cholesterol 56 <130 mg/dL   TSH with free T4 reflex   Result Value Ref Range    TSH 3.18 0.40 - 4.00 mU/L   Hemoglobin A1c   Result Value Ref Range    Hemoglobin A1C 5.0 0 - 5.6 %   Albumin Random Urine  Quantitative with Creat Ratio   Result Value Ref Range    Creatinine Urine 156 mg/dL    Albumin Urine mg/L 122 mg/L    Albumin Urine mg/g Cr 78.21 (H) 0 - 17 mg/g Cr

## 2019-02-18 NOTE — PROGRESS NOTES
"SUBJECTIVE:   Kyler Rodriguez is a 73 year old male who presents for Preventive Visit.      Are you in the first 12 months of your Medicare coverage?  No    Annual Wellness Visit     In general, how would you rate your overall health?  Good    Frequency of exercise:  None    Do you usually eat at least 4 servings of fruit and vegetables a day, include whole grains    & fiber and avoid regularly eating high fat or \"junk\" foods?  No    Taking medications regularly:  Yes    Medication side effects:  None    Ability to successfully perform activities of daily living:  No assistance needed    Home Safety:  No safety concerns identified    Hearing Impairment:  No hearing concerns    In the past 6 months, have you been bothered by leaking of urine?  No    In general, how would you rate your overall mental or emotional health?  Excellent    PHQ-2 Total Score: 0    Additional concerns today:  No    Do you feel safe in your environment? Yes    Do you have a Health Care Directive? Yes: Patient states has Advance Directive and will bring in a copy to clinic.      Fall risk  Fallen 2 or more times in the past year?: No  Any fall with injury in the past year?: No    Cognitive Screening   1) Repeat 3 items (Leader, Season, Table)    2) Clock draw: NORMAL  3) 3 item recall: Recalls 3 objects  Results: 3 items recalled: COGNITIVE IMPAIRMENT LESS LIKELY    Mini-CogTM Copyright S Jorge. Licensed by the author for use in [facility]; reprinted with permission (nick@.[email or web address]). All rights reserved.      Do you have sleep apnea, excessive snoring or daytime drowsiness?: no    Reviewed and updated as needed this visit by clinical staff         Reviewed and updated as needed this visit by Provider        Social History     Tobacco Use     Smoking status: Never Smoker     Smokeless tobacco: Never Used   Substance Use Topics     Alcohol use: Yes     Alcohol/week: 0.0 oz     Comment: 4 drinks/daily       Alcohol Use 2/16/2019   If " you drink alcohol do you typically have greater than 3 drinks per day OR greater than 7 drinks per week? No           PROBLEMS TO ADD ON...  Has h/o HTN. on medical treatment. BP has been controlled. No side effects from medications. No CP, HA, dizziness. good compliance with medications and low salt diet.  Has H/O hyperlipidemia. On medical treatment and diet. No side effects. No muscle weakness, myalgias or upset stomach.   Has H/O DM. On diet , exercise and PO Metformin. Blood sugars are controlled. No parestesias. No hypoglycemias.    Has h/o prostate cancer, seeing urology, no recurrence.   Has h/o parkisonism, follows with neurology.     Current providers sharing in care for this patient include:   Patient Care Team:  Rakesh Clark MD as PCP - General (Internal Medicine)  Rakesh Clark MD as PCP - Assigned PCP    The following health maintenance items are reviewed in Epic and correct as of today:  Health Maintenance   Topic Date Due     HEPATITIS C SCREENING  07/15/1963     ZOSTER IMMUNIZATION (2 of 3) 12/12/2014     FOOT EXAM Q1 YEAR  10/13/2015     LIPID MONITORING Q6 MO  08/13/2018     A1C Q6 MO  02/13/2019     MICROALBUMIN Q1 YEAR  02/13/2019     FALL RISK ASSESSMENT  02/13/2019     EYE EXAM Q1 YEAR  08/06/2019     CREATININE Q1 YEAR  08/13/2019     TSH W/ FREE T4 REFLEX Q2 YEAR  02/13/2020     PHQ-2 Q1 YR  02/18/2020     ADVANCE DIRECTIVE PLANNING Q5 YRS  09/21/2021     COLON CANCER SCREEN (SYSTEM ASSIGNED)  05/23/2024     DTAP/TDAP/TD IMMUNIZATION (3 - Td) 02/20/2028     INFLUENZA VACCINE  Completed     AORTIC ANEURYSM SCREENING (SYSTEM ASSIGNED)  Completed     IPV IMMUNIZATION  Aged Out     MENINGITIS IMMUNIZATION  Aged Out     Labs reviewed in EPIC  BP Readings from Last 3 Encounters:   02/18/19 130/60   08/17/18 110/60   08/13/18 108/67    Wt Readings from Last 3 Encounters:   02/18/19 91.6 kg (202 lb)   08/17/18 88.5 kg (195 lb)   08/13/18 87.7 kg (193 lb 4.8 oz)                       Review of Systems   Constitutional: Negative for chills and fever.   HENT: Negative for congestion, ear pain, hearing loss and sore throat.    Eyes: Negative for pain and visual disturbance.   Respiratory: Negative for cough and shortness of breath.    Cardiovascular: Positive for peripheral edema. Negative for chest pain and palpitations.   Gastrointestinal: Negative for abdominal pain, constipation, diarrhea, heartburn, hematochezia and nausea.   Genitourinary: Negative for discharge, dysuria, frequency, genital sores, hematuria, impotence and urgency.   Musculoskeletal: Negative for arthralgias, joint swelling and myalgias.   Skin: Negative for rash.   Neurological: Negative for dizziness, weakness, headaches and paresthesias.   Psychiatric/Behavioral: Negative for mood changes. The patient is not nervous/anxious.          OBJECTIVE:   There were no vitals taken for this visit. Estimated body mass index is 26.45 kg/m  as calculated from the following:    Height as of 8/17/18: 1.829 m (6').    Weight as of 8/17/18: 88.5 kg (195 lb).  Physical Exam  GENERAL: healthy, alert and no distress  EYES: Eyes grossly normal to inspection, PERRL and conjunctivae and sclerae normal  HENT: ear canals and TM's normal, nose and mouth without ulcers or lesions  NECK: no adenopathy, no asymmetry, masses, or scars and thyroid normal to palpation  RESP: lungs clear to auscultation - no rales, rhonchi or wheezes  CV: regular rate and rhythm, normal S1 S2, no S3 or S4, no murmur, click or rub, no peripheral edema and peripheral pulses strong  ABDOMEN: soft, nontender, no hepatosplenomegaly, no masses and bowel sounds normal  MS: no gross musculoskeletal defects noted, no edema  SKIN: no suspicious lesions or rashes  NEURO: Normal strength and tone, mentation intact and speech normal, mild resting tremor of hands   PSYCH: mentation appears normal, affect normal/bright    Diagnostic Test Results:  none     ASSESSMENT / PLAN:        ICD-10-CM    1. Encounter for preventative adult health care examination Z00.00 CBC with platelets     Comprehensive metabolic panel     Lipid panel reflex to direct LDL Fasting     TSH with free T4 reflex     Hemoglobin A1c     Albumin Random Urine Quantitative with Creat Ratio   2. Special screening for malignant neoplasms, colon Z12.11 GASTROENTEROLOGY ADULT REF PROCEDURE ONLY Rocio Lowe [phone]; No Provider Preference   3. Essential hypertension, benign I10 CBC with platelets     Comprehensive metabolic panel     Lipid panel reflex to direct LDL Fasting     TSH with free T4 reflex     OFFICE/OUTPT VISIT,EST,LEVL III   4. Type 2 diabetes mellitus with diabetic nephropathy, without long-term current use of insulin (H) E11.21 Hemoglobin A1c     Albumin Random Urine Quantitative with Creat Ratio     OFFICE/OUTPT VISIT,EST,LEVL III   5. Hyperlipidemia LDL goal <100 E78.5 Lipid panel reflex to direct LDL Fasting     OFFICE/OUTPT VISIT,EST,LEVL III       End of Life Planning:  Patient currently has an advanced directive: Yes.  Practitioner is supportive of decision.    COUNSELING:  Reviewed preventive health counseling, as reflected in patient instructions       Regular exercise       Healthy diet/nutrition       Vision screening       Hearing screening       Dental care       Colon cancer screening       Prostate cancer screening    BP Readings from Last 1 Encounters:   08/17/18 110/60     Estimated body mass index is 26.45 kg/m  as calculated from the following:    Height as of 8/17/18: 1.829 m (6').    Weight as of 8/17/18: 88.5 kg (195 lb).           reports that  has never smoked. he has never used smokeless tobacco.      Appropriate preventive services were discussed with this patient, including applicable screening as appropriate for cardiovascular disease, diabetes, osteopenia/osteoporosis, and glaucoma.  As appropriate for age/gender, discussed screening for colorectal cancer, prostate cancer,  breast cancer, and cervical cancer. Checklist reviewing preventive services available has been given to the patient.    Reviewed patients plan of care and provided an AVS. The Intermediate Care Plan ( asthma action plan, low back pain action plan, and migraine action plan) for Kyler meets the Care Plan requirement. This Care Plan has been established and reviewed with the Patient.    Counseling Resources:  ATP IV Guidelines  Pooled Cohorts Equation Calculator  Breast Cancer Risk Calculator  FRAX Risk Assessment  ICSI Preventive Guidelines  Dietary Guidelines for Americans, 2010  USDA's MyPlate  ASA Prophylaxis  Lung CA Screening    Rakesh Clark MD  [facility]

## 2019-02-19 LAB
CREAT UR-MCNC: 156 MG/DL
MICROALBUMIN UR-MCNC: 122 MG/L
MICROALBUMIN/CREAT UR: 78.21 MG/G CR (ref 0–17)

## 2019-03-11 ENCOUNTER — HOSPITAL ENCOUNTER (OUTPATIENT)
Dept: CT IMAGING | Facility: CLINIC | Age: 74
Discharge: HOME | End: 2019-03-11
Attending: INTERNAL MEDICINE | Admitting: INTERNAL MEDICINE
Payer: MEDICARE

## 2019-03-11 DIAGNOSIS — N28.89 RIGHT RENAL MASS: ICD-10-CM

## 2019-03-11 PROCEDURE — 25000128 H RX IP 250 OP 636: Performed by: RADIOLOGY

## 2019-03-11 PROCEDURE — 74178 CT ABD&PLV WO CNTR FLWD CNTR: CPT

## 2019-03-11 RX ORDER — IOPAMIDOL 755 MG/ML
500 INJECTION, SOLUTION INTRAVASCULAR ONCE
Status: COMPLETED | OUTPATIENT
Start: 2019-03-11 | End: 2019-03-11

## 2019-03-11 RX ADMIN — IOPAMIDOL 100 ML: 755 INJECTION, SOLUTION INTRAVENOUS at 11:18

## 2019-03-11 RX ADMIN — SODIUM CHLORIDE 55 ML: 9 INJECTION, SOLUTION INTRAVENOUS at 11:19

## 2019-03-12 ENCOUNTER — TRANSFERRED RECORDS (OUTPATIENT)
Dept: HEALTH INFORMATION MANAGEMENT | Facility: CLINIC | Age: 74
End: 2019-03-12

## 2019-03-13 ENCOUNTER — OFFICE VISIT (OUTPATIENT)
Dept: UROLOGY | Facility: CLINIC | Age: 74
End: 2019-03-13
Payer: MEDICARE

## 2019-03-13 VITALS
HEART RATE: 67 BPM | DIASTOLIC BLOOD PRESSURE: 60 MMHG | HEIGHT: 72 IN | BODY MASS INDEX: 27.77 KG/M2 | WEIGHT: 205 LBS | SYSTOLIC BLOOD PRESSURE: 130 MMHG | OXYGEN SATURATION: 97 %

## 2019-03-13 DIAGNOSIS — C61 PROSTATE CANCER (H): ICD-10-CM

## 2019-03-13 LAB
ALBUMIN UR-MCNC: 30 MG/DL
APPEARANCE UR: CLEAR
BILIRUB UR QL STRIP: ABNORMAL
COLOR UR AUTO: YELLOW
GLUCOSE UR STRIP-MCNC: NEGATIVE MG/DL
HGB UR QL STRIP: NEGATIVE
KETONES UR STRIP-MCNC: 80 MG/DL
LEUKOCYTE ESTERASE UR QL STRIP: NEGATIVE
NITRATE UR QL: NEGATIVE
PH UR STRIP: 7 PH (ref 5–7)
PSA SERPL-MCNC: <0.04 NG/ML (ref 0–4)
SOURCE: ABNORMAL
SP GR UR STRIP: 1.01 (ref 1–1.03)
UROBILINOGEN UR STRIP-ACNC: 2 EU/DL (ref 0.2–1)

## 2019-03-13 PROCEDURE — 84153 ASSAY OF PSA TOTAL: CPT | Performed by: UROLOGY

## 2019-03-13 PROCEDURE — 99213 OFFICE O/P EST LOW 20 MIN: CPT | Performed by: UROLOGY

## 2019-03-13 PROCEDURE — 81003 URINALYSIS AUTO W/O SCOPE: CPT | Performed by: UROLOGY

## 2019-03-13 PROCEDURE — 36415 COLL VENOUS BLD VENIPUNCTURE: CPT | Performed by: UROLOGY

## 2019-03-13 ASSESSMENT — PAIN SCALES - GENERAL: PAINLEVEL: NO PAIN (0)

## 2019-03-13 ASSESSMENT — MIFFLIN-ST. JEOR: SCORE: 1717.87

## 2019-03-13 NOTE — NURSING NOTE
Chief Complaint   Patient presents with     Prostate Cancer     Patient here today to go over CT and SD PSA       Blood pressure 130/60, pulse 67, height 1.829 m (6'), weight 93 kg (205 lb), SpO2 97 %. Body mass index is 27.8 kg/m .    Patient Active Problem List   Diagnosis     Essential hypertension, benign     Other and unspecified disc disorder of cervical region     Hypertrophy of prostate with urinary obstruction     Type 2 diabetes mellitus with diabetic nephropathy (H)     HYPERLIPIDEMIA LDL GOAL <100     Prostate cancer (H)     Advance Care Planning     RBBB     Parkinsonism, unspecified Parkinsonism type (H)       Allergies   Allergen Reactions     No Known Allergies        Current Outpatient Medications   Medication Sig Dispense Refill     amLODIPine-benazepril (LOTREL) 10-20 MG capsule TAKE 1 CAPSULE EVERY DAY 90 capsule 0     ASPIRIN 81 MG OR TABS 1 tablet daily  0     atenolol (TENORMIN) 25 MG tablet TAKE 1 TABLET EVERY DAY 90 tablet 0     atorvastatin (LIPITOR) 80 MG tablet TAKE 1/2 TABLET EVERY DAY 45 tablet 0     baclofen (LIORESAL) 10 MG tablet TAKE 1 TABLET EVERY DAY 90 tablet 1     Calcium Carbonate-Vit D-Min (CALTRATE 600+D PLUS PO) Take  by mouth.       carbidopa-levodopa (SINEMET)  MG per tablet Take 1 tablet by mouth 3 times daily       finasteride (PROSCAR) 5 MG tablet TAKE 1 TABLET EVERY DAY 90 tablet 3     metFORMIN (GLUCOPHAGE) 1000 MG tablet TAKE 1 TABLET TWICE DAILY WITH BREAKFAST  AND WITH DINNER 180 tablet 0     MULTIPLE VITAMINS OR TABS        ACCU-CHEK COMPLETE KIT test daily (Patient not taking: No sig reported) 1 0     blood glucose (ACCU-CHEK COMPACT DRUM) test strip by In Vitro route daily. Test one time daily or as directed. (Patient not taking: Reported on 8/17/2018) 100 strip 1       Social History     Tobacco Use     Smoking status: Never Smoker     Smokeless tobacco: Never Used   Substance Use Topics     Alcohol use: Yes     Alcohol/week: 0.0 oz     Comment: 4  drinks/daily     Drug use: No           UA RESULTS:  Recent Labs   Lab Test 03/13/19  1051  03/02/17  0855   COLOR Yellow   < > Red   APPEARANCE Clear   < > Cloudy   URINEGLC Negative   < > 50*   URINEBILI Small*   < > Negative   URINEKETONE 80*   < > Negative   SG 1.015   < > 1.017   UBLD Negative   < > Large*   URINEPH 7.0   < > 7.0   PROTEIN 30*   < > >499  Reviewed, acceptable  *   UROBILINOGEN 2.0*   < >  --    NITRITE Negative   < > Negative   LEUKEST Negative   < > Negative   RBCU  --   --  >182*   WBCU  --   --  0    < > = values in this interval not displayed.         PSA today was <0.4    Yany Chairez MA  3/13/2019

## 2019-03-13 NOTE — LETTER
RE: Kyler Rodriguez  [address] [facility] [id number]     Dear Colleague,    Thank you for referring your patient, Kyler Rodriguez, to the [facility] [place] at [facility]. Please see a copy of my visit note below.    Kyler Rodriguez is a 73-year-old male who underwent radiation with hormonal therapy for prostate cancer several years ago.  His PSA remains undetectable.  He has a normal urinalysis today and is having no difficulty voiding.  He has had no gross hematuria or flank discomfort.  His CT scan shows a growing enhancing mass in the upper pole the right kidney and a cystic mass in the lower pole of the right kidney.  The upper pole mass is more worrisome for renal cell carcinoma.  Other past medical history: Hypertension, hyperlipidemia, type 2 diabetes, non-smoker  Surgeries include varicose vein stripping, colonoscopy, tonsillectomy, vasectomy, eye surgeries, cystoscopy  Family history: Colorectal carcinoma  Medications: Amlodipine/Benzapril, low-dose aspirin, Tenormin, Lipitor, baclofen, Sinemet, finasteride, metformin, multivitamin calcium carbonate/vitamin D  Review of systems: As above  Allergies: None  Exam: Alert and oriented, normal appearance, normal vital signs, normal respirations, neuro grossly intact  Assessment: History of adenocarcinoma of the prostate with 0 PSA.  Will follow at 6 months intervals, eventually yearly.  Right upper pole renal mass is increasing in size, has some solid appearing components and enhances.  Probably a small renal cell carcinoma-discussed at length with patient and recommend he see Dr. Yoon for a surgical opinion-patient would be candidate for laparoscopic/robotic assisted partial nephrectomy versus cryotherapy.  Also a candidate for continued observation.    Again, thank you for allowing me to participate in the care of your patient.      Sincerely,    Keon Espinoza MD

## 2019-03-13 NOTE — PROGRESS NOTES
Kyler Rodriguez is a 73-year-old male who underwent radiation with hormonal therapy for prostate cancer several years ago.  His PSA remains undetectable.  He has a normal urinalysis today and is having no difficulty voiding.  He has had no gross hematuria or flank discomfort.  His CT scan shows a growing enhancing mass in the upper pole the right kidney and a cystic mass in the lower pole of the right kidney.  The upper pole mass is more worrisome for renal cell carcinoma.  Other past medical history: Hypertension, hyperlipidemia, type 2 diabetes, non-smoker  Surgeries include varicose vein stripping, colonoscopy, tonsillectomy, vasectomy, eye surgeries, cystoscopy  Family history: Colorectal carcinoma  Medications: Amlodipine/Benzapril, low-dose aspirin, Tenormin, Lipitor, baclofen, Sinemet, finasteride, metformin, multivitamin calcium carbonate/vitamin D  Review of systems: As above  Allergies: None  Exam: Alert and oriented, normal appearance, normal vital signs, normal respirations, neuro grossly intact  Assessment: History of adenocarcinoma of the prostate with 0 PSA.  Will follow at 6 months intervals, eventually yearly.  Right upper pole renal mass is increasing in size, has some solid appearing components and enhances.  Probably a small renal cell carcinoma-discussed at length with patient and recommend he see Dr. Yoon for a surgical opinion-patient would be candidate for laparoscopic/robotic assisted partial nephrectomy versus cryotherapy.  Also a candidate for continued observation.

## 2019-03-18 ENCOUNTER — MEDICAL CORRESPONDENCE (OUTPATIENT)
Dept: HEALTH INFORMATION MANAGEMENT | Facility: CLINIC | Age: 74
End: 2019-03-18

## 2019-03-25 ENCOUNTER — TELEPHONE (OUTPATIENT)
Dept: NEUROLOGY | Facility: CLINIC | Age: 74
End: 2019-03-25

## 2019-04-04 ENCOUNTER — DOCUMENTATION ONLY (OUTPATIENT)
Dept: NEUROLOGY | Facility: CLINIC | Age: 74
End: 2019-04-04

## 2019-04-08 ENCOUNTER — HOSPITAL ENCOUNTER (OUTPATIENT)
Facility: CLINIC | Age: 74
Discharge: HOME | End: 2019-04-08
Attending: INTERNAL MEDICINE | Admitting: INTERNAL MEDICINE
Payer: MEDICARE

## 2019-04-08 VITALS
DIASTOLIC BLOOD PRESSURE: 81 MMHG | OXYGEN SATURATION: 95 % | RESPIRATION RATE: 16 BRPM | SYSTOLIC BLOOD PRESSURE: 120 MMHG | BODY MASS INDEX: 27.77 KG/M2 | HEIGHT: 72 IN | WEIGHT: 205 LBS | HEART RATE: 62 BPM

## 2019-04-08 LAB
COLONOSCOPY: NORMAL
GLUCOSE BLDC GLUCOMTR-MCNC: 135 MG/DL (ref 70–99)

## 2019-04-08 PROCEDURE — 88305 TISSUE EXAM BY PATHOLOGIST: CPT | Performed by: INTERNAL MEDICINE

## 2019-04-08 PROCEDURE — 25000128 H RX IP 250 OP 636: Performed by: INTERNAL MEDICINE

## 2019-04-08 PROCEDURE — 88305 TISSUE EXAM BY PATHOLOGIST: CPT | Mod: 76 | Performed by: INTERNAL MEDICINE

## 2019-04-08 PROCEDURE — 45385 COLONOSCOPY W/LESION REMOVAL: CPT | Mod: PT | Performed by: INTERNAL MEDICINE

## 2019-04-08 PROCEDURE — 82962 GLUCOSE BLOOD TEST: CPT

## 2019-04-08 PROCEDURE — G0500 MOD SEDAT ENDO SERVICE >5YRS: HCPCS | Performed by: INTERNAL MEDICINE

## 2019-04-08 RX ORDER — ONDANSETRON 4 MG/1
4 TABLET, ORALLY DISINTEGRATING ORAL EVERY 6 HOURS PRN
Status: DISCONTINUED | OUTPATIENT
Start: 2019-04-08 | End: 2019-04-08 | Stop reason: HOSPADM

## 2019-04-08 RX ORDER — FLUMAZENIL 0.1 MG/ML
0.2 INJECTION, SOLUTION INTRAVENOUS
Status: DISCONTINUED | OUTPATIENT
Start: 2019-04-08 | End: 2019-04-08 | Stop reason: HOSPADM

## 2019-04-08 RX ORDER — FENTANYL CITRATE 50 UG/ML
INJECTION, SOLUTION INTRAMUSCULAR; INTRAVENOUS PRN
Status: DISCONTINUED | OUTPATIENT
Start: 2019-04-08 | End: 2019-04-08 | Stop reason: HOSPADM

## 2019-04-08 RX ORDER — ONDANSETRON 2 MG/ML
4 INJECTION INTRAMUSCULAR; INTRAVENOUS EVERY 6 HOURS PRN
Status: DISCONTINUED | OUTPATIENT
Start: 2019-04-08 | End: 2019-04-08 | Stop reason: HOSPADM

## 2019-04-08 RX ORDER — LIDOCAINE 40 MG/G
CREAM TOPICAL
Status: DISCONTINUED | OUTPATIENT
Start: 2019-04-08 | End: 2019-04-08 | Stop reason: HOSPADM

## 2019-04-08 RX ORDER — NALOXONE HYDROCHLORIDE 0.4 MG/ML
.1-.4 INJECTION, SOLUTION INTRAMUSCULAR; INTRAVENOUS; SUBCUTANEOUS
Status: DISCONTINUED | OUTPATIENT
Start: 2019-04-08 | End: 2019-04-08 | Stop reason: HOSPADM

## 2019-04-08 RX ORDER — ONDANSETRON 2 MG/ML
4 INJECTION INTRAMUSCULAR; INTRAVENOUS
Status: DISCONTINUED | OUTPATIENT
Start: 2019-04-08 | End: 2019-04-08 | Stop reason: HOSPADM

## 2019-04-08 ASSESSMENT — MIFFLIN-ST. JEOR: SCORE: 1717.87

## 2019-04-08 NOTE — LETTER
March 20, 2019      Kyler Rodriguez  [address] [facility] [address]      Thank you for choosing [facility]. You are scheduled for the following service:     Date:  Monday April 8, 2019             Procedure:  COLONOSCOPY  Doctor:        Dr Pandey   Arrival Time:  1000  *Check in at Emergency/Endoscopy desk*  Procedure Time:  1030      Location:   [facility]        Endoscopy Department, First Floor (Enter through ER Doors) *        [address] [place], [place] [address]      [phone] or [phone] () to reschedule      MIRALAX -GATORADE  PREP  Colonoscopy is the most accurate test to detect colon polyps and colon cancer; and the only test where polyps can be removed. During this procedure, a doctor examines the lining of your large intestine and rectum through a flexible tube.   Transportation  Arrange for a ride for the day of your procedure with a responsible adult.  A taxi ride is not an option unless you are accompanied by a responsible adult. If you fail to arrange transportation with a responsible adult, your procedure will be cancelled and rescheduled.    Purchase the  following supplies at your local pharmacy:  - 2 (two) bisacodyl tablets: each tablet contains 5 mg.  (Dulcolax  laxative NOT Dulcolax  stool softener)   - 1 (one) 8.3 oz bottle of Polyethylene Glycol (PEG) 3350 Powder   (MiraLAX , Smooth LAX , ClearLAX  or equivalent)  - 64 oz Gatorade    Regular Gatorade, Gatorade G2 , Powerade , Powerade Zero  or Pedialyte  is acceptable. Red colored flavors are not allowed; all other colors (yellow, green, orange, purple and blue) are okay. It is also okay to buy two 2.12 oz packets of powdered Gatorade that can be mixed with water to a total volume of 64 oz of liquid.  - 1 (one) 10 oz bottle of Magnesium Citrate (Red colored flavors are not allowed)  It is also okay for you to use a 0.5 oz package of powdered magnesium citrate (17  g) mixed with 10 oz of water.      PREPARATION FOR COLONOSCOPY    7 days before:    Discontinue fiber supplements and medications containing iron. This includes Metamucil  and Fibercon ; and multivitamins with iron.    3 days before:    Begin a low-fiber diet. A low-fiber diet helps making the cleanout more effective.     Examples of a low-fiber diet include (but are not limited to): white bread, white rice, pasta, crackers, fish, chicken, eggs, ground beef, creamy peanut butter, cooked/steamed/boiled vegetables, canned fruit, bananas, melons, milk, plain yogurt cheese, salad dressing and other condiments.     The following are not allowed on a low-fiber diet: seeds, nuts, popcorn, bran, whole wheat, corn, quinoa, raw fruits and vegetables, berries and dried fruit, beans and lentils.    For additional details on low-fiber diet, please refer to the table on the last page.    2 days before:    Continue the low-fiber diet.     Drink at least 8 glasses of water throughout the day.     Stop eating solid foods at 11:45 pm.    1 day before:    In the morning: begin a clear liquid diet (liquids you can see through).     Examples of a clear liquid diet include: water, clear broth or bouillon, Gatorade, Pedialyte or Powerade, carbonated and non-carbonated soft drinks (Sprite , 7-Up , ginger ale), strained fruit juices without pulp (apple, white grape, white cranberry), Jell-O  and popsicles.     The following are not allowed on a clear liquid diet: red liquids, alcoholic beverages, dairy products (milk, creamer, and yogurt), protein shakes, creamy broths, juice with pulp and chewing tobacco.    At noon: take 2 (two) bisacodyl tablets     At 4 (and no later than 6pm): start drinking the Miralax-Gatorade preparation (8.3 oz of Miralax mixed with 64 oz of Gatorade in a large pitcher). Drink 1(one) 8 oz glass every 15 minutes thereafter, until the mixture is gone.    COLON CLEANSING TIPS: drink adequate amounts of fluids before  and after your colon cleansing to prevent dehydration. Stay near a toilet because you will have diarrhea. Even if you are sitting on the toilet, continue to drink the cleansing solution every 15 minutes. If you feel nauseous or vomit, rinse your mouth with water, take a 15 to 30-minute-break and then continue drinking the solution. You will be uncomfortable until the stool has flushed from your colon (in about 2 to 4 hours). You may feel chilled.    Day of your procedure  You may take all of your morning medications including blood pressure medications, blood thinners (if you have not been instructed to stop these by our office), methadone, anti-seizure medications with sips of water 3 hours prior to your procedure or earlier. Do not take insulin or vitamins prior to your procedure. Continue the clear liquid diet.       4 hours prior: drink 10 oz of magnesium citrate. It may be easier to drink it with a straw.    STOP consuming all liquids after that.     Do not take anything by mouth during this time.     Allow extra time to travel to your procedure as you may need to stop and use a restroom along the way.    You are ready for the procedure, if you followed all instructions and your stool is no longer formed, but clear or yellow liquid. If you are unsure whether your colon is clean, please call our office at [phone] before you leave for your appointment.    Bring the following to your procedure:  - Insurance Card/Photo ID.   - List of current medications including over-the-counter medications and supplements.   - Your rescue inhaler if you currently use one to control asthma.      Canceling or rescheduling your appointment:   If you must cancel or reschedule your appointment, please call [phone] as soon as possible.      COLONOSCOPY PRE-PROCEDURE CHECKLIST    If you have diabetes, ask your regular doctor for diet and medication restrictions.  If you take an anticoagulant or anti-platelet medication (such  as Coumadin , Lovenox , Pradaxa , Xarelto , Eliquis , etc.), please call your primary doctor for advice on holding this medication.  If you take aspirin you may continue to do so.  If you are or may be pregnant, please discuss the risks and benefits of this procedure with your doctor.        What happens during a colonoscopy?    Plan to spend up to two hours, starting at registration time, at the endoscopy center the day of your procedure. The colonoscopy takes an average of 15 to 30 minutes. Recovery time is about 30 minutes.      Before the exam:    You will change into a gown.    Your medical history and medication list will be reviewed with you, unless that has been done over the phone prior to the procedure.     A nurse will insert an intravenous (IV) line into your hand or arm.    The doctor will meet with you and will give you a consent form to sign.  During the exam:     Medicine will be given through the IV line to help you relax.     Your heart rate and oxygen levels will be monitored. If your blood pressure is low, you may be given fluids through the IV line.     The doctor will insert a flexible hollow tube, called a colonoscope, into your rectum. The scope will be advanced slowly through the large intestine (colon).    You may have a feeling of fullness or pressure.     If an abnormal tissue or a polyp is found, the doctor may remove it through the endoscope for closer examination, or biopsy. Tissue removal is painless    After the exam:           Any tissue samples removed during the exam will be sent to a lab for evaluation. It may take 5-7 working days for you to be notified of the results.     A nurse will provide you with complete discharge instructions before you leave the endoscopy center. Be sure to ask the nurse for specific instructions if you take blood thinners such as Aspirin, Coumadin or Plavix.     The doctor will prepare a full report for you and for the physician who referred you for  the procedure.     Your doctor will talk with you about the initial results of your exam.      Medication given during the exam will prohibit you from driving for the rest of the day.     Following the exam, you may resume your normal diet. Your first meal should be light, no greasy foods. Avoid alcohol until the next day.     You may resume your regular activities the day after the procedure.         LOW-FIBER DIET    Foods RECOMMENDED Foods to AVOID   Breads, Cereal, Rice and Pasta:   White bread, rolls, biscuits, croissant and nikki toast.   Waffles, Green[place]ic toast and pancakes.   White rice, noodles, pasta, macaroni and peeled cooked potatoes.   Plain crackers and saltines.   Cooked cereals: farina, cream of rice.   Cold cereals: Puffed Rice , Rice Krispies , Corn Flakes  and Special K    Breads, Cereal, Rice and Pasta:   Breads or rolls with nuts, seeds or fruit.   Whole wheat, pumpernickel, rye breads and cornbread.   Potatoes with skin, brown or wild rice, and kasha (buckwheat).     Vegetables:   Tender cooked and canned vegetables without seeds: carrots, asparagus tips, green or wax beans, pumpkin, spinach, lima beans. Vegetables:   Raw or steamed vegetables.   Vegetables with seeds.   Sauerkraut.   Winter squash, peas, broccoli, Brussel sprouts, cabbage, onions, cauliflower, baked beans, peas and corn.   Fruits:   Strained fruit juice.   Canned fruit, except pineapple.   Ripe bananas and melon. Fruits:   Prunes and prune juice.   Raw fruits.   Dried fruits: figs, dates and raisins.   Milk/Dairy:   Milk: plain or flavored.   Yogurt, custard and ice cream.   Cheese and cottage cheese Milk/Dairy:     Meat and other proteins:   ground, well-cooked tender beef, lamb, ham, veal, pork, fish, poultry and organ meats.   Eggs.   Peanut butter without nuts. Meat and other proteins:   Tough, fibrous meats with gristle.   Dry beans, peas and lentils.   Peanut butter with nuts.   Tofu.   Fats, Snack, Sweets, Condiments and  Beverages:   Margarine, butter, oils, mayonnaise, sour cream and salad dressing, plain gravy.   Sugar, hard candy, clear jelly, honey and syrup.   Spices, cooked herbs, bouillon, broth and soups made with allowed vegetable, ketchup and mustard.   Coffee, tea and carbonated drinks.   Plain cakes, cookies and pretzels.   Gelatin, plain puddings, custard, ice cream, sherbet and popsicles. Fats, Snack, Sweets, Condiments and Beverages:   Nuts, seeds and coconut.   Jam, marmalade and preserves.   Pickles, olives, relish and horseradish.   All desserts containing nuts, seeds, dried fruit and coconut; or made from whole grains or bran.   Candy made with nuts or seeds.   Popcorn.                     DIRECTIONS TO THE ENDOSCOPY DEPARTMENT     From the north ([facility])  Take [address], exit on [address] [address]. Get into the left hand rohini, turn left (east), go one-half mile to [address] and turn left. Go north to the first stoplight, take a right on [address] and follow it to the Emergency entrance.    From the south ([facility])  Take 35N to the 35E split and exit on [address] [address]. On [address] , turn left (west) to [address]. Turn right (north) on [address]. Go north to the first stoplight, take a right on [address] and follow it to the Emergency entrance.    From the east via 35E ([facility])  Take [address] south to [address] [address] exit. Turn right on [address] . Go west to [address]. Turn right (north) on [address]. Go to the first stoplight, take a right and follow on [address] to the Emergency entrance.    From the east via [address] ([facility])  Take [address] to [address]. Turn left (south) on [address] to [address]. Turn left (east) on [address] and follow it to the Emergency entrance.    From the west via [address] ([place])  Take [address] east to [address]. Turn right (south)  on [address] to [address]. Turn left (east) on [address] and follow it to the Emergency entrance.

## 2019-04-08 NOTE — H&P
Pre-Endoscopy History and Physical     Kyler Rodriguez MRN# [id number]   YOB: 1945 Age: 73 year old     Date of Procedure: 4/8/2019  Primary care provider: Rakesh Clark  Type of Endoscopy: colonoscopy  Reason for Procedure: surveillannce  Type of Anesthesia Anticipated: Conscious Sedation    HPI:    Kyler is a 73 year old male who will be undergoing the above procedure.      A history and physical has been performed. The patient's medications and allergies have been reviewed. The risks and benefits of the procedure and the sedation options and risks were discussed with the patient.  All questions were answered and informed consent was obtained.      He denies a personal or family history of anesthesia complications or bleeding disorders.     Patient Active Problem List   Diagnosis     Essential hypertension, benign     Other and unspecified disc disorder of cervical region     Hypertrophy of prostate with urinary obstruction     Type 2 diabetes mellitus with diabetic nephropathy (H)     HYPERLIPIDEMIA LDL GOAL <100     Prostate cancer (H)     Advance Care Planning     RBBB     Parkinsonism, unspecified Parkinsonism type (H)        Past Medical History:   Diagnosis Date     Elevated prostate specific antigen (PSA)      Essential hypertension, benign      Malignant neoplasm (H) 10/2011    prostate cancer     Mumps      Other and unspecified hyperlipidemia      Prostate infection      Spider veins      Type II or unspecified type diabetes mellitus without mention of complication, not stated as uncontrolled         Past Surgical History:   Procedure Laterality Date     C NONSPECIFIC PROCEDURE  1998    varicose vein stripping     C NONSPECIFIC PROCEDURE  1998    colonoscopy     COLONOSCOPY  2009 & 2014    [facility]     COLONOSCOPY  04/08/2019    Dr. Pandey [facility]     CYSTOSCOPY       EYE SURGERY      cataract left eye     HC REMOVAL OF TONSILS,12+ Y/O       HC REMOVAL OF TONSILS,<13 Y/O       HC VASECTOMY  UNILAT/BILAT W POSTOP SEMEN       PHACOEMULSIFICATION CLEAR CORNEA W/ STANDARD IOL IMPLANT, ENDOSCOPIC CYCLOPHOTOCOAGULATION, COMBINED  7/31/2014    Procedure: COMBINED PHACOEMULSIFICATION CLEAR CORNEA WITH STANDARD INTRAOCULAR LENS IMPLANT, ENDOSCOPIC CYCLOPHOTOCOAGULATION;  Surgeon: Leroy Crews MD;  Location:  [facility]     VASECTOMY         Relevant Family History: father with colon cancer diagnosed age >60    Relevant Social History: NONE     Prior to Admission medications    Medication Sig Start Date End Date Taking? Authorizing Provider   amLODIPine-benazepril (LOTREL) 10-20 MG capsule TAKE 1 CAPSULE EVERY DAY 2/11/19  Yes Rakesh Clark MD   ASPIRIN 81 MG OR TABS 1 tablet daily 2/9/09  Yes Larry Valdovinos MD   atenolol (TENORMIN) 25 MG tablet TAKE 1 TABLET EVERY DAY 2/11/19  Yes Rakesh Clark MD   atorvastatin (LIPITOR) 80 MG tablet TAKE 1/2 TABLET EVERY DAY 2/11/19  Yes Rakesh Clark MD   baclofen (LIORESAL) 10 MG tablet TAKE 1 TABLET EVERY DAY 2/11/19  Yes Rakesh Clark MD   Calcium Carbonate-Vit D-Min (CALTRATE 600+D PLUS PO) Take  by mouth.   Yes Reported, Patient   carbidopa-levodopa (SINEMET)  MG per tablet Take 1 tablet by mouth 3 times daily   Yes Reported, Patient   finasteride (PROSCAR) 5 MG tablet TAKE 1 TABLET EVERY DAY 10/19/18  Yes Keon Espinoza MD   metFORMIN (GLUCOPHAGE) 1000 MG tablet TAKE 1 TABLET TWICE DAILY WITH BREAKFAST  AND WITH DINNER 2/11/19  Yes Rakesh Clark MD   MULTIPLE VITAMINS OR TABS    Yes    ACCU-CHEK COMPLETE KIT test daily  Patient not taking: No sig reported 12/18/07   Larry Valdovinos MD   blood glucose (ACCU-CHEK COMPACT DRUM) test strip by In Vitro route daily. Test one time daily or as directed.  Patient not taking: Reported on 8/17/2018 11/18/14   Rakesh Clark MD       Allergies   Allergen Reactions     No Known Allergies         REVIEW OF SYSTEMS:   A relevant review of systems was performed and  was negative    PHYSICAL EXAM:   /72   Pulse 58   Resp 10   Ht 1.829 m (6')   Wt 93 kg (205 lb)   SpO2 98%   BMI 27.80 kg/m   Estimated body mass index is 27.8 kg/m  as calculated from the following:    Height as of this encounter: 1.829 m (6').    Weight as of this encounter: 93 kg (205 lb).   GENERAL APPEARANCE: alert, and oriented  MENTAL STATUS: alert  AIRWAY EXAM: Normal  RESP: lungs clear to auscultation - no rales, rhonchi or wheezes  CV: regular rates and rhythm  DIAGNOSTICS:    Not indicated    IMPRESSION   ASA Class 2 - Mild systemic disease    PLAN:   Plan for colonoscopy. We discussed the risks, benefits and alternatives and the patient wished to proceed.      Signed Electronically by: Kel Pandey  April 8, 2019

## 2019-04-09 LAB — COPATH REPORT: NORMAL

## 2019-04-10 ENCOUNTER — OFFICE VISIT (OUTPATIENT)
Dept: UROLOGY | Facility: CLINIC | Age: 74
End: 2019-04-10
Payer: MEDICARE

## 2019-04-10 VITALS
HEIGHT: 72 IN | HEART RATE: 75 BPM | DIASTOLIC BLOOD PRESSURE: 64 MMHG | WEIGHT: 205 LBS | OXYGEN SATURATION: 97 % | BODY MASS INDEX: 27.77 KG/M2 | SYSTOLIC BLOOD PRESSURE: 128 MMHG

## 2019-04-10 DIAGNOSIS — N28.89 RIGHT RENAL MASS: Primary | ICD-10-CM

## 2019-04-10 PROCEDURE — 99215 OFFICE O/P EST HI 40 MIN: CPT | Performed by: UROLOGY

## 2019-04-10 ASSESSMENT — MIFFLIN-ST. JEOR: SCORE: 1717.87

## 2019-04-10 NOTE — LETTER
RE: Kyler Rodriguez  [address] [facility] [id number]     Dear Colleague,    Thank you for referring your patient, Kyler Rodriguez, to the [facility] [place] [facility]. Please see a copy of my visit note below.    [facility]  CHIEF COMPLAINT   It was my pleasure to see Kyler Rodriguez who is a 73 year old male for follow-up of RIGHT renal mass and cyst.      HPI   Kyler Rodriguez is a very pleasant 73 year old male who presents with a history of prostate cancer s/p radiation with hormonal therapy several years ago. He has followed with Dr. Espinoza for several years and PSA remains undetectable. He has had a right renal upper pole mass and lower pole cyst which have been watched on active surveillance for a few years. The upper pole mass increased in size to ~2cm and the lower pole hyperdense cyst increased to 2.3cm. Dr. Espinoza referred him for consideration and discussion of robotic partial nephrectomy.     Past Medical History:   Diagnosis Date     Elevated prostate specific antigen (PSA)      Essential hypertension, benign      Malignant neoplasm (H) 10/2011    prostate cancer     Mumps      Other and unspecified hyperlipidemia      Prostate infection      Spider veins      Type II or unspecified type diabetes mellitus without mention of complication, not stated as uncontrolled       Past Surgical History:   Procedure Laterality Date     C NONSPECIFIC PROCEDURE  1998    varicose vein stripping     C NONSPECIFIC PROCEDURE  1998    colonoscopy     COLONOSCOPY  2009 & 2014    [facility]     COLONOSCOPY  04/08/2019    Dr. Pandey [facility]     CYSTOSCOPY       EYE SURGERY      cataract left eye     HC REMOVAL OF TONSILS,12+ Y/O       HC REMOVAL OF TONSILS,<11 Y/O       HC VASECTOMY UNILAT/BILAT W POSTOP SEMEN       PHACOEMULSIFICATION CLEAR CORNEA W/ STANDARD IOL IMPLANT, ENDOSCOPIC CYCLOPHOTOCOAGULATION, COMBINED  7/31/2014    Procedure: COMBINED  PHACOEMULSIFICATION CLEAR CORNEA WITH STANDARD INTRAOCULAR LENS IMPLANT, ENDOSCOPIC CYCLOPHOTOCOAGULATION;  Surgeon: Leroy Crews MD;  Location:  [facility]     VASECTOMY       Current Outpatient Medications   Medication     ACCU-CHEK COMPLETE KIT     amLODIPine-benazepril (LOTREL) 10-20 MG capsule     ASPIRIN 81 MG OR TABS     atenolol (TENORMIN) 25 MG tablet     atorvastatin (LIPITOR) 80 MG tablet     baclofen (LIORESAL) 10 MG tablet     blood glucose (ACCU-CHEK COMPACT DRUM) test strip     Calcium Carbonate-Vit D-Min (CALTRATE 600+D PLUS PO)     carbidopa-levodopa (SINEMET)  MG per tablet     finasteride (PROSCAR) 5 MG tablet     metFORMIN (GLUCOPHAGE) 1000 MG tablet     MULTIPLE VITAMINS OR TABS     No current facility-administered medications for this visit.         PHYSICAL EXAM  Patient is a 73 year old  male   Vitals: Blood pressure 128/64, pulse 75, height 1.829 m (6'), weight 93 kg (205 lb), SpO2 97 %.  General Appearance Adult: Body mass index is 27.8 kg/m .  Alert, no acute distress, oriented  HENT: throat/mouth:normal, good dentition  Lungs: no respiratory distress, or pursed lip breathing  Heart: No obvious jugular venous distension present  Abdomen: soft, nontender, no organomegaly or masses  Musculoskeltal: extremities normal, no peripheral edema  Skin: no suspicious lesions or rashes  Neuro: Alert, oriented, speech and mentation normal  Psych: affect and mood normal  Gait: Normal    Creatinine   Date Value Ref Range Status   02/18/2019 0.70 0.66 - 1.25 mg/dL Final      PSA   Date Value Ref Range Status   02/13/2018 0.02 0 - 4 ug/L Final     Comment:     Assay Method:  Chemiluminescence using Siemens Vista analyzer        All pertinent imaging reviewed:    All imaging studies reviewed by me.  I personally reviewed these imaging films.  A formal report from radiology will follow.  I reviewed the CT Abd/Pel from 3/11/2019 with the patient and his wife.    FINDINGS:   Right urinary tract: No  urinary calculi. No hydronephrosis. An  enhancing 1.8 cm lesion in the upper pole of the right kidney (series  4 image 110) has increased slightly in size (previously by my  measurement 1.5 cm). A hyperdense 2.3 cm exophytic lesion in the lower  pole of the right kidney (series 4 image 99) has also increased  slightly in size (previously by my measurement 1.9 cm), but again  demonstrates no convincing enhancement.      Left urinary tract: No urinary calculi. No hydronephrosis. No solid  renal masses.      Urinary bladder: No bladder stones or masses are identified. Mild  diffuse bladder wall thickening has a similar appearance to the  previous exam. There is mild prostatic enlargement.     Other findings:  A few small indeterminate nodular opacities at both  lung bases are unchanged where visualized, with the largest in the  left lower lobe measuring 0.8 x 0.5 cm (series 3 image 6). The liver,  gallbladder, spleen, adrenal glands, and pancreas are unremarkable. No  bowel obstruction. Colonic diverticulosis, without convincing evidence  for diverticulitis. Unremarkable appendix. No free fluid in the  pelvis. Mild atherosclerotic aortoiliac calcification. No enlarged  lymph nodes are identified in the abdomen or pelvis. A few mildly  prominent retroperitoneal lymph nodes are not significantly changed.  Degenerative changes are noted in the visualized thoracolumbar spine.                                                                 IMPRESSION:   1. An enhancing 1.8 cm lesion in the upper pole of the right kidney  has increased slightly in size since 2/14/2018. This finding is  suspicious for solid renal neoplasm such as renal cell carcinoma.  2. A 2.3 cm lesion in the lower pole of the right kidney has also  increased slightly in size. This lesion is of higher density than  simple fluid but demonstrates no convincing enhancement, and most  likely represents a hemorrhagic or proteinaceous renal cyst.  3. Consider  renal MRI for further characterization of these right  renal lesions.  4. A few small indeterminate pulmonary nodules at both lung bases are  not significantly changed.  5. Colonic diverticulosis, without convincing evidence for  diverticulitis.  6. Mild diffuse bladder wall thickening has a similar appearance to  the previous exam and could be related to mild prostatic enlargement.                    ASSESSMENT and PLAN  73 year old man with history of prostate cancer s/p EBRT with hormonal therapy with incidental right renal mass and right renal hyperdense cyst.     DISCUSSION SMALL RENAL MASS    The diagnosis of renal mass was discussed in great detail.  We discussed the fact that enhancement within a renal mass suggests malignancy, but that a benign renal tumor cannot be excluded.  Overall the risks of malignancy appears to be about 80-90%.  We discussed the problems with renal biopsy, the limited indications, and the need to treat based primarily on radiographic findings. The patient appears to understand that there is about a 10-20% chance that this mass may be benign.      Regarding treatment, we discussed radical vs. partial nephrectomy.  With respect to partial nephrectomy we discussed a potential advantage with preservation of long-term renal function long term and the equivalent long-term cancer control rates with appropriately selected patients.  We discussed the small (~4%) risk of local recurrence and recurrence in the contralateral kidney and the need for long-term radiographic surveillance postoperatively.  We discussed the higher complication rate with open radical vs partial nephrectomy related to a higher risk of prolonged urine leak, urinoma, hemorrhage requiring transfusion, infection, and possible need for reoperation.  We also discussed the potential need for radical nephrectomy based on intraoperative findings.  For radical and partial nephrectomy, we discussed the risks associated with any  major surgery, including cardiovascular, pulmonary, anesthetic, and thromboembolic problems as well as wound healing problems.  We discussed risk of cancer recurrence and the potential need for additional therapy.  We also discussed risk of renal insufficiency and dialysis short and long-term with radical nephrectomy. We discussed that plan would be for partial nephrectomy of upper pole mass and removal of lower pole cyst.     We also discussed open vs. robotic-assisted laparoscopic surgery and advantages and disadvantages each way. For laparoscopic surgery, we discussed possibility that conversion to open surgery might be required dependent on intraoperative findings and anatomy.      We also discussed renal ablative therapies such as cryotherapy and RFA.  We reviewed the data for these modalities and the overall encouraging findings thus far, but also discussed the fact that long-term cancer control issues remain unproven due to the still somewhat novel status of these modalities.  As such, the fact that these procedures are still considered experimental was conveyed.    We discussed the role of observation and the low risk of metastases associated with lesions less than 2-3 cm in size and the potential for slow/stable growth in many cases.  However, the unpredictable natural history of these lesions was mentioned as a drawback with this approach and the lack of effective therapy in the event of systemic progression.  In general, this approach is most favored for those with limited life expectancy and/or those with indeterminate (< 2-3 cm) renal masses.    We discussed the options for treatment of a localized kidney mass including active surveillance, thermal ablation, and surgical extirpation.  Surgical extirpation has the best track record and close to a 99% chance of cure for T1a lesions compared to 90% cure with thermal ablation and is generally preferred.  Furthermore, thermal ablation is not optimal for  larger masses or centrally located masses.  Active surveillance is also a reasonable option when life expectancy is less than 5-7 years.    The anticipated post-operative course was explained, including an anticipated 1 night hospital stay.    Patient has decided he would like to proceed with Robotic Partial Nephrectomy - [id number]    The risks, benefits, alternatives, and personnel involved in the procudure were discussed.  All questions were answered.  A written informed consent will be finalized on the morning of the procedure.    Plan:   - Orders for surgery placed  - Patient would like to schedule in August      I spent over 40 minutes with the patient.  Over half this time was spent on counseling regarding the above.    Binu Yoon   Urology  [facility]  Clinic Phone [phone]

## 2019-04-12 NOTE — TELEPHONE ENCOUNTER
RECORDS RECEIVED FROM: External - Dr Amador - [facility]   DATE RECEIVED: 4/23/19   NOTES (FOR ALL VISITS) STATUS DETAILS   OFFICE NOTE from referring provider Received 3/12/19  3/16/18  1/31/18  1/15/18   OFFICE NOTE from other specialist N/A    DISCHARGE SUMMARY from hospital N/A    DISCHARGE REPORT from the ER N/A    OPERATIVE REPORT N/A    MEDICATION LIST Received    IMAGING  (FOR ALL VISITS)     EMG N/A    EEG N/A    ECT N/A    MRI (HEAD, NECK, SPINE) Received [facility]:  MRI Brain 1/17/18   CT (HEAD, NECK, SPINE) N/A        Phone Call:     Contact Name [facility]   Outcome Left voice mail to push images to PACS

## 2019-04-21 ASSESSMENT — ENCOUNTER SYMPTOMS
DYSURIA: 0
LOSS OF CONSCIOUSNESS: 0
SPEECH CHANGE: 0
DIFFICULTY URINATING: 0
DIZZINESS: 1
NUMBNESS: 0
HEADACHES: 0
FLANK PAIN: 0
PARALYSIS: 0
SEIZURES: 0
HEMATURIA: 1
MEMORY LOSS: 1
TREMORS: 1
TINGLING: 0
WEAKNESS: 0
DISTURBANCES IN COORDINATION: 0

## 2019-04-23 ENCOUNTER — OFFICE VISIT (OUTPATIENT)
Dept: NEUROLOGY | Facility: CLINIC | Age: 74
End: 2019-04-23

## 2019-04-23 ENCOUNTER — PRE VISIT (OUTPATIENT)
Dept: NEUROLOGY | Facility: CLINIC | Age: 74
End: 2019-04-23

## 2019-04-23 VITALS
TEMPERATURE: 98.6 F | HEART RATE: 70 BPM | BODY MASS INDEX: 27.51 KG/M2 | HEIGHT: 72 IN | WEIGHT: 203.1 LBS | SYSTOLIC BLOOD PRESSURE: 133 MMHG | DIASTOLIC BLOOD PRESSURE: 59 MMHG | OXYGEN SATURATION: 97 %

## 2019-04-23 DIAGNOSIS — G20.A1 PARKINSON'S DISEASE (H): Primary | ICD-10-CM

## 2019-04-23 ASSESSMENT — UNIFIED PARKINSONS DISEASE RATING SCALE (UPDRS)
TOETAPPING_LEFT: NORMAL
FINGER_TAPPING_RIGHT: SLIGHT: ANY OF THE FOLLOWING: A) THE REGULAR RHYTHM IS BROKEN WITH ONE WITH ONE OR TWO INTERRUPTIONS OR HESITATIONS OF THE MOVEMENT B) SLIGHT SLOWING C) THE AMPLITUDE DECREMENTS NEAR THE END OF THE 10 MOVEMENTS.
POSTURAL_STABILITY: NORMAL:  RECOVERS WITH ONE OR TWO STEPS.
SPONTANEITY_OF_MOVEMENT: 0: NORMAL.  NO PROBLEMS.
CONSTANCY_TREMOR_ATREST: SLIGHT: TREMOR AT REST IS PRESENT  25% OF THE ENTIRE EXAMINATION PERIOD.
RIGIDITY_NECK: MILD: RIGIDITY DETECTED WITHOUT THE ACTIVATION MANEUVER.  FULL RANGE OF MOTION IS EASILY ACHIEVED.
LEG_AGILITY_LEFT: NORMAL
TOETAPPING_RIGHT: NORMAL
POSTURE: 0 NORMAL, NO PROBLEMS
PRONATION_SUPINATION_LEFT: NORMAL
TOTAL_SCORE_LEFT: 2
FACIAL_EXPRESSION: MILD: IN ADDITION TO DECREASED EYE-BLINK FREQUENCY, MASKED FACIES PRESENT IN THE LOWER FACE AS WELL, NAMELY FEWER MOVEMENTS AROUND THE MOUTH, SUCH AS LESS SPONTANEOUS SMILING, BUT LIPS NOT PARTED.
RIGIDITY_RUE: SLIGHT: RIGIDITY ONLY DETECTED WITH ACTIVATION MANEUVER.
GAIT: SLIGHT: INDEPENDENT WALKING WITH MINOR GAIT IMPAIRMENT.
RIGIDITY_LLE: NORMAL
RIGIDITY_LUE: NORMAL
PRONATION_SUPINATION_RIGHT: NORMAL
AMPLITUDE_LLE: NORMAL: NO TREMOR.
AMPLITUDE_RLE: NORMAL: NO TREMOR.
AXIAL_SCORE: 6
RIGIDITY_RLE: SLIGHT: RIGIDITY ONLY DETECTED WITH ACTIVATION MANEUVER.
PARKINSONS_MEDS: ON
HANDMOVEMENTS_LEFT: NORMAL
SPEECH: SLIGHT: LOSS OF MODULATION, DICTION OR VOLUME, BUT STILL ALL WORDS EASY TO UNDERSTAND.
LEG_AGILITY_RIGHT: NORMAL
HANDMOVEMENTS_RIGHT: NORMAL
FINGER_TAPPING_LEFT: SLIGHT: ANY OF THE FOLLOWING: A) THE REGULAR RHYTHM IS BROKEN WITH ONE WITH ONE OR TWO INTERRUPTIONS OR HESITATIONS OF THE MOVEMENT B) SLIGHT SLOWING C) THE AMPLITUDE DECREMENTS NEAR THE END OF THE 10 MOVEMENTS.
AMPLITUDE_LIP_JAW: SLIGHT: < 1 CM IN MAXIMAL AMPLITUDE.
AMPLITUDE_LUE: SLIGHT: < 1 CM IN MAXIMAL AMPLITUDE.
TOTAL_SCORE: 14
AMPLITUDE_RUE: SLIGHT: < 1 CM IN MAXIMAL AMPLITUDE.
TOTAL_SCORE: 4
ARISING_CHAIR: NORMAL: ABLE TO ARISE QUICKLY WITHOUT HESITATION.
FREEZING_GAIT: NORMAL

## 2019-04-23 ASSESSMENT — MIFFLIN-ST. JEOR: SCORE: 1709.26

## 2019-04-23 ASSESSMENT — PAIN SCALES - GENERAL: PAINLEVEL: NO PAIN (0)

## 2019-04-23 NOTE — PROGRESS NOTES
Department of Neurology  Movement Disorders Division   Initial Clinic Evaluation     Patient: Kyler Rodriguez   MRN: [id number]   : 1945   Date of Visit: 2019     Referring Physician: ePrry    Reason for Referral:Tremor    Impression:  1.  Mild idiopathic Parkinson's disease  2.  Incomplete response to carbidopa/levodopa    Recommendations:  1.  I had a long discussion with the patient and his wife.  I told him he had all of the symptoms and signs of mild idiopathic Parkinson's disease.  I do not feel that a DaTscan would be necessary at this time.  They have cancelled the DaTscan and will not reschedule it.  2.  We discussed the current proposed pathophysiology for Parkinson's disease.  We discussed the concept of synucleinopathy.  We discussed new therapies that may become available that will directly attack the progression of Parkinson's disease.  3.  At this time our best measures help the symptoms, but not the cause of Parkinson's disease.  I think he could go up to carbidopa/levodopa, 25/100, 1-1/2 tablets 3 times a day 1 hour before meals.  He should then check back with Dr. Amador in about 3 months to see if this is a high enough dose.  He is so mild that he may have a hard time distinguishing a levodopa response.  4.  We discussed the value of regular daily exercise.    We would like to thank Dr. Amador for the opportunity to participate in this patient's care.  The patient will now return to Dr. Amador excellent neurological care.      Please call or write with questions or concerns,    Sincerely yours,    Colten Messina MD      History of Present Illness  Mr. Rodriguez is a 73 year old male who is right-handed.  He is a retired  for [employer].  He continues to work part-time.  He comes with his wife.  They live in a private home in [place].    The patient is kindly referred by Dr. Taqueria Amador for evaluation of his tremor disorder.    There is no family  history of tremor or Parkinson's disease.  The patient has no history of severe head injury.  He has no toxic exposures.  He was raised on city [place].  He is a coffee drinker.  He does not smoke.    5 years ago the patient's wife noticed that he was having tremors about his mouth.  His lips would quiver.  She noticed it first when he was filling up Medicare papers.  This tremor does not affect his speech.  He is on no neuroleptics and not on Reglan.    2 years ago the patient noticed tremor of his hands.  This affected both hands equally.  It was intermittent.  It was a resting tremor.  When the tremor occurred if he would look at it or concentrate the tremor would disappear.  The tremor was never present with action.  About a year ago the patient saw Dr. Taqueria Amador.  Asymmetric resting tremor and chin tremor was noted.  It was felt that this was most likely parkinsonism.  The patient was started on carbidopa levodopa.  He has been unable to discern a clear effect of this medication.  He is taking carbidopa/levodopa, 25/100, 1 tablet 3 times a day.  He takes it an hour before or 2 hours after meals.  He has no sense of the medicine working on him or the medicine wearing off.  He does not have dyskinesia.    Recently the patient saw Dr. Amador.  Dr. Amador found the same findings.  He felt that before increasing medication he wanted either a DaTscan or movement disorders consultation.  The patient elected the consultation today.    The patient has had anosmia for 2 years.  His wife does report dream enactment behavior.  The patient does not have micrographia.  He can stand from a chair and turn in bed without difficulties.  He does walk in a stooped shuffling fashion.  His wife says his movements have slowed down.    The patient denies falls or freezing.  The patient's wife says his memory is slightly worse than it had been in the past.  There are no hallucinations.  There is no depression.  The patient denies  any symptoms of dysautonomia.  Swallowing is normal.    The patient has a history of prostate cancer.  He had radiation therapy and the PSA is now 0.  He has hypertension and type 2 diabetes mellitus.    Past Medical History:   Past Medical History:   Diagnosis Date     Elevated prostate specific antigen (PSA)      Essential hypertension, benign      Malignant neoplasm (H) 10/2011    prostate cancer     Mumps      Other and unspecified hyperlipidemia      Prostate infection      Spider veins      Type II or unspecified type diabetes mellitus without mention of complication, not stated as uncontrolled        Past Surgical History:   Past Surgical History:   Procedure Laterality Date     C NONSPECIFIC PROCEDURE  1998    varicose vein stripping     C NONSPECIFIC PROCEDURE  1998    colonoscopy     COLONOSCOPY  2009 & 2014    [facility]     COLONOSCOPY  04/08/2019    Dr. Pandey [facility]     CYSTOSCOPY       EYE SURGERY      cataract left eye     HC REMOVAL OF TONSILS,12+ Y/O       HC REMOVAL OF TONSILS,<13 Y/O       HC VASECTOMY UNILAT/BILAT W POSTOP SEMEN       PHACOEMULSIFICATION CLEAR CORNEA W/ STANDARD IOL IMPLANT, ENDOSCOPIC CYCLOPHOTOCOAGULATION, COMBINED  7/31/2014    Procedure: COMBINED PHACOEMULSIFICATION CLEAR CORNEA WITH STANDARD INTRAOCULAR LENS IMPLANT, ENDOSCOPIC CYCLOPHOTOCOAGULATION;  Surgeon: Leroy Crews MD;  Location: [facility]     VASECTOMY         Medications:  Current Outpatient Medications   Medication Sig Dispense Refill     amLODIPine-benazepril (LOTREL) 10-20 MG capsule TAKE 1 CAPSULE EVERY DAY 90 capsule 0     ASPIRIN 81 MG OR TABS 1 tablet daily  0     atenolol (TENORMIN) 25 MG tablet TAKE 1 TABLET EVERY DAY 90 tablet 0     atorvastatin (LIPITOR) 80 MG tablet TAKE 1/2 TABLET EVERY DAY 45 tablet 0     baclofen (LIORESAL) 10 MG tablet TAKE 1 TABLET EVERY DAY 90 tablet 1     Calcium Carbonate-Vit D-Min (CALTRATE 600+D PLUS PO) Take  by mouth.       carbidopa-levodopa (SINEMET)  MG per tablet Take 1  tablet by mouth 3 times daily       finasteride (PROSCAR) 5 MG tablet TAKE 1 TABLET EVERY DAY 90 tablet 3     metFORMIN (GLUCOPHAGE) 1000 MG tablet TAKE 1 TABLET TWICE DAILY WITH BREAKFAST  AND WITH DINNER 180 tablet 0     MULTIPLE VITAMINS OR TABS        ACCU-CHEK COMPLETE KIT test daily (Patient not taking: No sig reported) 1 0     blood glucose (ACCU-CHEK COMPACT DRUM) test strip by In Vitro route daily. Test one time daily or as directed. (Patient not taking: Reported on 8/17/2018) 100 strip 1          Movement Disorder-related Medications                   am noon pm         Carbidopa/levodopa 25/100                                            1 1 1                                                                                 Allergies: is allergic to no known allergies.    Social History:   Social History     Socioeconomic History     Marital status:      Spouse name: None     Number of children: None     Years of education: None     Highest education level: None   Occupational History     None   Social Needs     Financial resource strain: None     Food insecurity:     Worry: None     Inability: None     Transportation needs:     Medical: None     Non-medical: None   Tobacco Use     Smoking status: Never Smoker     Smokeless tobacco: Never Used   Substance and Sexual Activity     Alcohol use: Yes     Alcohol/week: 0.0 oz     Comment: 3-4 drinks/daily     Drug use: No     Sexual activity: Yes     Partners: Female   Lifestyle     Physical activity:     Days per week: None     Minutes per session: None     Stress: None   Relationships     Social connections:     Talks on phone: None     Gets together: None     Attends Church service: None     Active member of club or organization: None     Attends meetings of clubs or organizations: None     Relationship status: None     Intimate partner violence:     Fear of current or ex partner: None     Emotionally abused: None     Physically abused: None      Forced sexual activity: None   Other Topics Concern     Parent/sibling w/ CABG, MI or angioplasty before 65F 55M? Not Asked      Service Not Asked     Blood Transfusions Not Asked     Caffeine Concern No     Occupational Exposure No     Hobby Hazards No     Sleep Concern No     Stress Concern No     Weight Concern No     Special Diet No     Back Care No     Exercise No     Bike Helmet Not Asked     Seat Belt Yes     Self-Exams Not Asked   Social History Narrative     None       Family History:  Family History   Problem Relation Age of Onset     Cancer - colorectal Father         [email or web address]     Prostate Cancer Father      Musculoskeletal Disorder Brother         fibermyalgia     Prostate Cancer Paternal Grandfather        ROS:  General:  Fever : no, Chills: no, Sweats: no, Fatigue: no, ,Weight loss: no  Cardiovascular  Chest Pains: no, Palpitations: no, Edema: no, Shortness of breath: no  Respiratory  Cough: no  Gastrointestinal  Nausea: no, Vomiting: no, Diarrhea: no, Constipation: no  Genitourinary  Dysuria: no, Frequency: no, Urgency: no,  Nocturia: no, Incontinence: no  Kidney lesion Musculoskeletal  Back pain: no, Neck Pain: no  Joint pain, swelling, redness: no, Stiffness: no  Skin  Rash: no, Itching: no,  Suspicious lesions: no  Psychiatric  Depression: no, Anxiety/Panic: no  Endocrine  Cold intolerance: no, Heat intolerance: no, Excessive thirst: no  Hematologic/LymphatiAbnormal bruising, bleeding: no ,Enlarged lymph nodes: {.:[id number]  Allergic/Immunologic  Hives (Urticaria): no, HIV exposure: no    Neurologic  Visual loss: no,cataract surgery Double vision: no, Headache: no, Loss of consciousness:  no, Seizure: no, Fainting (syncope): no, Dysarthria: no, Dysphagia:  no, Vertigo:  no, Weakness: no, Atrophy: no, Twitches: no, Lhermitte's: no, Numbness or tingling: YES, left hand after cervical radicuHandwriting change: no, Tremors: YES, Involuntary movements: no, Imbalance: no, Abnormal gait:  YES,  Falls :no, Memory loss: no, RBD: YES, Sleep disorder: no, Hallucination: no, Loss of concentration: no,  Behavioral change: no,  Loss of motivation: no      Comprehensive Neurologic Exam    Mental Status Exam   The patient is alert, oriented and exhibits no difficulty with cognition or memory.  A formal short test of mental status was performed.   Attention 6/7  4/4 memory at 5 minutes   33/34 [place]    Neurovascular         Speech and Language   Right Left     Carotid Bruit Absent Absent  Speech is normal.   Dorsalis Pedis Pulse Normal Normal  Description   Posterior Tibial Pulse Normal Normal  Language is normal.     Cranial Nerve Exam                 Right Left   Right Left   II                                        Normal Normal  Hearing (VIII) Normal Normal      III, IV, V!                   Normal Normal  Nystagmus (VIII) Normal Normal   EOM description                              Gag (IX, X) Normal Normal   Facial Sensation (V) Normal Normal  SCM (XI) Normal Normal   Muscles of Mastication (V) Normal Normal  Trapezius (XI) Normal Normal   Facial Strength (VII) Normal Normal  Tongue (XII) Normal Normal     Motor Exam  Strength (*/5 grading)  Muscle                  Right Left  Muscle Right Left   Frontalis                                           Normal Normal  Iliopsoas Normal    Normal          Orbicularis Oculi                     Normal Normal  Quadrideps Normal    Normal        Orbicularis Oris                         Normal Normal  Anterior Tibial Normal Normal      Deltoid Normal Normal  Gastrocnemius Normal    Normal   Biceps Normal Normal  Extensor Hallucis Longus Normal    Normal   Triceps Normal Normal  Toe Extensors Normal    Normal   EDC Normal Normal  Toe Flexor Normal    Normal   Finger Flexors Normal Normal  Other Normal Normal   First Dorsal Interosseous Normal Normal  Other Normal Normal   Hypothenar Normal Normal  Other Normal Normal   Thenar Normal Normal  Other Normal Normal      Reflexes      Tone   Right Left   Right Left   Biceps Normal Normal  Spasticity (S)  Rigidity (R)     Triceps Normal Normal  Neck     Brachioradialis Normal Normal  Arm     Quadriceps Normal Normal  Leg     Ankle Normal Normal       Babkinski Flexor Flexor         UPDRS Values 4/23/2019   Time: 12:43 PM   Medication On   R Brain DBS: None   L Brain DBS: None   Speech 1   Facial Expression 2   Rigidity Neck 2   Rigidity RUE 1   Rigidity LUE 0   Rigidity RLE 1   Rigidity LLE 0   Finger Taps R 1   Finger Taps L 1   Hand Mvt R 0   Hand Mvt L 0   Pron-/Supinate R 0   Pron-/Supinate L 0   Toe Tap R 0   Toe Tap L 0   Leg Agility R 0   Leg Agility L 0   Arise From Chair 0   Gait 1   Gait Freezing 0   Postural Stability 0   Posture 0   Global Spont Mvt 0   Postural Tremor RUE 0   Postural Tremor LUE 0   Kinetic Tremor RUE 0   Kinetic Tremor LUE 0   Rest Tremor RUE 1   Rest Tremor LUE 1   Rest Tremor RLE 0   Rest Tremor LLE 0   Rest Tremor Lip/Jaw 1   Rest Tremor Constancy 1   Total Right 4   Total Left 2   Axial Total 6   Total 14   Sensory Exam          Right Left    Pin Normal Normal    Vibration Normal Normal    Joint position Normal Normal    Other             Coding statement:         Number of diagnoses:[id number]  Management:Diagnostic tests orders or reviewed.No  Medications were prescribed.No Medications were adjusted.Yes  Complexity of medical data:Outside records reviewed.Yes Radiology images reviewed by myself.Yes Review/order clinical lab tests. Yes Review/order radiologyYes Discussed tests with performing physician. No Called outside records.Yes Discussed patient with another provider.No Took collateral history.YesRiskOne or more chronic illnesses with severe exacerbation, progression, or side effects of treatment.YesAcute or chronic illness or injury that poses a threat to life or bodily function.No  Abrupt change in neurologic status.No                     Answers for HPI/ROS submitted by the  patient on 4/21/2019   General Symptoms: No  Skin Symptoms: No  HENT Symptoms: No  EYE SYMPTOMS: No  HEART SYMPTOMS: No  LUNG SYMPTOMS: No  INTESTINAL SYMPTOMS: No  URINARY SYMPTOMS: Yes  REPRODUCTIVE SYMPTOMS: No  SKELETAL SYMPTOMS: No  BLOOD SYMPTOMS: No  NERVOUS SYSTEM SYMPTOMS: Yes  MENTAL HEALTH SYMPTOMS: No  Trouble holding urine or incontinence: No  Pain or burning: No  Trouble starting or stopping: No  Increased frequency of urination: No  Blood in urine: Yes  Decreased frequency of urination: No  Frequent nighttime urination: No  Flank pain: No  Difficulty emptying bladder: No  Trouble with coordination: No  Dizziness or trouble with balance: Yes  Fainting or black-out spells: No  Memory loss: Yes  Headache: No  Seizures: No  Speech problems: No  Tingling: No  Tremor: Yes  Weakness: No  Difficulty walking: No  Paralysis: No  Numbness: No

## 2019-04-23 NOTE — NURSING NOTE
Chief Complaint   Patient presents with     New Patient     [facility] NEW MOVEMENT DISORDER MIKE Mandujano LPN

## 2019-04-23 NOTE — LETTER
2019       RE: Kyler Rodriguez  [address] [facility] [address]     Dear Colleague,    Thank you for referring your patient, Kyler Rodriguez, to the [facility] NEUROLOGY at [facility]. Please see a copy of my visit note below.    Department of Neurology  Movement Disorders Division   Initial Clinic Evaluation     Patient: Kyler Rodriguez   MRN: [id number]   : 1945   Date of Visit: 2019     Referring Physician: Perry    Reason for Referral:Tremor    Impression:  1.  Mild idiopathic Parkinson's disease  2.  Incomplete response to carbidopa/levodopa    Recommendations:  1.  I had a long discussion with the patient and his wife.  I told him he had all of the symptoms and signs of mild idiopathic Parkinson's disease.  I do not feel that a DaTscan would be necessary at this time.  They have cancelled the DaTscan and will not reschedule it.  2.  We discussed the current proposed pathophysiology for Parkinson's disease.  We discussed the concept of synucleinopathy.  We discussed new therapies that may become available that will directly attack the progression of Parkinson's disease.  3.  At this time our best measures help the symptoms, but not the cause of Parkinson's disease.  I think he could go up to carbidopa/levodopa, 25/100, 1-1/2 tablets 3 times a day 1 hour before meals.  He should then check back with Dr. Amador in about 3 months to see if this is a high enough dose.  He is so mild that he may have a hard time distinguishing a levodopa response.  4.  We discussed the value of regular daily exercise.    We would like to thank Dr. Amador for the opportunity to participate in this patient's care.  The patient will now return to Dr. Amador excellent neurological care.      Please call or write with questions or concerns,    Sincerely yours,    Colten Messina MD      History of Present Illness  Mr. Rodriguez is a 73 year old male who is  right-handed.  He is a retired  for [employer].  He continues to work part-time.  He comes with his wife.  They live in a private home in [place].    The patient is kindly referred by Dr. Taqueria Amador for evaluation of his tremor disorder.    There is no family history of tremor or Parkinson's disease.  The patient has no history of severe head injury.  He has no toxic exposures.  He was [place] city [place].  He is a coffee drinker.  He does not smoke.    5 years ago the patient's wife noticed that he was having tremors about his mouth.  His lips would quiver.  She noticed it first when he was filling up Medicare papers.  This tremor does not affect his speech.  He is on no neuroleptics and not on Reglan.    2 years ago the patient noticed tremor of his hands.  This affected both hands equally.  It was intermittent.  It was a resting tremor.  When the tremor occurred if he would look at it or concentrate the tremor would disappear.  The tremor was never present with action.  About a year ago the patient saw Dr. Taqueria Amador.  Asymmetric resting tremor and chin tremor was noted.  It was felt that this was most likely parkinsonism.  The patient was started on carbidopa levodopa.  He has been unable to discern a clear effect of this medication.  He is taking carbidopa/levodopa, 25/100, 1 tablet 3 times a day.  He takes it an hour before or 2 hours after meals.  He has no sense of the medicine working on him or the medicine wearing off.  He does not have dyskinesia.    Recently the patient saw Dr. Amador.  Dr. Amador found the same findings.  He felt that before increasing medication he wanted either a DaTscan or movement disorders consultation.  The patient elected the consultation today.    The patient has had anosmia for 2 years.  His wife does report dream enactment behavior.  The patient does not have micrographia.  He can stand from a chair and turn in bed without difficulties.   He does walk in a stooped shuffling fashion.  His wife says his movements have slowed down.    The patient denies falls or freezing.  The patient's wife says his memory is slightly worse than it had been in the past.  There are no hallucinations.  There is no depression.  The patient denies any symptoms of dysautonomia.  Swallowing is normal.    The patient has a history of prostate cancer.  He had radiation therapy and the PSA is now 0.  He has hypertension and type 2 diabetes mellitus.    Past Medical History:   Past Medical History:   Diagnosis Date     Elevated prostate specific antigen (PSA)      Essential hypertension, benign      Malignant neoplasm (H) 10/2011    prostate cancer     Mumps      Other and unspecified hyperlipidemia      Prostate infection      Spider veins      Type II or unspecified type diabetes mellitus without mention of complication, not stated as uncontrolled        Past Surgical History:   Past Surgical History:   Procedure Laterality Date     C NONSPECIFIC PROCEDURE  1998    varicose vein stripping     C NONSPECIFIC PROCEDURE  1998    colonoscopy     COLONOSCOPY  2009 & 2014    [facility]     COLONOSCOPY  04/08/2019    Dr. Pandey [facility]     CYSTOSCOPY       EYE SURGERY      cataract left eye     HC REMOVAL OF TONSILS,12+ Y/O       HC REMOVAL OF TONSILS,<13 Y/O       HC VASECTOMY UNILAT/BILAT W POSTOP SEMEN       PHACOEMULSIFICATION CLEAR CORNEA W/ STANDARD IOL IMPLANT, ENDOSCOPIC CYCLOPHOTOCOAGULATION, COMBINED  7/31/2014    Procedure: COMBINED PHACOEMULSIFICATION CLEAR CORNEA WITH STANDARD INTRAOCULAR LENS IMPLANT, ENDOSCOPIC CYCLOPHOTOCOAGULATION;  Surgeon: Leroy Crews MD;  Location: [facility]     VASECTOMY         Medications:  Current Outpatient Medications   Medication Sig Dispense Refill     amLODIPine-benazepril (LOTREL) 10-20 MG capsule TAKE 1 CAPSULE EVERY DAY 90 capsule 0     ASPIRIN 81 MG OR TABS 1 tablet daily  0     atenolol (TENORMIN) 25 MG tablet TAKE 1 TABLET EVERY DAY 90  tablet 0     atorvastatin (LIPITOR) 80 MG tablet TAKE 1/2 TABLET EVERY DAY 45 tablet 0     baclofen (LIORESAL) 10 MG tablet TAKE 1 TABLET EVERY DAY 90 tablet 1     Calcium Carbonate-Vit D-Min (CALTRATE 600+D PLUS PO) Take  by mouth.       carbidopa-levodopa (SINEMET)  MG per tablet Take 1 tablet by mouth 3 times daily       finasteride (PROSCAR) 5 MG tablet TAKE 1 TABLET EVERY DAY 90 tablet 3     metFORMIN (GLUCOPHAGE) 1000 MG tablet TAKE 1 TABLET TWICE DAILY WITH BREAKFAST  AND WITH DINNER 180 tablet 0     MULTIPLE VITAMINS OR TABS        ACCU-CHEK COMPLETE KIT test daily (Patient not taking: No sig reported) 1 0     blood glucose (ACCU-CHEK COMPACT DRUM) test strip by In Vitro route daily. Test one time daily or as directed. (Patient not taking: Reported on 8/17/2018) 100 strip 1          Movement Disorder-related Medications                   am noon pm         Carbidopa/levodopa 25/100                                            1 1 1                                                                                 Allergies: is allergic to no known allergies.    Social History:   Social History     Socioeconomic History     Marital status:      Spouse name: None     Number of children: None     Years of education: None     Highest education level: None   Occupational History     None   Social Needs     Financial resource strain: None     Food insecurity:     Worry: None     Inability: None     Transportation needs:     Medical: None     Non-medical: None   Tobacco Use     Smoking status: Never Smoker     Smokeless tobacco: Never Used   Substance and Sexual Activity     Alcohol use: Yes     Alcohol/week: 0.0 oz     Comment: 3-4 drinks/daily     Drug use: No     Sexual activity: Yes     Partners: Female   Lifestyle     Physical activity:     Days per week: None     Minutes per session: None     Stress: None   Relationships     Social connections:     Talks on phone: None     Gets together: None      Attends Judaism service: None     Active member of club or organization: None     Attends meetings of clubs or organizations: None     Relationship status: None     Intimate partner violence:     Fear of current or ex partner: None     Emotionally abused: None     Physically abused: None     Forced sexual activity: None   Other Topics Concern     Parent/sibling w/ CABG, MI or angioplasty before 65F 55M? Not Asked      Service Not Asked     Blood Transfusions Not Asked     Caffeine Concern No     Occupational Exposure No     Hobby Hazards No     Sleep Concern No     Stress Concern No     Weight Concern No     Special Diet No     Back Care No     Exercise No     Bike Helmet Not Asked     Seat Belt Yes     Self-Exams Not Asked   Social History Narrative     None       Family History:  Family History   Problem Relation Age of Onset     Cancer - colorectal Father         [email or web address]     Prostate Cancer Father      Musculoskeletal Disorder Brother         fibermyalgia     Prostate Cancer Paternal Grandfather        ROS:  General:  Fever : no, Chills: no, Sweats: no, Fatigue: no, ,Weight loss: no  Cardiovascular  Chest Pains: no, Palpitations: no, Edema: no, Shortness of breath: no  Respiratory  Cough: no  Gastrointestinal  Nausea: no, Vomiting: no, Diarrhea: no, Constipation: no  Genitourinary  Dysuria: no, Frequency: no, Urgency: no,  Nocturia: no, Incontinence: no  Kidney lesion Musculoskeletal  Back pain: no, Neck Pain: no  Joint pain, swelling, redness: no, Stiffness: no  Skin  Rash: no, Itching: no,  Suspicious lesions: no  Psychiatric  Depression: no, Anxiety/Panic: no  Endocrine  Cold intolerance: no, Heat intolerance: no, Excessive thirst: no  Hematologic/LymphatiAbnormal bruising, bleeding: no ,Enlarged lymph nodes: {[id number]  Allergic/Immunologic  Hives (Urticaria): no, HIV exposure: no    Neurologic  Visual loss: no,cataract surgery Double vision: no, Headache: no, Loss of consciousness:  no,  Seizure: no, Fainting (syncope): no, Dysarthria: no, Dysphagia:  no, Vertigo:  no, Weakness: no, Atrophy: no, Twitches: no, Lhermitte's: no, Numbness or tingling: YES, left hand after cervical radicuHandwriting change: no, Tremors: YES, Involuntary movements: no, Imbalance: no, Abnormal gait:  YES, Falls :no, Memory loss: no, RBD: YES, Sleep disorder: no, Hallucination: no, Loss of concentration: no,  Behavioral change: no,  Loss of motivation: no      Comprehensive Neurologic Exam    Mental Status Exam   The patient is alert, oriented and exhibits no difficulty with cognition or memory.  A formal short test of mental status was performed.   Attention 6/7 4/4 memory at 5 minutes   33/34 [place]    Neurovascular         Speech and Language   Right Left     Carotid Bruit Absent Absent  Speech is normal.   Dorsalis Pedis Pulse Normal Normal  Description   Posterior Tibial Pulse Normal Normal  Language is normal.     Cranial Nerve Exam                 Right Left   Right Left   II                                        Normal Normal  Hearing (VIII) Normal Normal      III, IV, V!                   Normal Normal  Nystagmus (VIII) Normal Normal   EOM description                              Gag (IX, X) Normal Normal   Facial Sensation (V) Normal Normal  SCM (XI) Normal Normal   Muscles of Mastication (V) Normal Normal  Trapezius (XI) Normal Normal   Facial Strength (VII) Normal Normal  Tongue (XII) Normal Normal     Motor Exam  Strength (*/5 grading)  Muscle                  Right Left  Muscle Right Left   Frontalis                                           Normal Normal  Iliopsoas Normal    Normal          Orbicularis Oculi                     Normal Normal  Quadrideps Normal    Normal        Orbicularis Oris                         Normal Normal  Anterior Tibial Normal Normal      Deltoid Normal Normal  Gastrocnemius Normal    Normal   Biceps Normal Normal  Extensor Hallucis Longus Normal    Normal   Triceps Normal Normal   Toe Extensors Normal    Normal   EDC Normal Normal  Toe Flexor Normal    Normal   Finger Flexors Normal Normal  Other Normal Normal   First Dorsal Interosseous Normal Normal  Other Normal Normal   Hypothenar Normal Normal  Other Normal Normal   Thenar Normal Normal  Other Normal Normal     Reflexes      Tone   Right Left   Right Left   Biceps Normal Normal  Spasticity (S)  Rigidity (R)     Triceps Normal Normal  Neck     Brachioradialis Normal Normal  Arm     Quadriceps Normal Normal  Leg     Ankle Normal Normal       Babkinski Flexor Flexor         UPDRS Values 4/23/2019   Time: 12:43 PM   Medication On   R Brain DBS: None   L Brain DBS: None   Speech 1   Facial Expression 2   Rigidity Neck 2   Rigidity RUE 1   Rigidity LUE 0   Rigidity RLE 1   Rigidity LLE 0   Finger Taps R 1   Finger Taps L 1   Hand Mvt R 0   Hand Mvt L 0   Pron-/Supinate R 0   Pron-/Supinate L 0   Toe Tap R 0   Toe Tap L 0   Leg Agility R 0   Leg Agility L 0   Arise From Chair 0   Gait 1   Gait Freezing 0   Postural Stability 0   Posture 0   Global Spont Mvt 0   Postural Tremor RUE 0   Postural Tremor LUE 0   Kinetic Tremor RUE 0   Kinetic Tremor LUE 0   Rest Tremor RUE 1   Rest Tremor LUE 1   Rest Tremor RLE 0   Rest Tremor LLE 0   Rest Tremor Lip/Jaw 1   Rest Tremor Constancy 1   Total Right 4   Total Left 2   Axial Total 6   Total 14   Sensory Exam          Right Left    Pin Normal Normal    Vibration Normal Normal    Joint position Normal Normal    Other             Coding statement:         Number of diagnoses:[id number]  Management:Diagnostic tests orders or reviewed.No  Medications were prescribed.No Medications were adjusted.Yes  Complexity of medical data:Outside records reviewed.Yes Radiology images reviewed by myself.Yes Review/order clinical lab tests. Yes Review/order radiologyYes Discussed tests with performing physician. No Called outside records.Yes Discussed patient with another provider.No Took collateral  history.YesRiskOne or more chronic illnesses with severe exacerbation, progression, or side effects of treatment.YesAcute or chronic illness or injury that poses a threat to life or bodily function.No  Abrupt change in neurologic status.No    Again, thank you for allowing me to participate in the care of your patient.      Sincerely,    Colten Messina MD

## 2019-04-23 NOTE — PATIENT INSTRUCTIONS
1.  You do have mild Parkinson's disease.  You will likely do very well with this.    2.  Try increasing your carbidopa/levodopa 25/100  To 1.5 tablets, 1 hour before meals    3.  Find a program of exercise.  Try to reach 30 minutes 6 times per week    4.  Return for a check with Dr. Amador in the next 3 months

## 2019-05-09 DIAGNOSIS — E78.5 HYPERLIPIDEMIA LDL GOAL <100: ICD-10-CM

## 2019-05-09 DIAGNOSIS — I10 ESSENTIAL HYPERTENSION, BENIGN: ICD-10-CM

## 2019-05-09 DIAGNOSIS — E11.21 TYPE 2 DIABETES MELLITUS WITH DIABETIC NEPHROPATHY, WITHOUT LONG-TERM CURRENT USE OF INSULIN (H): ICD-10-CM

## 2019-05-10 RX ORDER — AMLODIPINE AND BENAZEPRIL HYDROCHLORIDE 10; 20 MG/1; MG/1
CAPSULE ORAL
Qty: 90 CAPSULE | Refills: 0 | Status: SHIPPED | OUTPATIENT
Start: 2019-05-10 | End: 2019-08-05

## 2019-05-10 RX ORDER — ATORVASTATIN CALCIUM 80 MG/1
TABLET, FILM COATED ORAL
Qty: 45 TABLET | Refills: 0 | Status: SHIPPED | OUTPATIENT
Start: 2019-05-10 | End: 2019-08-05

## 2019-05-10 RX ORDER — ATENOLOL 25 MG/1
TABLET ORAL
Qty: 90 TABLET | Refills: 0 | Status: SHIPPED | OUTPATIENT
Start: 2019-05-10 | End: 2019-08-05

## 2019-05-10 NOTE — TELEPHONE ENCOUNTER
Last OV 2/18/19  Prescription approved per [facility] Refill Protocol.      BP Readings from Last 3 Encounters:   04/23/19 133/59   04/10/19 128/64   04/08/19 120/81     Lab Results   Component Value Date    A1C 5.0 02/18/2019    A1C 4.8 08/13/2018    A1C 5.5 02/13/2018    A1C 5.1 08/11/2017    A1C 5.4 02/07/2017     Creatinine   Date Value Ref Range Status   02/18/2019 0.70 0.66 - 1.25 mg/dL Final     Potassium   Date Value Ref Range Status   02/18/2019 4.2 3.4 - 5.3 mmol/L Final     Recent Labs   Lab Test 02/18/19  0850 02/13/18  0857  08/17/15  0820 10/13/14  1024   CHOL 155 113   < > 145 137   HDL 99 49   < > 85 82   LDL 43 44   < > 38 44   TRIG 67 98   < > 108 55   CHOLHDLRATIO  --   --   --  1.7 1.7    < > = values in this interval not displayed.

## 2019-05-10 NOTE — TELEPHONE ENCOUNTER
"Requested Prescriptions   Pending Prescriptions Disp Refills     atorvastatin (LIPITOR) 80 MG tablet [Pharmacy Med Name: ATORVASTATIN CALCIUM 80 MG Tablet] 45 tablet 0     Sig: TAKE 1/2 TABLET EVERY DAY   Last Written Prescription Date:  02/11/2019  Last Fill Quantity: 45,  # refills: 0   Last office visit: 2/18/2019 with prescribing provider:     Future Office Visit:   Next 5 appointments (look out 90 days)    Aug 05, 2019 10:00 AM CDT  [facility] [address] [facility] [address]  [phone]           Statins Protocol Passed - 5/9/2019  8:59 PM        Passed - LDL on file in past 12 months     Recent Labs   Lab Test 02/18/19  0850   LDL 43             Passed - No abnormal creatine kinase in past 12 months     No lab results found.             Passed - Recent (12 mo) or future (30 days) visit within the authorizing provider's specialty     Patient had office visit in the last 12 months or has a visit in the next 30 days with authorizing provider or within the authorizing provider's specialty.  See \"Patient Info\" tab in inbasket, or \"Choose Columns\" in Meds & Orders section of the refill encounter.              Passed - Medication is active on med list        Passed - Patient is age 18 or older        atenolol (TENORMIN) 25 MG tablet [Pharmacy Med Name: ATENOLOL 25 MG Tablet] 90 tablet 0     Sig: TAKE 1 TABLET EVERY DAY   Last Written Prescription Date:  02/11/2019  Last Fill Quantity: 90,  # refills: 0   Last office visit: 2/18/2019 with prescribing provider:     Future Office Visit:   Next 5 appointments (look out 90 days)    Aug 05, 2019 10:00 AM CDT  [facility]-Op physical [facility]h [facility]  [facility] [address] [facility] [address]  [phone]           Beta-Blockers Protocol Passed - 5/9/2019  8:59 PM        Passed - Blood pressure " "under 140/90 in past 12 months     BP Readings from Last 3 Encounters:   04/23/19 133/59   04/10/19 128/64   04/08/19 120/81                 Passed - Patient is age 6 or older        Passed - Recent (12 mo) or future (30 days) visit within the authorizing provider's specialty     Patient had office visit in the last 12 months or has a visit in the next 30 days with authorizing provider or within the authorizing provider's specialty.  See \"Patient Info\" tab in inbasket, or \"Choose Columns\" in Meds & Orders section of the refill encounter.              Passed - Medication is active on med list        amLODIPine-benazepril (LOTREL) 10-20 MG capsule [Pharmacy Med Name: AMLODIPINE BESYLATE/BENAZEPRIL HYDROCHLORIDE 10-20 MG Capsule] 90 capsule 0     Sig: TAKE 1 CAPSULE EVERY DAY   Last Written Prescription Date:  02/11/2019  Last Fill Quantity: 90,  # refills: 0   Last office visit: 2/18/2019 with prescribing provider:     Future Office Visit:   Next 5 appointments (look out 90 days)    Aug 05, 2019 10:00 AM CDT  Pre-Op physical with [facility] MD  [facility] [address] [place] [address]  [phone]           Calcium Channel Blockers Protocol  Passed - 5/9/2019  8:59 PM        Passed - Blood pressure under 140/90 in past 12 months     BP Readings from Last 3 Encounters:   04/23/19 133/59   04/10/19 128/64   04/08/19 120/81                 Passed - Recent (12 mo) or future (30 days) visit within the authorizing provider's specialty     Patient had office visit in the last 12 months or has a visit in the next 30 days with authorizing provider or within the authorizing provider's specialty.  See \"Patient Info\" tab in inbasket, or \"Choose Columns\" in Meds & Orders section of the refill encounter.              Passed - Medication is active on med list        Passed - Patient is age 18 or older        Passed - Normal serum creatinine on file in past 12 " months     Recent Labs   Lab Test 02/18/19  0850  08/19/11  1043   CR 0.70   < >  --    CREAT  --   --  0.8    < > = values in this interval not displayed.             metFORMIN (GLUCOPHAGE) 1000 MG tablet [Pharmacy Med Name: METFORMIN HYDROCHLORIDE 1000 MG Tablet] 180 tablet 0     Sig: TAKE 1 TABLET TWICE DAILY WITH BREAKFAST  AND WITH DINNER   Last Written Prescription Date:  02/11/2019  Last Fill Quantity: 180,  # refills: 0   Last office visit: 2/18/2019 with prescribing provider:     Future Office Visit:   Next 5 appointments (look out 90 days)    Aug 05, 2019 10:00 AM CDT  Pre-Op physical with R[facility]h Clark MD  [facility] [address] [facility] [id number] [phone]           Biguanide Agents Passed - 5/9/2019  8:59 PM        Passed - Blood pressure less than 140/90 in past 6 months     BP Readings from Last 3 Encounters:   04/23/19 133/59   04/10/19 128/64   04/08/19 120/81                 Passed - Patient has documented LDL within the past 12 mos.     Recent Labs   Lab Test 02/18/19  0850   LDL 43             Passed - Patient has had a Microalbumin in the past 15 mos.     Recent Labs   Lab Test 02/18/19  1025   MICROL 122   UMALCR 78.21*             Passed - Patient is age 10 or older        Passed - Patient has documented A1c within the specified period of time.     If HgbA1C is 8 or greater, it needs to be on file within the past 3 months.  If less than 8, must be on file within the past 6 months.     Recent Labs   Lab Test 02/18/19  0850   A1C 5.0             Passed - Patient's CR is NOT>1.4 OR Patient's EGFR is NOT<45 within past 12 mos.     Recent Labs   Lab Test 02/18/19  0850   GFRESTIMATED >90   GFRESTBLACK >90       Recent Labs   Lab Test 02/18/19  0850   CR 0.70             Passed - Patient does NOT have a diagnosis of CHF.        Passed - Medication is active on med list        Passed - Recent (6 mo) or future (30 days) visit  "within the authorizing provider's specialty     Patient had office visit in the last 6 months or has a visit in the next 30 days with authorizing provider or within the authorizing provider's specialty.  See \"Patient Info\" tab in inbasket, or \"Choose Columns\" in Meds & Orders section of the refill encounter.            "

## 2019-05-21 ENCOUNTER — TRANSFERRED RECORDS (OUTPATIENT)
Dept: HEALTH INFORMATION MANAGEMENT | Facility: CLINIC | Age: 74
End: 2019-05-21

## 2019-08-05 ENCOUNTER — OFFICE VISIT (OUTPATIENT)
Dept: INTERNAL MEDICINE | Facility: CLINIC | Age: 74
End: 2019-08-05
Payer: MEDICARE

## 2019-08-05 VITALS
BODY MASS INDEX: 26.71 KG/M2 | HEIGHT: 72 IN | SYSTOLIC BLOOD PRESSURE: 126 MMHG | DIASTOLIC BLOOD PRESSURE: 62 MMHG | OXYGEN SATURATION: 98 % | WEIGHT: 197.2 LBS | TEMPERATURE: 98.6 F | HEART RATE: 61 BPM

## 2019-08-05 DIAGNOSIS — N42.1: ICD-10-CM

## 2019-08-05 DIAGNOSIS — E78.5 HYPERLIPIDEMIA LDL GOAL <100: ICD-10-CM

## 2019-08-05 DIAGNOSIS — Z01.818 PREOP GENERAL PHYSICAL EXAM: Primary | ICD-10-CM

## 2019-08-05 DIAGNOSIS — I10 ESSENTIAL HYPERTENSION, BENIGN: ICD-10-CM

## 2019-08-05 DIAGNOSIS — D49.519 KIDNEY TUMOR: ICD-10-CM

## 2019-08-05 DIAGNOSIS — E11.21 TYPE 2 DIABETES MELLITUS WITH DIABETIC NEPHROPATHY, WITHOUT LONG-TERM CURRENT USE OF INSULIN (H): ICD-10-CM

## 2019-08-05 DIAGNOSIS — M62.830 BACK MUSCLE SPASM: ICD-10-CM

## 2019-08-05 LAB
ALBUMIN UR-MCNC: 30 MG/DL
APPEARANCE UR: CLEAR
BACTERIA #/AREA URNS HPF: ABNORMAL /HPF
BILIRUB UR QL STRIP: NEGATIVE
COLOR UR AUTO: YELLOW
ERYTHROCYTE [DISTWIDTH] IN BLOOD BY AUTOMATED COUNT: 12.4 % (ref 10–15)
GLUCOSE UR STRIP-MCNC: NEGATIVE MG/DL
HBA1C MFR BLD: 5 % (ref 0–5.6)
HCT VFR BLD AUTO: 44.6 % (ref 40–53)
HGB BLD-MCNC: 15.3 G/DL (ref 13.3–17.7)
HGB UR QL STRIP: NEGATIVE
KETONES UR STRIP-MCNC: ABNORMAL MG/DL
LEUKOCYTE ESTERASE UR QL STRIP: NEGATIVE
MCH RBC QN AUTO: 32.7 PG (ref 26.5–33)
MCHC RBC AUTO-ENTMCNC: 34.3 G/DL (ref 31.5–36.5)
MCV RBC AUTO: 95 FL (ref 78–100)
NITRATE UR QL: NEGATIVE
PH UR STRIP: 8 PH (ref 5–7)
PLATELET # BLD AUTO: 153 10E9/L (ref 150–450)
RBC # BLD AUTO: 4.68 10E12/L (ref 4.4–5.9)
RBC #/AREA URNS AUTO: ABNORMAL /HPF
SOURCE: ABNORMAL
SP GR UR STRIP: 1.01 (ref 1–1.03)
UROBILINOGEN UR STRIP-ACNC: 1 EU/DL (ref 0.2–1)
WBC # BLD AUTO: 3.9 10E9/L (ref 4–11)
WBC #/AREA URNS AUTO: ABNORMAL /HPF

## 2019-08-05 PROCEDURE — 99215 OFFICE O/P EST HI 40 MIN: CPT | Performed by: INTERNAL MEDICINE

## 2019-08-05 PROCEDURE — 80053 COMPREHEN METABOLIC PANEL: CPT | Performed by: INTERNAL MEDICINE

## 2019-08-05 PROCEDURE — 81001 URINALYSIS AUTO W/SCOPE: CPT | Performed by: INTERNAL MEDICINE

## 2019-08-05 PROCEDURE — 85027 COMPLETE CBC AUTOMATED: CPT | Performed by: INTERNAL MEDICINE

## 2019-08-05 PROCEDURE — 83036 HEMOGLOBIN GLYCOSYLATED A1C: CPT | Performed by: INTERNAL MEDICINE

## 2019-08-05 PROCEDURE — 36415 COLL VENOUS BLD VENIPUNCTURE: CPT | Performed by: INTERNAL MEDICINE

## 2019-08-05 PROCEDURE — 93000 ELECTROCARDIOGRAM COMPLETE: CPT | Performed by: INTERNAL MEDICINE

## 2019-08-05 RX ORDER — AMLODIPINE AND BENAZEPRIL HYDROCHLORIDE 10; 20 MG/1; MG/1
1 CAPSULE ORAL DAILY
Qty: 90 CAPSULE | Refills: 3 | Status: SHIPPED | OUTPATIENT
Start: 2019-08-05 | End: 2020-08-05

## 2019-08-05 RX ORDER — FINASTERIDE 5 MG/1
TABLET, FILM COATED ORAL
Qty: 90 TABLET | Refills: 3 | Status: SHIPPED | OUTPATIENT
Start: 2019-08-05 | End: 2020-08-05

## 2019-08-05 RX ORDER — ATORVASTATIN CALCIUM 80 MG/1
40 TABLET, FILM COATED ORAL DAILY
Qty: 45 TABLET | Refills: 3 | Status: SHIPPED | OUTPATIENT
Start: 2019-08-05 | End: 2020-08-05

## 2019-08-05 RX ORDER — ATENOLOL 25 MG/1
25 TABLET ORAL DAILY
Qty: 90 TABLET | Refills: 3 | Status: SHIPPED | OUTPATIENT
Start: 2019-08-05 | End: 2020-08-05

## 2019-08-05 RX ORDER — BACLOFEN 10 MG/1
TABLET ORAL
Qty: 90 TABLET | Refills: 1 | Status: SHIPPED | OUTPATIENT
Start: 2019-08-05 | End: 2020-02-05

## 2019-08-05 ASSESSMENT — MIFFLIN-ST. JEOR: SCORE: 1677.49

## 2019-08-05 NOTE — NURSING NOTE
/62   Pulse 61   Temp 98.6  F (37  C) (Oral)   Ht 1.829 m (6')   Wt 89.4 kg (197 lb 3.2 oz)   SpO2 98%   BMI 26.75 kg/m    Patient in for Pre-Op.  Ana Paula Soliz CMA

## 2019-08-05 NOTE — LETTER
[facility]  [address], [address]  [place] [address]  [phone]        August 6, 2019    Kyler Rodriguez  [address] [facility] [address]            Dear Mr. Kyler Rodriguez:      The results of your recent labs were NORMAL.      If you have any further questions or problems, please contact our office.    Sincerely,        Rakesh Clark M.D.

## 2019-08-05 NOTE — PROGRESS NOTES
[place] [address]  [facility] [address]  [phone]  Dept: [phone]    PRE-OP EVALUATION:  Today's date: 2019    Kyler Rodriguez (: 1945) presents for pre-operative evaluation assessment as requested by Dr. Yoon.  He requires evaluation and anesthesia risk assessment prior to undergoing surgery/procedure for treatment of Rt partial nephrerctomy .    Proposed Surgery/ Procedure: Rt partial nephrectomy  Date of Surgery/ Procedure: 19  Time of Surgery/ Procedure: [facility]  Hospital/Surgical Facility: [facility]    Primary Physician: Rakesh Clark  Type of Anesthesia Anticipated: General    Patient has a Health Care Directive or Living Will:  YES     1. NO - Do you have a history of heart attack, stroke, stent, bypass or surgery on an artery in the head, neck, heart or legs?  2. NO - Do you ever have any pain or discomfort in your chest?  3. NO - Do you have a history of  Heart Failure?  4. NO - Are you troubled by shortness of breath when: walking on the level, up a slight hill or at night?  5. NO - Do you currently have a cold, bronchitis or other respiratory infection?  6. NO - Do you have a cough, shortness of breath or wheezing?  7. NO - Do you sometimes get pains in the calves of your legs when you walk?  8. NO - Do you or anyone in your family have previous history of blood clots?  9. NO - Do you or does anyone in your family have a serious bleeding problem such as prolonged bleeding following surgeries or cuts?  10. NO - Have you ever had problems with anemia or been told to take iron pills?  11. NO - Have you had any abnormal blood loss such as black, tarry or bloody stools, or abnormal vaginal bleeding?  12. NO - Have you ever had a blood transfusion?  13. NO - Have you or any of your relatives ever had problems with anesthesia?  14. NO - Do you have sleep apnea, excessive snoring or daytime drowsiness?  15. NO - Do you have any prosthetic heart  valves?  16. NO - Do you have prosthetic joints?  17. NO - Is there any chance that you may be pregnant?      HPI:     HPI related to upcoming procedure: scheduled for Robotic right partial nephrectomy for treatment of enlarging kidney mass.   No acute complaints, no medication change or new medical conditions.  Has H/O DM. On diet , exercise Metformin. Blood sugars are controlled. No parestesias. No hypoglycemias.  Has h/o HTN. on medical treatment. BP has been controlled. No side effects from medications. No CP, HA, dizziness. good compliance with medications and low salt diet.  Has H/O hyperlipidemia. On medical treatment and diet. No side effects. No muscle weakness, myalgias or upset stomach.   Has Parkinson's disease, on treatment with Sinemet. Follows with neurology.       See problem list for active medical problems.  Problems all longstanding and stable, except as noted/documented.  See ROS for pertinent symptoms related to these conditions.      MEDICAL HISTORY:     Patient Active Problem List    Diagnosis Date Noted     Parkinsonism, unspecified Parkinsonism type (H) 02/18/2019     Priority: Medium     RBBB 10/13/2014     Priority: Medium     Advance Care Planning 10/08/2013     Priority: Medium     Advance Care Planning 9/21/2016: Receipt of ACP document:  Received: Health Care Directive which was witnessed or notarized on 8-15-16.  Document not previously scanned.  Validation form completed and sent with document to be scanned.  Code Status needs to be updated to reflect choices in most recent ACP document.  Confirmed/documented designated decision maker(s).  Added by Laurie Dow RN Advance Care Planning Liaison with [employer]  Discussed Advance Directive planning with patient; information given to patient to review.       Prostate cancer (H) 10/27/2011     Priority: Medium     8/2011 positive bx       Type 2 diabetes mellitus with diabetic nephropathy (H) 10/31/2010     Priority: Medium      HYPERLIPIDEMIA LDL GOAL <100 10/31/2010     Priority: Medium     Hypertrophy of prostate with urinary obstruction 08/08/2005     Priority: Medium     Problem list name updated by automated process. Provider to review       Other and unspecified disc disorder of cervical region 08/18/2003     Priority: Medium     Essential hypertension, benign 09/23/2002     Priority: Medium      Past Medical History:   Diagnosis Date     Elevated prostate specific antigen (PSA)      Essential hypertension, benign      Malignant neoplasm (H) 10/2011    prostate cancer     Mumps      Other and unspecified hyperlipidemia      Prostate infection      Spider veins      Type II or unspecified type diabetes mellitus without mention of complication, not stated as uncontrolled      Past Surgical History:   Procedure Laterality Date     C NONSPECIFIC PROCEDURE  1998    varicose vein stripping     C NONSPECIFIC PROCEDURE  1998    colonoscopy     COLONOSCOPY  2009 & 2014    [facility]     COLONOSCOPY  04/08/2019    Dr. Pandey [facility]     CYSTOSCOPY       EYE SURGERY      cataract left eye     HC REMOVAL OF TONSILS,12+ Y/O       HC REMOVAL OF TONSILS,<13 Y/O       HC VASECTOMY UNILAT/BILAT W POSTOP SEMEN       PHACOEMULSIFICATION CLEAR CORNEA W/ STANDARD IOL IMPLANT, ENDOSCOPIC CYCLOPHOTOCOAGULATION, COMBINED  7/31/2014    Procedure: COMBINED PHACOEMULSIFICATION CLEAR CORNEA WITH STANDARD INTRAOCULAR LENS IMPLANT, ENDOSCOPIC CYCLOPHOTOCOAGULATION;  Surgeon: Leroy Crews MD;  Location:  [facility]     VASECTOMY       Current Outpatient Medications   Medication Sig Dispense Refill     ACCU-CHEK COMPLETE KIT test daily 1 0     amLODIPine-benazepril (LOTREL) 10-20 MG capsule TAKE 1 CAPSULE EVERY DAY 90 capsule 0     ASPIRIN 81 MG OR TABS 1 tablet daily  0     atenolol (TENORMIN) 25 MG tablet TAKE 1 TABLET EVERY DAY 90 tablet 0     atorvastatin (LIPITOR) 80 MG tablet TAKE 1/2 TABLET EVERY DAY 45 tablet 0     baclofen (LIORESAL) 10 MG tablet TAKE 1 TABLET  EVERY DAY 90 tablet 1     blood glucose (ACCU-CHEK COMPACT DRUM) test strip by In Vitro route daily. Test one time daily or as directed. 100 strip 1     Calcium Carbonate-Vit D-Min (CALTRATE 600+D PLUS PO) Take  by mouth.       carbidopa-levodopa (SINEMET)  MG per tablet Take 1 tablet by mouth 3 times daily       finasteride (PROSCAR) 5 MG tablet TAKE 1 TABLET EVERY DAY 90 tablet 3     metFORMIN (GLUCOPHAGE) 1000 MG tablet TAKE 1 TABLET TWICE DAILY WITH BREAKFAST  AND WITH DINNER 180 tablet 0     MULTIPLE VITAMINS OR TABS        OTC products: Aspirin    Allergies   Allergen Reactions     No Known Allergies       Latex Allergy: NO    Social History     Tobacco Use     Smoking status: Never Smoker     Smokeless tobacco: Never Used   Substance Use Topics     Alcohol use: Yes     Alcohol/week: 0.0 oz     Comment: 3-4 drinks/daily     History   Drug Use No       REVIEW OF SYSTEMS:   CONSTITUTIONAL: NEGATIVE for fever, chills, change in weight  INTEGUMENTARY/SKIN: NEGATIVE for worrisome rashes, moles or lesions  EYES: NEGATIVE for vision changes or irritation  ENT/MOUTH: NEGATIVE for ear, mouth and throat problems  RESP: NEGATIVE for significant cough or SOB  BREAST: NEGATIVE for masses, tenderness or discharge  CV: NEGATIVE for chest pain, palpitations or peripheral edema  GI: NEGATIVE for nausea, abdominal pain, heartburn, or change in bowel habits  : NEGATIVE for frequency, dysuria, or hematuria  MUSCULOSKELETAL: NEGATIVE for significant arthralgias or myalgia  NEURO: NEGATIVE for weakness, dizziness or paresthesias  ENDOCRINE: NEGATIVE for temperature intolerance, skin/hair changes  HEME: NEGATIVE for bleeding problems  PSYCHIATRIC: NEGATIVE for changes in mood or affect    EXAM:   /62   Pulse 61   Temp 98.6  F (37  C) (Oral)   Ht 1.829 m (6')   Wt 89.4 kg (197 lb 3.2 oz)   SpO2 98%   BMI 26.75 kg/m      GENERAL APPEARANCE: healthy, alert and no distress     EYES: EOMI,  PERRL     HENT: ear canals  and TM's normal and nose and mouth without ulcers or lesions     NECK: no adenopathy, no asymmetry, masses, or scars and thyroid normal to palpation     RESP: lungs clear to auscultation - no rales, rhonchi or wheezes     CV: regular rates and rhythm, normal S1 S2, no S3 or S4 and no murmur, click or rub     ABDOMEN:  soft, nontender, no HSM or masses and bowel sounds normal     MS: extremities normal- no gross deformities noted, no evidence of inflammation in joints, FROM in all extremities.     SKIN: no suspicious lesions or rashes     NEURO: Normal strength and tone, sensory exam grossly normal, mentation intact and speech normal     PSYCH: mentation appears normal. and affect normal/bright     LYMPHATICS: No cervical adenopathy    DIAGNOSTICS:     EKG: appears normal, NSR, Right Bundle Branch Block, unchanged from previous tracings  Labs Drawn and in Process:   Unresulted Labs Ordered in the Past 30 Days of this Admission     No orders found from 7/6/2019 to 8/6/2019.          Recent Labs   Lab Test 02/18/19  0850 08/13/18  0916 02/13/18  0857  08/10/11  0839   HGB 14.9  --  16.3   < >  --    *  --  175   < > 143*   INR  --   --   --   --  1.05    138 137   < >  --    POTASSIUM 4.2 4.5 4.3   < >  --    CR 0.70 0.68 0.75   < >  --    A1C 5.0 4.8 5.5   < >  --     < > = values in this interval not displayed.        IMPRESSION:   Reason for surgery/procedure: right kidney mass, robotic partial nephrectomy   Diagnosis/reason for consult: preoperative evaluation/ clearance      The proposed surgical procedure is considered INTERMEDIATE risk.    REVISED CARDIAC RISK INDEX  The patient has the following serious cardiovascular risks for perioperative complications such as (MI, PE, VFib and 3  AV Block):  No serious cardiac risks  INTERPRETATION: 0 risks: Class I (very low risk - 0.4% complication rate)    The patient has the following additional risks for perioperative complications:  No identified  additional risks      ICD-10-CM    1. Preop general physical exam Z01.818 EKG 12-lead complete w/read - Clinics   2. Hyperlipidemia LDL goal <100 E78.5    3. BENIGN HYPERTENSION I10    4. Back muscle spasm M62.830    5. Type 2 diabetes mellitus with diabetic nephropathy, without long-term current use of insulin (H) E11.21    6. Prostatic hemorrhage N42.1        RECOMMENDATIONS:         --Patient is to take all scheduled medications on the day of surgery EXCEPT for modifications listed below.    Diabetes Medication Use    -----Hold usual oral and non-insulin diabetic meds (e.g. Metformin, Actos, Glipizide) while NPO.       ACE Inhibitor or Angiotensin Receptor Blocker (ARB) Use  Ace inhibitor or Angiotensin Receptor Blocker (ARB) and should HOLD this medication for the 24 hours prior to surgery.      APPROVAL GIVEN to proceed with proposed procedure, without further diagnostic evaluation       Signed Electronically by: Rakesh Clark MD    Copy of this evaluation report is provided to requesting physician.    [facility] Preop Guidelines    Revised Cardiac Risk Index

## 2019-08-05 NOTE — PATIENT INSTRUCTIONS
Before Your Surgery      Call your surgeon if there is any change in your health. This includes signs of a cold or flu (such as a sore throat, runny nose, cough, rash or fever).    Do not smoke, drink alcohol or take over the counter medicine (unless your surgeon or primary care doctor tells you to) for the 24 hours before and after surgery.    If you take prescribed drugs: Follow your doctor s orders about which medicines to take and which to stop until after surgery.    Eating and drinking prior to surgery: follow the instructions from your surgeon    Take a shower or bath the night before surgery. Use the soap your surgeon gave you to gently clean your skin. If you do not have soap from your surgeon, use your regular soap. Do not shave or scrub the surgery site.  Wear clean pajamas and have clean sheets on your bed.     Stop taking Aspirin 1 week prior to surgery.   Don't take Metformin and Lotrel on the morning of surgery.

## 2019-08-06 LAB
ALBUMIN SERPL-MCNC: 4 G/DL (ref 3.4–5)
ALP SERPL-CCNC: 89 U/L (ref 40–150)
ALT SERPL W P-5'-P-CCNC: 24 U/L (ref 0–70)
ANION GAP SERPL CALCULATED.3IONS-SCNC: 7 MMOL/L (ref 3–14)
AST SERPL W P-5'-P-CCNC: 59 U/L (ref 0–45)
BILIRUB SERPL-MCNC: 1.1 MG/DL (ref 0.2–1.3)
BUN SERPL-MCNC: 8 MG/DL (ref 7–30)
CALCIUM SERPL-MCNC: 9.1 MG/DL (ref 8.5–10.1)
CHLORIDE SERPL-SCNC: 100 MMOL/L (ref 94–109)
CO2 SERPL-SCNC: 29 MMOL/L (ref 20–32)
CREAT SERPL-MCNC: 0.74 MG/DL (ref 0.66–1.25)
GFR SERPL CREATININE-BSD FRML MDRD: >90 ML/MIN/{1.73_M2}
GLUCOSE SERPL-MCNC: 138 MG/DL (ref 70–99)
POTASSIUM SERPL-SCNC: 4.3 MMOL/L (ref 3.4–5.3)
PROT SERPL-MCNC: 7.3 G/DL (ref 6.8–8.8)
SODIUM SERPL-SCNC: 136 MMOL/L (ref 133–144)

## 2019-09-04 ENCOUNTER — ANESTHESIA (OUTPATIENT)
Dept: SURGERY | Facility: CLINIC | Age: 74
End: 2019-09-04
Payer: MEDICARE

## 2019-09-04 ENCOUNTER — ANESTHESIA EVENT (OUTPATIENT)
Dept: SURGERY | Facility: CLINIC | Age: 74
End: 2019-09-04
Payer: MEDICARE

## 2019-09-04 ENCOUNTER — HOSPITAL ENCOUNTER (OUTPATIENT)
Facility: CLINIC | Age: 74
Discharge: HOME | End: 2019-09-05
Attending: UROLOGY | Admitting: UROLOGY
Payer: MEDICARE

## 2019-09-04 DIAGNOSIS — N28.89 RIGHT RENAL MASS: ICD-10-CM

## 2019-09-04 DIAGNOSIS — C64.1 RENAL MALIGNANT NEOPLASM, RIGHT (H): Primary | ICD-10-CM

## 2019-09-04 LAB
ABO + RH BLD: NORMAL
ABO + RH BLD: NORMAL
ANION GAP SERPL CALCULATED.3IONS-SCNC: 5 MMOL/L (ref 3–14)
BLD GP AB SCN SERPL QL: NORMAL
BLOOD BANK CMNT PATIENT-IMP: NORMAL
BUN SERPL-MCNC: 15 MG/DL (ref 7–30)
CALCIUM SERPL-MCNC: 7.8 MG/DL (ref 8.5–10.1)
CHLORIDE SERPL-SCNC: 106 MMOL/L (ref 94–109)
CO2 SERPL-SCNC: 26 MMOL/L (ref 20–32)
CREAT SERPL-MCNC: 0.87 MG/DL (ref 0.66–1.25)
ERYTHROCYTE [DISTWIDTH] IN BLOOD BY AUTOMATED COUNT: 12 % (ref 10–15)
GFR SERPL CREATININE-BSD FRML MDRD: 86 ML/MIN/{1.73_M2}
GLUCOSE BLDC GLUCOMTR-MCNC: 123 MG/DL (ref 70–99)
GLUCOSE BLDC GLUCOMTR-MCNC: 136 MG/DL (ref 70–99)
GLUCOSE BLDC GLUCOMTR-MCNC: 139 MG/DL (ref 70–99)
GLUCOSE BLDC GLUCOMTR-MCNC: 142 MG/DL (ref 70–99)
GLUCOSE SERPL-MCNC: 159 MG/DL (ref 70–99)
HCT VFR BLD AUTO: 42.7 % (ref 40–53)
HGB BLD-MCNC: 15.2 G/DL (ref 13.3–17.7)
MCH RBC QN AUTO: 33 PG (ref 26.5–33)
MCHC RBC AUTO-ENTMCNC: 35.6 G/DL (ref 31.5–36.5)
MCV RBC AUTO: 93 FL (ref 78–100)
PLATELET # BLD AUTO: 178 10E9/L (ref 150–450)
POTASSIUM SERPL-SCNC: 5 MMOL/L (ref 3.4–5.3)
RBC # BLD AUTO: 4.61 10E12/L (ref 4.4–5.9)
SODIUM SERPL-SCNC: 137 MMOL/L (ref 133–144)
SPECIMEN EXP DATE BLD: NORMAL
WBC # BLD AUTO: 9.3 10E9/L (ref 4–11)

## 2019-09-04 PROCEDURE — 88305 TISSUE EXAM BY PATHOLOGIST: CPT | Performed by: UROLOGY

## 2019-09-04 PROCEDURE — 36415 COLL VENOUS BLD VENIPUNCTURE: CPT | Performed by: UROLOGY

## 2019-09-04 PROCEDURE — 86900 BLOOD TYPING SEROLOGIC ABO: CPT | Performed by: UROLOGY

## 2019-09-04 PROCEDURE — 88307 TISSUE EXAM BY PATHOLOGIST: CPT | Performed by: UROLOGY

## 2019-09-04 PROCEDURE — 99207 ZZC CDG-CODE CATEGORY CHANGED: CPT | Performed by: PHYSICIAN ASSISTANT

## 2019-09-04 PROCEDURE — 25800030 ZZH RX IP 258 OP 636: Performed by: UROLOGY

## 2019-09-04 PROCEDURE — 25000131 ZZH RX MED GY IP 250 OP 636 PS 637: Mod: GY | Performed by: ANESTHESIOLOGY

## 2019-09-04 PROCEDURE — 88305 TISSUE EXAM BY PATHOLOGIST: CPT | Mod: 26 | Performed by: UROLOGY

## 2019-09-04 PROCEDURE — 25000132 ZZH RX MED GY IP 250 OP 250 PS 637: Mod: GY | Performed by: UROLOGY

## 2019-09-04 PROCEDURE — 36000087 ZZH SURGERY LEVEL 8 EA 15 ADDTL MIN: Performed by: UROLOGY

## 2019-09-04 PROCEDURE — 88307 TISSUE EXAM BY PATHOLOGIST: CPT | Mod: 26 | Performed by: UROLOGY

## 2019-09-04 PROCEDURE — 50543 LAPARO PARTIAL NEPHRECTOMY: CPT | Mod: RT | Performed by: UROLOGY

## 2019-09-04 PROCEDURE — 25800025 ZZH RX 258: Performed by: UROLOGY

## 2019-09-04 PROCEDURE — 88331 PATH CONSLTJ SURG 1 BLK 1SPC: CPT | Mod: 26 | Performed by: UROLOGY

## 2019-09-04 PROCEDURE — 37000008 ZZH ANESTHESIA TECHNICAL FEE, 1ST 30 MIN: Performed by: UROLOGY

## 2019-09-04 PROCEDURE — 86901 BLOOD TYPING SEROLOGIC RH(D): CPT | Performed by: UROLOGY

## 2019-09-04 PROCEDURE — 88331 PATH CONSLTJ SURG 1 BLK 1SPC: CPT | Performed by: UROLOGY

## 2019-09-04 PROCEDURE — 82962 GLUCOSE BLOOD TEST: CPT | Mod: 91

## 2019-09-04 PROCEDURE — 99204 OFFICE O/P NEW MOD 45 MIN: CPT | Performed by: PHYSICIAN ASSISTANT

## 2019-09-04 PROCEDURE — 25000125 ZZHC RX 250: Performed by: NURSE ANESTHETIST, CERTIFIED REGISTERED

## 2019-09-04 PROCEDURE — 36000085 ZZH SURGERY LEVEL 8 1ST 30 MIN: Performed by: UROLOGY

## 2019-09-04 PROCEDURE — 25000132 ZZH RX MED GY IP 250 OP 250 PS 637: Mod: GY | Performed by: PHYSICIAN ASSISTANT

## 2019-09-04 PROCEDURE — 37000009 ZZH ANESTHESIA TECHNICAL FEE, EACH ADDTL 15 MIN: Performed by: UROLOGY

## 2019-09-04 PROCEDURE — 80048 BASIC METABOLIC PNL TOTAL CA: CPT | Performed by: UROLOGY

## 2019-09-04 PROCEDURE — 25000128 H RX IP 250 OP 636: Performed by: NURSE ANESTHETIST, CERTIFIED REGISTERED

## 2019-09-04 PROCEDURE — 86850 RBC ANTIBODY SCREEN: CPT | Performed by: UROLOGY

## 2019-09-04 PROCEDURE — 27210794 ZZH OR GENERAL SUPPLY STERILE: Performed by: UROLOGY

## 2019-09-04 PROCEDURE — 85027 COMPLETE CBC AUTOMATED: CPT | Performed by: UROLOGY

## 2019-09-04 PROCEDURE — 76770 US EXAM ABDO BACK WALL COMP: CPT | Mod: 26 | Performed by: UROLOGY

## 2019-09-04 PROCEDURE — 25000125 ZZHC RX 250: Performed by: UROLOGY

## 2019-09-04 PROCEDURE — 25000566 ZZH SEVOFLURANE, EA 15 MIN: Performed by: UROLOGY

## 2019-09-04 PROCEDURE — 25800030 ZZH RX IP 258 OP 636: Performed by: NURSE ANESTHETIST, CERTIFIED REGISTERED

## 2019-09-04 PROCEDURE — 71000013 ZZH RECOVERY PHASE 1 LEVEL 1 EA ADDTL HR: Performed by: UROLOGY

## 2019-09-04 PROCEDURE — 25000128 H RX IP 250 OP 636: Performed by: UROLOGY

## 2019-09-04 PROCEDURE — 40000170 ZZH STATISTIC PRE-PROCEDURE ASSESSMENT II: Performed by: UROLOGY

## 2019-09-04 PROCEDURE — 71000012 ZZH RECOVERY PHASE 1 LEVEL 1 FIRST HR: Performed by: UROLOGY

## 2019-09-04 PROCEDURE — S2900 ROBOTIC SURGICAL SYSTEM: HCPCS | Performed by: UROLOGY

## 2019-09-04 RX ORDER — NALOXONE HYDROCHLORIDE 0.4 MG/ML
.1-.4 INJECTION, SOLUTION INTRAMUSCULAR; INTRAVENOUS; SUBCUTANEOUS
Status: DISCONTINUED | OUTPATIENT
Start: 2019-09-04 | End: 2019-09-04

## 2019-09-04 RX ORDER — BUPIVACAINE HYDROCHLORIDE 2.5 MG/ML
INJECTION, SOLUTION EPIDURAL; INFILTRATION; INTRACAUDAL PRN
Status: DISCONTINUED | OUTPATIENT
Start: 2019-09-04 | End: 2019-09-04 | Stop reason: HOSPADM

## 2019-09-04 RX ORDER — OXYCODONE HYDROCHLORIDE 5 MG/1
5-10 TABLET ORAL
Status: DISCONTINUED | OUTPATIENT
Start: 2019-09-04 | End: 2019-09-05 | Stop reason: HOSPADM

## 2019-09-04 RX ORDER — CEFAZOLIN SODIUM 2 G/100ML
2 INJECTION, SOLUTION INTRAVENOUS
Status: COMPLETED | OUTPATIENT
Start: 2019-09-04 | End: 2019-09-04

## 2019-09-04 RX ORDER — CEFAZOLIN SODIUM 1 G/3ML
1 INJECTION, POWDER, FOR SOLUTION INTRAMUSCULAR; INTRAVENOUS SEE ADMIN INSTRUCTIONS
Status: DISCONTINUED | OUTPATIENT
Start: 2019-09-04 | End: 2019-09-04

## 2019-09-04 RX ORDER — HYDROMORPHONE HYDROCHLORIDE 1 MG/ML
0.2 INJECTION, SOLUTION INTRAMUSCULAR; INTRAVENOUS; SUBCUTANEOUS
Status: DISCONTINUED | OUTPATIENT
Start: 2019-09-04 | End: 2019-09-05 | Stop reason: HOSPADM

## 2019-09-04 RX ORDER — ONDANSETRON 2 MG/ML
4 INJECTION INTRAMUSCULAR; INTRAVENOUS EVERY 6 HOURS PRN
Status: DISCONTINUED | OUTPATIENT
Start: 2019-09-04 | End: 2019-09-05 | Stop reason: HOSPADM

## 2019-09-04 RX ORDER — FENTANYL CITRATE 50 UG/ML
25-50 INJECTION, SOLUTION INTRAMUSCULAR; INTRAVENOUS
Status: DISCONTINUED | OUTPATIENT
Start: 2019-09-04 | End: 2019-09-04 | Stop reason: HOSPADM

## 2019-09-04 RX ORDER — BACLOFEN 10 MG/1
10 TABLET ORAL EVERY EVENING
Status: DISCONTINUED | OUTPATIENT
Start: 2019-09-04 | End: 2019-09-05 | Stop reason: HOSPADM

## 2019-09-04 RX ORDER — APREPITANT 40 MG/1
40 CAPSULE ORAL ONCE
Status: COMPLETED | OUTPATIENT
Start: 2019-09-04 | End: 2019-09-04

## 2019-09-04 RX ORDER — SODIUM CHLORIDE 9 MG/ML
INJECTION, SOLUTION INTRAVENOUS CONTINUOUS PRN
Status: DISCONTINUED | OUTPATIENT
Start: 2019-09-04 | End: 2019-09-04

## 2019-09-04 RX ORDER — SODIUM CHLORIDE 9 MG/ML
INJECTION, SOLUTION INTRAVENOUS CONTINUOUS
Status: DISCONTINUED | OUTPATIENT
Start: 2019-09-04 | End: 2019-09-05 | Stop reason: CLARIF

## 2019-09-04 RX ORDER — MULTIVITAMIN,THERAPEUTIC
1 TABLET ORAL EVERY MORNING
COMMUNITY

## 2019-09-04 RX ORDER — ONDANSETRON 4 MG/1
4 TABLET, ORALLY DISINTEGRATING ORAL EVERY 6 HOURS PRN
Status: DISCONTINUED | OUTPATIENT
Start: 2019-09-04 | End: 2019-09-05 | Stop reason: HOSPADM

## 2019-09-04 RX ORDER — AMLODIPINE BESYLATE 10 MG/1
10 TABLET ORAL DAILY
Status: DISCONTINUED | OUTPATIENT
Start: 2019-09-04 | End: 2019-09-05 | Stop reason: HOSPADM

## 2019-09-04 RX ORDER — PROPOFOL 10 MG/ML
INJECTION, EMULSION INTRAVENOUS CONTINUOUS PRN
Status: DISCONTINUED | OUTPATIENT
Start: 2019-09-04 | End: 2019-09-04

## 2019-09-04 RX ORDER — MAGNESIUM HYDROXIDE 1200 MG/15ML
LIQUID ORAL PRN
Status: DISCONTINUED | OUTPATIENT
Start: 2019-09-04 | End: 2019-09-04 | Stop reason: HOSPADM

## 2019-09-04 RX ORDER — PROPOFOL 10 MG/ML
INJECTION, EMULSION INTRAVENOUS PRN
Status: DISCONTINUED | OUTPATIENT
Start: 2019-09-04 | End: 2019-09-04

## 2019-09-04 RX ORDER — POTASSIUM CL/LIDO/0.9 % NACL 10MEQ/0.1L
10 INTRAVENOUS SOLUTION, PIGGYBACK (ML) INTRAVENOUS
Status: DISCONTINUED | OUTPATIENT
Start: 2019-09-04 | End: 2019-09-05 | Stop reason: HOSPADM

## 2019-09-04 RX ORDER — POTASSIUM CHLORIDE 1.5 G/1.58G
20-40 POWDER, FOR SOLUTION ORAL
Status: DISCONTINUED | OUTPATIENT
Start: 2019-09-04 | End: 2019-09-05 | Stop reason: HOSPADM

## 2019-09-04 RX ORDER — ASPIRIN 81 MG
100 TABLET, DELAYED RELEASE (ENTERIC COATED) ORAL 2 TIMES DAILY
Qty: 60 TABLET | Refills: 0 | Status: SHIPPED | OUTPATIENT
Start: 2019-09-04 | End: 2020-09-23

## 2019-09-04 RX ORDER — VECURONIUM BROMIDE 1 MG/ML
INJECTION, POWDER, LYOPHILIZED, FOR SOLUTION INTRAVENOUS PRN
Status: DISCONTINUED | OUTPATIENT
Start: 2019-09-04 | End: 2019-09-04

## 2019-09-04 RX ORDER — ATORVASTATIN CALCIUM 40 MG/1
40 TABLET, FILM COATED ORAL EVERY EVENING
Status: DISCONTINUED | OUTPATIENT
Start: 2019-09-04 | End: 2019-09-05 | Stop reason: HOSPADM

## 2019-09-04 RX ORDER — DEXTROSE MONOHYDRATE 25 G/50ML
25-50 INJECTION, SOLUTION INTRAVENOUS
Status: DISCONTINUED | OUTPATIENT
Start: 2019-09-04 | End: 2019-09-05 | Stop reason: HOSPADM

## 2019-09-04 RX ORDER — LISINOPRIL 20 MG/1
20 TABLET ORAL DAILY
Status: DISCONTINUED | OUTPATIENT
Start: 2019-09-04 | End: 2019-09-05 | Stop reason: HOSPADM

## 2019-09-04 RX ORDER — SODIUM CHLORIDE, SODIUM LACTATE, POTASSIUM CHLORIDE, CALCIUM CHLORIDE 600; 310; 30; 20 MG/100ML; MG/100ML; MG/100ML; MG/100ML
INJECTION, SOLUTION INTRAVENOUS CONTINUOUS PRN
Status: DISCONTINUED | OUTPATIENT
Start: 2019-09-04 | End: 2019-09-04

## 2019-09-04 RX ORDER — CARBIDOPA AND LEVODOPA 25; 100 MG/1; MG/1
1.5 TABLET ORAL 3 TIMES DAILY
Status: DISCONTINUED | OUTPATIENT
Start: 2019-09-04 | End: 2019-09-04

## 2019-09-04 RX ORDER — NICOTINE POLACRILEX 4 MG
15-30 LOZENGE BUCCAL
Status: DISCONTINUED | OUTPATIENT
Start: 2019-09-04 | End: 2019-09-05 | Stop reason: HOSPADM

## 2019-09-04 RX ORDER — POTASSIUM CHLORIDE 7.45 MG/ML
10 INJECTION INTRAVENOUS
Status: DISCONTINUED | OUTPATIENT
Start: 2019-09-04 | End: 2019-09-05 | Stop reason: HOSPADM

## 2019-09-04 RX ORDER — ONDANSETRON 2 MG/ML
4 INJECTION INTRAMUSCULAR; INTRAVENOUS EVERY 30 MIN PRN
Status: DISCONTINUED | OUTPATIENT
Start: 2019-09-04 | End: 2019-09-04 | Stop reason: HOSPADM

## 2019-09-04 RX ORDER — LIDOCAINE HYDROCHLORIDE 20 MG/ML
INJECTION, SOLUTION INFILTRATION; PERINEURAL PRN
Status: DISCONTINUED | OUTPATIENT
Start: 2019-09-04 | End: 2019-09-04

## 2019-09-04 RX ORDER — FENTANYL CITRATE 50 UG/ML
50-100 INJECTION, SOLUTION INTRAMUSCULAR; INTRAVENOUS
Status: DISCONTINUED | OUTPATIENT
Start: 2019-09-04 | End: 2019-09-04

## 2019-09-04 RX ORDER — ONDANSETRON 2 MG/ML
INJECTION INTRAMUSCULAR; INTRAVENOUS PRN
Status: DISCONTINUED | OUTPATIENT
Start: 2019-09-04 | End: 2019-09-04

## 2019-09-04 RX ORDER — HYDROMORPHONE HYDROCHLORIDE 1 MG/ML
.3-.5 INJECTION, SOLUTION INTRAMUSCULAR; INTRAVENOUS; SUBCUTANEOUS EVERY 5 MIN PRN
Status: DISCONTINUED | OUTPATIENT
Start: 2019-09-04 | End: 2019-09-04 | Stop reason: HOSPADM

## 2019-09-04 RX ORDER — LIDOCAINE 40 MG/G
CREAM TOPICAL
Status: DISCONTINUED | OUTPATIENT
Start: 2019-09-04 | End: 2019-09-05 | Stop reason: HOSPADM

## 2019-09-04 RX ORDER — SODIUM CHLORIDE, SODIUM LACTATE, POTASSIUM CHLORIDE, CALCIUM CHLORIDE 600; 310; 30; 20 MG/100ML; MG/100ML; MG/100ML; MG/100ML
INJECTION, SOLUTION INTRAVENOUS CONTINUOUS
Status: DISCONTINUED | OUTPATIENT
Start: 2019-09-04 | End: 2019-09-04 | Stop reason: HOSPADM

## 2019-09-04 RX ORDER — FENTANYL CITRATE 50 UG/ML
INJECTION, SOLUTION INTRAMUSCULAR; INTRAVENOUS PRN
Status: DISCONTINUED | OUTPATIENT
Start: 2019-09-04 | End: 2019-09-04

## 2019-09-04 RX ORDER — EPHEDRINE SULFATE 50 MG/ML
INJECTION, SOLUTION INTRAMUSCULAR; INTRAVENOUS; SUBCUTANEOUS PRN
Status: DISCONTINUED | OUTPATIENT
Start: 2019-09-04 | End: 2019-09-04

## 2019-09-04 RX ORDER — DEXAMETHASONE SODIUM PHOSPHATE 4 MG/ML
INJECTION, SOLUTION INTRA-ARTICULAR; INTRALESIONAL; INTRAMUSCULAR; INTRAVENOUS; SOFT TISSUE PRN
Status: DISCONTINUED | OUTPATIENT
Start: 2019-09-04 | End: 2019-09-04

## 2019-09-04 RX ORDER — POTASSIUM CHLORIDE 1500 MG/1
20-40 TABLET, EXTENDED RELEASE ORAL
Status: DISCONTINUED | OUTPATIENT
Start: 2019-09-04 | End: 2019-09-05 | Stop reason: HOSPADM

## 2019-09-04 RX ORDER — ATENOLOL 25 MG/1
25 TABLET ORAL EVERY EVENING
Status: DISCONTINUED | OUTPATIENT
Start: 2019-09-04 | End: 2019-09-05 | Stop reason: HOSPADM

## 2019-09-04 RX ORDER — POTASSIUM CHLORIDE 29.8 MG/ML
20 INJECTION INTRAVENOUS
Status: DISCONTINUED | OUTPATIENT
Start: 2019-09-04 | End: 2019-09-05 | Stop reason: HOSPADM

## 2019-09-04 RX ORDER — ONDANSETRON 4 MG/1
4 TABLET, ORALLY DISINTEGRATING ORAL EVERY 30 MIN PRN
Status: DISCONTINUED | OUTPATIENT
Start: 2019-09-04 | End: 2019-09-04 | Stop reason: HOSPADM

## 2019-09-04 RX ORDER — NEOSTIGMINE METHYLSULFATE 1 MG/ML
VIAL (ML) INJECTION PRN
Status: DISCONTINUED | OUTPATIENT
Start: 2019-09-04 | End: 2019-09-04

## 2019-09-04 RX ORDER — GLYCOPYRROLATE 0.2 MG/ML
INJECTION, SOLUTION INTRAMUSCULAR; INTRAVENOUS PRN
Status: DISCONTINUED | OUTPATIENT
Start: 2019-09-04 | End: 2019-09-04

## 2019-09-04 RX ORDER — NALOXONE HYDROCHLORIDE 0.4 MG/ML
.1-.4 INJECTION, SOLUTION INTRAMUSCULAR; INTRAVENOUS; SUBCUTANEOUS
Status: DISCONTINUED | OUTPATIENT
Start: 2019-09-04 | End: 2019-09-05 | Stop reason: HOSPADM

## 2019-09-04 RX ORDER — OXYCODONE HYDROCHLORIDE 5 MG/1
5 TABLET ORAL EVERY 6 HOURS PRN
Qty: 9 TABLET | Refills: 0 | Status: SHIPPED | OUTPATIENT
Start: 2019-09-04 | End: 2020-09-23

## 2019-09-04 RX ORDER — ACETAMINOPHEN 325 MG/1
975 TABLET ORAL EVERY 6 HOURS
Status: DISCONTINUED | OUTPATIENT
Start: 2019-09-04 | End: 2019-09-05 | Stop reason: HOSPADM

## 2019-09-04 RX ORDER — ASPIRIN 81 MG/1
81 TABLET ORAL AT BEDTIME
Status: ON HOLD | COMMUNITY
End: 2022-09-30

## 2019-09-04 RX ORDER — AMLODIPINE AND BENAZEPRIL HYDROCHLORIDE 10; 20 MG/1; MG/1
1 CAPSULE ORAL DAILY
Status: DISCONTINUED | OUTPATIENT
Start: 2019-09-04 | End: 2019-09-04 | Stop reason: CLARIF

## 2019-09-04 RX ADMIN — AMLODIPINE BESYLATE 10 MG: 10 TABLET ORAL at 16:05

## 2019-09-04 RX ADMIN — CEFAZOLIN SODIUM 1 G: 2 INJECTION, SOLUTION INTRAVENOUS at 12:14

## 2019-09-04 RX ADMIN — VECURONIUM BROMIDE 1 MG: 1 INJECTION, POWDER, LYOPHILIZED, FOR SOLUTION INTRAVENOUS at 10:30

## 2019-09-04 RX ADMIN — LIDOCAINE HYDROCHLORIDE 100 MG: 20 INJECTION, SOLUTION INFILTRATION; PERINEURAL at 07:51

## 2019-09-04 RX ADMIN — DEXAMETHASONE SODIUM PHOSPHATE 4 MG: 4 INJECTION, SOLUTION INTRA-ARTICULAR; INTRALESIONAL; INTRAMUSCULAR; INTRAVENOUS; SOFT TISSUE at 08:00

## 2019-09-04 RX ADMIN — SODIUM CHLORIDE, POTASSIUM CHLORIDE, SODIUM LACTATE AND CALCIUM CHLORIDE: 600; 310; 30; 20 INJECTION, SOLUTION INTRAVENOUS at 07:48

## 2019-09-04 RX ADMIN — ACETAMINOPHEN 975 MG: 325 TABLET ORAL at 16:05

## 2019-09-04 RX ADMIN — CARBIDOPA AND LEVODOPA 1.5 TABLET: 25; 100 TABLET ORAL at 14:29

## 2019-09-04 RX ADMIN — SODIUM CHLORIDE, POTASSIUM CHLORIDE, SODIUM LACTATE AND CALCIUM CHLORIDE: 600; 310; 30; 20 INJECTION, SOLUTION INTRAVENOUS at 10:07

## 2019-09-04 RX ADMIN — PROPOFOL 50 MG: 10 INJECTION, EMULSION INTRAVENOUS at 08:40

## 2019-09-04 RX ADMIN — GLYCOPYRROLATE 0.4 MG: 0.2 INJECTION, SOLUTION INTRAMUSCULAR; INTRAVENOUS at 12:28

## 2019-09-04 RX ADMIN — ROCURONIUM BROMIDE 50 MG: 10 INJECTION INTRAVENOUS at 07:52

## 2019-09-04 RX ADMIN — PROPOFOL 50 MCG/KG/MIN: 10 INJECTION, EMULSION INTRAVENOUS at 08:41

## 2019-09-04 RX ADMIN — HYDROMORPHONE HYDROCHLORIDE 0.5 MG: 1 INJECTION, SOLUTION INTRAMUSCULAR; INTRAVENOUS; SUBCUTANEOUS at 08:24

## 2019-09-04 RX ADMIN — PROPOFOL 50 MG: 10 INJECTION, EMULSION INTRAVENOUS at 12:23

## 2019-09-04 RX ADMIN — GLYCOPYRROLATE 0.1 MG: 0.2 INJECTION, SOLUTION INTRAMUSCULAR; INTRAVENOUS at 11:15

## 2019-09-04 RX ADMIN — LISINOPRIL 20 MG: 20 TABLET ORAL at 16:05

## 2019-09-04 RX ADMIN — MIDAZOLAM 1 MG: 1 INJECTION INTRAMUSCULAR; INTRAVENOUS at 07:50

## 2019-09-04 RX ADMIN — VECURONIUM BROMIDE 2 MG: 1 INJECTION, POWDER, LYOPHILIZED, FOR SOLUTION INTRAVENOUS at 09:43

## 2019-09-04 RX ADMIN — NEOSTIGMINE METHYLSULFATE 2.5 MG: 1 INJECTION, SOLUTION INTRAVENOUS at 12:30

## 2019-09-04 RX ADMIN — SODIUM CHLORIDE: 9 INJECTION, SOLUTION INTRAVENOUS at 07:56

## 2019-09-04 RX ADMIN — ACETAMINOPHEN 975 MG: 325 TABLET ORAL at 21:46

## 2019-09-04 RX ADMIN — VECURONIUM BROMIDE 1 MG: 1 INJECTION, POWDER, LYOPHILIZED, FOR SOLUTION INTRAVENOUS at 11:12

## 2019-09-04 RX ADMIN — GLYCOPYRROLATE 0.1 MG: 0.2 INJECTION, SOLUTION INTRAMUSCULAR; INTRAVENOUS at 10:24

## 2019-09-04 RX ADMIN — GLYCOPYRROLATE 0.1 MG: 0.2 INJECTION, SOLUTION INTRAMUSCULAR; INTRAVENOUS at 11:56

## 2019-09-04 RX ADMIN — SODIUM CHLORIDE: 9 INJECTION, SOLUTION INTRAVENOUS at 15:06

## 2019-09-04 RX ADMIN — VECURONIUM BROMIDE 2 MG: 1 INJECTION, POWDER, LYOPHILIZED, FOR SOLUTION INTRAVENOUS at 08:24

## 2019-09-04 RX ADMIN — GLYCOPYRROLATE 0.3 MG: 0.2 INJECTION, SOLUTION INTRAMUSCULAR; INTRAVENOUS at 12:30

## 2019-09-04 RX ADMIN — HYDROMORPHONE HYDROCHLORIDE 0.5 MG: 1 INJECTION, SOLUTION INTRAMUSCULAR; INTRAVENOUS; SUBCUTANEOUS at 09:43

## 2019-09-04 RX ADMIN — PROPOFOL 150 MG: 10 INJECTION, EMULSION INTRAVENOUS at 07:51

## 2019-09-04 RX ADMIN — CEFAZOLIN SODIUM 2 G: 2 INJECTION, SOLUTION INTRAVENOUS at 08:15

## 2019-09-04 RX ADMIN — HYDROMORPHONE HYDROCHLORIDE 0.5 MG: 1 INJECTION, SOLUTION INTRAMUSCULAR; INTRAVENOUS; SUBCUTANEOUS at 08:36

## 2019-09-04 RX ADMIN — CEFAZOLIN SODIUM 1 G: 2 INJECTION, SOLUTION INTRAVENOUS at 10:14

## 2019-09-04 RX ADMIN — VECURONIUM BROMIDE 2 MG: 1 INJECTION, POWDER, LYOPHILIZED, FOR SOLUTION INTRAVENOUS at 09:09

## 2019-09-04 RX ADMIN — Medication 5 MG: at 10:56

## 2019-09-04 RX ADMIN — APREPITANT 40 MG: 40 CAPSULE ORAL at 07:22

## 2019-09-04 RX ADMIN — NEOSTIGMINE METHYLSULFATE 2 MG: 1 INJECTION, SOLUTION INTRAVENOUS at 12:28

## 2019-09-04 RX ADMIN — BACLOFEN 10 MG: 10 TABLET ORAL at 20:18

## 2019-09-04 RX ADMIN — VECURONIUM BROMIDE 2 MG: 1 INJECTION, POWDER, LYOPHILIZED, FOR SOLUTION INTRAVENOUS at 08:34

## 2019-09-04 RX ADMIN — ATENOLOL 25 MG: 25 TABLET ORAL at 20:15

## 2019-09-04 RX ADMIN — CARBIDOPA AND LEVODOPA 1.5 TABLET: 25; 100 TABLET ORAL at 20:13

## 2019-09-04 RX ADMIN — VECURONIUM BROMIDE 2 MG: 1 INJECTION, POWDER, LYOPHILIZED, FOR SOLUTION INTRAVENOUS at 11:30

## 2019-09-04 RX ADMIN — ATORVASTATIN CALCIUM 40 MG: 40 TABLET, FILM COATED ORAL at 20:15

## 2019-09-04 RX ADMIN — FENTANYL CITRATE 100 MCG: 50 INJECTION, SOLUTION INTRAMUSCULAR; INTRAVENOUS at 07:51

## 2019-09-04 RX ADMIN — ONDANSETRON 4 MG: 2 INJECTION INTRAMUSCULAR; INTRAVENOUS at 12:06

## 2019-09-04 ASSESSMENT — ENCOUNTER SYMPTOMS
DYSRHYTHMIAS: 0
SEIZURES: 0

## 2019-09-04 ASSESSMENT — COPD QUESTIONNAIRES: COPD: 0

## 2019-09-04 ASSESSMENT — ACTIVITIES OF DAILY LIVING (ADL)
RETIRED_EATING: 0-->INDEPENDENT
FALL_HISTORY_WITHIN_LAST_SIX_MONTHS: NO
RETIRED_COMMUNICATION: 0-->UNDERSTANDS/COMMUNICATES WITHOUT DIFFICULTY
BATHING: 0-->INDEPENDENT
TOILETING: 0-->INDEPENDENT
TRANSFERRING: 0-->INDEPENDENT
DRESS: 0-->INDEPENDENT
COGNITION: 0 - NO COGNITION ISSUES REPORTED
AMBULATION: 0-->INDEPENDENT
SWALLOWING: 0-->SWALLOWS FOODS/LIQUIDS WITHOUT DIFFICULTY

## 2019-09-04 ASSESSMENT — LIFESTYLE VARIABLES: TOBACCO_USE: 0

## 2019-09-04 ASSESSMENT — MIFFLIN-ST. JEOR: SCORE: 1665.7

## 2019-09-04 NOTE — ANESTHESIA POSTPROCEDURE EVALUATION
Patient: Kyler Rodriguez    Procedure(s):  ROBOTIC-ASSISTED RIGHT PARTIAL NEPHRECTOMY WITH ULTRASOUND    Diagnosis:RIGHT RENAL MASS  Diagnosis Additional Information: No value filed.    Anesthesia Type:  General, ETT    Note:  Anesthesia Post Evaluation    Patient location during evaluation: PACU  Patient participation: Able to fully participate in evaluation  Level of consciousness: awake and alert  Pain management: adequate  Airway patency: patent  Cardiovascular status: acceptable  Respiratory status: acceptable  Hydration status: acceptable  PONV: none     Anesthetic complications: None          Last vitals:  Vitals:    09/04/19 1600 09/04/19 1618 09/04/19 1741   BP: (!) 142/75  (!) 149/62   Pulse:      Resp: 13  12   Temp: 36.1  C (97  F)  35.9  C (96.6  F)   SpO2: 96% 95% 95%         Electronically Signed By: Yimi Roche MD  September 4, 2019  5:49 PM

## 2019-09-04 NOTE — CONSULTS
Consult Date:  09/04/2019      PRIMARY CARE PHYSICIAN:  Dr. Clark.      REQUESTING PHYSICIAN:  Dr. Yoon.        REASON FOR CONSULTATION:  Assistance with medical management following a right partial nephrectomy.      HISTORY OF PRESENT ILLNESS:  Kyler Rodriguez is a 74-year-old male with a past medical history significant for right-sided renal mass, type 2 diabetes, hypertension, hyperlipidemia, known right bundle branch block, Parkinson's disease, prostate cancer, status post radiation therapy, and cervical degenerative disk disease who underwent an elective right partial nephrectomy for which the Hospitalist Service has been consulted to assist with medical management.      The patient underwent a right partial nephrectomy due to an enlarging renal mass.  This procedure was performed under general endotracheal anesthesia with an estimated blood loss of 150 mL and no complications.      I evaluated the patient in his hospital room with his wife present in the room.  The patient states he has occasional sinus headache, has to get up frequently at night to urinate, and states that it takes a while for cuts and bruises to heal.  Otherwise, he denied lightheadedness, dizziness, chest pain, shortness of breath, abdominal pain or dysuria.      PAST MEDICAL HISTORY:   1.  Right renal mass.   2.  Type 2 diabetes.   3.  Hypertension.   4.  Hyperlipidemia.   5.  Known right bundle branch block.   6.  Parkinson's disease.   7.  Prostate cancer, status post radiation therapy.   8.  Cervical degenerative disk disease.      PAST SURGICAL HISTORY:   1.  Varicose vein stripping bilaterally in the lower extremities.   2.  Cataract removal and lens replacement bilaterally.   3.  Tonsillectomy.   4.  Vasectomy.      PRIOR TO ADMIT MEDICATIONS:  Prior to Admission Medications   Prescriptions Last Dose Informant Patient Reported? Taking?   ACCU-CHEK COMPLETE KIT   No No   Sig: test daily   Calcium Carbonate-Vit D-Min (CALTRATE 600+D  PLUS PO) 9/3/2019 at am Self Yes Yes   Sig: Take 1 tablet by mouth daily    Phenylephrine-Acetaminophen (TYLENOL SINUS CONGESTION/PAIN PO) 9/3/2019 at 1700 Self Yes Yes   Sig: Take 2 tablets by mouth 2 times daily as needed   amLODIPine-benazepril (LOTREL) 10-20 MG capsule 9/3/2019 at am Self No Yes   Sig: Take 1 capsule by mouth daily   aspirin 81 MG EC tablet more than a week Self Yes Yes   Sig: Take 81 mg by mouth At Bedtime   atenolol (TENORMIN) 25 MG tablet 9/3/2019 at pm Self No Yes   Sig: Take 1 tablet (25 mg) by mouth daily   atorvastatin (LIPITOR) 80 MG tablet 9/3/2019 at pm Self No Yes   Sig: Take 0.5 tablets (40 mg) by mouth daily   baclofen (LIORESAL) 10 MG tablet 9/3/2019 at pm Self No Yes   Sig: TAKE 1 TABLET EVERY DAY   blood glucose (ACCU-CHEK COMPACT DRUM) test strip   No No   Sig: by In Vitro route daily. Test one time daily or as directed.   carbidopa-levodopa (SINEMET)  MG per tablet 9/4/2019 at 0400 Self Yes Yes   Sig: Take 1.5 tablets by mouth 3 times daily    finasteride (PROSCAR) 5 MG tablet 9/3/2019 at pm Self No Yes   Sig: TAKE 1 TABLET EVERY DAY   metFORMIN (GLUCOPHAGE) 1000 MG tablet 9/3/2019 at pm Self No Yes   Sig: TAKE 1 TABLET TWICE DAILY WITH BREAKFAST  AND WITH DINNER   multivitamin, therapeutic (THERA-VIT) TABS tablet more than a week Self Yes Yes   Sig: Take 1 tablet by mouth every morning      Facility-Administered Medications: None        ALLERGIES:  No known drug allergies.      SOCIAL HISTORY:  The patient currently resides in a house in [place] with his wife.  He is a lifelong nonsmoker or tobacco user.  He does consume alcohol, maybe 2-3 beverages per day.  He denied a history of seizures.  He denies use of illicit drugs and does not utilize a cane or a walker.      FAMILY HISTORY:  Father had colorectal and prostate cancer.      REVIEW OF SYSTEMS:  A 10-point review of systems was performed.  All pertinent positives are listed in the History of Present Illness,  otherwise is negative.      PHYSICAL EXAMINATION:   VITAL SIGNS:  Temperature is 97 degrees Fahrenheit with a blood pressure of 142/75, heart rate of 69 beats per minute, respiratory rate of 13, O2 saturation 96% on 5 liters per OxyMask.  The patient is experiencing some abdominal discomfort.   GENERAL:  The patient is awake, alert, cooperative, in no apparent distress, alert and oriented x3.   HEENT:  Normocephalic, atraumatic.  Moist mucous membranes present.  No exudates noted in the posterior pharynx.  Uvula is midline.  Eyes:  Pupils are equal, round, reactive to light.  Extraocular movements are intact.  Sclerae are injected in the lateral aspects bilaterally.   NECK:  Supple, normal range of motion, no tracheal deviation.  No cervical lymphadenopathy present.   CARDIOVASCULAR:  Regular rate and rhythm, no rubs, murmurs or gallops appreciated.   PULMONARY:  Decreased breath sounds at the lung bases, otherwise clear to auscultation bilaterally.  The patient is currently on OxyMask with 5 liters running continuously.   GASTROINTESTINAL:  Hypoactive bowel sounds, soft, mildly distended and tender to palpation in the right quadrant.   GENITOURINARY:  Cantu catheter in place with clear yellow urine present in the bag.   NEUROLOGIC:  Cranial nerves II-XII appear grossly intact.  The patient demonstrates equal sensation, coordination and strength in the upper and lower extremities bilaterally.   EXTREMITIES:  No lower extremity edema noted bilaterally.  Calves are nontender to palpation.  PCDs are in place bilaterally and dorsal pedal pulses are palpable bilaterally.      ASSESSMENT AND PLAN:  Kyler Rodriguez is a 74-year-old male with a past medical history significant for right-sided renal mass, type 2 diabetes, hypertension, hyperlipidemia and known right bundle branch block, Parkinson's disease, prostate cancer, status post radiation therapy and cervical degenerative disk disease who underwent a right partial  nephrectomy for which the Hospitalist Service has been consulted to assist with medical management.   1.  Right-sided renal mass, status post right partial nephrectomy:  Postoperative day #0.  Urology Service will be managing at this point.  Will defer analgesic management, DVT prophylaxis and PT and OT assessment to their service.  Hemoglobin will be checked in the morning to assess for acute blood loss anemia with noted EBL of 150 mL.  Would strongly encourage utilization of incentive spirometer.   2.  Type 2 diabetes:  Patient's previous A1c was noted to be 5.  The patient is compliant with metformin 1000 mg 2 times daily.  Will hold this medication and start a medium insulin sliding scale with each meal and at bedtime.  Blood glucose will be checked 4 times daily.   3.  Hypertension:  The patient's blood pressure is currently at 142/75.  The patient will resume prior to admit atenolol 25 mg daily and Lotrel 10/20 mg capsule daily with hold parameters in place.   4.  Hyperlipidemia:  Patient's prior to admit atorvastatin 40 mg daily will be continued.   5.  Parkinson's disease:  The patient's prior to admit Sinemet 1-1/2 tablets 3 times daily will be continued. Will resume PTA Baclofen 10 mg at bedtime.    6.  Alcohol use/abuse:  Patient endorses consuming 2-3 alcoholic beverages per day.  We will place patient on a CIWA monitoring, but will hold off on Ativan or Valium medication until or unless the patient begins to show signs of withdrawal.   7.  Known right bundle branch block:  This is mentioned in the electronic medical record.  EKG was reviewed preoperatively and confirms right bundle branch block.  No interventions are necessary.   8.  Prostate cancer:  The patient is status post radiation therapy.  No further interventions are necessary.  The patient's prior to admit finasteride is currently being held as the patient currently has a Cantu catheter in place.  Would recommend resumption of this medication  once a Cantu has been removed.   9.  Cervical degenerative disk disease:  Will defer analgesic management to Urology Service.   10.  Deep venous thrombosis prophylaxis:  Patient currently has PCDs in place.  We will defer further prophylactic therapy to Urology Service and as to when patient can resume a baby aspirin daily.      CODE STATUS:  The patient elected to be full code.      DISPOSITION:  Ultimately up to Urology Service.      The patient was discussed with Dr. Wendy Malik, who agrees with the assessment and plan as stated above.  Dr. Malik will evaluate the patient independently.      At this time, I would like to thank Dr. Yoon for consulting the Hospitalist Service.  We will continue to follow.         WENDY MALIK DO       As dictated by NASIM PENNINGTON PA-C            D: 2019   T: 2019   MT: HERVE      Name:     [place] VE[place]   MRN:      [id number]        Account:       [id number]   :      1945           Consult Date:  2019      Document: [id number]       cc: Rakesh Clark MD

## 2019-09-04 NOTE — ANESTHESIA CARE TRANSFER NOTE
Patient: Kyler Rodriguez    Procedure(s):  ROBOTIC-ASSISTED RIGHT PARTIAL NEPHRECTOMY WITH ULTRASOUND    Diagnosis: RIGHT RENAL MASS  Diagnosis Additional Information: No value filed.    Anesthesia Type:   General, ETT     Note:  Airway :Face Mask  Patient transferred to:PACU  Comments: Patient awake, extubated, and transferred to PACU. VS stable and report given to RN. Handoff Report: Identifed the Patient, Identified the Reponsible Provider, Reviewed the pertinent medical history, Discussed the surgical course, Reviewed Intra-OP anesthesia mangement and issues during anesthesia, Set expectations for post-procedure period and Allowed opportunity for questions and acknowledgement of understanding      Vitals: (Last set prior to Anesthesia Care Transfer)    CRNA VITALS  9/4/2019 1224 - 9/4/2019 1302      9/4/2019             Pulse:  50    SpO2:  97 %    Resp Rate (set):  10                Electronically Signed By: FRANCE Alfredo CRNA  September 4, 2019  1:02 PM

## 2019-09-04 NOTE — OP NOTE
OPERATIVE REPORT  DATE OF SURGERY: 09/04/19  LOCATION OF SURGERY: [facility] OR  PREOPERATIVE DIAGNOSIS:  (N28.89) Right renal mass  (primary encounter diagnosis)  POSTOPERATIVE DIAGNOSIS: (N28.89) Right renal mass  (primary encounter diagnosis)   START TIME: 8:33 AM  END TIME: 12:47 PM  PROCEDURE PERFORMED:   1. ROBOTIC ASSISTED LAPAROSCOPIC RIGHT PARTIAL NEPHRECTOMY - Modifier 22  2. INTRA-OPERATIVE RENAL ULTRASOUND      STAFF SURGEON: Binu Yoon MD  ASSISTANT: Alessandra Sorensen  ANESTHESIA: General.   ESTIMATED BLOOD LOSS: 150 mL.   DRAINS AND TUBES: 16fr thornton catheter  COMPLICATIONS: None.   DISPOSITION: PACU.   SPECIMENS OBTAINED:   ID Type Source Tests Collected by Time Destination   A : RIGHT RENAL MASS BASE MARGIN Tissue Renal Pelvis, Right SURGICAL PATHOLOGY EXAM Binu Yoon MD 9/4/2019 12:00 PM    B : RIGHT RENAL NEOPLASM Tissue Kidney, Right SURGICAL PATHOLOGY EXAM Binu Yoon MD 9/4/2019 12:35 PM      SIGNIFICANT FINDINGS: Extensive and densely adherent perinephric fat. Lower pole renal cyst unroofed during mobilization. Upper pole mass excised with grossly negative margins.      HISTORY OF PRESENT ILLNESS: Kyler Rodriguez is a very pleasant 73 year old male who presents with a history of prostate cancer s/p radiation with hormonal therapy several years ago. He has followed with Dr. Espinoza for several years and PSA remains undetectable. He has had a right renal upper pole mass and lower pole cyst which have been watched on active surveillance for a few years. The upper pole mass increased in size to ~2cm and the lower pole hyperdense cyst increased to 2.3cm.     OPERATION PERFORMED:   Informed consent was obtained and the patient was brought to the operating room where general anesthesia was induced. The patient was given appropriate preoperative antibiotics and positioned supine. We then performed a timeout, verifying the correct patient's site and procedure to be performed.    The patient  was then placed in the lateral flank position with RIGHT side up in preparation for a RIGHT-sided partial nephrectomy. We prepped and draped in the usual sterile fashion, and then after being prepped and draped in the usual sterile fashion, we gained access to the abdomen for CO2 insufflation with a Veress needle of the lateral border of the rectus muscle in line with the 11th rib. After access was obtained, the 12-mm port was inserted and the camera was inserted. No adhesions were noted. An 8-mm port was placed one hand's breath cranial, 1-2 fingers below the costal margin. A finder needle was used to identify the optimal site and angle for the liver retractor and a 5mm port was placed medial and cranial to the robotic port for this. An 8-mm port was placed in the RIGHT lower quadrant, medial and anterior to the anterior iliac spine.  We then placed a 12-mm assistant port. The robot was then docked, and the ascending colon was dropped off the lateral wall. The liver was retracted and held in place with a locking Lillie clamp. The plane was found between the mesentery and Gerota's fascia. The duodenum was partially kocherized and the IVC was identified. The ureter was identified and the gonadal vein was identified.  We were able to follow this up to the renal vein. Dissection was carried to the hilum and both the renal artery and vein were exposed. Care was taken to ensure the artery was dissected adequately for clamp placement. The kidney was then defatted. This process was extremely difficult and challenging due to densely adherent perinephric fat and this mobilization took considerable amount of extra time - about 2 hours. The lower pole cyst was unroofed during this process. The upper pole bulge was noted  The renal ultrasound was used to delineate the mass and the renal capsule was scored, marking the outline of the tumors. Two bulldog clamps were placed on the artery. The partial nephrectomy was then performed.  There was concern for a positive margin and the dissection plain was corrected. A deep margin was then taken for frozen section which resulted as negative.  Hemostasis was maintained in the nephrectomy bed was running 2-0 SH vicryl suture. The renorrhaphy was closed with a 0 CT-1 Vicryl suture through the capsule. Capsular sutures were secured with Weck clips. The final end was doubly clipped. The bulldog clamp was then removed and hemostasis was checked, which was excellent. Ischemia time was 18 minutes. Pneumoperitoneum was lowered and there was no bleeding from the renorrhaphy site. The specimen was placed in an endocatch bag. A drain was placed in the lateral-most port. The robot was undocked. The 12-mm assistant port was decided to be our extraction site, and the camera port was closed with a Fernando-Dannie device with an 0 vicryl tie. The assistant port was then extended. Extraction incision was closed with 0-PDS. The remainder of the skin incisions were irrigated out and closed subcuticularly using 4-0 Vicryl suture and covered with skin glue. Dressings were applied.     The patient was awoken from anesthesia and taken to PACU in stable condition.    Binu Yoon MD   Urology  [facility]  Clinic Phone [phone]

## 2019-09-04 NOTE — PROGRESS NOTES
Admission medication history interview status for the 9/4/2019  admission is complete. See EPIC admission navigator for prior to admission medications     Medication history source reliability:Good    Medication history interview source(s):Patient    Medication history resources (including written lists, pill bottles, clinic record):written list    Primary pharmacy.[facility]    Additional medication history information not noted on PTA med list :None    Time spent in this activity: 30 minutes    Prior to Admission medications    Medication Sig Last Dose Taking? Auth Provider   amLODIPine-benazepril (LOTREL) 10-20 MG capsule Take 1 capsule by mouth daily 9/3/2019 at am Yes Rakesh Clark MD   aspirin 81 MG EC tablet Take 81 mg by mouth At Bedtime more than a week Yes Reported, Patient   atenolol (TENORMIN) 25 MG tablet Take 1 tablet (25 mg) by mouth daily 9/3/2019 at pm Yes Rakesh Clark MD   atorvastatin (LIPITOR) 80 MG tablet Take 0.5 tablets (40 mg) by mouth daily 9/3/2019 at pm Yes Rakesh Clark MD   baclofen (LIORESAL) 10 MG tablet TAKE 1 TABLET EVERY DAY 9/3/2019 at pm Yes Rakesh Clark MD   Calcium Carbonate-Vit D-Min (CALTRATE 600+D PLUS PO) Take 1 tablet by mouth daily  9/3/2019 at am Yes Reported, Patient   carbidopa-levodopa (SINEMET)  MG per tablet Take 1.5 tablets by mouth 3 times daily  9/4/2019 at 0400 Yes Reported, Patient   finasteride (PROSCAR) 5 MG tablet TAKE 1 TABLET EVERY DAY 9/3/2019 at pm Yes Rakesh Clark MD   metFORMIN (GLUCOPHAGE) 1000 MG tablet TAKE 1 TABLET TWICE DAILY WITH BREAKFAST  AND WITH DINNER 9/3/2019 at pm Yes Rakesh Clark MD   multivitamin, therapeutic (THERA-VIT) TABS tablet Take 1 tablet by mouth every morning more than a week Yes Reported, Patient   Phenylephrine-Acetaminophen (TYLENOL SINUS CONGESTION/PAIN PO) Take 2 tablets by mouth 2 times daily as needed 9/3/2019 at 1700 Yes Reported, Patient   ACCU-CHEK COMPLETE KIT test daily    Larry Valdovinos MD   blood glucose (ACCU-CHEK COMPACT DRUM) test strip by In Vitro route daily. Test one time daily or as directed.   Rakesh Clark MD

## 2019-09-04 NOTE — ANESTHESIA PROCEDURE NOTES
ARTERIAL LINE PROCEDURE NOTE:   Pre-Procedure  Performed by Yimi Roche MD  Location: OR      Pre-Anesthestic Checklist: patient identified, IV checked, risks and benefits discussed, informed consent, monitors and equipment checked and pre-op evaluation    Timeout  Correct Patient: Yes   Correct Procedure: Yes   Correct Site: Yes   Correct Laterality: N/A   Correct Position: Yes   Site Marked: No   .   Procedure Documentation  Procedure: arterial line    Supine  Insertion Site:left, radial.Skin infiltrated with mL of 1% lidocaine. Injection technique: Seldinger Technique  .  .  Patient Prep;chlorhexidine gluconate and isopropyl alcohol, patient draped    Assessment/Narrative    Catheter: 20 gauge, 1.75 in/4.5 cm quick cath (integral wire)     Secured by other  Tegaderm dressing used.    Arterial waveform: Yes     Comments:  A sterile prep of chlorhexidine was used for preparation. A hat, sterile gloves, and mask were wore by the provider of care. No complications. Arterial line secured further with tape.

## 2019-09-04 NOTE — DISCHARGE INSTRUCTIONS
POSTOPERATIVE INSTRUCTIONS    Diagnosis-------------------------------   RENAL mass    Procedure-------------------------------  Procedure(s) (LRB):  ROBOTIC-ASSISTED RIGHT PARTIAL NEPHRECTOMY WITH ULTRASOUND (Right)      Findings--------------------------------  RIGHT upper pole renal mass removed with grossly negative margins.     Home-going instructions-----------------         Activity Limitation:     - No driving or operating heavy machinery while on narcotic pain medication.   - No strenuous exercise for 6 weeks.   - No lifting, pushing, pulling more than 10 pounds for 6 weeks. Take care when pushing with your arms to stand up.   - Do not strain your belly area. When you bend, sit up or twice, you could strain the area around your incision.   - Do not strain with bowel movements.   - Do not drive until you can press the brake pedal quickly and fully without pain.   - Do not operate a motor vehicle while taking narcotic pain medications    FOLLOW THESE INSTRUCTIONS AS INDICATED BELOW:  - Observe operative area for signs of excessive bleeding.  - You may shower.  - Increase fluid intake to promote clear urine.  - Resume usual diet as tolerated      What to expect while recovering----------- WOUND CARE:  - You may shower and get incisions wet starting 48 hrs after surgery.  - Do not scrub incisions or submerge wounds (aka, bath, pool, hot tub, etc.) for 2 weeks or until wounds have healed  - Remove wound dressing 48 hours after surgery if already not removed.   - If purple dermabond glue was used, avoid applying any lotions or ointments.   - Leave incision open to air. Cover with gauze only if needed for comfort or to protect clothing from drainage.    Discharge Medications/instructions:   1) PAIN: Oxycodone is a narcotic medication that has been prescribed for pain. Narcotics will cause sleepiness and constipation, therefore it is best to stop or reduce them as soon as you can and switch to using acetaminophen  "(Tylenol) and/or ibuprofen (Advil/Motrin), taken as directed on the packaging. Keep in mind that certain narcotics can contain acetaminophen, also called \"APAP\" on prescription bottles. Do not take more than 4,000mg of Tylenol (acetaminophen/ APAP) from all sources in any 24 hour period since this can cause liver damage. Never drive, operate machinery or drink alcoholic beverages while you are taking narcotic pain medications.     2) CONSTIPATION: Pericolace (senna/docusate sodium) can be taken twice daily for prevention of constipation since surgery, pain medications and bladder spasm medications can all make you constipated. Please reduce or stop pericolace if you develop loose stools. Other over the counter solutions such as prune juice, miralax, fiber products, senna, and dulcolax can also be used. If you are taking the pericolace but still have not had a bowel movement in 3 days, start over-the-counter Milk of Magnesia taken twice daily until you have a nice bowel movement. Call the office with any concerns.       Questions/concerns------------------------  [facility]: [phone]    Future appointments  10/16/19 with Dr. Car Yoon MD      "

## 2019-09-04 NOTE — ANESTHESIA PREPROCEDURE EVALUATION
Anesthesia Pre-Procedure Evaluation    Patient: Kyler Rodriguez   MRN: [id number] : 1945          Preoperative Diagnosis: RIGHT RENAL MASS    Procedure(s):  ROBOTIC-ASSISTED RIGHT PARTIAL NEPHRECTOMY (WILL BRING IN LAPARASCOPIC ULTRASOUND)    Past Medical History:   Diagnosis Date     Elevated prostate specific antigen (PSA)      Essential hypertension, benign      Malignant neoplasm (H) 10/2011    prostate cancer     Mumps      Other and unspecified hyperlipidemia      Prostate infection      Spider veins      Type II or unspecified type diabetes mellitus without mention of complication, not stated as uncontrolled      Past Surgical History:   Procedure Laterality Date     C NONSPECIFIC PROCEDURE      varicose vein stripping     C NONSPECIFIC PROCEDURE      colonoscopy     COLONOSCOPY   &     [facility]     COLONOSCOPY  2019    Dr. Pandey [facility]     CYSTOSCOPY       EYE SURGERY      cataract left eye     HC REMOVAL OF TONSILS,<14 Y/O       HC VASECTOMY UNILAT/BILAT W POSTOP SEMEN       PHACOEMULSIFICATION CLEAR CORNEA W/ STANDARD IOL IMPLANT, ENDOSCOPIC CYCLOPHOTOCOAGULATION, COMBINED  2014    Procedure: COMBINED PHACOEMULSIFICATION CLEAR CORNEA WITH STANDARD INTRAOCULAR LENS IMPLANT, ENDOSCOPIC CYCLOPHOTOCOAGULATION;  Surgeon: Leroy Crews MD;  Location: [facility]     VASECTOMY         Anesthesia Evaluation     . Pt has had prior anesthetic. Type: General    No history of anesthetic complications          ROS/MED HX    ENT/Pulmonary:      (-) tobacco use, asthma, COPD, sleep apnea and recent URI   Neurologic:     (+)Parkinson's disease    (-) seizures, CVA, Neuropathy and migraines   Cardiovascular:     (+) Dyslipidemia, hypertension----. : . . . :. .      (-) CAD, taking anticoagulants/antiplatelets, CHF, pacemaker, arrhythmias, pacemaker and ICD   METS/Exercise Tolerance:     Hematologic:  - neg hematologic  ROS       Musculoskeletal:   (+)  other musculoskeletal- hx of neck pain       GI/Hepatic:        (-) GERD and liver disease   Renal/Genitourinary:     (+) Pt does not require dialysis, Pt has no history of transplant, Other Renal/ Genitourinary, renal mass   (-) renal disease   Endo:     (+) type II DM Not using insulin - not using insulin pump not previously admitted for DM/DKA .   (-) Type I DM   Psychiatric:         Infectious Disease:         Malignancy:   (+) Malignancy History of Prostate  Prostate CA Active status post,         Other:                          Physical Exam  Normal systems: cardiovascular and pulmonary    Airway   Mallampati: II  TM distance: >3 FB  Neck ROM: full    Dental   (+) caps  Comment: The patient denies any loose, chipped, or missing teeth.    Cardiovascular       Pulmonary             Lab Results   Component Value Date    WBC 3.9 (L) 08/05/2019    HGB 15.3 08/05/2019    HCT 44.6 08/05/2019     08/05/2019     08/05/2019    POTASSIUM 4.3 08/05/2019    CHLORIDE 100 08/05/2019    CO2 29 08/05/2019    BUN 8 08/05/2019    CR 0.74 08/05/2019     (H) 08/05/2019    EBTH 9.1 08/05/2019    MAG 1.7 05/20/2010    ALBUMIN 4.0 08/05/2019    PROTTOTAL 7.3 08/05/2019    ALT 24 08/05/2019    AST 59 (H) 08/05/2019    ALKPHOS 89 08/05/2019    BILITOTAL 1.1 08/05/2019    PTT 31 08/10/2011    INR 1.05 08/10/2011    TSH 3.18 02/18/2019       Preop Vitals  BP Readings from Last 3 Encounters:   09/04/19 124/76   08/05/19 126/62   04/23/19 133/59    Pulse Readings from Last 3 Encounters:   09/04/19 63   08/05/19 61   04/23/19 70      Resp Readings from Last 3 Encounters:   09/04/19 16   04/08/19 16   02/18/19 12    SpO2 Readings from Last 3 Encounters:   09/04/19 99%   08/05/19 98%   04/23/19 97%      Temp Readings from Last 1 Encounters:   09/04/19 36  C (96.8  F) (Temporal)    Ht Readings from Last 1 Encounters:   09/04/19 1.829 m (6')      Wt Readings from Last 1 Encounters:   09/04/19 88.3 kg (194 lb 9.6 oz)    Estimated body mass index is 26.39 kg/m  as  calculated from the following:    Height as of this encounter: 1.829 m (6').    Weight as of this encounter: 88.3 kg (194 lb 9.6 oz).       Anesthesia Plan      History & Physical Review  History and physical reviewed and following examination; no interval change.    ASA Status:  3 .    NPO Status:  > 8 hours    Plan for General and ETT with Intravenous induction. Maintenance will be Balanced.    PONV prophylaxis:  Ondansetron (or other 5HT-3) and Dexamethasone or Solumedrol  Additional equipment: Arterial Line and 2nd IV      Postoperative Care  Postoperative pain management:  Multi-modal analgesia.  Plan for postoperative opioid use.    Consents  Anesthetic plan, risks, benefits and alternatives discussed with:  Patient.  Use of blood products discussed: Yes.   Use of blood products discussed with Patient.  Consented to blood products.  .                 Yimi Roche MD

## 2019-09-04 NOTE — PROGRESS NOTES
Pt arrived on unit around 3pm today. VSS, on 6L oxymask. Report given to oncoming nurse. Wife at bedside.

## 2019-09-05 VITALS
BODY MASS INDEX: 26.36 KG/M2 | TEMPERATURE: 96.1 F | RESPIRATION RATE: 16 BRPM | DIASTOLIC BLOOD PRESSURE: 68 MMHG | HEIGHT: 72 IN | OXYGEN SATURATION: 93 % | WEIGHT: 194.6 LBS | HEART RATE: 60 BPM | SYSTOLIC BLOOD PRESSURE: 128 MMHG

## 2019-09-05 LAB
ANION GAP SERPL CALCULATED.3IONS-SCNC: 4 MMOL/L (ref 3–14)
BUN SERPL-MCNC: 11 MG/DL (ref 7–30)
CALCIUM SERPL-MCNC: 8.5 MG/DL (ref 8.5–10.1)
CHLORIDE SERPL-SCNC: 109 MMOL/L (ref 94–109)
CO2 SERPL-SCNC: 28 MMOL/L (ref 20–32)
COPATH REPORT: NORMAL
CREAT SERPL-MCNC: 0.67 MG/DL (ref 0.66–1.25)
ERYTHROCYTE [DISTWIDTH] IN BLOOD BY AUTOMATED COUNT: 12 % (ref 10–15)
GFR SERPL CREATININE-BSD FRML MDRD: >90 ML/MIN/{1.73_M2}
GLUCOSE BLDC GLUCOMTR-MCNC: 122 MG/DL (ref 70–99)
GLUCOSE BLDC GLUCOMTR-MCNC: 161 MG/DL (ref 70–99)
GLUCOSE BLDC GLUCOMTR-MCNC: 86 MG/DL (ref 70–99)
GLUCOSE SERPL-MCNC: 113 MG/DL (ref 70–99)
HCT VFR BLD AUTO: 41.1 % (ref 40–53)
HGB BLD-MCNC: 14.1 G/DL (ref 13.3–17.7)
MCH RBC QN AUTO: 32 PG (ref 26.5–33)
MCHC RBC AUTO-ENTMCNC: 34.3 G/DL (ref 31.5–36.5)
MCV RBC AUTO: 93 FL (ref 78–100)
PLATELET # BLD AUTO: 191 10E9/L (ref 150–450)
POTASSIUM SERPL-SCNC: 4 MMOL/L (ref 3.4–5.3)
RBC # BLD AUTO: 4.4 10E12/L (ref 4.4–5.9)
SODIUM SERPL-SCNC: 141 MMOL/L (ref 133–144)
WBC # BLD AUTO: 7.7 10E9/L (ref 4–11)

## 2019-09-05 PROCEDURE — 99214 OFFICE O/P EST MOD 30 MIN: CPT | Performed by: INTERNAL MEDICINE

## 2019-09-05 PROCEDURE — 80048 BASIC METABOLIC PNL TOTAL CA: CPT | Performed by: UROLOGY

## 2019-09-05 PROCEDURE — 25000132 ZZH RX MED GY IP 250 OP 250 PS 637: Mod: GY | Performed by: UROLOGY

## 2019-09-05 PROCEDURE — 36415 COLL VENOUS BLD VENIPUNCTURE: CPT | Performed by: UROLOGY

## 2019-09-05 PROCEDURE — 99207 ZZC CDG-CODE CATEGORY CHANGED: CPT | Performed by: INTERNAL MEDICINE

## 2019-09-05 PROCEDURE — 85027 COMPLETE CBC AUTOMATED: CPT | Performed by: UROLOGY

## 2019-09-05 PROCEDURE — 25000131 ZZH RX MED GY IP 250 OP 636 PS 637: Mod: GY | Performed by: PHYSICIAN ASSISTANT

## 2019-09-05 PROCEDURE — 25000132 ZZH RX MED GY IP 250 OP 250 PS 637: Mod: GY | Performed by: PHYSICIAN ASSISTANT

## 2019-09-05 PROCEDURE — 82962 GLUCOSE BLOOD TEST: CPT | Mod: 91

## 2019-09-05 RX ADMIN — ACETAMINOPHEN 975 MG: 325 TABLET ORAL at 16:03

## 2019-09-05 RX ADMIN — INSULIN ASPART 1 UNITS: 100 INJECTION, SOLUTION INTRAVENOUS; SUBCUTANEOUS at 13:11

## 2019-09-05 RX ADMIN — ACETAMINOPHEN 975 MG: 325 TABLET ORAL at 10:02

## 2019-09-05 RX ADMIN — ACETAMINOPHEN 975 MG: 325 TABLET ORAL at 04:29

## 2019-09-05 RX ADMIN — LISINOPRIL 20 MG: 20 TABLET ORAL at 08:16

## 2019-09-05 RX ADMIN — AMLODIPINE BESYLATE 10 MG: 10 TABLET ORAL at 08:16

## 2019-09-05 RX ADMIN — CARBIDOPA AND LEVODOPA 3 HALF-TAB: 25; 100 TABLET ORAL at 08:16

## 2019-09-05 RX ADMIN — CARBIDOPA AND LEVODOPA 3 HALF-TAB: 25; 100 TABLET ORAL at 16:03

## 2019-09-05 NOTE — DISCHARGE SUMMARY
Surgery Discharge Summary    Kyler Rodriguez MRN# [id number]   Age: 74 year old YOB: 1945     Date of Admission:  9/4/2019  Date of Discharge::  9/5/2019   Admitting Physician:  Binu Yoon MD  Discharge Physician:  Margy Hunt MD     PRE/POSTOPERATIVE DIAGNOSES: renal mass   PROCEDURES PERFORMED:  INTRAOPERATIVE FINDINGS:   POSTOPERATIVE COMPLICATIONS:          Medications Prior to Admission:     Medications Prior to Admission   Medication Sig Dispense Refill Last Dose     amLODIPine-benazepril (LOTREL) 10-20 MG capsule Take 1 capsule by mouth daily 90 capsule 3 9/3/2019 at am     aspirin 81 MG EC tablet Take 81 mg by mouth At Bedtime   more than a week     atenolol (TENORMIN) 25 MG tablet Take 1 tablet (25 mg) by mouth daily 90 tablet 3 9/3/2019 at pm     atorvastatin (LIPITOR) 80 MG tablet Take 0.5 tablets (40 mg) by mouth daily 45 tablet 3 9/3/2019 at pm     baclofen (LIORESAL) 10 MG tablet TAKE 1 TABLET EVERY DAY 90 tablet 1 9/3/2019 at pm     Calcium Carbonate-Vit D-Min (CALTRATE 600+D PLUS PO) Take 1 tablet by mouth daily    9/3/2019 at am     carbidopa-levodopa (SINEMET)  MG per tablet Take 1.5 tablets by mouth 3 times daily    9/4/2019 at 0400     finasteride (PROSCAR) 5 MG tablet TAKE 1 TABLET EVERY DAY 90 tablet 3 9/3/2019 at pm     metFORMIN (GLUCOPHAGE) 1000 MG tablet TAKE 1 TABLET TWICE DAILY WITH BREAKFAST  AND WITH DINNER 180 tablet 1 9/3/2019 at pm     multivitamin, therapeutic (THERA-VIT) TABS tablet Take 1 tablet by mouth every morning   more than a week     Phenylephrine-Acetaminophen (TYLENOL SINUS CONGESTION/PAIN PO) Take 2 tablets by mouth 2 times daily as needed   9/3/2019 at 1700     ACCU-CHEK COMPLETE KIT test daily 1 0 Taking     blood glucose (ACCU-CHEK COMPACT DRUM) test strip by In Vitro route daily. Test one time daily or as directed. 100 strip 1 Taking             Discharge Medications:     Current Discharge Medication List      START taking these  medications    Details   docusate sodium (COLACE) 100 MG tablet Take 1 tablet (100 mg) by mouth 2 times daily Take while using narcotics.  Qty: 60 tablet, Refills: 0    Associated Diagnoses: Renal malignant neoplasm, right (H)      oxyCODONE (ROXICODONE) 5 MG tablet Take 1 tablet (5 mg) by mouth every 6 hours as needed for breakthrough pain  Qty: 9 tablet, Refills: 0    Associated Diagnoses: Renal malignant neoplasm, right (H)         CONTINUE these medications which have NOT CHANGED    Details   amLODIPine-benazepril (LOTREL) 10-20 MG capsule Take 1 capsule by mouth daily  Qty: 90 capsule, Refills: 3    Associated Diagnoses: Essential hypertension, benign      aspirin 81 MG EC tablet Take 81 mg by mouth At Bedtime      atenolol (TENORMIN) 25 MG tablet Take 1 tablet (25 mg) by mouth daily  Qty: 90 tablet, Refills: 3    Associated Diagnoses: Essential hypertension, benign      atorvastatin (LIPITOR) 80 MG tablet Take 0.5 tablets (40 mg) by mouth daily  Qty: 45 tablet, Refills: 3    Associated Diagnoses: Hyperlipidemia LDL goal <100      baclofen (LIORESAL) 10 MG tablet TAKE 1 TABLET EVERY DAY  Qty: 90 tablet, Refills: 1    Associated Diagnoses: Back muscle spasm      Calcium Carbonate-Vit D-Min (CALTRATE 600+D PLUS PO) Take 1 tablet by mouth daily     Associated Diagnoses: Type 2 diabetes, HbA1c goal < 7% (H); Hyperlipidemia LDL goal <100; Prostate cancer (H); Essential hypertension, benign      carbidopa-levodopa (SINEMET)  MG per tablet Take 1.5 tablets by mouth 3 times daily       finasteride (PROSCAR) 5 MG tablet TAKE 1 TABLET EVERY DAY  Qty: 90 tablet, Refills: 3    Associated Diagnoses: Prostatic hemorrhage      metFORMIN (GLUCOPHAGE) 1000 MG tablet TAKE 1 TABLET TWICE DAILY WITH BREAKFAST  AND WITH DINNER  Qty: 180 tablet, Refills: 1    Associated Diagnoses: Type 2 diabetes mellitus with diabetic nephropathy, without long-term current use of insulin (H)      multivitamin, therapeutic (THERA-VIT) TABS  tablet Take 1 tablet by mouth every morning      Phenylephrine-Acetaminophen (TYLENOL SINUS CONGESTION/PAIN PO) Take 2 tablets by mouth 2 times daily as needed      ACCU-CHEK COMPLETE KIT test daily  Qty: 1, Refills: 0    Associated Diagnoses: Type II or unspecified type diabetes mellitus without mention of complication, not stated as uncontrolled      blood glucose (ACCU-CHEK COMPACT DRUM) test strip by In Vitro route daily. Test one time daily or as directed.  Qty: 100 strip, Refills: 1    Associated Diagnoses: Type II or unspecified type diabetes mellitus without mention of complication, not stated as uncontrolled                    Brief History of Illness:     Patient with right renal mass s/p RAL right partial nephrectomy            Hospital Course:   The patient's hospital course was unremarkable.  He recovered as anticipated and experienced no post-operative complications.     Other specific systems-based issues were treated as follows:  Neuro  CV  Pulm  GI  FEN  Endo  Heme  ID     DISCHARGE INSTRUCTION:  DIET: Regular diet   Wound care: You may shower and get incision wet 2 days after operation. Do not submerge, soak, or scrub incision or swim until seen in follow-up.   The stitches do not need to be removed, they will be absorbed on their own.  Activity: Restrictions until seen in follow up: No lifting greater than 10 pounds. No repetitive twisting motion of chest/back. No overhead lifting.   Stay hydrated. Narcotics can make you constipated. Take over the counter fiber (metamucil or benefiber) and stool softeners (Miralax, docusate or senna) every day if becoming constipated.   Notification:   Call for fever greater than 101.5, chills, decreased urine output, increased size of incision, red skin around incision, bleeding, shortness of breath or concerns, or other concerns.   Transition to ibuprofen or tylenol/acetaminophen for pain control. Do not take tylenol/acetaminophen and acetaminophen containing  narcotic (e.g., percocet or vicodin) at the same time.  In emergencies, call 911   For other Questions or Concerns; [phone]  FOLLOW UP APPOINTMENTS:          Discharge Disposition:   Discharged to home       Attestation:   I have reviewed today's vital signs, notes, medications, labs and imaging.     Report completed by:   Joaquin Katz MD  PGY-1 Surgery  [phone]

## 2019-09-05 NOTE — PROVIDER NOTIFICATION
MD Notification    Notified Person: MD    Notified Person Name: Chris Hunt    Notification Date/Time: 09/05/19 at 1628    Notification Interaction: paged    Purpose of Notification: Pt is tolerating reg diet, denies nausea/emesis. BS active. Voiding adequately with negative PVRs. Ambulating halls.  Pt would like to discharge home.     Orders Received: Discharge orders placed.

## 2019-09-05 NOTE — PLAN OF CARE
A&Ox4.  VSS, RA. 5 lap sites on abdomen: CDI, open to air. +BS and passing flatus, regular diet.  Cantu removed this AM, voided 150mL of clear, yellow urine, PVR: 20mL, encouraged fluid intake and ambulation. Pain well controlled with scheduled tylenol. Tolerating regular diet and ambulation well. CIWA-0, baseline tremors in UE due to Parkinson's.   at lunchtime, sliding scale. 2 PIV SL. Potentially discharging this PM.

## 2019-09-05 NOTE — PLAN OF CARE
A&Ox4.  VSS, RA.  Patient refused Capno this shift, monitoring via continuous pulse oximetry.  Lap sites (5) on abdomen, CDI, liquid bandages and open to air.  Patient denies pain and nausea.  Cantu patient, clear yellow urine noted.  Discharge pending Cantu removal and patient's ability to void post-removal, today or tomorrow.  CIWA-0, slight tremors due to history of Parkinson's.  BG WNL, no insulin needed.  PIV infusing NS at 100mL/hr.

## 2019-09-05 NOTE — PROGRESS NOTES
[facility]    Medicine Progress Note - Hospitalist Service       Date of Admission:  9/4/2019  Assessment & Plan   Kyler Rodriguez is a 74-year-old male with a past medical history significant for right-sided renal mass, type 2 diabetes, hypertension, hyperlipidemia and known right bundle branch block, Parkinson's disease, prostate cancer, status post radiation therapy and cervical degenerative disk disease who underwent a right partial nephrectomy for which the Hospitalist Service has been consulted to assist with medical management.     Right-sided renal mass, s/p right partial nephrectomy.  POD #1  Urology Service will be managing at this point.  Will defer analgesic management, DVT prophylaxis and PT and OT assessment to their service.  Hemoglobin will be checked in the morning to assess for acute blood loss anemia with noted EBL of 150 mL.  Would strongly encourage utilization of incentive spirometer.   - Defer post-op care to primary service     DM type II   Patient's previous A1c was noted to be 5.  The patient is compliant with metformin 1000 mg 2 times daily.    - Holding PTA Metformin   - SSI     Hypertension  - PTA Atenolol 25 mg daily and Lotrel 10/20 mg capsule daily    Hyperlipidemia  - PTA Lipitor     Parkinson's disease  - PTA Sinemet 1-1/2 tablets 3 times daily will be continued  - PTA Baclofen 10 mg at bedtime    Alcohol use/abuse  Patient endorses consuming 2-3 alcoholic beverages per day.  - Continue CIWA    Known right bundle branch block  This is mentioned in the electronic medical record.  EKG was reviewed preoperatively and confirms right bundle branch block.  No interventions are necessary.     H/o Prostate cancer  Status post radiation therapy.    - Holding PTA finasteride and restart at discharge       Diet: Advance Diet as Tolerated: Regular Diet Adult    DVT Prophylaxis: Defer to primary service  Cantu Catheter: not present    Disposition Plan   Expected discharge: Defer to  primary service.  Patient is medically stable for discharge.  Will continue to follow while here.  PTA medication list reconciled.    Entered: Robb Malik DO 09/05/2019, 1:42 PM       The patient's care was discussed with the Patient.    Robb Malik DO  Hospitalist Service  [facility]    ______________________________________________________________________    Interval History   Patient seen and examined.  No acute events over night.  No pain at this time.  No fevers or chills.     Data reviewed today: I reviewed all medications, new labs and imaging results over the last 24 hours. I personally reviewed no images or EKG's today.    Physical Exam   Vital Signs: Temp: 96.9  F (36.1  C) Temp src: Oral BP: 105/58 Pulse: 60 Heart Rate: 60 Resp: 16 SpO2: 94 % O2 Device: None (Room air) Oxygen Delivery: 1 LPM  Weight: 194 lbs 9.6 oz  General Appearance: Resting comfortably.  NAD   Respiratory: Clear to auscultation.  No respiratory distress  Cardiovascular: RRR.  No murmurs  GI: Bowel sounds present.  Non-tender  Skin: No rashes.  No cyanosis  Other: No edema.  No calf tenderness      Data   Recent Labs   Lab 09/05/19  0907 09/04/19  1350   WBC 7.7 9.3   HGB 14.1 15.2   MCV 93 93    178    137   POTASSIUM 4.0 5.0   CHLORIDE 109 106   CO2 28 26   BUN 11 15   CR 0.67 0.87   ANIONGAP 4 5   BETH 8.5 7.8*   * 159*     No results found for this or any previous visit (from the past 24 hour(s)).

## 2019-09-05 NOTE — DISCHARGE SUMMARY
Patient discharged at 5:31 PM to home. IV was discontinued. Pain at time of discharge was 2/10, scheduled tylenol given. VSS. Voiding adequately in urinal with negative PVRs. Ambulating halls. Tolerating Regular diet, denies nausea/emesis. BS active, no flatus. Belongings returned to patient.  Discharge instructions and medications reviewed with patient and spouse.  Patient verbalized understanding and all questions were answered.  Prescriptions given to patient.  At time of discharge, patient condition was stable and left the unit on wheelchair escorted by ODILON.

## 2019-09-05 NOTE — PROGRESS NOTES
"Urology Daily Progress Note    Patient POD1 from RAL right partial nephrectomy. Tolerating clear liquids. Has ambulated with assistance. Has had flatus. No BM. Cantu removed but has not voided yet. Notes that he is \"in no rush to get home.\" Notes some pain with transferring from bed to standing.     Exam:  /68 (BP Location: Left arm)   Pulse 60   Temp 97.5  F (36.4  C) (Oral)   Resp 16   Ht 1.829 m (6')   Wt 88.3 kg (194 lb 9.6 oz)   SpO2 96%   BMI 26.39 kg/m    No acute distress  Unlabored breathing  Abdomen soft, nt/nd, incisions   Lower extremities non-edematous bilaterally      Clinical Data  UOP 1585 ml      Labs  WBC Pending, lab called   Hgb pending lab called   Cr 0.67      Assessment/Plan  74 year old y/o male POD#1 s/p RAL right partial nephrectomy for right renal mass. Doing well.    NEURO: Scheduled PO oxycodone for pain control  CV: HDS  PULM: Incentive spirometry while awake  FEN/GI: Advance to regular diet  :   HEME/ID: Afebrile; no active issues.  ENDO:  ACTIVITY: Ad jasper  PPx: SCDs  DISPO:  Will reassess once patient tolerating regular diet. Possible discharge today.             "

## 2019-09-05 NOTE — PLAN OF CARE
Pt. arrived from PACU @ 1445. VSS, came on 6L of O2, weaned down to RA during the shift. Capno. A/Ox4. Abdominal incisions x5, liquid bandage, YANELY. C/o abdominal pain controlled with scheduled tylenol, denies additional intervention. Clear liquids, had beef broth and jello for supper, tolerated well. Cantu patent, potentially remove in the AM. L PIV infusing NS @ 100mL/hr. R PIV SL. Ambulated in room x1, tolerated well. BG checks x4, no correction needed. CIWA q4, negative. Continue to monitor pain and output. Discharge possibly tomorrow or Friday.

## 2019-09-18 ENCOUNTER — TELEPHONE (OUTPATIENT)
Dept: UROLOGY | Facility: CLINIC | Age: 74
End: 2019-09-18

## 2019-09-18 NOTE — TELEPHONE ENCOUNTER
Kyler Rodriguez  [id number]  September 18, 2019  11:24 AM    I called the patient to discuss his pathology report from his recent partial nephrectomy.  He notes that he is recovering well, each day gaining in strength, and having minimal pain.  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  The renal parenchymal margin was negative.  We are scheduled for follow-up on October 16 and we discussed that we will plan for a surveillance CT scan at about 6 months after surgery.  All questions were answered and he was thankful for the call.    Binu Yoon M.D.

## 2019-10-02 ENCOUNTER — HEALTH MAINTENANCE LETTER (OUTPATIENT)
Age: 74
End: 2019-10-02

## 2019-10-15 DIAGNOSIS — C64.1 RENAL CELL CARCINOMA, RIGHT (H): Primary | ICD-10-CM

## 2019-10-16 ENCOUNTER — OFFICE VISIT (OUTPATIENT)
Dept: UROLOGY | Facility: CLINIC | Age: 74
End: 2019-10-16
Payer: MEDICARE

## 2019-10-16 VITALS
SYSTOLIC BLOOD PRESSURE: 132 MMHG | DIASTOLIC BLOOD PRESSURE: 80 MMHG | WEIGHT: 190 LBS | HEIGHT: 72 IN | OXYGEN SATURATION: 97 % | BODY MASS INDEX: 25.73 KG/M2 | HEART RATE: 68 BPM

## 2019-10-16 DIAGNOSIS — C64.1 RENAL MALIGNANT NEOPLASM, RIGHT (H): Primary | ICD-10-CM

## 2019-10-16 DIAGNOSIS — C61 PROSTATE CANCER (H): ICD-10-CM

## 2019-10-16 PROCEDURE — 99024 POSTOP FOLLOW-UP VISIT: CPT | Performed by: UROLOGY

## 2019-10-16 ASSESSMENT — PAIN SCALES - GENERAL: PAINLEVEL: NO PAIN (0)

## 2019-10-16 ASSESSMENT — MIFFLIN-ST. JEOR: SCORE: 1644.83

## 2019-10-16 NOTE — PROGRESS NOTES
[facility]   CHIEF COMPLAINT   It was my pleasure to see Kyler Rodriguez who is a 74 year old male for follow-up of s/p RIGHT PNx.      HPI   Kyler Rodriguez is a very pleasant 74 year old male who presents with a history of prostate cancer s/p radiation and RIGHT renal mass now s/p robotic partial nephrectomy.    ##Kidney Cancer Follow up##  Patient is status post Robotic Partial Nephrectomy on 9/4/19.   Tumor was on the right side.   Maximal tumor dimension was 1.2 cm.    The histologic subtype was Clear Cell.    ISUP Grade 2.    Pathologic Stage T1a.    Tumor thrombus level 0 (renal vein).    Tumor necrosis present: No.  Sarcomatoid features present: No.  Lymph Nodes Removed No.   Number of nodes removed n/a, number of node positive for cancer n/a.   Was the ipsilateral adrenal gland removed? No.  Neoadjuvant Chemotherapy? No.  Was Margin Positive? No.    There were not deviations from a normal post-operative course or complications?.    The patient was not readmitted to the hospital since post-operative discharge.    He is doing well post op with no issues.     PHYSICAL EXAM  Patient is a 74 year old  male   Vitals: Blood pressure 132/80, pulse 68, height 1.829 m (6'), weight 86.2 kg (190 lb), SpO2 97 %.  General Appearance Adult: Body mass index is 25.77 kg/m .  Alert, no acute distress, oriented  HENT: throat/mouth:normal, good dentition  Lungs: no respiratory distress, or pursed lip breathing  Heart: No obvious jugular venous distension present  Abdomen: soft, nontender, no organomegaly or masses  Incisions: clean, dry and intact with no evidence of hernia  Musculoskeltal: extremities normal, no peripheral edema  Skin: no suspicious lesions or rashes  Neuro: Alert, oriented, speech and mentation normal  Psych: affect and mood normal  Gait: Normal    Creatinine   Date Value Ref Range Status   09/05/2019 0.67 0.66 - 1.25 mg/dL Final      PATHOLOGY:    FINAL DIAGNOSIS:   A: Kidney, right, renal mass base, margin,  excision   - Negative for malignancy     B: Kidney, right, partial nephrectomy   - Clear cell renal cell carcinoma, WHO/ISUP grade 2   - Tumor size: 1.2 cm   - Renal parenchymal margin negative   - Tumor extends to the inked, cauterized margin of the perirenal tissue   (see comment)   - Please see below for tumor synopsis       ASSESSMENT and PLAN  74 year old man with history of prostate cancer s/p radiation therapy and hormonal therapy several years ago and now s/p RIGHT robotic pnx on 9/4/19    Right clear cell renal cell  - We discussed that his pathology was reassuring with pT1a disease. Reported as positive margin, however this is at the capsule with no evidence of capsular invasion  - We discussed that there is a very low risk of this cancer spreading or recurrence  - Plan for follow up in 5 months with CT Kidney prior for surveillance    Prostate cancer   - Plan for PSA in 5 months      I spent over 15 minutes with the patient.  Over half this time was spent on counseling regarding the above.    Binu Yoon MD   Urology  [facility]  Clinic Phone [phone]

## 2019-10-16 NOTE — LETTER
10/16/2019       RE: Kyler Rodriguez  [address] [id number]     Dear Colleague,    Thank you for referring your patient, Kyler Rodriguez, to the [facility] at [facility]. Please see a copy of my visit note below.    SOUTHMICHAEL   CHIEF COMPLAINT   It was my pleasure to see Kyler Rodriguez who is a 74 year old male for follow-up of s/p RIGHT PNx.      HPI   Kyler Rodriguez is a very pleasant 74 year old male who presents with a history of prostate cancer s/p radiation and RIGHT renal mass now s/p robotic partial nephrectomy.    ##Kidney Cancer Follow up##  Patient is status post Robotic Partial Nephrectomy on 9/4/19.   Tumor was on the right side.   Maximal tumor dimension was 1.2 cm.    The histologic subtype was Clear Cell.    ISUP Grade 2.    Pathologic Stage T1a.    Tumor thrombus level 0 (renal vein).    Tumor necrosis present: No.  Sarcomatoid features present: No.  Lymph Nodes Removed No.   Number of nodes removed n/a, number of node positive for cancer n/a.   Was the ipsilateral adrenal gland removed? No.  Neoadjuvant Chemotherapy? No.  Was Margin Positive? No.    There were not deviations from a normal post-operative course or complications?.    The patient was not readmitted to the hospital since post-operative discharge.    He is doing well post op with no issues.     PHYSICAL EXAM  Patient is a 74 year old  male   Vitals: Blood pressure 132/80, pulse 68, height 1.829 m (6'), weight 86.2 kg (190 lb), SpO2 97 %.  General Appearance Adult: Body mass index is 25.77 kg/m .  Alert, no acute distress, oriented  HENT: throat/mouth:normal, good dentition  Lungs: no respiratory distress, or pursed lip breathing  Heart: No obvious jugular venous distension present  Abdomen: soft, nontender, no organomegaly or masses  Incisions: clean, dry and intact with no evidence of hernia  Musculoskeltal: extremities normal, no  peripheral edema  Skin: no suspicious lesions or rashes  Neuro: Alert, oriented, speech and mentation normal  Psych: affect and mood normal  Gait: Normal    Creatinine   Date Value Ref Range Status   09/05/2019 0.67 0.66 - 1.25 mg/dL Final      PATHOLOGY:    FINAL DIAGNOSIS:   A: Kidney, right, renal mass base, margin, excision   - Negative for malignancy     B: Kidney, right, partial nephrectomy   - Clear cell renal cell carcinoma, WHO/ISUP grade 2   - Tumor size: 1.2 cm   - Renal parenchymal margin negative   - Tumor extends to the inked, cauterized margin of the perirenal tissue   (see comment)   - Please see below for tumor synopsis       ASSESSMENT and PLAN  74 year old man with history of prostate cancer s/p radiation therapy and hormonal therapy several years ago and now s/p RIGHT robotic pnx on 9/4/19    Right clear cell renal cell  - We discussed that his pathology was reassuring with pT1a disease. Reported as positive margin, however this is at the capsule with no evidence of capsular invasion  - We discussed that there is a very low risk of this cancer spreading or recurrence  - Plan for follow up in 5 months with CT Kidney prior for surveillance    Prostate cancer   - Plan for PSA in 5 months      I spent over 15 minutes with the patient.  Over half this time was spent on counseling regarding the above.    Binu Yoon MD   Urology  [facility]  Clinic Phone [phone]

## 2019-10-16 NOTE — NURSING NOTE
Chief Complaint   Patient presents with     Follow Up     patient is here for 6 week post op renal cell carcinoma.      Virgie Mcconnell, CMA

## 2019-11-08 ENCOUNTER — TRANSFERRED RECORDS (OUTPATIENT)
Dept: HEALTH INFORMATION MANAGEMENT | Facility: CLINIC | Age: 74
End: 2019-11-08

## 2019-11-08 LAB — RETINOPATHY: NEGATIVE

## 2020-02-05 ENCOUNTER — OFFICE VISIT (OUTPATIENT)
Dept: INTERNAL MEDICINE | Facility: CLINIC | Age: 75
End: 2020-02-05
Payer: MEDICARE

## 2020-02-05 VITALS
SYSTOLIC BLOOD PRESSURE: 122 MMHG | DIASTOLIC BLOOD PRESSURE: 56 MMHG | HEART RATE: 67 BPM | RESPIRATION RATE: 16 BRPM | TEMPERATURE: 98.2 F | OXYGEN SATURATION: 98 % | WEIGHT: 200.9 LBS | BODY MASS INDEX: 27.21 KG/M2 | HEIGHT: 72 IN

## 2020-02-05 DIAGNOSIS — G20.C PARKINSONISM, UNSPECIFIED PARKINSONISM TYPE (H): ICD-10-CM

## 2020-02-05 DIAGNOSIS — E11.21 TYPE 2 DIABETES MELLITUS WITH DIABETIC NEPHROPATHY, WITHOUT LONG-TERM CURRENT USE OF INSULIN (H): ICD-10-CM

## 2020-02-05 DIAGNOSIS — M62.830 BACK MUSCLE SPASM: ICD-10-CM

## 2020-02-05 DIAGNOSIS — Z00.00 ROUTINE GENERAL MEDICAL EXAMINATION AT A HEALTH CARE FACILITY: ICD-10-CM

## 2020-02-05 DIAGNOSIS — I10 ESSENTIAL HYPERTENSION, BENIGN: ICD-10-CM

## 2020-02-05 DIAGNOSIS — Z00.00 ENCOUNTER FOR PREVENTATIVE ADULT HEALTH CARE EXAMINATION: Primary | ICD-10-CM

## 2020-02-05 DIAGNOSIS — Z12.5 SCREENING FOR PROSTATE CANCER: ICD-10-CM

## 2020-02-05 LAB
ALBUMIN UR-MCNC: NEGATIVE MG/DL
APPEARANCE UR: CLEAR
BILIRUB UR QL STRIP: ABNORMAL
COLOR UR AUTO: YELLOW
ERYTHROCYTE [DISTWIDTH] IN BLOOD BY AUTOMATED COUNT: 13.8 % (ref 10–15)
GLUCOSE UR STRIP-MCNC: NEGATIVE MG/DL
HBA1C MFR BLD: 4.9 % (ref 0–5.6)
HCT VFR BLD AUTO: 42.8 % (ref 40–53)
HGB BLD-MCNC: 14.5 G/DL (ref 13.3–17.7)
HGB UR QL STRIP: NEGATIVE
KETONES UR STRIP-MCNC: 40 MG/DL
LEUKOCYTE ESTERASE UR QL STRIP: NEGATIVE
MCH RBC QN AUTO: 32.1 PG (ref 26.5–33)
MCHC RBC AUTO-ENTMCNC: 33.9 G/DL (ref 31.5–36.5)
MCV RBC AUTO: 95 FL (ref 78–100)
NITRATE UR QL: NEGATIVE
PH UR STRIP: 5.5 PH (ref 5–7)
PLATELET # BLD AUTO: 148 10E9/L (ref 150–450)
RBC # BLD AUTO: 4.52 10E12/L (ref 4.4–5.9)
SOURCE: ABNORMAL
SP GR UR STRIP: 1.01 (ref 1–1.03)
UROBILINOGEN UR STRIP-ACNC: 0.2 EU/DL (ref 0.2–1)
WBC # BLD AUTO: 4.6 10E9/L (ref 4–11)

## 2020-02-05 PROCEDURE — G0103 PSA SCREENING: HCPCS | Performed by: INTERNAL MEDICINE

## 2020-02-05 PROCEDURE — 83036 HEMOGLOBIN GLYCOSYLATED A1C: CPT | Performed by: INTERNAL MEDICINE

## 2020-02-05 PROCEDURE — 90750 HZV VACC RECOMBINANT IM: CPT | Performed by: INTERNAL MEDICINE

## 2020-02-05 PROCEDURE — 81003 URINALYSIS AUTO W/O SCOPE: CPT | Performed by: INTERNAL MEDICINE

## 2020-02-05 PROCEDURE — 99397 PER PM REEVAL EST PAT 65+ YR: CPT | Mod: 25 | Performed by: INTERNAL MEDICINE

## 2020-02-05 PROCEDURE — 90471 IMMUNIZATION ADMIN: CPT | Performed by: INTERNAL MEDICINE

## 2020-02-05 PROCEDURE — 36415 COLL VENOUS BLD VENIPUNCTURE: CPT | Performed by: INTERNAL MEDICINE

## 2020-02-05 PROCEDURE — 82043 UR ALBUMIN QUANTITATIVE: CPT | Performed by: INTERNAL MEDICINE

## 2020-02-05 PROCEDURE — 80061 LIPID PANEL: CPT | Performed by: INTERNAL MEDICINE

## 2020-02-05 PROCEDURE — 99213 OFFICE O/P EST LOW 20 MIN: CPT | Mod: 25 | Performed by: INTERNAL MEDICINE

## 2020-02-05 PROCEDURE — 84443 ASSAY THYROID STIM HORMONE: CPT | Performed by: INTERNAL MEDICINE

## 2020-02-05 PROCEDURE — 85027 COMPLETE CBC AUTOMATED: CPT | Performed by: INTERNAL MEDICINE

## 2020-02-05 PROCEDURE — 80053 COMPREHEN METABOLIC PANEL: CPT | Performed by: INTERNAL MEDICINE

## 2020-02-05 RX ORDER — BACLOFEN 10 MG/1
10 TABLET ORAL
Qty: 90 TABLET | Refills: 1 | Status: SHIPPED | OUTPATIENT
Start: 2020-02-05 | End: 2021-09-21

## 2020-02-05 ASSESSMENT — MIFFLIN-ST. JEOR: SCORE: 1694.28

## 2020-02-05 NOTE — PROGRESS NOTES
3  SUBJECTIVE:   CC: Kyler Rodriguez is an 74 year old male who presents for preventive health visit.     Healthy Habits:    Do you get at least three servings of calcium containing foods daily (dairy, green leafy vegetables, etc.)? yes    Amount of exercise or daily activities, outside of work: no    Problems taking medications regularly No    Medication side effects: No    Have you had an eye exam in the past two years? yes    Do you see a dentist twice per year? yes    Do you have sleep apnea, excessive snoring or daytime drowsiness?no      PROBLEMS TO ADD ON...  -------------------------------------    Has H/O DM. On diet , exercise and PO Metformin. Blood sugars are controlled. No parestesias. No hypoglycemias.  Has h/o HTN. on medical treatment. BP has been controlled. No side effects from medications. No CP, HA, dizziness. good compliance with medications and low salt diet.  Has h/o parkinsonism, follows with neurology. Has tremor and gait shuffling. No falls.   Has h/o back pain, neck and shoulder. On Baclofen for pain , spasms, helps.       Today's PHQ-2 Score:   PHQ-2 ( 1999 [employer]) 2/5/2020 10/16/2019   Q1: Little interest or pleasure in doing things 0 0   Q2: Feeling down, depressed or hopeless 0 0   PHQ-2 Score 0 0   Q1: Little interest or pleasure in doing things - -   Q2: Feeling down, depressed or hopeless - -   PHQ-2 Score - -       Abuse: Current or Past(Physical, Sexual or Emotional)- No  Do you feel safe in your environment? Yes        Social History     Tobacco Use     Smoking status: Never Smoker     Smokeless tobacco: Never Used   Substance Use Topics     Alcohol use: Yes     Alcohol/week: 0.0 standard drinks     Comment: 3-4 drinks/daily     If you drink alcohol do you typically have >3 drinks per day or >7 drinks per week? No                      Last PSA:   PSA   Date Value Ref Range Status   02/13/2018 0.02 0 - 4 ug/L Final     Comment:     Assay Method:  Chemiluminescence using Siemens  Vista analyzer       Reviewed orders with patient. Reviewed health maintenance and updated orders accordingly - Yes  Lab work is in process  Labs reviewed in EPIC    Reviewed and updated as needed this visit by clinical staff  Tobacco  Allergies  Med Hx  Surg Hx  Fam Hx  Soc Hx        Reviewed and updated as needed this visit by Provider            ROS:  CONSTITUTIONAL: NEGATIVE for fever, chills, change in weight  INTEGUMENTARY/SKIN: NEGATIVE for worrisome rashes, moles or lesions  EYES: NEGATIVE for vision changes or irritation  ENT: NEGATIVE for ear, mouth and throat problems  RESP: NEGATIVE for significant cough or SOB  CV: NEGATIVE for chest pain, palpitations or peripheral edema  GI: NEGATIVE for nausea, abdominal pain, heartburn, or change in bowel habits   male: negative for dysuria, hematuria, decreased urinary stream, erectile dysfunction, urethral discharge  MUSCULOSKELETAL: NEGATIVE for significant arthralgias or myalgia  NEURO: NEGATIVE for weakness, dizziness or paresthesias  PSYCHIATRIC: NEGATIVE for changes in mood or affect    OBJECTIVE:   /56 (BP Location: Right arm, Patient Position: Sitting, Cuff Size: Adult Large)   Pulse 67   Temp 98.2  F (36.8  C) (Oral)   Resp 16   Ht 1.829 m (6')   Wt 91.1 kg (200 lb 14.4 oz)   SpO2 98%   BMI 27.25 kg/m    EXAM:  GENERAL: frail,  alert and no distress  EYES: Eyes grossly normal to inspection, PERRL and conjunctivae and sclerae normal  HENT: ear canals and TM's normal, nose and mouth without ulcers or lesions  NECK: no adenopathy, no asymmetry, masses, or scars and thyroid normal to palpation  RESP: lungs clear to auscultation - no rales, rhonchi or wheezes  CV: regular rate and rhythm, normal S1 S2, no S3 or S4, no murmur, click or rub, no peripheral edema and peripheral pulses strong  ABDOMEN: soft, nontender, no hepatosplenomegaly, no masses and bowel sounds normal  MS: no gross musculoskeletal defects noted, no edema  SKIN: no  suspicious lesions or rashes  NEURO: Normal strength and tone, mentation intact and speech normal, resting arms tremor, impaired gait, shuffling   PSYCH: mentation appears normal, affect normal/bright    Diagnostic Test Results:  Labs reviewed in Epic    ASSESSMENT/PLAN:       ICD-10-CM    1. Encounter for preventative adult health care examination Z00.00 CBC with platelets     Comprehensive metabolic panel     Lipid panel reflex to direct LDL Fasting     TSH with free T4 reflex     Prostate spec antigen screen     Hemoglobin A1c     *UA reflex to Microscopic     Albumin Random Urine Quantitative with Creat Ratio   2. Back muscle spasm M62.830 baclofen (LIORESAL) 10 MG tablet   3. Type 2 diabetes mellitus with diabetic nephropathy, without long-term current use of insulin (H) E11.21 metFORMIN (GLUCOPHAGE) 1000 MG tablet     Hemoglobin A1c     *UA reflex to Microscopic     Albumin Random Urine Quantitative with Creat Ratio     OFFICE/OUTPT VISIT,EST,LEVL III   4. Screening for prostate cancer Z12.5 Prostate spec antigen screen   5. Essential hypertension, benign I10 CBC with platelets     Comprehensive metabolic panel     Lipid panel reflex to direct LDL Fasting     TSH with free T4 reflex     OFFICE/OUTPT VISIT,EST,LEVL III   6. Parkinsonism, unspecified Parkinsonism type (H) G20    7. Routine general medical examination at a health care facility Z00.00        COUNSELING:  Reviewed preventive health counseling, as reflected in patient instructions       Regular exercise       Healthy diet/nutrition       Vision screening       Hearing screening       Colon cancer screening       Prostate cancer screening    Estimated body mass index is 27.25 kg/m  as calculated from the following:    Height as of this encounter: 1.829 m (6').    Weight as of this encounter: 91.1 kg (200 lb 14.4 oz).         reports that he has never smoked. He has never used smokeless tobacco.      Counseling Resources:  ATP IV Guidelines  Pooled  Cohorts Equation Calculator  FRAX Risk Assessment  ICSI Preventive Guidelines  Dietary Guidelines for Americans, 2010  USDA's MyPlate  ASA Prophylaxis  Lung CA Screening    Rakesh Clark MD  [facility]

## 2020-02-05 NOTE — NURSING NOTE
Prior to immunization administration, verified patients identity using patient s name and date of birth. Please see Immunization Activity for additional information.     Screening Questionnaire for Adult Immunization    Are you sick today?   No   Do you have allergies to medications, food, a vaccine component or latex?   No   Have you ever had a serious reaction after receiving a vaccination?   No   Do you have a long-term health problem with heart, lung, kidney, or metabolic disease (e.g., diabetes), asthma, a blood disorder, no spleen, complement component deficiency, a cochlear implant, or a spinal fluid leak?  Are you on long-term aspirin therapy?   Yes   Do you have cancer, leukemia, HIV/AIDS, or any other immune system problem?   No   Do you have a parent, brother, or sister with an immune system problem?   No   In the past 3 months, have you taken medications that affect  your immune system, such as prednisone, other steroids, or anticancer drugs; drugs for the treatment of rheumatoid arthritis, Crohn s disease, or psoriasis; or have you had radiation treatments?   No   Have you had a seizure, or a brain or other nervous system problem?   No   During the past year, have you received a transfusion of blood or blood    products, or been given immune (gamma) globulin or antiviral drug?   No   For women: Are you pregnant or is there a chance you could become       pregnant during the next month?   No   Have you received any vaccinations in the past 4 weeks?   No     Immunization questionnaire answers were not all negative.        Per orders of Dr. Clark, injection of Shingrix 2nd dose given by Cecilia Bergman CMA. Patient instructed to remain in clinic for 15 minutes afterwards, and to report any adverse reaction to me immediately.       Screening performed by Cecilia Bergman CMA on 2/5/2020 at 10:42 AM.

## 2020-02-05 NOTE — PATIENT INSTRUCTIONS
Preventive Health Recommendations:     See your health care provider every year to    Review health changes.     Discuss preventive care.      Review your medicines if your doctor has prescribed any.      Talk with your health care provider about whether you should have a test to screen for prostate cancer (PSA).    Every 3 years, have a diabetes test (fasting glucose). If you are at risk for diabetes, you should have this test more often.    Every 5 years, have a cholesterol test. Have this test more often if you are at risk for high cholesterol or heart disease.     Every 10 years, have a colonoscopy. Or, have a yearly FIT test (stool test). These exams will check for colon cancer.    Talk to with your health care provider about screening for Abdominal Aortic Aneurysm if you have a family history of AAA or have a history of smoking.    Shots:     Get a flu shot each year.     Get a tetanus shot every 10 years.     Talk to your doctor about your pneumonia vaccines. There are now two you should receive - Pneumovax (PPSV 23) and Prevnar (PCV 13).     Talk to your pharmacist about a shingles vaccine.     Talk to your doctor about the hepatitis B vaccine.  Nutrition:     Eat at least 5 servings of fruits and vegetables each day.     Eat whole-grain bread, whole-wheat pasta and brown rice instead of white grains and rice.     Get adequate Calcium and Vitamin D.   Lifestyle    Exercise for at least 150 minutes a week (30 minutes a day, 5 days a week). This will help you control your weight and prevent disease.     Limit alcohol to one drink per day.     No smoking.     Wear sunscreen to prevent skin cancer.    See your dentist every six months for an exam and cleaning.    See your eye doctor every 1 to 2 years to screen for conditions such as glaucoma, macular degeneration, cataracts, etc.    Personalized Prevention Plan  You are due for the preventive services outlined below.  Your care team is available to assist you  in scheduling these services.  If you have already completed any of these items, please share that information with your care team to update in your medical record.  Health Maintenance Due   Topic Date Due     Hepatitis C Screening  1945     Diabetic Foot Exam  10/13/2015     Cholesterol Lab  08/18/2019     Zoster (Shingles) Vaccine (3 of 3) 01/01/2020     A1C Lab  02/05/2020     Annual Wellness Visit  02/18/2020     Kidney Microalbumin Urine Test  02/18/2020

## 2020-02-05 NOTE — PROGRESS NOTES
Subjective     Kyler Rodriguez is a 74 year old male who presents to clinic today for the following health issues:    HPI   Diabetes Follow-up      How often are you checking your blood sugar? Not at all    What concerns do you have today about your diabetes? None     Do you have any of these symptoms? (Select all that apply)  No numbness or tingling in feet.  No redness, sores or blisters on feet.  No complaints of excessive thirst.  No reports of blurry vision.  No significant changes to weight.              Hyperlipidemia Follow-Up      Are you regularly taking any medication or supplement to lower your cholesterol?   Yes- atorvastatin    Are you having muscle aches or other side effects that you think could be caused by your cholesterol lowering medication?  No    Hypertension Follow-up      Do you check your blood pressure regularly outside of the clinic? No     Are you following a low salt diet? Yes    Are your blood pressures ever more than 140 on the top number (systolic) OR more   than 90 on the bottom number (diastolic), for example 140/90? No    BP Readings from Last 2 Encounters:   02/05/20 122/56   10/16/19 132/80     Hemoglobin A1C (%)   Date Value   08/05/2019 5.0   02/18/2019 5.0     LDL Cholesterol Calculated (mg/dL)   Date Value   02/18/2019 43   02/13/2018 44         How many servings of fruits and vegetables do you eat daily?  0-1    On average, how many sweetened beverages do you drink each day (Examples: soda, juice, sweet tea, etc.  Do NOT count diet or artificially sweetened beverages)?   0    How many days per week do you exercise enough to make your heart beat faster? 3 or less    How many minutes a day do you exercise enough to make your heart beat faster? 9 or less    How many days per week do you miss taking your medication? 0    Assess lab work   Cont treatment

## 2020-02-05 NOTE — NURSING NOTE
Patient here to follow up on HTN ,lipid and diabetes.  /56 (BP Location: Right arm, Patient Position: Sitting, Cuff Size: Adult Large)   Pulse 67   Temp 98.2  F (36.8  C) (Oral)   Resp 16   Ht 1.829 m (6')   Wt 91.1 kg (200 lb 14.4 oz)   SpO2 98%   BMI 27.25 kg/m

## 2020-02-06 LAB
ALBUMIN SERPL-MCNC: 4.2 G/DL (ref 3.4–5)
ALP SERPL-CCNC: 78 U/L (ref 40–150)
ALT SERPL W P-5'-P-CCNC: 18 U/L (ref 0–70)
ANION GAP SERPL CALCULATED.3IONS-SCNC: 11 MMOL/L (ref 3–14)
AST SERPL W P-5'-P-CCNC: 58 U/L (ref 0–45)
BILIRUB SERPL-MCNC: 1.3 MG/DL (ref 0.2–1.3)
BUN SERPL-MCNC: 10 MG/DL (ref 7–30)
CALCIUM SERPL-MCNC: 9.3 MG/DL (ref 8.5–10.1)
CHLORIDE SERPL-SCNC: 95 MMOL/L (ref 94–109)
CHOLEST SERPL-MCNC: 174 MG/DL
CO2 SERPL-SCNC: 27 MMOL/L (ref 20–32)
CREAT SERPL-MCNC: 0.67 MG/DL (ref 0.66–1.25)
CREAT UR-MCNC: 101 MG/DL
GFR SERPL CREATININE-BSD FRML MDRD: >90 ML/MIN/{1.73_M2}
GLUCOSE SERPL-MCNC: 99 MG/DL (ref 70–99)
HDLC SERPL-MCNC: 117 MG/DL
LDLC SERPL CALC-MCNC: 46 MG/DL
MICROALBUMIN UR-MCNC: 53 MG/L
MICROALBUMIN/CREAT UR: 52.48 MG/G CR (ref 0–17)
NONHDLC SERPL-MCNC: 57 MG/DL
POTASSIUM SERPL-SCNC: 3.8 MMOL/L (ref 3.4–5.3)
PROT SERPL-MCNC: 7.5 G/DL (ref 6.8–8.8)
PSA SERPL-ACNC: 0.02 UG/L (ref 0–4)
SODIUM SERPL-SCNC: 133 MMOL/L (ref 133–144)
TRIGL SERPL-MCNC: 53 MG/DL
TSH SERPL DL<=0.005 MIU/L-ACNC: 2.72 MU/L (ref 0.4–4)

## 2020-02-14 ENCOUNTER — TELEPHONE (OUTPATIENT)
Dept: INTERNAL MEDICINE | Facility: CLINIC | Age: 75
End: 2020-02-14

## 2020-02-14 NOTE — TELEPHONE ENCOUNTER
[facility] pharmacy calls for clarification on instruction for taking baclofen. Call them back at [phone].

## 2020-02-29 PROBLEM — C64.1: Status: ACTIVE | Noted: 2019-09-04

## 2020-03-12 ENCOUNTER — HOSPITAL ENCOUNTER (OUTPATIENT)
Dept: CT IMAGING | Facility: CLINIC | Age: 75
Discharge: HOME | End: 2020-03-12
Attending: UROLOGY | Admitting: UROLOGY
Payer: MEDICARE

## 2020-03-12 DIAGNOSIS — C64.1 RENAL MALIGNANT NEOPLASM, RIGHT (H): ICD-10-CM

## 2020-03-12 LAB
CREAT BLD-MCNC: 0.7 MG/DL (ref 0.66–1.25)
GFR SERPL CREATININE-BSD FRML MDRD: >90 ML/MIN/{1.73_M2}

## 2020-03-12 PROCEDURE — 82565 ASSAY OF CREATININE: CPT

## 2020-03-12 PROCEDURE — 74178 CT ABD&PLV WO CNTR FLWD CNTR: CPT

## 2020-03-12 PROCEDURE — 25000128 H RX IP 250 OP 636: Performed by: UROLOGY

## 2020-03-12 RX ORDER — IOPAMIDOL 755 MG/ML
100 INJECTION, SOLUTION INTRAVASCULAR ONCE
Status: COMPLETED | OUTPATIENT
Start: 2020-03-12 | End: 2020-03-12

## 2020-03-12 RX ADMIN — IOPAMIDOL 100 ML: 755 INJECTION, SOLUTION INTRAVENOUS at 09:48

## 2020-03-16 ENCOUNTER — OFFICE VISIT (OUTPATIENT)
Dept: UROLOGY | Facility: CLINIC | Age: 75
End: 2020-03-16
Payer: MEDICARE

## 2020-03-16 VITALS
OXYGEN SATURATION: 94 % | HEIGHT: 72 IN | BODY MASS INDEX: 26.41 KG/M2 | HEART RATE: 65 BPM | SYSTOLIC BLOOD PRESSURE: 128 MMHG | DIASTOLIC BLOOD PRESSURE: 60 MMHG | WEIGHT: 195 LBS

## 2020-03-16 DIAGNOSIS — C64.1 RENAL MALIGNANT NEOPLASM, RIGHT (H): Primary | ICD-10-CM

## 2020-03-16 DIAGNOSIS — C61 PROSTATE CANCER (H): ICD-10-CM

## 2020-03-16 PROCEDURE — 99213 OFFICE O/P EST LOW 20 MIN: CPT | Performed by: UROLOGY

## 2020-03-16 ASSESSMENT — PAIN SCALES - GENERAL: PAINLEVEL: NO PAIN (0)

## 2020-03-16 ASSESSMENT — MIFFLIN-ST. JEOR: SCORE: 1667.51

## 2020-03-16 NOTE — LETTER
3/16/2020       RE: Kyler Rodriguez  [id number] [address] [id number]     Dear Colleague,    Thank you for referring your patient, Kyler Rodriguez, to the [facility]OLOGY [facility]INIC [place] at [facility]. Please see a copy of my visit note below.    SOUTHMICHAEL   CHIEF COMPLAINT   It was my pleasure to see Kyler Rodriguez who is a 74 year old male for follow-up of s/p RIGHT PNx.      HPI   Kyler Rodriguez is a very pleasant 74 year old male who presents with a history of prostate cancer s/p radiation and RIGHT renal mass now s/p robotic partial nephrectomy.    ##Kidney Cancer Follow up##  Patient is status post Robotic Partial Nephrectomy on 9/4/19.   Tumor was on the right side.   Maximal tumor dimension was 1.2 cm.    The histologic subtype was Clear Cell.    ISUP Grade 2.    Pathologic Stage T1a.    Tumor thrombus level 0 (renal vein).    Tumor necrosis present: No.  Sarcomatoid features present: No.  Lymph Nodes Removed No.   Number of nodes removed n/a, number of node positive for cancer n/a.   Was the ipsilateral adrenal gland removed? No.  Neoadjuvant Chemotherapy? No.  Was Margin Positive? No.    There were not deviations from a normal post-operative course or complications?.    The patient was not readmitted to the hospital since post-operative discharge.    TODAY:  He is doing well with no issues.   He has healed well    PHYSICAL EXAM  Patient is a 74 year old  male   Vitals: Blood pressure 128/60, pulse 65, height 1.829 m (6'), weight 88.5 kg (195 lb), SpO2 94 %.  General Appearance Adult: Body mass index is 26.45 kg/m .  Alert, no acute distress, oriented  HENT: throat/mouth:normal, good dentition  Lungs: no respiratory distress, or pursed lip breathing  Heart: No obvious jugular venous distension present  Abdomen: soft, nontender, no organomegaly or masses  Incisions: clean, dry and intact with no evidence of hernia  Musculoskeltal: extremities  normal, no peripheral edema  Skin: no suspicious lesions or rashes  Neuro: Alert, oriented, speech and mentation normal  Psych: affect and mood normal  Gait: Normal    Component PSA PSA Diag Urologic Phys   Latest Ref Rng & Units 0 - 4 ug/L 0.00 - 4.00 ng/mL   8/18/2008 15.40 (H)    2/4/2015 0.1    2/3/2017  0.04   8/16/2017  <0.04   2/13/2018 0.02    8/17/2018  <0.04   3/13/2019  <0.04   2/5/2020 0.02        Creatinine   Date Value Ref Range Status   02/05/2020 0.67 0.66 - 1.25 mg/dL Final      PATHOLOGY:    FINAL DIAGNOSIS:   A: Kidney, right, renal mass base, margin, excision   - Negative for malignancy     B: Kidney, right, partial nephrectomy   - Clear cell renal cell carcinoma, WHO/ISUP grade 2   - Tumor size: 1.2 cm   - Renal parenchymal margin negative   - Tumor extends to the inked, cauterized margin of the perirenal tissue   (see comment)   - Please see below for tumor synopsis     IMAGING:  I reviewed all pertinent available imaging    CT ABD/PEL 3/12/20:  IMPRESSION:    1. Postsurgical changes of partial right nephrectomy with no  convincing evidence of residual disease or local recurrence.  2. Scattered foci of arterial enhancement in the liver, indeterminate,  could represent perfusional abnormalities versus less likely due to  arterial enhancing lesions, can be further evaluated with  contrast-enhanced MRI.  3. Multiple subcentimeter hypoenhancing splenic lesions, too small to  characterize, appear slightly more conspicuous as compared to  3/11/2019 exam, indeterminate, differential consideration including  splenic cysts/pseudocysts, splenic hemangiomas versus other benign or  malignant splenic lesions, can be further evaluated the same time  evaluating the above-mentioned arterial enhancing hepatic lesions.  4. Few left basilar pulmonary nodules measure up to 5 mm, not  significantly changed as compared to 3/11/2019 exam, although  indeterminate, likely benign given the interval  stability.      ASSESSMENT and PLAN  74 year old man with history of prostate cancer s/p radiation therapy and hormonal therapy several years ago and now s/p RIGHT robotic pnx on 9/4/19    Right clear cell renal cell  - We discussed that his pathology was reassuring with pT1a disease. Reported as positive margin, however this is at the capsule with no evidence of capsular invasion  -We reviewed his surveillance imaging which was negative for any evidence of recurrence or metastasis  -We will plan for follow-up in 1 year with a repeat CT scan and chest x-ray prior    Prostate cancer   -We reviewed his PSA history and noted that his last PSA was 0.02 which is not concerning  -Recommend continued annual PSA monitoring      I spent over 10 minutes with the patient.  Over half this time was spent on counseling regarding the above.    Binu Yoon MD   Urology  [facility]  Clinic Phone [phone]      Again, thank you for allowing me to participate in the care of your patient.      Sincerely,    Binu Yoon MD

## 2020-03-16 NOTE — PROGRESS NOTES
[place]   CHIEF COMPLAINT   It was my pleasure to see Kyler Rodriguez who is a 74 year old male for follow-up of s/p RIGHT PNx.      HPI   Kyler Rodriguez is a very pleasant 74 year old male who presents with a history of prostate cancer s/p radiation and RIGHT renal mass now s/p robotic partial nephrectomy.    ##Kidney Cancer Follow up##  Patient is status post Robotic Partial Nephrectomy on 9/4/19.   Tumor was on the right side.   Maximal tumor dimension was 1.2 cm.    The histologic subtype was Clear Cell.    ISUP Grade 2.    Pathologic Stage T1a.    Tumor thrombus level 0 (renal vein).    Tumor necrosis present: No.  Sarcomatoid features present: No.  Lymph Nodes Removed No.   Number of nodes removed n/a, number of node positive for cancer n/a.   Was the ipsilateral adrenal gland removed? No.  Neoadjuvant Chemotherapy? No.  Was Margin Positive? No.    There were not deviations from a normal post-operative course or complications?.    The patient was not readmitted to the hospital since post-operative discharge.    TODAY:  He is doing well with no issues.   He has healed well    PHYSICAL EXAM  Patient is a 74 year old  male   Vitals: Blood pressure 128/60, pulse 65, height 1.829 m (6'), weight 88.5 kg (195 lb), SpO2 94 %.  General Appearance Adult: Body mass index is 26.45 kg/m .  Alert, no acute distress, oriented  HENT: throat/mouth:normal, good dentition  Lungs: no respiratory distress, or pursed lip breathing  Heart: No obvious jugular venous distension present  Abdomen: soft, nontender, no organomegaly or masses  Incisions: clean, dry and intact with no evidence of hernia  Musculoskeltal: extremities normal, no peripheral edema  Skin: no suspicious lesions or rashes  Neuro: Alert, oriented, speech and mentation normal  Psych: affect and mood normal  Gait: Normal    Component PSA PSA Diag Urologic Phys   Latest Ref Rng & Units 0 - 4 ug/L 0.00 - 4.00 ng/mL   8/18/2008 15.40 (H)    2/4/2015 0.1    2/3/2017   0.04   8/16/2017  <0.04   2/13/2018 0.02    8/17/2018  <0.04   3/13/2019  <0.04   2/5/2020 0.02        Creatinine   Date Value Ref Range Status   02/05/2020 0.67 0.66 - 1.25 mg/dL Final      PATHOLOGY:    FINAL DIAGNOSIS:   A: Kidney, right, renal mass base, margin, excision   - Negative for malignancy     B: Kidney, right, partial nephrectomy   - Clear cell renal cell carcinoma, WHO/ISUP grade 2   - Tumor size: 1.2 cm   - Renal parenchymal margin negative   - Tumor extends to the inked, cauterized margin of the perirenal tissue   (see comment)   - Please see below for tumor synopsis     IMAGING:  I reviewed all pertinent available imaging    CT ABD/PEL 3/12/20:  IMPRESSION:    1. Postsurgical changes of partial right nephrectomy with no  convincing evidence of residual disease or local recurrence.  2. Scattered foci of arterial enhancement in the liver, indeterminate,  could represent perfusional abnormalities versus less likely due to  arterial enhancing lesions, can be further evaluated with  contrast-enhanced MRI.  3. Multiple subcentimeter hypoenhancing splenic lesions, too small to  characterize, appear slightly more conspicuous as compared to  3/11/2019 exam, indeterminate, differential consideration including  splenic cysts/pseudocysts, splenic hemangiomas versus other benign or  malignant splenic lesions, can be further evaluated the same time  evaluating the above-mentioned arterial enhancing hepatic lesions.  4. Few left basilar pulmonary nodules measure up to 5 mm, not  significantly changed as compared to 3/11/2019 exam, although  indeterminate, likely benign given the interval stability.      ASSESSMENT and PLAN  74 year old man with history of prostate cancer s/p radiation therapy and hormonal therapy several years ago and now s/p RIGHT robotic pnx on 9/4/19    Right clear cell renal cell  - We discussed that his pathology was reassuring with pT1a disease. Reported as positive margin, however this is  at the capsule with no evidence of capsular invasion  -We reviewed his surveillance imaging which was negative for any evidence of recurrence or metastasis  -We will plan for follow-up in 1 year with a repeat CT scan and chest x-ray prior    Prostate cancer   -We reviewed his PSA history and noted that his last PSA was 0.02 which is not concerning  -Recommend continued annual PSA monitoring      I spent over 10 minutes with the patient.  Over half this time was spent on counseling regarding the above.    Binu Yoon MD   Urology  [facility]  Clinic Phone [phone]

## 2020-03-16 NOTE — NURSING NOTE
Chief Complaint   Patient presents with     Clinic Care Coordination - Follow-up     Pt here for PSA follow-up     Eryn Mc CMA

## 2020-05-05 ENCOUNTER — TELEPHONE (OUTPATIENT)
Dept: INTERNAL MEDICINE | Facility: CLINIC | Age: 75
End: 2020-05-05

## 2020-05-05 DIAGNOSIS — R74.8 ELEVATED LIVER ENZYMES: Primary | ICD-10-CM

## 2020-05-05 DIAGNOSIS — R74.8 ELEVATED LIVER ENZYMES: ICD-10-CM

## 2020-05-05 LAB
ALT SERPL W P-5'-P-CCNC: 10 U/L (ref 0–70)
AST SERPL W P-5'-P-CCNC: 16 U/L (ref 0–45)

## 2020-05-05 PROCEDURE — 36415 COLL VENOUS BLD VENIPUNCTURE: CPT | Performed by: INTERNAL MEDICINE

## 2020-05-05 PROCEDURE — 84460 ALANINE AMINO (ALT) (SGPT): CPT | Performed by: INTERNAL MEDICINE

## 2020-05-05 PROCEDURE — 84450 TRANSFERASE (AST) (SGOT): CPT | Performed by: INTERNAL MEDICINE

## 2020-05-05 NOTE — TELEPHONE ENCOUNTER
Pt waiting in lab, and needs orders.   Done.       Notes recorded by Rakesh Clark MD on 2/6/2020 at 6:26 PM CST   Normal result reviewed, please notify patient. Slightly elevated liver enzyme.   Recommend to recheck AST, ALT in 3 months.

## 2020-06-16 ENCOUNTER — TRANSFERRED RECORDS (OUTPATIENT)
Dept: HEALTH INFORMATION MANAGEMENT | Facility: CLINIC | Age: 75
End: 2020-06-16

## 2020-08-05 DIAGNOSIS — N42.1: ICD-10-CM

## 2020-08-05 DIAGNOSIS — E78.5 HYPERLIPIDEMIA LDL GOAL <100: ICD-10-CM

## 2020-08-05 DIAGNOSIS — I10 ESSENTIAL HYPERTENSION, BENIGN: ICD-10-CM

## 2020-08-05 DIAGNOSIS — E11.21 TYPE 2 DIABETES MELLITUS WITH DIABETIC NEPHROPATHY, WITHOUT LONG-TERM CURRENT USE OF INSULIN (H): ICD-10-CM

## 2020-08-05 RX ORDER — FINASTERIDE 5 MG/1
TABLET, FILM COATED ORAL
Qty: 90 TABLET | Refills: 0 | Status: SHIPPED | OUTPATIENT
Start: 2020-08-05 | End: 2021-02-08

## 2020-08-05 RX ORDER — ATENOLOL 25 MG/1
TABLET ORAL
Qty: 90 TABLET | Refills: 0 | Status: SHIPPED | OUTPATIENT
Start: 2020-08-05 | End: 2020-12-29

## 2020-08-05 RX ORDER — ATORVASTATIN CALCIUM 80 MG/1
TABLET, FILM COATED ORAL
Qty: 45 TABLET | Refills: 0 | Status: SHIPPED | OUTPATIENT
Start: 2020-08-05 | End: 2020-12-29

## 2020-08-05 RX ORDER — AMLODIPINE AND BENAZEPRIL HYDROCHLORIDE 10; 20 MG/1; MG/1
CAPSULE ORAL
Qty: 90 CAPSULE | Refills: 0 | Status: SHIPPED | OUTPATIENT
Start: 2020-08-05 | End: 2020-11-10

## 2020-08-05 NOTE — TELEPHONE ENCOUNTER
"Pt has appt scheduled in September.     Requested Prescriptions   Pending Prescriptions Disp Refills     atorvastatin (LIPITOR) 80 MG tablet [Pharmacy Med Name: ATORVASTATIN CALCIUM 80 MG Tablet] 45 tablet 3     Sig: TAKE 1/2 TABLET EVERY DAY       Statins Protocol Passed - 8/5/2020  9:39 AM        Passed - LDL on file in past 12 months     Recent Labs   Lab Test 02/05/20  1041   LDL 46             Passed - No abnormal creatine kinase in past 12 months     No lab results found.             Passed - Recent (12 mo) or future (30 days) visit within the authorizing provider's specialty     Patient has had an office visit with the authorizing provider or a provider within the authorizing providers department within the previous 12 mos or has a future within next 30 days. See \"Patient Info\" tab in inbasket, or \"Choose Columns\" in Meds & Orders section of the refill encounter.              Passed - Medication is active on med list        Passed - Patient is age 18 or older           amLODIPine-benazepril (LOTREL) 10-20 MG capsule [Pharmacy Med Name: AMLODIPINE BESYLATE/BENAZEPRIL HYDROCHLORIDE 10-20 MG Capsule] 90 capsule 3     Sig: TAKE 1 CAPSULE EVERY DAY       Calcium Channel Blockers Protocol  Passed - 8/5/2020  9:39 AM        Passed - Blood pressure under 140/90 in past 12 months     BP Readings from Last 3 Encounters:   03/16/20 128/60   02/05/20 122/56   10/16/19 132/80                 Passed - Recent (12 mo) or future (30 days) visit within the authorizing provider's specialty     Patient has had an office visit with the authorizing provider or a provider within the authorizing providers department within the previous 12 mos or has a future within next 30 days. See \"Patient Info\" tab in inbasket, or \"Choose Columns\" in Meds & Orders section of the refill encounter.              Passed - Medication is active on med list        Passed - Patient is age 18 or older        Passed - Normal serum creatinine on file in " "past 12 months     Recent Labs   Lab Test 03/12/20  0940 02/05/20  1041   CR  --  0.67   CREAT 0.7  --        Ok to refill medication if creatinine is low         ACE Inhibitors (Including Combos) Protocol Passed - 8/5/2020  9:39 AM        Passed - Blood pressure under 140/90 in past 12 months     BP Readings from Last 3 Encounters:   03/16/20 128/60   02/05/20 122/56   10/16/19 132/80                 Passed - Recent (12 mo) or future (30 days) visit within the authorizing provider's specialty     Patient has had an office visit with the authorizing provider or a provider within the authorizing providers department within the previous 12 mos or has a future within next 30 days. See \"Patient Info\" tab in inbasket, or \"Choose Columns\" in Meds & Orders section of the refill encounter.              Passed - Medication is active on med list        Passed - Patient is age 18 or older        Passed - Normal serum creatinine on file in past 12 months     Recent Labs   Lab Test 03/12/20  0940 02/05/20  1041   CR  --  0.67   CREAT 0.7  --        Ok to refill medication if creatinine is low          Passed - Normal serum potassium on file in past 12 months     Recent Labs   Lab Test 02/05/20  1041   POTASSIUM 3.8                atenolol (TENORMIN) 25 MG tablet [Pharmacy Med Name: ATENOLOL 25 MG Tablet] 90 tablet 3     Sig: TAKE 1 TABLET EVERY DAY       Beta-Blockers Protocol Passed - 8/5/2020  9:39 AM        Passed - Blood pressure under 140/90 in past 12 months     BP Readings from Last 3 Encounters:   03/16/20 128/60   02/05/20 122/56   10/16/19 132/80                 Passed - Patient is age 6 or older        Passed - Recent (12 mo) or future (30 days) visit within the authorizing provider's specialty     Patient has had an office visit with the authorizing provider or a provider within the authorizing providers department within the previous 12 mos or has a future within next 30 days. See \"Patient Info\" tab in in[employer] Infiniteet, or " "\"Choose Columns\" in Meds & Orders section of the refill encounter.              Passed - Medication is active on med list           finasteride (PROSCAR) 5 MG tablet [Pharmacy Med Name: FINASTERIDE 5 MG Tablet] 90 tablet 3     Sig: TAKE 1 TABLET EVERY DAY       BPH Agents Passed - 8/5/2020  9:39 AM        Passed - Recent (12 mo) or future (30 days) visit within the authorizing provider's department     Patient has had an office visit with the authorizing provider or a provider within the authorizing providers department within the previous 12 mos or has a future within next 30 days. See \"Patient Info\" tab in in[employer]eng[employer], or \"Choose Columns\" in Meds & Orders section of the refill encounter.              Passed - Medication is active on med list        Passed - Patient is 18 years of age or older           metFORMIN (GLUCOPHAGE) 1000 MG tablet [Pharmacy Med Name: METFORMIN HYDROCHLORIDE 1000 MG Tablet] 180 tablet 1     Sig: TAKE 1 TABLET TWICE DAILY WITH BREAKFAST  AND WITH DINNER       Biguanide Agents Failed - 8/5/2020  9:39 AM        Failed - Patient has documented A1c within the specified period of time.     If HgbA1C is 8 or greater, it needs to be on file within the past 3 months.  If less than 8, must be on file within the past 6 months.     Recent Labs   Lab Test 02/05/20  1041   A1C 4.9             Failed - Recent (6 mo) or future (30 days) visit within the authorizing provider's specialty     Patient had office visit in the last 6 months or has a visit in the next 30 days with authorizing provider or within the authorizing provider's specialty.  See \"Patient Info\" tab in [employer], or \"Choose Columns\" in Meds & Orders section of the refill encounter.            Passed - Patient is age 10 or older        Passed - Patient's CR is NOT>1.4 OR Patient's EGFR is NOT<45 within past 12 mos.     Recent Labs   Lab Test 03/12/20  0940   GFRESTIMATED >90   GFRESTBLACK >90       Recent Labs   Lab Test 02/05/20  1041   CR " 0.67             Passed - Patient does NOT have a diagnosis of CHF.        Passed - Medication is active on med list

## 2020-09-23 ENCOUNTER — OFFICE VISIT (OUTPATIENT)
Dept: INTERNAL MEDICINE | Facility: CLINIC | Age: 75
End: 2020-09-23
Payer: MEDICARE

## 2020-09-23 VITALS
WEIGHT: 189 LBS | DIASTOLIC BLOOD PRESSURE: 76 MMHG | RESPIRATION RATE: 16 BRPM | SYSTOLIC BLOOD PRESSURE: 138 MMHG | OXYGEN SATURATION: 100 % | TEMPERATURE: 98.4 F | HEART RATE: 66 BPM | BODY MASS INDEX: 25.63 KG/M2

## 2020-09-23 DIAGNOSIS — E78.5 HYPERLIPIDEMIA LDL GOAL <100: ICD-10-CM

## 2020-09-23 DIAGNOSIS — E11.21 TYPE 2 DIABETES MELLITUS WITH DIABETIC NEPHROPATHY, WITHOUT LONG-TERM CURRENT USE OF INSULIN (H): ICD-10-CM

## 2020-09-23 DIAGNOSIS — I10 ESSENTIAL HYPERTENSION, BENIGN: ICD-10-CM

## 2020-09-23 DIAGNOSIS — Z23 NEED FOR PROPHYLACTIC VACCINATION AND INOCULATION AGAINST INFLUENZA: Primary | ICD-10-CM

## 2020-09-23 LAB — HBA1C MFR BLD: 5.5 % (ref 0–5.6)

## 2020-09-23 PROCEDURE — 90662 IIV NO PRSV INCREASED AG IM: CPT | Performed by: INTERNAL MEDICINE

## 2020-09-23 PROCEDURE — 36415 COLL VENOUS BLD VENIPUNCTURE: CPT | Performed by: INTERNAL MEDICINE

## 2020-09-23 PROCEDURE — 99214 OFFICE O/P EST MOD 30 MIN: CPT | Performed by: INTERNAL MEDICINE

## 2020-09-23 PROCEDURE — G0008 ADMIN INFLUENZA VIRUS VAC: HCPCS | Performed by: INTERNAL MEDICINE

## 2020-09-23 PROCEDURE — 83036 HEMOGLOBIN GLYCOSYLATED A1C: CPT | Performed by: INTERNAL MEDICINE

## 2020-09-23 PROCEDURE — 80053 COMPREHEN METABOLIC PANEL: CPT | Performed by: INTERNAL MEDICINE

## 2020-09-23 NOTE — PROGRESS NOTES
Subjective     Kyler Rodriguez is a 75 year old male who presents to clinic today for the following health issues:    HPI     Patient is seen for a follow up visit.  No acute complaints, no medication change or new medical conditions.      Diabetes Follow-up   Has H/O DM. On diet , exercise and oral treatment.       How often are you checking your blood sugar? Not at all    What concerns do you have today about your diabetes? None     Do you have any of these symptoms? (Select all that apply)  No numbness or tingling in feet.  No redness, sores or blisters on feet.  No complaints of excessive thirst.  No reports of blurry vision.  No significant changes to weight.        Hyperlipidemia Follow-Up   Has H/O hyperlipidemia. On medical treatment and diet. No side effects. No muscle weakness, myalgias or upset stomach.       Are you regularly taking any medication or supplement to lower your cholesterol?   Yes- atorvastatin    Are you having muscle aches or other side effects that you think could be caused by your cholesterol lowering medication?  No    Hypertension Follow-up  Has h/o HTN. on medical treatment. BP has been controlled. No side effects from medications. No CP, HA, dizziness. good compliance with medications and low salt diet.      Do you check your blood pressure regularly outside of the clinic? No     Are you following a low salt diet? Yes    Are your blood pressures ever more than 140 on the top number (systolic) OR more   than 90 on the bottom number (diastolic), for example 140/90? No    BP Readings from Last 2 Encounters:   09/23/20 138/76   03/16/20 128/60     Hemoglobin A1C (%)   Date Value   09/23/2020 5.5   02/05/2020 4.9     LDL Cholesterol Calculated (mg/dL)   Date Value   02/05/2020 46   02/18/2019 43         How many servings of fruits and vegetables do you eat daily?  0-1    On average, how many sweetened beverages do you drink each day (Examples: soda, juice, sweet tea, etc.  Do NOT count  diet or artificially sweetened beverages)?   0    How many days per week do you exercise enough to make your heart beat faster? 3 or less    How many minutes a day do you exercise enough to make your heart beat faster? 9 or less    How many days per week do you miss taking your medication? 0        Review of Systems   Constitutional, HEENT, cardiovascular, pulmonary, gi and gu systems are negative, except as otherwise noted.      Objective    /76   Pulse 66   Temp 98.4  F (36.9  C)   Resp 16   Wt 85.7 kg (189 lb)   SpO2 100%   BMI 25.63 kg/m    Body mass index is 25.63 kg/m .  Physical Exam   GENERAL: healthy, alert and no distress  EYES: Eyes grossly normal to inspection, PERRL and conjunctivae and sclerae normal  HENT: ear canals and TM's normal, nose and mouth without ulcers or lesions  NECK: no adenopathy, no asymmetry, masses, or scars and thyroid normal to palpation  RESP: lungs clear to auscultation - no rales, rhonchi or wheezes  CV: regular rate and rhythm, normal S1 S2, no S3 or S4, no murmur, click or rub, no peripheral edema and peripheral pulses strong  ABDOMEN: soft, nontender, no hepatosplenomegaly, no masses and bowel sounds normal  MS: no gross musculoskeletal defects noted, no edema  SKIN: no suspicious lesions or rashes  NEURO: right arm coarse tremor, mentation intact and speech normal    Results for orders placed or performed in visit on 09/23/20 (from the past 24 hour(s))   Hemoglobin A1c   Result Value Ref Range    Hemoglobin A1C 5.5 0 - 5.6 %           Assessment & Plan   Problem List Items Addressed This Visit     Essential hypertension, benign    Type 2 diabetes mellitus with diabetic nephropathy (H) - Primary    Relevant Orders    Comprehensive metabolic panel (Completed)    Hemoglobin A1c (Completed)    HYPERLIPIDEMIA LDL GOAL <100             BMI:   Estimated body mass index is 25.63 kg/m  as calculated from the following:    Height as of 3/16/20: 1.829 m (6').    Weight as  of this encounter: 85.7 kg (189 lb).             See Patient Instructions  No follow-ups on file.    Rakesh Clark MD  [facility]

## 2020-09-24 LAB
ALBUMIN SERPL-MCNC: 3.7 G/DL (ref 3.4–5)
ALP SERPL-CCNC: 85 U/L (ref 40–150)
ALT SERPL W P-5'-P-CCNC: 10 U/L (ref 0–70)
ANION GAP SERPL CALCULATED.3IONS-SCNC: 6 MMOL/L (ref 3–14)
AST SERPL W P-5'-P-CCNC: 20 U/L (ref 0–45)
BILIRUB SERPL-MCNC: 0.5 MG/DL (ref 0.2–1.3)
BUN SERPL-MCNC: 10 MG/DL (ref 7–30)
CALCIUM SERPL-MCNC: 9.3 MG/DL (ref 8.5–10.1)
CHLORIDE SERPL-SCNC: 102 MMOL/L (ref 94–109)
CO2 SERPL-SCNC: 27 MMOL/L (ref 20–32)
CREAT SERPL-MCNC: 0.76 MG/DL (ref 0.66–1.25)
GFR SERPL CREATININE-BSD FRML MDRD: 90 ML/MIN/{1.73_M2}
GLUCOSE SERPL-MCNC: 110 MG/DL (ref 70–99)
POTASSIUM SERPL-SCNC: 4.5 MMOL/L (ref 3.4–5.3)
PROT SERPL-MCNC: 7.2 G/DL (ref 6.8–8.8)
SODIUM SERPL-SCNC: 135 MMOL/L (ref 133–144)

## 2020-11-08 DIAGNOSIS — I10 ESSENTIAL HYPERTENSION, BENIGN: ICD-10-CM

## 2020-11-08 DIAGNOSIS — E11.21 TYPE 2 DIABETES MELLITUS WITH DIABETIC NEPHROPATHY, WITHOUT LONG-TERM CURRENT USE OF INSULIN (H): ICD-10-CM

## 2020-11-10 ENCOUNTER — TRANSFERRED RECORDS (OUTPATIENT)
Dept: HEALTH INFORMATION MANAGEMENT | Facility: CLINIC | Age: 75
End: 2020-11-10

## 2020-11-10 LAB — RETINOPATHY: NEGATIVE

## 2020-11-10 RX ORDER — AMLODIPINE AND BENAZEPRIL HYDROCHLORIDE 10; 20 MG/1; MG/1
CAPSULE ORAL
Qty: 90 CAPSULE | Refills: 3 | Status: SHIPPED | OUTPATIENT
Start: 2020-11-10 | End: 2021-11-05

## 2020-11-10 NOTE — TELEPHONE ENCOUNTER
Pending Prescriptions:                       Disp   Refills    amLODIPine-benazepril (LOTREL) 10-20 MG c*90 cap*0            Sig: TAKE 1 CAPSULE EVERY DAY    metFORMIN (GLUCOPHAGE) 1000 MG tablet [Ph*180 ta*0            Sig: TAKE 1 TABLET TWICE DAILY WITH BREAKFAST  AND           WITH DINNER      Prescriptions approved per [facility] Refill Protocol.

## 2020-12-27 DIAGNOSIS — E78.5 HYPERLIPIDEMIA LDL GOAL <100: ICD-10-CM

## 2020-12-27 DIAGNOSIS — I10 ESSENTIAL HYPERTENSION, BENIGN: ICD-10-CM

## 2020-12-29 RX ORDER — ATORVASTATIN CALCIUM 80 MG/1
TABLET, FILM COATED ORAL
Qty: 45 TABLET | Refills: 0 | Status: SHIPPED | OUTPATIENT
Start: 2020-12-29 | End: 2021-03-26

## 2020-12-29 RX ORDER — ATENOLOL 25 MG/1
TABLET ORAL
Qty: 90 TABLET | Refills: 0 | Status: SHIPPED | OUTPATIENT
Start: 2020-12-29 | End: 2021-03-26

## 2021-02-04 ASSESSMENT — ENCOUNTER SYMPTOMS
JOINT SWELLING: 0
NAUSEA: 0
COUGH: 0
HEMATOCHEZIA: 0
ABDOMINAL PAIN: 0
PARESTHESIAS: 0
HEADACHES: 0
HEARTBURN: 0
FEVER: 0
CONSTIPATION: 0
PALPITATIONS: 0
EYE PAIN: 0
SORE THROAT: 0
DIARRHEA: 0
HEMATURIA: 0
SHORTNESS OF BREATH: 0
ARTHRALGIAS: 0
WEAKNESS: 0
MYALGIAS: 0
DIZZINESS: 0
FREQUENCY: 0
NERVOUS/ANXIOUS: 0
DYSURIA: 0
CHILLS: 0

## 2021-02-04 ASSESSMENT — ACTIVITIES OF DAILY LIVING (ADL): CURRENT_FUNCTION: NO ASSISTANCE NEEDED

## 2021-02-08 ENCOUNTER — OFFICE VISIT (OUTPATIENT)
Dept: INTERNAL MEDICINE | Facility: CLINIC | Age: 76
End: 2021-02-08
Payer: MEDICARE

## 2021-02-08 VITALS
HEART RATE: 61 BPM | DIASTOLIC BLOOD PRESSURE: 77 MMHG | TEMPERATURE: 98.2 F | OXYGEN SATURATION: 97 % | WEIGHT: 198 LBS | BODY MASS INDEX: 26.82 KG/M2 | HEIGHT: 72 IN | SYSTOLIC BLOOD PRESSURE: 129 MMHG

## 2021-02-08 DIAGNOSIS — E11.21 TYPE 2 DIABETES MELLITUS WITH DIABETIC NEPHROPATHY, WITHOUT LONG-TERM CURRENT USE OF INSULIN (H): ICD-10-CM

## 2021-02-08 DIAGNOSIS — Z00.00 ENCOUNTER FOR PREVENTATIVE ADULT HEALTH CARE EXAMINATION: Primary | ICD-10-CM

## 2021-02-08 DIAGNOSIS — E78.5 HYPERLIPIDEMIA LDL GOAL <100: ICD-10-CM

## 2021-02-08 DIAGNOSIS — N42.1: ICD-10-CM

## 2021-02-08 DIAGNOSIS — Z12.5 SCREENING FOR PROSTATE CANCER: ICD-10-CM

## 2021-02-08 DIAGNOSIS — I10 ESSENTIAL HYPERTENSION, BENIGN: ICD-10-CM

## 2021-02-08 LAB
ALBUMIN UR-MCNC: ABNORMAL MG/DL
APPEARANCE UR: CLEAR
BACTERIA #/AREA URNS HPF: ABNORMAL /HPF
BILIRUB UR QL STRIP: NEGATIVE
COLOR UR AUTO: YELLOW
ERYTHROCYTE [DISTWIDTH] IN BLOOD BY AUTOMATED COUNT: 12.7 % (ref 10–15)
GLUCOSE UR STRIP-MCNC: NEGATIVE MG/DL
HBA1C MFR BLD: 5.2 % (ref 0–5.6)
HCT VFR BLD AUTO: 43.8 % (ref 40–53)
HGB BLD-MCNC: 14.8 G/DL (ref 13.3–17.7)
HGB UR QL STRIP: NEGATIVE
HYALINE CASTS #/AREA URNS LPF: ABNORMAL /LPF
KETONES UR STRIP-MCNC: NEGATIVE MG/DL
LEUKOCYTE ESTERASE UR QL STRIP: NEGATIVE
MCH RBC QN AUTO: 32.2 PG (ref 26.5–33)
MCHC RBC AUTO-ENTMCNC: 33.8 G/DL (ref 31.5–36.5)
MCV RBC AUTO: 95 FL (ref 78–100)
MUCOUS THREADS #/AREA URNS LPF: PRESENT /LPF
NITRATE UR QL: NEGATIVE
PH UR STRIP: 8.5 PH (ref 5–7)
PLATELET # BLD AUTO: 125 10E9/L (ref 150–450)
RBC # BLD AUTO: 4.6 10E12/L (ref 4.4–5.9)
RBC #/AREA URNS AUTO: ABNORMAL /HPF
SOURCE: ABNORMAL
SP GR UR STRIP: 1.02 (ref 1–1.03)
UROBILINOGEN UR STRIP-ACNC: 1 EU/DL (ref 0.2–1)
WBC # BLD AUTO: 3.8 10E9/L (ref 4–11)
WBC #/AREA URNS AUTO: ABNORMAL /HPF

## 2021-02-08 PROCEDURE — 83036 HEMOGLOBIN GLYCOSYLATED A1C: CPT | Performed by: INTERNAL MEDICINE

## 2021-02-08 PROCEDURE — 80061 LIPID PANEL: CPT | Performed by: INTERNAL MEDICINE

## 2021-02-08 PROCEDURE — 85027 COMPLETE CBC AUTOMATED: CPT | Performed by: INTERNAL MEDICINE

## 2021-02-08 PROCEDURE — 80053 COMPREHEN METABOLIC PANEL: CPT | Performed by: INTERNAL MEDICINE

## 2021-02-08 PROCEDURE — 84443 ASSAY THYROID STIM HORMONE: CPT | Performed by: INTERNAL MEDICINE

## 2021-02-08 PROCEDURE — 82043 UR ALBUMIN QUANTITATIVE: CPT | Performed by: INTERNAL MEDICINE

## 2021-02-08 PROCEDURE — G0103 PSA SCREENING: HCPCS | Performed by: INTERNAL MEDICINE

## 2021-02-08 PROCEDURE — 81001 URINALYSIS AUTO W/SCOPE: CPT | Performed by: INTERNAL MEDICINE

## 2021-02-08 PROCEDURE — G0439 PPPS, SUBSEQ VISIT: HCPCS | Performed by: INTERNAL MEDICINE

## 2021-02-08 PROCEDURE — 36415 COLL VENOUS BLD VENIPUNCTURE: CPT | Performed by: INTERNAL MEDICINE

## 2021-02-08 RX ORDER — FINASTERIDE 5 MG/1
TABLET, FILM COATED ORAL
Qty: 90 TABLET | Refills: 3 | Status: SHIPPED | OUTPATIENT
Start: 2021-02-08 | End: 2021-11-02

## 2021-02-08 ASSESSMENT — ENCOUNTER SYMPTOMS
MYALGIAS: 0
CHILLS: 0
ABDOMINAL PAIN: 0
PALPITATIONS: 0
WEAKNESS: 0
FEVER: 0
HEMATURIA: 0
SORE THROAT: 0
PARESTHESIAS: 0
HEARTBURN: 0
FREQUENCY: 0
EYE PAIN: 0
DIARRHEA: 0
JOINT SWELLING: 0
SHORTNESS OF BREATH: 0
COUGH: 0
NAUSEA: 0
HEADACHES: 0
CONSTIPATION: 0
DIZZINESS: 0
NERVOUS/ANXIOUS: 0
HEMATOCHEZIA: 0
DYSURIA: 0
ARTHRALGIAS: 0

## 2021-02-08 ASSESSMENT — ACTIVITIES OF DAILY LIVING (ADL): CURRENT_FUNCTION: NO ASSISTANCE NEEDED

## 2021-02-08 ASSESSMENT — MIFFLIN-ST. JEOR: SCORE: 1676.12

## 2021-02-08 NOTE — PROGRESS NOTES
"SUBJECTIVE:   Kyler Rodriguez is a 75 year old male who presents for Preventive Visit.      Patient has been advised of split billing requirements and indicates understanding: Yes   Are you in the first 12 months of your Medicare coverage?  No    Healthy Habits:     In general, how would you rate your overall health?  Good    Frequency of exercise:  None    Do you usually eat at least 4 servings of fruit and vegetables a day, include whole grains    & fiber and avoid regularly eating high fat or \"junk\" foods?  No    Taking medications regularly:  Yes    Medication side effects:  None    Ability to successfully perform activities of daily living:  No assistance needed    Home Safety:  No safety concerns identified    Hearing Impairment:  No hearing concerns    In the past 6 months, have you been bothered by leaking of urine?  No    In general, how would you rate your overall mental or emotional health?  Good      PHQ-2 Total Score: 0    Additional concerns today:  No    Do you feel safe in your environment? Yes    Have you ever done Advance Care Planning? (For example, a Health Directive, POLST, or a discussion with a medical provider or your loved ones about your wishes): Yes, advance care planning is on file.       Fall risk  Fallen 2 or more times in the past year?: No  Any fall with injury in the past year?: No    Cognitive Screening   1) Repeat 3 items (Leader, Season, Table)    2) Clock draw: NORMAL  3) 3 item recall: Recalls 3 objects  Results: 3 items recalled: COGNITIVE IMPAIRMENT LESS LIKELY    Mini-CogTM Copyright LAURA Patel. Licensed by the author for use in [facility]; reprinted with permission (nick@.[facility]). All rights reserved.      Do you have sleep apnea, excessive snoring or daytime drowsiness?: no    Reviewed and updated as needed this visit by clinical staff  Tobacco  Allergies  Meds   Med Hx  Surg Hx  Fam Hx  Soc Hx        Reviewed and updated as needed this visit by Provider     "            Social History     Tobacco Use     Smoking status: Never Smoker     Smokeless tobacco: Never Used   Substance Use Topics     Alcohol use: Yes     Alcohol/week: 0.0 standard drinks     Comment: 1-2 drinks/daily     If you drink alcohol do you typically have >3 drinks per day or >7 drinks per week? No    Alcohol Use 2/4/2021   Prescreen: >3 drinks/day or >7 drinks/week? Yes   Prescreen: >3 drinks/day or >7 drinks/week? -   AUDIT SCORE  14           PROBLEMS TO ADD ON...  Has h/o HTN. on medical treatment. BP has been controlled. No side effects from medications. No CP, HA, dizziness. good compliance with medications and low salt diet.  Has H/O DM. On diet , exercise and oral treatment. Blood sugars are controlled. No parestesias. No hypoglycemias.  Has H/O hyperlipidemia. On medical treatment and diet. No side effects. No muscle weakness, myalgias or upset stomach.   Has h/o prostate and kidney cancer. Follows with urology . No recurrence.   Has h/o Parkinson's disease. Follows with neurology. On Sinemet.     Current providers sharing in care for this patient include:   Patient Care Team:  Rakesh Clark MD as PCP - General (Internal Medicine)  Rakesh Clark MD as Assigned PCP  Taqueria Amador MD as MD (Neurology)  Colten Messina MD as MD (Clinical Neurophysiology)  Binu Yoon MD as Assigned Surgical Provider    The following health maintenance items are reviewed in Epic and correct as of today:  Health Maintenance   Topic Date Due     HEPATITIS C SCREENING  07/15/1963     DIABETIC FOOT EXAM  10/13/2015     LIPID  08/05/2020     FALL RISK ASSESSMENT  08/05/2020     MICROALBUMIN  02/05/2021     A1C  03/23/2021     ADVANCE CARE PLANNING  09/21/2021     BMP  09/23/2021     EYE EXAM  11/10/2021     MEDICARE ANNUAL WELLNESS VISIT  02/08/2022     DTAP/TDAP/TD IMMUNIZATION (3 - Td) 02/20/2028     COLORECTAL CANCER SCREENING  04/08/2029     PHQ-2  Completed     INFLUENZA VACCINE   Completed     Pneumococcal Vaccine: 65+ Years  Completed     ZOSTER IMMUNIZATION  Completed     AORTIC ANEURYSM SCREENING (SYSTEM ASSIGNED)  Completed     Pneumococcal Vaccine: Pediatrics (0 to 5 Years) and At-Risk Patients (6 to 64 Years)  Aged Out     IPV IMMUNIZATION  Aged Out     MENINGITIS IMMUNIZATION  Aged Out     Lab work is in process  Labs reviewed in EPIC      Review of Systems   Constitutional: Negative for chills and fever.   HENT: Negative for congestion, ear pain, hearing loss and sore throat.    Eyes: Negative for pain and visual disturbance.   Respiratory: Negative for cough and shortness of breath.    Cardiovascular: Negative for chest pain, palpitations and peripheral edema.   Gastrointestinal: Negative for abdominal pain, constipation, diarrhea, heartburn, hematochezia and nausea.   Genitourinary: Negative for discharge, dysuria, frequency, genital sores, hematuria, impotence and urgency.   Musculoskeletal: Negative for arthralgias, joint swelling and myalgias.   Skin: Negative for rash.   Neurological: Negative for dizziness, weakness, headaches and paresthesias.   Psychiatric/Behavioral: Negative for mood changes. The patient is not nervous/anxious.          OBJECTIVE:   /77 (BP Location: Left arm, Patient Position: Sitting, Cuff Size: Adult Regular)   Pulse 61   Temp 98.2  F (36.8  C) (Oral)   Ht 1.829 m (6')   Wt 89.8 kg (198 lb)   SpO2 97%   BMI 26.85 kg/m   Estimated body mass index is 26.85 kg/m  as calculated from the following:    Height as of this encounter: 1.829 m (6').    Weight as of this encounter: 89.8 kg (198 lb).  Physical Exam  GENERAL: healthy, alert and no distress  EYES: Eyes grossly normal to inspection, PERRL and conjunctivae and sclerae normal  HENT: ear canals and TM's normal, nose and mouth without ulcers or lesions  NECK: no adenopathy, no asymmetry, masses, or scars and thyroid normal to palpation  RESP: lungs clear to auscultation - no rales, rhonchi or  wheezes  CV: regular rate and rhythm, normal S1 S2, no S3 or S4, no murmur, click or rub, no peripheral edema and peripheral pulses strong  ABDOMEN: soft, nontender, no hepatosplenomegaly, no masses and bowel sounds normal  MS: no gross musculoskeletal defects noted, no edema  SKIN: no suspicious lesions or rashes  NEURO: Normal strength and tone, mentation intact and speech normal, tremor of hands.   PSYCH: mentation appears normal, affect normal/bright    Diagnostic Test Results:  Labs reviewed in Epic    ASSESSMENT / PLAN:       ICD-10-CM    1. Prostatic hemorrhage  N42.1 finasteride (PROSCAR) 5 MG tablet     2. Encounter for preventive health care examination    3. Essential HTN    4. Diabetes mellitus type 2    Patient has been advised of split billing requirements and indicates understanding: Yes  COUNSELING:  Reviewed preventive health counseling, as reflected in patient instructions       Regular exercise       Healthy diet/nutrition       Vision screening       Hearing screening       Colon cancer screening       Prostate cancer screening    Estimated body mass index is 26.85 kg/m  as calculated from the following:    Height as of this encounter: 1.829 m (6').    Weight as of this encounter: 89.8 kg (198 lb).        He reports that he has never smoked. He has never used smokeless tobacco.      Appropriate preventive services were discussed with this patient, including applicable screening as appropriate for cardiovascular disease, diabetes, osteopenia/osteoporosis, and glaucoma.  As appropriate for age/gender, discussed screening for colorectal cancer, prostate cancer, breast cancer, and cervical cancer. Checklist reviewing preventive services available has been given to the patient.    Reviewed patients plan of care and provided an AVS. The Intermediate Care Plan ( asthma action plan, low back pain action plan, and migraine action plan) for Kyler meets the Care Plan requirement. This Care Plan has been  established and reviewed with the Patient.    Counseling Resources:  ATP IV Guidelines  Pooled Cohorts Equation Calculator  Breast Cancer Risk Calculator  Breast Cancer: Medication to Reduce Risk  FRAX Risk Assessment  ICSI Preventive Guidelines  Dietary Guidelines for Americans, 2010  USDA's MyPlate  ASA Prophylaxis  Lung CA Screening    Rakesh Clark MD  [facility]    Identified Health Risks:

## 2021-02-09 LAB
ALBUMIN SERPL-MCNC: 4.2 G/DL (ref 3.4–5)
ALP SERPL-CCNC: 78 U/L (ref 40–150)
ALT SERPL W P-5'-P-CCNC: 9 U/L (ref 0–70)
ANION GAP SERPL CALCULATED.3IONS-SCNC: 6 MMOL/L (ref 3–14)
AST SERPL W P-5'-P-CCNC: 36 U/L (ref 0–45)
BILIRUB SERPL-MCNC: 1.2 MG/DL (ref 0.2–1.3)
BUN SERPL-MCNC: 8 MG/DL (ref 7–30)
CALCIUM SERPL-MCNC: 10.4 MG/DL (ref 8.5–10.1)
CHLORIDE SERPL-SCNC: 99 MMOL/L (ref 94–109)
CHOLEST SERPL-MCNC: 162 MG/DL
CO2 SERPL-SCNC: 27 MMOL/L (ref 20–32)
CREAT SERPL-MCNC: 0.73 MG/DL (ref 0.66–1.25)
CREAT UR-MCNC: 81 MG/DL
GFR SERPL CREATININE-BSD FRML MDRD: >90 ML/MIN/{1.73_M2}
GLUCOSE SERPL-MCNC: 119 MG/DL (ref 70–99)
HDLC SERPL-MCNC: 115 MG/DL
LDLC SERPL CALC-MCNC: 26 MG/DL
MICROALBUMIN UR-MCNC: 90 MG/L
MICROALBUMIN/CREAT UR: 110.59 MG/G CR (ref 0–17)
NONHDLC SERPL-MCNC: 47 MG/DL
POTASSIUM SERPL-SCNC: 4.3 MMOL/L (ref 3.4–5.3)
PROT SERPL-MCNC: 7.6 G/DL (ref 6.8–8.8)
PSA SERPL-ACNC: 0.03 UG/L (ref 0–4)
SODIUM SERPL-SCNC: 132 MMOL/L (ref 133–144)
TRIGL SERPL-MCNC: 105 MG/DL
TSH SERPL DL<=0.005 MIU/L-ACNC: 3.8 MU/L (ref 0.4–4)

## 2021-02-11 ENCOUNTER — HOSPITAL ENCOUNTER (OUTPATIENT)
Dept: GENERAL RADIOLOGY | Facility: CLINIC | Age: 76
End: 2021-02-11
Attending: UROLOGY
Payer: MEDICARE

## 2021-02-11 ENCOUNTER — HOSPITAL ENCOUNTER (OUTPATIENT)
Dept: CT IMAGING | Facility: CLINIC | Age: 76
End: 2021-02-11
Attending: UROLOGY
Payer: MEDICARE

## 2021-02-11 DIAGNOSIS — C64.1 RENAL MALIGNANT NEOPLASM, RIGHT (H): ICD-10-CM

## 2021-02-11 PROCEDURE — 250N000009 HC RX 250: Performed by: UROLOGY

## 2021-02-11 PROCEDURE — 74178 CT ABD&PLV WO CNTR FLWD CNTR: CPT | Mod: MG

## 2021-02-11 PROCEDURE — 250N000011 HC RX IP 250 OP 636: Performed by: UROLOGY

## 2021-02-11 PROCEDURE — 71046 X-RAY EXAM CHEST 2 VIEWS: CPT

## 2021-02-11 RX ORDER — IOPAMIDOL 755 MG/ML
500 INJECTION, SOLUTION INTRAVASCULAR ONCE
Status: COMPLETED | OUTPATIENT
Start: 2021-02-11 | End: 2021-02-11

## 2021-02-11 RX ADMIN — SODIUM CHLORIDE 66 ML: 9 INJECTION, SOLUTION INTRAVENOUS at 10:01

## 2021-02-11 RX ADMIN — IOPAMIDOL 100 ML: 755 INJECTION, SOLUTION INTRAVENOUS at 10:00

## 2021-02-15 ENCOUNTER — OFFICE VISIT (OUTPATIENT)
Dept: UROLOGY | Facility: CLINIC | Age: 76
End: 2021-02-15
Payer: MEDICARE

## 2021-02-15 VITALS
DIASTOLIC BLOOD PRESSURE: 82 MMHG | BODY MASS INDEX: 26.68 KG/M2 | HEIGHT: 72 IN | WEIGHT: 197 LBS | SYSTOLIC BLOOD PRESSURE: 122 MMHG

## 2021-02-15 DIAGNOSIS — C64.1 RENAL MALIGNANT NEOPLASM, RIGHT (H): Primary | ICD-10-CM

## 2021-02-15 DIAGNOSIS — C61 PROSTATE CANCER (H): ICD-10-CM

## 2021-02-15 PROCEDURE — 99214 OFFICE O/P EST MOD 30 MIN: CPT | Performed by: UROLOGY

## 2021-02-15 ASSESSMENT — MIFFLIN-ST. JEOR: SCORE: 1671.59

## 2021-02-15 ASSESSMENT — PAIN SCALES - GENERAL: PAINLEVEL: NO PAIN (0)

## 2021-02-15 NOTE — LETTER
2/15/2021       RE: Kyler Rodriguez  [address] [facility] [address]     Dear Colleague,    Thank you for referring your patient, Kyler Rodriguez, to the [facility] at [facility]. Please see a copy of my visit note below.    [place]   CHIEF COMPLAINT   It was my pleasure to see Kyler Rodriguez who is a 75 year old male for follow-up of s/p RIGHT PNx.      HPI   Kyler Rodriguez is a very pleasant 75 year old male who presents with a history of prostate cancer s/p radiation and RIGHT renal mass now s/p robotic partial nephrectomy.    ##Kidney Cancer Follow up##  Patient is status post Robotic Partial Nephrectomy on 9/4/19.   Tumor was on the right side.   Maximal tumor dimension was 1.2 cm.    The histologic subtype was Clear Cell.    ISUP Grade 2.    Pathologic Stage T1a.    Tumor thrombus level 0 (renal vein).    Tumor necrosis present: No.  Sarcomatoid features present: No.  Lymph Nodes Removed No.   Number of nodes removed n/a, number of node positive for cancer n/a.   Was the ipsilateral adrenal gland removed? No.  Neoadjuvant Chemotherapy? No.  Was Margin Positive? No.    There were not deviations from a normal post-operative course or complications?.    The patient was not readmitted to the hospital since post-operative discharge.    TODAY:  He is doing well with no issues.   He is still waiting for his COVID vaccine     PHYSICAL EXAM  Patient is a 75 year old  male   Vitals: Blood pressure 122/82, height 1.829 m (6'), weight 89.4 kg (197 lb).  General Appearance Adult: Body mass index is 26.72 kg/m .  Alert, no acute distress, oriented  HENT: throat/mouth:normal, good dentition  Lungs: no respiratory distress, or pursed lip breathing  Heart: No obvious jugular venous distension present  Abdomen: soft, nontender, no organomegaly or masses  Musculoskeltal: extremities normal, no peripheral edema  Skin: no suspicious lesions or  rashes  Neuro: Alert, oriented, speech and mentation normal  Psych: affect and mood normal  Gait: Normal    Component PSA PSA Diag Urologic Phys   Latest Ref Rng & Units 0 - 4 ug/L 0.00 - 4.00 ng/mL   8/18/2008 15.40 (H)    2/4/2015 0.1    2/3/2017  0.04   8/16/2017  <0.04   2/13/2018 0.02    8/17/2018  <0.04   3/13/2019  <0.04   2/5/2020 0.02    2/8/2021 0.03      Creatinine   Date Value Ref Range Status   02/08/2021 0.73 0.66 - 1.25 mg/dL Final      PATHOLOGY:    FINAL DIAGNOSIS:   A: Kidney, right, renal mass base, margin, excision   - Negative for malignancy     B: Kidney, right, partial nephrectomy   - Clear cell renal cell carcinoma, WHO/ISUP grade 2   - Tumor size: 1.2 cm   - Renal parenchymal margin negative   - Tumor extends to the inked, cauterized margin of the perirenal tissue   (see comment)   - Please see below for tumor synopsis     IMAGING:  I reviewed all pertinent available imaging    CT ABD/PEL 2/11/21:  FINDINGS: A 0.3 cm right middle lobe nodule is present on series 13,  image 10 which is stable. 0.4 cm left lower lobe nodule is present on  image 4 which is also unchanged. Lungs are otherwise clear. Mild  elevation right hemidiaphragm is stable with subtle atelectasis or  scarring posterior right lung base.     Abdomen: The liver, spleen, gallbladder, pancreas and adrenal glands  are unremarkable. Probable flash filling hemangiomas right hepatic  lobe on series 13, images 7 and 24 each measure less than 1 cm. No  other abnormal arterial enhancing lesions are present. No abnormal  enhancement on portal venous phase images. The gallbladder, spleen,  pancreas and adrenal glands are unremarkable. No bowel obstruction or  diverticulitis. Appendix is normal. No enlarged lymph nodes. Aorta  demonstrates scattered calcified plaque without aneurysm.     Right urinary tract:  No hydronephrosis or renal calculi. Postop  changes from partial nephrectomy upper pole shows no evidence of  recurrent soft tissue  mass. Collecting system and ureter are  unremarkable. Small subcentimeter cyst medial right kidney on series  19, image 95. No abnormal enhancement. No further follow-up required.     Left urinary tract: No enhancing renal mass. A few subcentimeter  low-attenuation lesions left renal cortex are noted, probably cysts.  No further follow-up required. No collecting system or ureteral stones  are present. There is no hydronephrosis. Delayed imaging demonstrates  a normal left renal collecting system and ureter. Ureter is normal in  caliber and course.     Bladder:  Circumferential bladder wall thickening is present, possibly  reflecting chronic bladder outlet obstruction or UTI. Neurogenic  bladder could appear similar. Small posterior right bladder  diverticulum noted on series 19, image 184. Prostate gland does not  appear significantly enlarged.     Pelvis:  Rectum is partially decompressed but otherwise unremarkable.  No pelvic free fluid or mass. No enlarged pelvic lymph nodes.                                                      IMPRESSION:    1. No evidence of recurrent mass in the region of partial right  nephrectomy. Kidneys are otherwise unremarkable with probable tiny  renal cortical cysts too small to characterize by CT.  2. Circumferential bladder wall thickening possibly related to chronic  bladder outlet obstruction. No significant prostate gland enlargement  is evident. Recurrent UTI and/or neurogenic bladder could appear  similar.  3. Abdominal organs are otherwise within normal limits. No bowel  obstruction. No enlarged lymph nodes.    CHEST X-RAY 2/11/21:  IMPRESSION: PA and lateral views of the chest. Lungs are clear. Heart  is normal in size. No effusions are evident. No pneumothorax. Mild  degenerative curvature thoracic spine is unchanged.      ASSESSMENT and PLAN  75 year old man with history of prostate cancer s/p radiation therapy and hormonal therapy several years ago and now s/p RIGHT robotic  pnx on 9/4/19    Right clear cell renal cell  - We discussed that his pathology was reassuring with pT1a disease. Reported as positive margin, however this is at the capsule with no evidence of capsular invasion  -We reviewed his surveillance imaging which was negative for any evidence of recurrence or metastasis  -We will plan for follow-up in 1 year with a repeat CT scan and chest x-ray prior  - we reviewed his Cr which is normal and stable    Prostate cancer   -We reviewed his PSA history and noted that his last PSA was 0.03 which is not concerning  -Recommend continued annual PSA monitoring    Time spent: 10 minutes spent on the date of the encounter doing chart review, history and exam, documentation and further activities as noted above.     Binu Yoon MD   Urology  [facility]  Clinic Phone [phone]

## 2021-02-15 NOTE — PROGRESS NOTES
[place]   CHIEF COMPLAINT   It was my pleasure to see Kyler Rodriguez who is a 75 year old male for follow-up of s/p RIGHT PNx.      HPI   Kyler Rodriguez is a very pleasant 75 year old male who presents with a history of prostate cancer s/p radiation and RIGHT renal mass now s/p robotic partial nephrectomy.    ##Kidney Cancer Follow up##  Patient is status post Robotic Partial Nephrectomy on 9/4/19.   Tumor was on the right side.   Maximal tumor dimension was 1.2 cm.    The histologic subtype was Clear Cell.    ISUP Grade 2.    Pathologic Stage T1a.    Tumor thrombus level 0 (renal vein).    Tumor necrosis present: No.  Sarcomatoid features present: No.  Lymph Nodes Removed No.   Number of nodes removed n/a, number of node positive for cancer n/a.   Was the ipsilateral adrenal gland removed? No.  Neoadjuvant Chemotherapy? No.  Was Margin Positive? No.    There were not deviations from a normal post-operative course or complications?.    The patient was not readmitted to the hospital since post-operative discharge.    TODAY:  He is doing well with no issues.   He is still waiting for his COVID vaccine     PHYSICAL EXAM  Patient is a 75 year old  male   Vitals: Blood pressure 122/82, height 1.829 m (6'), weight 89.4 kg (197 lb).  General Appearance Adult: Body mass index is 26.72 kg/m .  Alert, no acute distress, oriented  HENT: throat/mouth:normal, good dentition  Lungs: no respiratory distress, or pursed lip breathing  Heart: No obvious jugular venous distension present  Abdomen: soft, nontender, no organomegaly or masses  Musculoskeltal: extremities normal, no peripheral edema  Skin: no suspicious lesions or rashes  Neuro: Alert, oriented, speech and mentation normal  Psych: affect and mood normal  Gait: Normal    Component PSA PSA Diag Urologic Phys   Latest Ref Rng & Units 0 - 4 ug/L 0.00 - 4.00 ng/mL   8/18/2008 15.40 (H)    2/4/2015 0.1    2/3/2017  0.04   8/16/2017  <0.04   2/13/2018 0.02    8/17/2018  <0.04    3/13/2019  <0.04   2/5/2020 0.02    2/8/2021 0.03      Creatinine   Date Value Ref Range Status   02/08/2021 0.73 0.66 - 1.25 mg/dL Final      PATHOLOGY:    FINAL DIAGNOSIS:   A: Kidney, right, renal mass base, margin, excision   - Negative for malignancy     B: Kidney, right, partial nephrectomy   - Clear cell renal cell carcinoma, WHO/ISUP grade 2   - Tumor size: 1.2 cm   - Renal parenchymal margin negative   - Tumor extends to the inked, cauterized margin of the perirenal tissue   (see comment)   - Please see below for tumor synopsis     IMAGING:  I reviewed all pertinent available imaging    CT ABD/PEL 2/11/21:  FINDINGS: A 0.3 cm right middle lobe nodule is present on series 13,  image 10 which is stable. 0.4 cm left lower lobe nodule is present on  image 4 which is also unchanged. Lungs are otherwise clear. Mild  elevation right hemidiaphragm is stable with subtle atelectasis or  scarring posterior right lung base.     Abdomen: The liver, spleen, gallbladder, pancreas and adrenal glands  are unremarkable. Probable flash filling hemangiomas right hepatic  lobe on series 13, images 7 and 24 each measure less than 1 cm. No  other abnormal arterial enhancing lesions are present. No abnormal  enhancement on portal venous phase images. The gallbladder, spleen,  pancreas and adrenal glands are unremarkable. No bowel obstruction or  diverticulitis. Appendix is normal. No enlarged lymph nodes. Aorta  demonstrates scattered calcified plaque without aneurysm.     Right urinary tract:  No hydronephrosis or renal calculi. Postop  changes from partial nephrectomy upper pole shows no evidence of  recurrent soft tissue mass. Collecting system and ureter are  unremarkable. Small subcentimeter cyst medial right kidney on series  19, image 95. No abnormal enhancement. No further follow-up required.     Left urinary tract: No enhancing renal mass. A few subcentimeter  low-attenuation lesions left renal cortex are noted,  probably cysts.  No further follow-up required. No collecting system or ureteral stones  are present. There is no hydronephrosis. Delayed imaging demonstrates  a normal left renal collecting system and ureter. Ureter is normal in  caliber and course.     Bladder:  Circumferential bladder wall thickening is present, possibly  reflecting chronic bladder outlet obstruction or UTI. Neurogenic  bladder could appear similar. Small posterior right bladder  diverticulum noted on series 19, image 184. Prostate gland does not  appear significantly enlarged.     Pelvis:  Rectum is partially decompressed but otherwise unremarkable.  No pelvic free fluid or mass. No enlarged pelvic lymph nodes.                                                      IMPRESSION:    1. No evidence of recurrent mass in the region of partial right  nephrectomy. Kidneys are otherwise unremarkable with probable tiny  renal cortical cysts too small to characterize by CT.  2. Circumferential bladder wall thickening possibly related to chronic  bladder outlet obstruction. No significant prostate gland enlargement  is evident. Recurrent UTI and/or neurogenic bladder could appear  similar.  3. Abdominal organs are otherwise within normal limits. No bowel  obstruction. No enlarged lymph nodes.    CHEST X-RAY 2/11/21:  IMPRESSION: PA and lateral views of the chest. Lungs are clear. Heart  is normal in size. No effusions are evident. No pneumothorax. Mild  degenerative curvature thoracic spine is unchanged.      ASSESSMENT and PLAN  75 year old man with history of prostate cancer s/p radiation therapy and hormonal therapy several years ago and now s/p RIGHT robotic pnx on 9/4/19    Right clear cell renal cell  - We discussed that his pathology was reassuring with pT1a disease. Reported as positive margin, however this is at the capsule with no evidence of capsular invasion  -We reviewed his surveillance imaging which was negative for any evidence of recurrence  or metastasis  -We will plan for follow-up in 1 year with a repeat CT scan and chest x-ray prior  - we reviewed his Cr which is normal and stable    Prostate cancer   -We reviewed his PSA history and noted that his last PSA was 0.03 which is not concerning  -Recommend continued annual PSA monitoring    Time spent: 10 minutes spent on the date of the encounter doing chart review, history and exam, documentation and further activities as noted above.     Binu Yoon MD   Urology  [facility]  Clinic Phone [phone]

## 2021-03-24 DIAGNOSIS — E78.5 HYPERLIPIDEMIA LDL GOAL <100: ICD-10-CM

## 2021-03-24 DIAGNOSIS — I10 ESSENTIAL HYPERTENSION, BENIGN: ICD-10-CM

## 2021-03-26 RX ORDER — ATENOLOL 25 MG/1
TABLET ORAL
Qty: 90 TABLET | Refills: 2 | Status: SHIPPED | OUTPATIENT
Start: 2021-03-26 | End: 2021-11-02

## 2021-03-26 RX ORDER — ATORVASTATIN CALCIUM 80 MG/1
TABLET, FILM COATED ORAL
Qty: 45 TABLET | Refills: 2 | Status: SHIPPED | OUTPATIENT
Start: 2021-03-26 | End: 2021-11-02

## 2021-03-26 NOTE — TELEPHONE ENCOUNTER
Pending Prescriptions:                       Disp   Refills    atorvastatin (LIPITOR) 80 MG tablet [Phar*45 tab*2            Sig: TAKE 1/2 TABLET EVERY DAY    atenolol (TENORMIN) 25 MG tablet [Pharmac*90 tab*2            Sig: TAKE 1 TABLET EVERY DAY    Prescription approved per [facility] Refill Protocol.

## 2021-05-30 DIAGNOSIS — E11.21 TYPE 2 DIABETES MELLITUS WITH DIABETIC NEPHROPATHY, WITHOUT LONG-TERM CURRENT USE OF INSULIN (H): ICD-10-CM

## 2021-06-14 ENCOUNTER — TRANSFERRED RECORDS (OUTPATIENT)
Dept: HEALTH INFORMATION MANAGEMENT | Facility: CLINIC | Age: 76
End: 2021-06-14

## 2021-08-11 DIAGNOSIS — E11.21 TYPE 2 DIABETES MELLITUS WITH DIABETIC NEPHROPATHY, WITHOUT LONG-TERM CURRENT USE OF INSULIN (H): ICD-10-CM

## 2021-08-11 NOTE — LETTER
[facility]  [address]  [facility] [address]  Phone: [phone]        August 12, 2021      Kyler MARTIN Jennifer                                                                                                                                [address] [facility] [address]            Dear Mr. Rodriguez,    We recently provided you with a medication refill.  Many medications require routine lab work and follow-up with your Doctor.      At this time we ask that: You schedule a routine office visit with your physician to follow your diabetes.  You need to be seen every 6 months with diabetes, sometimes more often depending on your lab work.  Please call [phone] to schedule an appointment with Dr. Clark.      Your prescription: Has been refilled for a 90 day supply so you may have time for the above noted follow-up.      Thank you,      [facility]

## 2021-08-12 NOTE — TELEPHONE ENCOUNTER
"Requested Prescriptions   Pending Prescriptions Disp Refills     metFORMIN (GLUCOPHAGE) 1000 MG tablet [Pharmacy Med Name: METFORMIN HYDROCHLORIDE 1000 MG Tablet] 180 tablet 0     Sig: TAKE 1 TABLET TWICE DAILY WITH BREAKFAST  AND WITH DINNER       Biguanide Agents Failed - 8/11/2021 11:59 AM        Failed - Patient has documented A1c within the specified period of time.     If HgbA1C is 8 or greater, it needs to be on file within the past 3 months.  If less than 8, must be on file within the past 6 months.     Recent Labs   Lab Test 02/08/21 0951   A1C 5.2             Failed - Recent (6 mo) or future (30 days) visit within the authorizing provider's specialty     Patient had office visit in the last 6 months or has a visit in the next 30 days with authorizing provider or within the authorizing provider's specialty.  See \"Patient Info\" tab in inbasket, or \"Choose Columns\" in Meds & Orders section of the refill encounter.            Passed - Patient is age 10 or older        Passed - Patient's CR is NOT>1.4 OR Patient's EGFR is NOT<45 within past 12 mos.     Recent Labs   Lab Test 02/08/21 0951   GFRESTIMATED >90   GFRESTBLACK >90       Recent Labs   Lab Test 02/08/21 0951   CR 0.73             Passed - Patient does NOT have a diagnosis of CHF.        Passed - Medication is active on med list             "

## 2021-09-04 ENCOUNTER — HEALTH MAINTENANCE LETTER (OUTPATIENT)
Age: 76
End: 2021-09-04

## 2021-09-21 ENCOUNTER — OFFICE VISIT (OUTPATIENT)
Dept: INTERNAL MEDICINE | Facility: CLINIC | Age: 76
End: 2021-09-21
Payer: MEDICARE

## 2021-09-21 VITALS
WEIGHT: 192 LBS | DIASTOLIC BLOOD PRESSURE: 78 MMHG | TEMPERATURE: 98.3 F | BODY MASS INDEX: 26.01 KG/M2 | HEART RATE: 65 BPM | OXYGEN SATURATION: 98 % | HEIGHT: 72 IN | SYSTOLIC BLOOD PRESSURE: 131 MMHG

## 2021-09-21 DIAGNOSIS — E78.5 HYPERLIPIDEMIA LDL GOAL <100: ICD-10-CM

## 2021-09-21 DIAGNOSIS — Z23 NEED FOR PROPHYLACTIC VACCINATION AND INOCULATION AGAINST INFLUENZA: ICD-10-CM

## 2021-09-21 DIAGNOSIS — E11.21 TYPE 2 DIABETES MELLITUS WITH DIABETIC NEPHROPATHY, WITHOUT LONG-TERM CURRENT USE OF INSULIN (H): Primary | ICD-10-CM

## 2021-09-21 DIAGNOSIS — G20.C PARKINSONISM, UNSPECIFIED PARKINSONISM TYPE (H): ICD-10-CM

## 2021-09-21 DIAGNOSIS — I10 ESSENTIAL HYPERTENSION, BENIGN: ICD-10-CM

## 2021-09-21 LAB
ERYTHROCYTE [DISTWIDTH] IN BLOOD BY AUTOMATED COUNT: 12 % (ref 10–15)
HBA1C MFR BLD: 5.1 % (ref 0–5.6)
HCT VFR BLD AUTO: 47.6 % (ref 40–53)
HGB BLD-MCNC: 15.8 G/DL (ref 13.3–17.7)
MCH RBC QN AUTO: 32.2 PG (ref 26.5–33)
MCHC RBC AUTO-ENTMCNC: 33.2 G/DL (ref 31.5–36.5)
MCV RBC AUTO: 97 FL (ref 78–100)
PLATELET # BLD AUTO: 209 10E3/UL (ref 150–450)
RBC # BLD AUTO: 4.91 10E6/UL (ref 4.4–5.9)
WBC # BLD AUTO: 6.8 10E3/UL (ref 4–11)

## 2021-09-21 PROCEDURE — 85027 COMPLETE CBC AUTOMATED: CPT | Performed by: INTERNAL MEDICINE

## 2021-09-21 PROCEDURE — 90662 IIV NO PRSV INCREASED AG IM: CPT | Performed by: INTERNAL MEDICINE

## 2021-09-21 PROCEDURE — 83036 HEMOGLOBIN GLYCOSYLATED A1C: CPT | Performed by: INTERNAL MEDICINE

## 2021-09-21 PROCEDURE — 99214 OFFICE O/P EST MOD 30 MIN: CPT | Mod: 25 | Performed by: INTERNAL MEDICINE

## 2021-09-21 PROCEDURE — G0008 ADMIN INFLUENZA VIRUS VAC: HCPCS | Performed by: INTERNAL MEDICINE

## 2021-09-21 PROCEDURE — 36415 COLL VENOUS BLD VENIPUNCTURE: CPT | Performed by: INTERNAL MEDICINE

## 2021-09-21 PROCEDURE — 80053 COMPREHEN METABOLIC PANEL: CPT | Performed by: INTERNAL MEDICINE

## 2021-09-21 ASSESSMENT — MIFFLIN-ST. JEOR: SCORE: 1643.91

## 2021-09-21 NOTE — LETTER
September 23, 2021      Kyler Rodriguez  [address] [facility] [address]        Dear ,    We are writing to inform you of your test results.    Your test results fall within the expected range(s) or remain unchanged from previous results.  Please continue with current treatment plan.    Resulted Orders   CBC with platelets   Result Value Ref Range    WBC Count 6.8 4.0 - 11.0 10e3/uL    RBC Count 4.91 4.40 - 5.90 10e6/uL    Hemoglobin 15.8 13.3 - 17.7 g/dL    Hematocrit 47.6 40.0 - 53.0 %    MCV 97 78 - 100 fL    MCH 32.2 26.5 - 33.0 pg    MCHC 33.2 31.5 - 36.5 g/dL    RDW 12.0 10.0 - 15.0 %    Platelet Count 209 150 - 450 10e3/uL   Comprehensive metabolic panel (BMP + Alb, Alk Phos, ALT, AST, Total. Bili, TP)   Result Value Ref Range    Sodium 135 133 - 144 mmol/L    Potassium 4.3 3.4 - 5.3 mmol/L    Chloride 103 94 - 109 mmol/L    Carbon Dioxide (CO2) 32 20 - 32 mmol/L    Anion Gap <1 (L) 3 - 14 mmol/L    Urea Nitrogen 9 7 - 30 mg/dL    Creatinine 0.78 0.66 - 1.25 mg/dL    Calcium 8.8 8.5 - 10.1 mg/dL    Glucose 108 (H) 70 - 99 mg/dL    Alkaline Phosphatase 104 40 - 150 U/L    AST 18 0 - 45 U/L    ALT 10 0 - 70 U/L    Protein Total 7.4 6.8 - 8.8 g/dL    Albumin 3.8 3.4 - 5.0 g/dL    Bilirubin Total 0.8 0.2 - 1.3 mg/dL    GFR Estimate 88 >60 mL/min/1.73m2      Comment:      As of July 11, 2021, eGFR is calculated by the CKD-EPI creatinine equation, without race adjustment. eGFR can be influenced by muscle mass, exercise, and diet. The reported eGFR is an estimation only and is only applicable if the renal function is stable.   Hemoglobin A1c   Result Value Ref Range    Hemoglobin A1C 5.1 0.0 - 5.6 %      Comment:      Normal <5.7%   Prediabetes 5.7-6.4%    Diabetes 6.5% or higher     Note: Adopted from ADA consensus guidelines.       If you have any questions or concerns, please call the clinic at the number listed above.       Sincerely,      Rakesh Clark MD

## 2021-09-21 NOTE — PROGRESS NOTES
Assessment & Plan     Parkinsonism, unspecified Parkinsonism type (H)  Follow up with neurology     Essential hypertension, benign  Assess lab, controlled   - CBC with platelets  - Comprehensive metabolic panel (BMP + Alb, Alk Phos, ALT, AST, Total. Bili, TP)    Type 2 diabetes mellitus with diabetic nephropathy, without long-term current use of insulin (H)  Controlled on treatment   - Hemoglobin A1c    Hyperlipidemia LDL goal <100  Cont statin                BMI:   Estimated body mass index is 26.04 kg/m  as calculated from the following:    Height as of this encounter: 1.829 m (6').    Weight as of this encounter: 87.1 kg (192 lb).       See Patient Instructions    Return in about 6 months (around 3/21/2022) for Physical Exam.    Rakesh Clark MD  [facility] DANDY Chávez is a 76 year old who presents for the following health issues     HPI     Diabetes Follow-up  Has H/O DM. On diet , exercise and PO treatment. Blood sugars are controlled. No parestesias. No hypoglycemias.      How often are you checking your blood sugar? Not at all    What concerns do you have today about your diabetes? None     Do you have any of these symptoms? (Select all that apply)  Weight loss 5lbs        Hyperlipidemia Follow-Up  Has H/O hyperlipidemia. On medical treatment and diet. No side effects. No muscle weakness, myalgias or upset stomach.       Are you regularly taking any medication or supplement to lower your cholesterol?   Yes- Atorvastatin    Are you having muscle aches or other side effects that you think could be caused by your cholesterol lowering medication?  No    Hypertension Follow-up  Has h/o HTN. on medical treatment. BP has been controlled. No side effects from medications. No CP, HA, dizziness. good compliance with medications and low salt diet.      Do you check your blood pressure regularly outside of the clinic? No     Are you following a low salt diet? No    Are your  blood pressures ever more than 140 on the top number (systolic) OR more   than 90 on the bottom number (diastolic), for example 140/90? N/A    BP Readings from Last 2 Encounters:   09/21/21 131/78   02/15/21 122/82     Hemoglobin A1C (%)   Date Value   02/08/2021 5.2   09/23/2020 5.5     LDL Cholesterol Calculated (mg/dL)   Date Value   02/08/2021 26   02/05/2020 46       Has h/o Parkinson's disease. On Sinemet. Follows with neurology.     Concern for right hip pain, posterior     Review of Systems   Constitutional, HEENT, cardiovascular, pulmonary, gi and gu systems are negative, except as otherwise noted.      Objective    /78 (BP Location: Right arm, Patient Position: Sitting, Cuff Size: Adult Regular)   Pulse 65   Temp 98.3  F (36.8  C) (Oral)   Ht 1.829 m (6')   Wt 87.1 kg (192 lb)   SpO2 98%   BMI 26.04 kg/m    Body mass index is 26.04 kg/m .  Physical Exam   GENERAL: healthy, alert and no distress  EYES: Eyes grossly normal to inspection, PERRL and conjunctivae and sclerae normal  HENT: ear canals and TM's normal, nose and mouth without ulcers or lesions  NECK: no adenopathy, no asymmetry, masses, or scars and thyroid normal to palpation  RESP: lungs clear to auscultation - no rales, rhonchi or wheezes  CV: regular rate and rhythm, normal S1 S2, no S3 or S4, no murmur, click or rub, no peripheral edema and peripheral pulses strong  ABDOMEN: soft, nontender, no hepatosplenomegaly, no masses and bowel sounds normal  MS: no gross musculoskeletal defects noted, no edema  NEURO: Normal strength and tone, mentation intact and speech normal, resting hands tremor     Office Visit on 02/08/2021   Component Date Value Ref Range Status     WBC 02/08/2021 3.8* 4.0 - 11.0 10e9/L Final    Results confirmed by repeat test     RBC Count 02/08/2021 4.60  4.4 - 5.9 10e12/L Final     Hemoglobin 02/08/2021 14.8  13.3 - 17.7 g/dL Final     Hematocrit 02/08/2021 43.8  40.0 - 53.0 % Final     MCV 02/08/2021 40  78 - 100  fl Final     MCH 02/08/2021 32.2  26.5 - 33.0 pg Final     MCHC 02/08/2021 33.8  31.5 - 36.5 g/dL Final     RDW 02/08/2021 12.7  10.0 - 15.0 % Final     Platelet Count 02/08/2021 125* 150 - 450 10e9/L Final    Results confirmed by repeat test     Cholesterol 02/08/2021 162  <200 mg/dL Final     Triglycerides 02/08/2021 105  <150 mg/dL Final    Fasting specimen     HDL Cholesterol 02/08/2021 115  >39 mg/dL Final     LDL Cholesterol Calculated 02/08/2021 26  <100 mg/dL Final    Desirable:       <100 mg/dl     Non HDL Cholesterol 02/08/2021 47  <130 mg/dL Final     Sodium 02/08/2021 132* 133 - 144 mmol/L Final     Potassium 02/08/2021 4.3  3.4 - 5.3 mmol/L Final     Chloride 02/08/2021 99  94 - 109 mmol/L Final     Carbon Dioxide 02/08/2021 27  20 - 32 mmol/L Final     Anion Gap 02/08/2021 6  3 - 14 mmol/L Final     Glucose 02/08/2021 119* 70 - 99 mg/dL Final    Fasting specimen     Urea Nitrogen 02/08/2021 8  7 - 30 mg/dL Final     Creatinine 02/08/2021 0.73  0.66 - 1.25 mg/dL Final     GFR Estimate 02/08/2021 >90  >60 mL/min/[1.73_m2] Final    Comment: Non  GFR Calc  Starting 12/18/2018, serum creatinine based estimated GFR (eGFR) will be   calculated using the Chronic Kidney Disease Epidemiology Collaboration   (CKD-EPI) equation.       GFR Estimate If Black 02/08/2021 >90  >60 mL/min/[1.73_m2] Final    Comment:  GFR Calc  Starting 12/18/2018, serum creatinine based estimated GFR (eGFR) will be   calculated using the Chronic Kidney Disease Epidemiology Collaboration   (CKD-EPI) equation.       Calcium 02/08/2021 10.4* 8.5 - 10.1 mg/dL Final     Bilirubin Total 02/08/2021 1.2  0.2 - 1.3 mg/dL Final     Albumin 02/08/2021 4.2  3.4 - 5.0 g/dL Final     Protein Total 02/08/2021 7.6  6.8 - 8.8 g/dL Final     Alkaline Phosphatase 02/08/2021 78  40 - 150 U/L Final     ALT 02/08/2021 9  0 - 70 U/L Final     AST 02/08/2021 36  0 - 45 U/L Final     TSH 02/08/2021 3.80  0.40 - 4.00 mU/L Final      PSA 02/08/2021 0.03  0 - 4 ug/L Final    Assay Method:  Chemiluminescence using Siemens Vista analyzer     Hemoglobin A1C 02/08/2021 5.2  0 - 5.6 % Final    Comment: Normal <5.7% Prediabetes 5.7-6.4%  Diabetes 6.5% or higher - adopted from ADA   consensus guidelines.       Color Urine 02/08/2021 Yellow   Final     Appearance Urine 02/08/2021 Clear   Final     Glucose Urine 02/08/2021 Negative  NEG^Negative mg/dL Final     Bilirubin Urine 02/08/2021 Negative  NEG^Negative Final     Ketones Urine 02/08/2021 Negative  NEG^Negative mg/dL Final     Specific Gravity Urine 02/08/2021 1.020  1.003 - 1.035 Final     Blood Urine 02/08/2021 Negative  NEG^Negative Final     pH Urine 02/08/2021 8.5* 5.0 - 7.0 pH Final     Protein Albumin Urine 02/08/2021 Trace* NEG^Negative mg/dL Final     Urobilinogen Urine 02/08/2021 1.0  0.2 - 1.0 EU/dL Final     Nitrite Urine 02/08/2021 Negative  NEG^Negative Final     Leukocyte Esterase Urine 02/08/2021 Negative  NEG^Negative Final     Source 02/08/2021 Midstream Urine   Final     Creatinine Urine 02/08/2021 81  mg/dL Final     Albumin Urine mg/L 02/08/2021 90  mg/L Final     Albumin Urine mg/g Cr 02/08/2021 110.59* 0 - 17 mg/g Cr Final     WBC Urine 02/08/2021 0 - 5  OTO5^0 - 5 /HPF Final     RBC Urine 02/08/2021 O - 2  OTO2^O - 2 /HPF Final     Hyaline Casts 02/08/2021 O - 2  OTO2^O - 2 /LPF Final     Bacteria Urine 02/08/2021 Few* NEG^Negative /HPF Final     Mucous Urine 02/08/2021 Present* NEG^Negative /LPF Final

## 2021-09-22 LAB
ALBUMIN SERPL-MCNC: 3.8 G/DL (ref 3.4–5)
ALP SERPL-CCNC: 104 U/L (ref 40–150)
ALT SERPL W P-5'-P-CCNC: 10 U/L (ref 0–70)
ANION GAP SERPL CALCULATED.3IONS-SCNC: <1 MMOL/L (ref 3–14)
AST SERPL W P-5'-P-CCNC: 18 U/L (ref 0–45)
BILIRUB SERPL-MCNC: 0.8 MG/DL (ref 0.2–1.3)
BUN SERPL-MCNC: 9 MG/DL (ref 7–30)
CALCIUM SERPL-MCNC: 8.8 MG/DL (ref 8.5–10.1)
CHLORIDE BLD-SCNC: 103 MMOL/L (ref 94–109)
CO2 SERPL-SCNC: 32 MMOL/L (ref 20–32)
CREAT SERPL-MCNC: 0.78 MG/DL (ref 0.66–1.25)
GFR SERPL CREATININE-BSD FRML MDRD: 88 ML/MIN/1.73M2
GLUCOSE BLD-MCNC: 108 MG/DL (ref 70–99)
POTASSIUM BLD-SCNC: 4.3 MMOL/L (ref 3.4–5.3)
PROT SERPL-MCNC: 7.4 G/DL (ref 6.8–8.8)
SODIUM SERPL-SCNC: 135 MMOL/L (ref 133–144)

## 2021-11-10 ENCOUNTER — TRANSFERRED RECORDS (OUTPATIENT)
Dept: HEALTH INFORMATION MANAGEMENT | Facility: CLINIC | Age: 76
End: 2021-11-10
Payer: MEDICARE

## 2021-11-10 LAB — RETINOPATHY: NEGATIVE

## 2021-11-24 DIAGNOSIS — C61 PROSTATE CANCER (H): Primary | ICD-10-CM

## 2021-11-24 DIAGNOSIS — C64.1 RENAL MALIGNANT NEOPLASM, RIGHT (H): ICD-10-CM

## 2021-11-24 DIAGNOSIS — C64.9 RENAL CELL CANCER (H): Primary | ICD-10-CM

## 2022-03-09 ENCOUNTER — HOSPITAL ENCOUNTER (OUTPATIENT)
Dept: CT IMAGING | Facility: CLINIC | Age: 77
End: 2022-03-09
Attending: UROLOGY
Payer: MEDICARE

## 2022-03-09 ENCOUNTER — HOSPITAL ENCOUNTER (OUTPATIENT)
Dept: GENERAL RADIOLOGY | Facility: CLINIC | Age: 77
End: 2022-03-09
Attending: UROLOGY
Payer: MEDICARE

## 2022-03-09 DIAGNOSIS — C64.1 RENAL MALIGNANT NEOPLASM, RIGHT (H): ICD-10-CM

## 2022-03-09 LAB
CREAT BLD-MCNC: 0.8 MG/DL (ref 0.7–1.3)
GFR SERPL CREATININE-BSD FRML MDRD: >60 ML/MIN/1.73M2

## 2022-03-09 PROCEDURE — 250N000009 HC RX 250: Performed by: UROLOGY

## 2022-03-09 PROCEDURE — 82565 ASSAY OF CREATININE: CPT

## 2022-03-09 PROCEDURE — 71046 X-RAY EXAM CHEST 2 VIEWS: CPT

## 2022-03-09 PROCEDURE — G1004 CDSM NDSC: HCPCS

## 2022-03-09 PROCEDURE — 250N000011 HC RX IP 250 OP 636: Performed by: UROLOGY

## 2022-03-09 RX ORDER — IOPAMIDOL 755 MG/ML
500 INJECTION, SOLUTION INTRAVASCULAR ONCE
Status: COMPLETED | OUTPATIENT
Start: 2022-03-09 | End: 2022-03-09

## 2022-03-09 RX ADMIN — IOPAMIDOL 96 ML: 755 INJECTION, SOLUTION INTRAVENOUS at 13:15

## 2022-03-09 RX ADMIN — SODIUM CHLORIDE 60 ML: 9 INJECTION, SOLUTION INTRAVENOUS at 13:15

## 2022-03-11 ENCOUNTER — OFFICE VISIT (OUTPATIENT)
Dept: UROLOGY | Facility: CLINIC | Age: 77
End: 2022-03-11
Payer: MEDICARE

## 2022-03-11 VITALS
DIASTOLIC BLOOD PRESSURE: 60 MMHG | SYSTOLIC BLOOD PRESSURE: 130 MMHG | WEIGHT: 200 LBS | OXYGEN SATURATION: 97 % | HEART RATE: 66 BPM | HEIGHT: 72 IN | BODY MASS INDEX: 27.09 KG/M2

## 2022-03-11 DIAGNOSIS — C61 PROSTATE CANCER (H): ICD-10-CM

## 2022-03-11 DIAGNOSIS — C64.1 RENAL MALIGNANT NEOPLASM, RIGHT (H): Primary | ICD-10-CM

## 2022-03-11 LAB — PSA SERPL-MCNC: <0.04 UG/L (ref 0–4)

## 2022-03-11 PROCEDURE — 84153 ASSAY OF PSA TOTAL: CPT | Performed by: UROLOGY

## 2022-03-11 PROCEDURE — 36415 COLL VENOUS BLD VENIPUNCTURE: CPT | Performed by: UROLOGY

## 2022-03-11 PROCEDURE — 99214 OFFICE O/P EST MOD 30 MIN: CPT | Performed by: UROLOGY

## 2022-03-11 ASSESSMENT — PAIN SCALES - GENERAL: PAINLEVEL: NO PAIN (0)

## 2022-03-11 NOTE — LETTER
3/11/2022       RE: Kyler Rodriguez  [address] [facility] [address]     Dear Colleague,    Thank you for referring your patient, Kyler Rodriguez, to the [facility] at [facility]. Please see a copy of my visit note below.    [place]   CHIEF COMPLAINT   It was my pleasure to see Kyler Rodriguez who is a 76 year old male for follow-up of s/p RIGHT PNx.      HPI   Kyler Rodriguez is a very pleasant 76 year old male who presents with a history of prostate cancer s/p radiation and RIGHT renal mass now s/p robotic partial nephrectomy.    ##Kidney Cancer Follow up##  Patient is status post Robotic Partial Nephrectomy on 9/4/19.   Tumor was on the right side.   Maximal tumor dimension was 1.2 cm.    The histologic subtype was Clear Cell.    ISUP Grade 2.    Pathologic Stage T1a.    Tumor thrombus level 0 (renal vein).    Tumor necrosis present: No.  Sarcomatoid features present: No.  Lymph Nodes Removed No.   Number of nodes removed n/a, number of node positive for cancer n/a.   Was the ipsilateral adrenal gland removed? No.  Neoadjuvant Chemotherapy? No.  Was Margin Positive? No.    There were not deviations from a normal post-operative course or complications?.    The patient was not readmitted to the hospital since post-operative discharge.    2/15/21:  He is doing well with no issues.   He is still waiting for his COVID vaccine     TODAY 3/11/22:  He is doing well with no issues over the last year    PHYSICAL EXAM  Patient is a 76 year old  male   Vitals: Blood pressure 130/60, pulse 66, height 1.829 m (6'), weight 90.7 kg (200 lb), SpO2 97 %.  General Appearance Adult: Body mass index is 27.12 kg/m .  Alert, no acute distress, oriented  HENT: throat/mouth:normal, good dentition  Lungs: no respiratory distress, or pursed lip breathing  Heart: No obvious jugular venous distension present  Abdomen: soft, nontender, no organomegaly or  masses  Musculoskeltal: extremities normal, no peripheral edema  Skin: no suspicious lesions or rashes  Neuro: Alert, oriented, speech and mentation normal  Psych: affect and mood normal  Gait: Normal    Component PSA PSA Diag Urologic Phys   Latest Ref Rng & Units 0.00 - 4.00 ug/L 0.00 - 4.00 ng/mL   7/19/2004 10.85 (H)    10/13/2006 7.62 (H)    9/11/2007 10.30 (H)    8/18/2008 15.40 (H)    2/4/2015 0.1    2/3/2017  0.04   8/16/2017  <0.04   2/13/2018 0.02    8/17/2018  <0.04   3/13/2019  <0.04   2/5/2020 0.02    2/8/2021 0.03    3/11/2022 <0.04      Creatinine   Date Value Ref Range Status   09/21/2021 0.78 0.66 - 1.25 mg/dL Final   02/08/2021 0.73 0.66 - 1.25 mg/dL Final     Creatinine POCT   Date Value Ref Range Status   03/09/2022 0.8 0.7 - 1.3 mg/dL Final      PATHOLOGY:  FINAL DIAGNOSIS:   A: Kidney, right, renal mass base, margin, excision   - Negative for malignancy     B: Kidney, right, partial nephrectomy   - Clear cell renal cell carcinoma, WHO/ISUP grade 2   - Tumor size: 1.2 cm   - Renal parenchymal margin negative   - Tumor extends to the inked, cauterized margin of the perirenal tissue   (see comment)   - Please see below for tumor synopsis     IMAGING:  I reviewed all pertinent available imaging    CT ABD/PEL 2/11/21:                                  IMPRESSION:    1. No evidence of recurrent mass in the region of partial right  nephrectomy. Kidneys are otherwise unremarkable with probable tiny  renal cortical cysts too small to characterize by CT.  2. Circumferential bladder wall thickening possibly related to chronic  bladder outlet obstruction. No significant prostate gland enlargement  is evident. Recurrent UTI and/or neurogenic bladder could appear  similar.  3. Abdominal organs are otherwise within normal limits. No bowel  obstruction. No enlarged lymph nodes.    CHEST X-RAY 2/11/21:  IMPRESSION: PA and lateral views of the chest. Lungs are clear. Heart  is normal in size. No effusions are  evident. No pneumothorax. Mild  degenerative curvature thoracic spine is unchanged.    CT UROGRAM 3/9/22:  IMPRESSION: Previous partial right nephrectomy in the right kidney  without evidence of metastatic or recurrent disease.    CHEST X-RAY 3/9/22:  IMPRESSION: No radiographic evidence of acute chest abnormality.     ASSESSMENT and PLAN  76 year old man with history of prostate cancer s/p radiation therapy and hormonal therapy several years ago and now s/p RIGHT robotic pnx on 9/4/19    Right clear cell renal cell  - We discussed that his pathology was reassuring with pT1a disease. Reported as positive margin, however this is at the capsule with no evidence of capsular invasion  -We reviewed his surveillance imaging which was negative for any evidence of recurrence or metastasis  -I reviewed these images personally and agree with radiologist interpretation  -We will plan for follow-up in 1 year with a repeat CT scan and chest x-ray prior  - we reviewed his Cr which is normal and stable  -We discussed that following next years visit we will likely be able to finish surveillance imaging    Prostate cancer   -We reviewed his PSA history and noted that his last PSA was 0.03 which is not concerning  -Recommend continued annual PSA monitoring    Time spent: 10 minutes spent on the date of the encounter doing chart review, history and exam, documentation and further activities as noted above.     Binu Yoon MD   Urology  [facility]  Clinic Phone [phone]

## 2022-03-11 NOTE — PROGRESS NOTES
[place]   CHIEF COMPLAINT   It was my pleasure to see Kyler Rodriguez who is a 76 year old male for follow-up of s/p RIGHT PNx.      HPI   Kyler Rodriguez is a very pleasant 76 year old male who presents with a history of prostate cancer s/p radiation and RIGHT renal mass now s/p robotic partial nephrectomy.    ##Kidney Cancer Follow up##  Patient is status post Robotic Partial Nephrectomy on 9/4/19.   Tumor was on the right side.   Maximal tumor dimension was 1.2 cm.    The histologic subtype was Clear Cell.    ISUP Grade 2.    Pathologic Stage T1a.    Tumor thrombus level 0 (renal vein).    Tumor necrosis present: No.  Sarcomatoid features present: No.  Lymph Nodes Removed No.   Number of nodes removed n/a, number of node positive for cancer n/a.   Was the ipsilateral adrenal gland removed? No.  Neoadjuvant Chemotherapy? No.  Was Margin Positive? No.    There were not deviations from a normal post-operative course or complications?.    The patient was not readmitted to the hospital since post-operative discharge.    2/15/21:  He is doing well with no issues.   He is still waiting for his COVID vaccine     TODAY 3/11/22:  He is doing well with no issues over the last year    PHYSICAL EXAM  Patient is a 76 year old  male   Vitals: Blood pressure 130/60, pulse 66, height 1.829 m (6'), weight 90.7 kg (200 lb), SpO2 97 %.  General Appearance Adult: Body mass index is 27.12 kg/m .  Alert, no acute distress, oriented  HENT: throat/mouth:normal, good dentition  Lungs: no respiratory distress, or pursed lip breathing  Heart: No obvious jugular venous distension present  Abdomen: soft, nontender, no organomegaly or masses  Musculoskeltal: extremities normal, no peripheral edema  Skin: no suspicious lesions or rashes  Neuro: Alert, oriented, speech and mentation normal  Psych: affect and mood normal  Gait: Normal    Component PSA PSA Diag Urologic Phys   Latest Ref Rng & Units 0.00 - 4.00 ug/L 0.00 - 4.00 ng/mL   7/19/2004  10.85 (H)    10/13/2006 7.62 (H)    9/11/2007 10.30 (H)    8/18/2008 15.40 (H)    2/4/2015 0.1    2/3/2017  0.04   8/16/2017  <0.04   2/13/2018 0.02    8/17/2018  <0.04   3/13/2019  <0.04   2/5/2020 0.02    2/8/2021 0.03    3/11/2022 <0.04      Creatinine   Date Value Ref Range Status   09/21/2021 0.78 0.66 - 1.25 mg/dL Final   02/08/2021 0.73 0.66 - 1.25 mg/dL Final     Creatinine POCT   Date Value Ref Range Status   03/09/2022 0.8 0.7 - 1.3 mg/dL Final      PATHOLOGY:  FINAL DIAGNOSIS:   A: Kidney, right, renal mass base, margin, excision   - Negative for malignancy     B: Kidney, right, partial nephrectomy   - Clear cell renal cell carcinoma, WHO/ISUP grade 2   - Tumor size: 1.2 cm   - Renal parenchymal margin negative   - Tumor extends to the inked, cauterized margin of the perirenal tissue   (see comment)   - Please see below for tumor synopsis     IMAGING:  I reviewed all pertinent available imaging    CT ABD/PEL 2/11/21:                                  IMPRESSION:    1. No evidence of recurrent mass in the region of partial right  nephrectomy. Kidneys are otherwise unremarkable with probable tiny  renal cortical cysts too small to characterize by CT.  2. Circumferential bladder wall thickening possibly related to chronic  bladder outlet obstruction. No significant prostate gland enlargement  is evident. Recurrent UTI and/or neurogenic bladder could appear  similar.  3. Abdominal organs are otherwise within normal limits. No bowel  obstruction. No enlarged lymph nodes.    CHEST X-RAY 2/11/21:  IMPRESSION: PA and lateral views of the chest. Lungs are clear. Heart  is normal in size. No effusions are evident. No pneumothorax. Mild  degenerative curvature thoracic spine is unchanged.    CT UROGRAM 3/9/22:  IMPRESSION: Previous partial right nephrectomy in the right kidney  without evidence of metastatic or recurrent disease.    CHEST X-RAY 3/9/22:  IMPRESSION: No radiographic evidence of acute chest abnormality.      ASSESSMENT and PLAN  76 year old man with history of prostate cancer s/p radiation therapy and hormonal therapy several years ago and now s/p RIGHT robotic pnx on 9/4/19    Right clear cell renal cell  - We discussed that his pathology was reassuring with pT1a disease. Reported as positive margin, however this is at the capsule with no evidence of capsular invasion  -We reviewed his surveillance imaging which was negative for any evidence of recurrence or metastasis  -I reviewed these images personally and agree with radiologist interpretation  -We will plan for follow-up in 1 year with a repeat CT scan and chest x-ray prior  - we reviewed his Cr which is normal and stable  -We discussed that following next years visit we will likely be able to finish surveillance imaging    Prostate cancer   -We reviewed his PSA history and noted that his last PSA was 0.03 which is not concerning  -Recommend continued annual PSA monitoring    Time spent: 10 minutes spent on the date of the encounter doing chart review, history and exam, documentation and further activities as noted above.     Binu Yoon MD   Urology  [facility]  Clinic Phone [phone]

## 2022-03-11 NOTE — NURSING NOTE
Chief Complaint   Patient presents with     Prostate Cancer     PSA check and CT review   Chel Celeste LPN

## 2022-03-16 ASSESSMENT — ENCOUNTER SYMPTOMS
CHILLS: 0
HEMATURIA: 0
HEADACHES: 0
HEMATOCHEZIA: 0
FEVER: 0
CONSTIPATION: 0
NAUSEA: 0
WEAKNESS: 0
HEARTBURN: 0
EYE PAIN: 0
PARESTHESIAS: 0
ABDOMINAL PAIN: 0
SHORTNESS OF BREATH: 0
SORE THROAT: 0
MYALGIAS: 0
DIZZINESS: 0
DYSURIA: 0
DIARRHEA: 0
NERVOUS/ANXIOUS: 0
ARTHRALGIAS: 0
FREQUENCY: 0
PALPITATIONS: 0
COUGH: 0
JOINT SWELLING: 0

## 2022-03-16 ASSESSMENT — ACTIVITIES OF DAILY LIVING (ADL): CURRENT_FUNCTION: NO ASSISTANCE NEEDED

## 2022-03-21 ENCOUNTER — OFFICE VISIT (OUTPATIENT)
Dept: INTERNAL MEDICINE | Facility: CLINIC | Age: 77
End: 2022-03-21
Payer: MEDICARE

## 2022-03-21 VITALS
HEART RATE: 54 BPM | WEIGHT: 193 LBS | HEIGHT: 72 IN | SYSTOLIC BLOOD PRESSURE: 125 MMHG | BODY MASS INDEX: 26.14 KG/M2 | TEMPERATURE: 97.2 F | OXYGEN SATURATION: 98 % | DIASTOLIC BLOOD PRESSURE: 65 MMHG

## 2022-03-21 DIAGNOSIS — I10 ESSENTIAL HYPERTENSION, BENIGN: ICD-10-CM

## 2022-03-21 DIAGNOSIS — Z13.220 SCREENING FOR HYPERLIPIDEMIA: ICD-10-CM

## 2022-03-21 DIAGNOSIS — G20.C PARKINSONISM, UNSPECIFIED PARKINSONISM TYPE (H): ICD-10-CM

## 2022-03-21 DIAGNOSIS — Z00.00 ENCOUNTER FOR PREVENTATIVE ADULT HEALTH CARE EXAMINATION: Primary | ICD-10-CM

## 2022-03-21 DIAGNOSIS — Z11.59 NEED FOR HEPATITIS C SCREENING TEST: ICD-10-CM

## 2022-03-21 DIAGNOSIS — E11.21 TYPE 2 DIABETES MELLITUS WITH DIABETIC NEPHROPATHY, WITHOUT LONG-TERM CURRENT USE OF INSULIN (H): ICD-10-CM

## 2022-03-21 LAB — HBA1C MFR BLD: 5.3 % (ref 0–5.6)

## 2022-03-21 PROCEDURE — 80061 LIPID PANEL: CPT | Performed by: INTERNAL MEDICINE

## 2022-03-21 PROCEDURE — 83036 HEMOGLOBIN GLYCOSYLATED A1C: CPT | Performed by: INTERNAL MEDICINE

## 2022-03-21 PROCEDURE — G0439 PPPS, SUBSEQ VISIT: HCPCS | Performed by: INTERNAL MEDICINE

## 2022-03-21 PROCEDURE — 86803 HEPATITIS C AB TEST: CPT | Performed by: INTERNAL MEDICINE

## 2022-03-21 PROCEDURE — 80053 COMPREHEN METABOLIC PANEL: CPT | Performed by: INTERNAL MEDICINE

## 2022-03-21 PROCEDURE — 82043 UR ALBUMIN QUANTITATIVE: CPT | Performed by: INTERNAL MEDICINE

## 2022-03-21 PROCEDURE — 36415 COLL VENOUS BLD VENIPUNCTURE: CPT | Performed by: INTERNAL MEDICINE

## 2022-03-21 ASSESSMENT — ENCOUNTER SYMPTOMS
NAUSEA: 0
JOINT SWELLING: 0
PARESTHESIAS: 0
DIARRHEA: 0
EYE PAIN: 0
DIZZINESS: 0
DYSURIA: 0
HEADACHES: 0
WEAKNESS: 0
HEARTBURN: 0
ARTHRALGIAS: 0
PALPITATIONS: 0
FEVER: 0
SORE THROAT: 0
SHORTNESS OF BREATH: 0
MYALGIAS: 0
CONSTIPATION: 0
COUGH: 0
ABDOMINAL PAIN: 0
HEMATOCHEZIA: 0
FREQUENCY: 0
NERVOUS/ANXIOUS: 0
HEMATURIA: 0
CHILLS: 0

## 2022-03-21 ASSESSMENT — ACTIVITIES OF DAILY LIVING (ADL): CURRENT_FUNCTION: NO ASSISTANCE NEEDED

## 2022-03-21 NOTE — LETTER
[place] [address], [address]  [place], [place] [address] [phone]        March 22, 2022    Kyler Rodriguez  [address] [facility] [address]            Dear Mr. Kyler Rodriguez:    Your recent labs are all normal except for Slightly elevated liver enzyme.  Recommend to recheck in 6 months.       Please give us a call with any questions.        Rakesh Clark MD     Results for orders placed or performed in visit on 03/21/22   Hepatitis C Screen Reflex to HCV RNA Quant and Genotype     Status: Normal   Result Value Ref Range    Hepatitis C Antibody Nonreactive Nonreactive    Narrative    Assay performance characteristics have not been established for newborns, infants, and children.   Lipid panel reflex to direct LDL Fasting     Status: None   Result Value Ref Range    Cholesterol 142 <200 mg/dL    Triglycerides 94 <150 mg/dL    Direct Measure HDL 81 >=40 mg/dL    LDL Cholesterol Calculated 42 <=100 mg/dL    Non HDL Cholesterol 61 <130 mg/dL    Patient Fasting > 8hrs? Yes     Narrative    Cholesterol  Desirable:  <200 mg/dL    Triglycerides  Normal:  Less than 150 mg/dL  Borderline High:  150-199 mg/dL  High:  200-499 mg/dL  Very High:  Greater than or equal to 500 mg/dL    Direct Measure HDL  Female:  Greater than or equal to 50 mg/dL   Male:  Greater than or equal to 40 mg/dL    LDL Cholesterol  Desirable:  <100mg/dL  Above Desirable:  100-129 mg/dL   Borderline High:  130-159 mg/dL   High:  160-189 mg/dL   Very High:  >= 190 mg/dL    Non HDL Cholesterol  Desirable:  130 mg/dL  Above Desirable:  130-159 mg/dL  Borderline High:  160-189 mg/dL  High:  190-219 mg/dL  Very High:  Greater than or equal to 220 mg/dL   Albumin Random Urine Quantitative with Creat Ratio     Status: Abnormal   Result Value Ref Range    Creatinine Urine mg/dL 245 mg/dL    Albumin Urine mg/L 219 mg/L    Albumin Urine mg/g Cr 89.39 (H) 0.00 - 17.00 mg/g Cr   HEMOGLOBIN A1C     Status:  Normal   Result Value Ref Range    Hemoglobin A1C 5.3 0.0 - 5.6 %   Comprehensive metabolic panel (BMP + Alb, Alk Phos, ALT, AST, Total. Bili, TP)     Status: Abnormal   Result Value Ref Range    Sodium 135 133 - 144 mmol/L    Potassium 4.4 3.4 - 5.3 mmol/L    Chloride 101 94 - 109 mmol/L    Carbon Dioxide (CO2) 25 20 - 32 mmol/L    Anion Gap 9 3 - 14 mmol/L    Urea Nitrogen 14 7 - 30 mg/dL    Creatinine 0.87 0.66 - 1.25 mg/dL    Calcium 9.9 8.5 - 10.1 mg/dL    Glucose 125 (H) 70 - 99 mg/dL    Alkaline Phosphatase 83 40 - 150 U/L    AST 51 (H) 0 - 45 U/L    ALT 22 0 - 70 U/L    Protein Total 7.9 6.8 - 8.8 g/dL    Albumin 3.9 3.4 - 5.0 g/dL    Bilirubin Total 0.9 0.2 - 1.3 mg/dL    GFR Estimate 90 >60 mL/min/1.73m2

## 2022-03-21 NOTE — PROGRESS NOTES
"SUBJECTIVE:   Kyler Rodriguez is a 76 year old male who presents for Preventive Visit.      Patient has been advised of split billing requirements and indicates understanding: Yes  Are you in the first 12 months of your Medicare coverage?  No    Healthy Habits:     In general, how would you rate your overall health?  Good    Frequency of exercise:  None    Do you usually eat at least 4 servings of fruit and vegetables a day, include whole grains    & fiber and avoid regularly eating high fat or \"junk\" foods?  No    Taking medications regularly:  Yes    Medication side effects:  None    Ability to successfully perform activities of daily living:  No assistance needed    Home Safety:  No safety concerns identified    Hearing Impairment:  No hearing concerns    In the past 6 months, have you been bothered by leaking of urine?  No    In general, how would you rate your overall mental or emotional health?  Good      PHQ-2 Total Score: 0    Additional concerns today:  No    Do you feel safe in your environment? Yes    Have you ever done Advance Care Planning? (For example, a Health Directive, POLST, or a discussion with a medical provider or your loved ones about your wishes): Yes, patient states has an Advance Care Planning document and will bring a copy to the clinic.       Fall risk  Fallen 2 or more times in the past year?: No  Any fall with injury in the past year?: No    Cognitive Screening   1) Repeat 3 items (Leader, Season, Table)    2) Clock draw: NORMAL  3) 3 item recall: Recalls 3 objects  Results: 3 items recalled: COGNITIVE IMPAIRMENT LESS LIKELY    Mini-CogTM Copyright LAURA Patel. Licensed by the author for use in [facility]; reprinted with permission (ncik@.[facility]). All rights reserved.      Do you have sleep apnea, excessive snoring or daytime drowsiness?: no    Reviewed and updated as needed this visit by clinical staff   Tobacco  Allergies  Meds   Med Hx  Surg Hx  Fam Hx  Soc Hx    "     Reviewed and updated as needed this visit by Provider                 Social History     Tobacco Use     Smoking status: Never Smoker     Smokeless tobacco: Never Used   Substance Use Topics     Alcohol use: Yes     Alcohol/week: 0.0 standard drinks     Comment: 1-2 drinks/daily     If you drink alcohol do you typically have >3 drinks per day or >7 drinks per week? No    Alcohol Use 3/21/2022   Prescreen: >3 drinks/day or >7 drinks/week? -   Prescreen: >3 drinks/day or >7 drinks/week? No   AUDIT SCORE  -           PROBLEMS TO ADD ON...  Has H/O DM. On diet , exercise and oral treatment . Blood sugars are controlled. No parestesias. No hypoglycemias.  Has h/o HTN. on medical treatment. BP has been controlled. No side effects from medications. No CP, HA, dizziness. good compliance with medications and low salt diet.  Has H/O hyperlipidemia. On medical treatment and diet. No side effects. No muscle weakness, myalgias or upset stomach.   Has h/o prostate cancer and kidney caner, follows with urology, no recurrence.   Has h/o Parkinsonism. On treatment, seeing neurology. Has gait changes and tremor     Current providers sharing in care for this patient include:   Patient Care Team:  Rakesh Clark MD as PCP - General (Internal Medicine)  Rakesh Clark MD as Assigned PCP  Taqueria Amador MD as MD (Neurology)  Colten Messina MD as MD (Clinical Neurophysiology)  Binu Yoon MD as Assigned Surgical Provider    The following health maintenance items are reviewed in Epic and correct as of today:  Health Maintenance Due   Topic Date Due     HEPATITIS C SCREENING  Never done     DIABETIC FOOT EXAM  10/13/2015     LIPID  08/08/2021     MICROALBUMIN  02/08/2022     FALL RISK ASSESSMENT  02/08/2022     A1C  03/21/2022     Lab work is in process  Labs reviewed in EPIC          Review of Systems   Constitutional: Negative for chills and fever.   HENT: Negative for congestion, ear pain, hearing loss and  sore throat.    Eyes: Negative for pain and visual disturbance.   Respiratory: Negative for cough and shortness of breath.    Cardiovascular: Negative for chest pain, palpitations and peripheral edema.   Gastrointestinal: Negative for abdominal pain, constipation, diarrhea, heartburn, hematochezia and nausea.   Genitourinary: Negative for dysuria, frequency, genital sores, hematuria, impotence, penile discharge and urgency.   Musculoskeletal: Negative for arthralgias, joint swelling and myalgias.   Skin: Negative for rash.   Neurological: Negative for dizziness, weakness, headaches and paresthesias.   Psychiatric/Behavioral: Negative for mood changes. The patient is not nervous/anxious.          OBJECTIVE:   /65 (BP Location: Right arm, Patient Position: Sitting, Cuff Size: Adult Large)   Pulse 54   Temp 97.2  F (36.2  C) (Tympanic)   Ht 1.829 m (6')   Wt 87.5 kg (193 lb)   SpO2 98%   BMI 26.18 kg/m   Estimated body mass index is 26.18 kg/m  as calculated from the following:    Height as of this encounter: 1.829 m (6').    Weight as of this encounter: 87.5 kg (193 lb).  Physical Exam  GENERAL: healthy, alert and no distress  EYES: Eyes grossly normal to inspection, PERRL and conjunctivae and sclerae normal  HENT: ear canals and TM's normal, nose and mouth without ulcers or lesions  NECK: no adenopathy, no asymmetry, masses, or scars and thyroid normal to palpation  RESP: lungs clear to auscultation - no rales, rhonchi or wheezes  CV: regular rate and rhythm, normal S1 S2, no S3 or S4, no murmur, click or rub, no peripheral edema and peripheral pulses strong  ABDOMEN: soft, nontender, no hepatosplenomegaly, no masses and bowel sounds normal  MS: no gross musculoskeletal defects noted, no edema  SKIN: no suspicious lesions or rashes  NEURO: Normal strength and tone, mentation intact and speech normal, shuffling gait, resting tremor of the right hand   PSYCH: mentation appears normal, affect  normal/bright    Diagnostic Test Results:  Labs reviewed in Epic    ASSESSMENT / PLAN:       ICD-10-CM    1. Encounter for preventative adult health care examination  Z00.00    2. Need for hepatitis C screening test  Z11.59 Hepatitis C Screen Reflex to HCV RNA Quant and Genotype   3. Screening for hyperlipidemia  Z13.220 Lipid panel reflex to direct LDL Fasting   4. Essential hypertension, benign  I10 Comprehensive metabolic panel (BMP + Alb, Alk Phos, ALT, AST, Total. Bili, TP)   5. Type 2 diabetes mellitus with diabetic nephropathy, without long-term current use of insulin (H)  E11.21 Albumin Random Urine Quantitative with Creat Ratio     HEMOGLOBIN A1C   6. Parkinsonism, unspecified Parkinsonism type (H)  G20        Patient has been advised of split billing requirements and indicates understanding: Yes    COUNSELING:  Reviewed preventive health counseling, as reflected in patient instructions       Regular exercise       Healthy diet/nutrition       Vision screening       Hearing screening       Dental care       Colon cancer screening       Prostate cancer screening    Estimated body mass index is 26.18 kg/m  as calculated from the following:    Height as of this encounter: 1.829 m (6').    Weight as of this encounter: 87.5 kg (193 lb).        He reports that he has never smoked. He has never used smokeless tobacco.      Appropriate preventive services were discussed with this patient, including applicable screening as appropriate for cardiovascular disease, diabetes, osteopenia/osteoporosis, and glaucoma.  As appropriate for age/gender, discussed screening for colorectal cancer, prostate cancer, breast cancer, and cervical cancer. Checklist reviewing preventive services available has been given to the patient.    Reviewed patients plan of care and provided an AVS. The Intermediate Care Plan ( asthma action plan, low back pain action plan, and migraine action plan) for Kyler meets the Care Plan requirement. This  Care Plan has been established and reviewed with the Patient.    Counseling Resources:  ATP IV Guidelines  Pooled Cohorts Equation Calculator  Breast Cancer Risk Calculator  Breast Cancer: Medication to Reduce Risk  FRAX Risk Assessment  ICSI Preventive Guidelines  Dietary Guidelines for Americans, 2010  USDA's MyPlate  ASA Prophylaxis  Lung CA Screening    Rakesh Clark MD  [facility]    Identified Health Risks:

## 2022-03-22 LAB
ALBUMIN SERPL-MCNC: 3.9 G/DL (ref 3.4–5)
ALP SERPL-CCNC: 83 U/L (ref 40–150)
ALT SERPL W P-5'-P-CCNC: 22 U/L (ref 0–70)
ANION GAP SERPL CALCULATED.3IONS-SCNC: 9 MMOL/L (ref 3–14)
AST SERPL W P-5'-P-CCNC: 51 U/L (ref 0–45)
BILIRUB SERPL-MCNC: 0.9 MG/DL (ref 0.2–1.3)
BUN SERPL-MCNC: 14 MG/DL (ref 7–30)
CALCIUM SERPL-MCNC: 9.9 MG/DL (ref 8.5–10.1)
CHLORIDE BLD-SCNC: 101 MMOL/L (ref 94–109)
CHOLEST SERPL-MCNC: 142 MG/DL
CO2 SERPL-SCNC: 25 MMOL/L (ref 20–32)
CREAT SERPL-MCNC: 0.87 MG/DL (ref 0.66–1.25)
CREAT UR-MCNC: 245 MG/DL
FASTING STATUS PATIENT QL REPORTED: YES
GFR SERPL CREATININE-BSD FRML MDRD: 90 ML/MIN/1.73M2
GLUCOSE BLD-MCNC: 125 MG/DL (ref 70–99)
HCV AB SERPL QL IA: NONREACTIVE
HDLC SERPL-MCNC: 81 MG/DL
LDLC SERPL CALC-MCNC: 42 MG/DL
MICROALBUMIN UR-MCNC: 219 MG/L
MICROALBUMIN/CREAT UR: 89.39 MG/G CR (ref 0–17)
NONHDLC SERPL-MCNC: 61 MG/DL
POTASSIUM BLD-SCNC: 4.4 MMOL/L (ref 3.4–5.3)
PROT SERPL-MCNC: 7.9 G/DL (ref 6.8–8.8)
SODIUM SERPL-SCNC: 135 MMOL/L (ref 133–144)
TRIGL SERPL-MCNC: 94 MG/DL

## 2022-05-20 DIAGNOSIS — E11.21 TYPE 2 DIABETES MELLITUS WITH DIABETIC NEPHROPATHY, WITHOUT LONG-TERM CURRENT USE OF INSULIN (H): ICD-10-CM

## 2022-05-23 NOTE — TELEPHONE ENCOUNTER
Prescription approved per [facility] Refill Protocol.  José Luis DIAZ RN, BSN

## 2022-07-19 ENCOUNTER — TRANSFERRED RECORDS (OUTPATIENT)
Dept: HEALTH INFORMATION MANAGEMENT | Facility: CLINIC | Age: 77
End: 2022-07-19

## 2022-07-19 DIAGNOSIS — I10 ESSENTIAL HYPERTENSION, BENIGN: ICD-10-CM

## 2022-07-20 RX ORDER — AMLODIPINE AND BENAZEPRIL HYDROCHLORIDE 10; 20 MG/1; MG/1
CAPSULE ORAL
Qty: 90 CAPSULE | Refills: 2 | Status: ON HOLD | OUTPATIENT
Start: 2022-07-20 | End: 2022-09-30

## 2022-07-20 NOTE — TELEPHONE ENCOUNTER
Amlodipine benzapril 10-20 mg cap  Prescription approved per [facility] Refill Protocol.

## 2022-08-09 DIAGNOSIS — N42.1: ICD-10-CM

## 2022-08-11 ENCOUNTER — TRANSFERRED RECORDS (OUTPATIENT)
Dept: HEALTH INFORMATION MANAGEMENT | Facility: CLINIC | Age: 77
End: 2022-08-11

## 2022-08-11 RX ORDER — FINASTERIDE 5 MG/1
TABLET, FILM COATED ORAL
Qty: 90 TABLET | Refills: 1 | Status: SHIPPED | OUTPATIENT
Start: 2022-08-11 | End: 2023-05-24

## 2022-08-11 NOTE — TELEPHONE ENCOUNTER
Finasteride (Proscar) 5 mg tab  Prescription approved per [facility] Refill Protocol.

## 2022-09-19 ENCOUNTER — OFFICE VISIT (OUTPATIENT)
Dept: INTERNAL MEDICINE | Facility: CLINIC | Age: 77
End: 2022-09-19
Payer: MEDICARE

## 2022-09-19 VITALS
TEMPERATURE: 95.7 F | HEART RATE: 63 BPM | BODY MASS INDEX: 24.79 KG/M2 | RESPIRATION RATE: 16 BRPM | DIASTOLIC BLOOD PRESSURE: 66 MMHG | HEIGHT: 72 IN | WEIGHT: 183 LBS | OXYGEN SATURATION: 98 % | SYSTOLIC BLOOD PRESSURE: 112 MMHG

## 2022-09-19 DIAGNOSIS — Z23 NEED FOR PROPHYLACTIC VACCINATION AND INOCULATION AGAINST INFLUENZA: ICD-10-CM

## 2022-09-19 DIAGNOSIS — Z13.220 SCREENING FOR HYPERLIPIDEMIA: ICD-10-CM

## 2022-09-19 DIAGNOSIS — E78.5 HYPERLIPIDEMIA LDL GOAL <100: ICD-10-CM

## 2022-09-19 DIAGNOSIS — E11.21 TYPE 2 DIABETES MELLITUS WITH DIABETIC NEPHROPATHY, WITHOUT LONG-TERM CURRENT USE OF INSULIN (H): Primary | ICD-10-CM

## 2022-09-19 DIAGNOSIS — I10 ESSENTIAL HYPERTENSION, BENIGN: ICD-10-CM

## 2022-09-19 LAB — HBA1C MFR BLD: 5.4 % (ref 0–5.6)

## 2022-09-19 PROCEDURE — 90662 IIV NO PRSV INCREASED AG IM: CPT | Performed by: INTERNAL MEDICINE

## 2022-09-19 PROCEDURE — 80053 COMPREHEN METABOLIC PANEL: CPT | Performed by: INTERNAL MEDICINE

## 2022-09-19 PROCEDURE — 36415 COLL VENOUS BLD VENIPUNCTURE: CPT | Performed by: INTERNAL MEDICINE

## 2022-09-19 PROCEDURE — G0008 ADMIN INFLUENZA VIRUS VAC: HCPCS | Performed by: INTERNAL MEDICINE

## 2022-09-19 PROCEDURE — 83036 HEMOGLOBIN GLYCOSYLATED A1C: CPT | Performed by: INTERNAL MEDICINE

## 2022-09-19 PROCEDURE — 99214 OFFICE O/P EST MOD 30 MIN: CPT | Mod: 25 | Performed by: INTERNAL MEDICINE

## 2022-09-19 NOTE — LETTER
September 20, 2022      Kyler Rodriguez  [id number] [address] [id number]        Dear Kyler,    Normal result reviewed        Sincerely,      Rakesh Clark MD      Results for orders placed or performed in visit on 09/19/22   HEMOGLOBIN A1C     Status: Normal   Result Value Ref Range    Hemoglobin A1C 5.4 0.0 - 5.6 %   Comprehensive metabolic panel (BMP + Alb, Alk Phos, ALT, AST, Total. Bili, TP)     Status: Abnormal   Result Value Ref Range    Sodium 133 133 - 144 mmol/L    Potassium 3.9 3.4 - 5.3 mmol/L    Chloride 95 94 - 109 mmol/L    Carbon Dioxide (CO2) 28 20 - 32 mmol/L    Anion Gap 10 3 - 14 mmol/L    Urea Nitrogen 14 7 - 30 mg/dL    Creatinine 0.69 0.66 - 1.25 mg/dL    Calcium 9.3 8.5 - 10.1 mg/dL    Glucose 127 (H) 70 - 99 mg/dL    Alkaline Phosphatase 80 40 - 150 U/L    AST 27 0 - 45 U/L    ALT 17 0 - 70 U/L    Protein Total 7.4 6.8 - 8.8 g/dL    Albumin 3.9 3.4 - 5.0 g/dL    Bilirubin Total 0.8 0.2 - 1.3 mg/dL    GFR Estimate >90 >60 mL/min/1.73m2

## 2022-09-19 NOTE — PROGRESS NOTES
Assessment & Plan       Type 2 diabetes mellitus with diabetic nephropathy, without long-term current use of insulin (H)  Well controlled, we can decrease Metformin to 1000 mg daily. Keep diet and exercise.     - HEMOGLOBIN A1C  - metFORMIN (GLUCOPHAGE) 1000 MG tablet; Take 1 tablet (1,000 mg) by mouth daily (with dinner)    Essential hypertension, benign  Controlled on treatment   - Comprehensive metabolic panel (BMP + Alb, Alk Phos, ALT, AST, Total. Bili, TP)    Hyperlipidemia LDL goal <100  On statin, last LFT slightly elevated, recheck                See Patient Instructions    Return in about 6 months (around 3/19/2023) for Physical Exam.    Rakesh Clark MD  [facility] DANDY Chávez is a 77 year old, presenting for the following health issues:  RECHECK and Imm/Inj (Flu Shot)      History of Present Illness       Diabetes:   He presents for follow up of diabetes.  He is not checking blood glucose. He has no concerns regarding his diabetes at this time.  He is not experiencing numbness or burning in feet, excessive thirst, blurry vision, weight changes or redness, sores or blisters on feet.         Hypertension: He presents for follow up of hypertension.  He does not check blood pressure  regularly outside of the clinic. Outpatient blood pressures have not been over 140/90. He does not follow a low salt diet.         Diabetes Follow-up  Has H/O DM. On diet , exercise and oral Metformin. Blood sugars are controlled. No parestesias. No hypoglycemias.      How often are you checking your blood sugar? Not at all    What concerns do you have today about your diabetes? None     Do you have any of these symptoms? (Select all that apply)  No numbness or tingling in feet.  No redness, sores or blisters on feet.  No complaints of excessive thirst.  No reports of blurry vision.  No significant changes to weight.      BP Readings from Last 2 Encounters:   09/19/22 112/66   03/21/22  125/65     Hemoglobin A1C (%)   Date Value   03/21/2022 5.3   09/21/2021 5.1   02/08/2021 5.2   09/23/2020 5.5     LDL Cholesterol Calculated (mg/dL)   Date Value   03/21/2022 42   02/08/2021 26   02/05/2020 46           How many servings of fruits and vegetables do you eat daily?  2-3    On average, how many sweetened beverages do you drink each day (Examples: soda, juice, sweet tea, etc.  Do NOT count diet or artificially sweetened beverages)?   0    How many days per week do you exercise enough to make your heart beat faster? 3 or less    How many minutes a day do you exercise enough to make your heart beat faster? 9 or less    How many days per week do you miss taking your medication? 0    Has h/o HTN. on medical treatment. BP has been controlled. No side effects from medications. No CP, HA, dizziness. good compliance with medications and low salt diet.  Has H/O hyperlipidemia. On medical treatment and diet. No side effects. No muscle weakness, myalgias or upset stomach.       Review of Systems   Constitutional, HEENT, cardiovascular, pulmonary, gi and gu systems are negative, except as otherwise noted.      Objective    /66   Pulse 63   Temp (!) 95.7  F (35.4  C) (Tympanic)   Resp 16   Ht 1.829 m (6')   Wt 83 kg (183 lb)   SpO2 98%   BMI 24.82 kg/m    Body mass index is 24.82 kg/m .  Physical Exam   GENERAL: healthy, alert and no distress  NECK: no adenopathy, no asymmetry, masses, or scars and thyroid normal to palpation  RESP: lungs clear to auscultation - no rales, rhonchi or wheezes  CV: regular rate and rhythm, normal S1 S2, no S3 or S4, no murmur, click or rub,  peripheral pulses strong  ABDOMEN: soft, nontender, no hepatosplenomegaly, no masses and bowel sounds normal  MS: no gross musculoskeletal defects noted, trace ankles edema    Office Visit on 03/21/2022   Component Date Value Ref Range Status     Hepatitis C Antibody 03/21/2022 Nonreactive  Nonreactive Final     Cholesterol 03/21/2022  142  <200 mg/dL Final     Triglycerides 03/21/2022 94  <150 mg/dL Final     Direct Measure HDL 03/21/2022 81  >=40 mg/dL Final     LDL Cholesterol Calculated 03/21/2022 42  <=100 mg/dL Final     Non HDL Cholesterol 03/21/2022 61  <130 mg/dL Final     Patient Fasting > 8hrs? 03/21/2022 Yes   Final     Creatinine Urine mg/dL 03/21/2022 245  mg/dL Final     Albumin Urine mg/L 03/21/2022 219  mg/L Final     Albumin Urine mg/g Cr 03/21/2022 89.39 (A) 0.00 - 17.00 mg/g Cr Final     Hemoglobin A1C 03/21/2022 5.3  0.0 - 5.6 % Final    Normal <5.7%   Prediabetes 5.7-6.4%    Diabetes 6.5% or higher     Note: Adopted from ADA consensus guidelines.     Sodium 03/21/2022 135  133 - 144 mmol/L Final     Potassium 03/21/2022 4.4  3.4 - 5.3 mmol/L Final     Chloride 03/21/2022 101  94 - 109 mmol/L Final     Carbon Dioxide (CO2) 03/21/2022 25  20 - 32 mmol/L Final     Anion Gap 03/21/2022 9  3 - 14 mmol/L Final     Urea Nitrogen 03/21/2022 14  7 - 30 mg/dL Final     Creatinine 03/21/2022 0.87  0.66 - 1.25 mg/dL Final     Calcium 03/21/2022 9.9  8.5 - 10.1 mg/dL Final     Glucose 03/21/2022 125 (A) 70 - 99 mg/dL Final     Alkaline Phosphatase 03/21/2022 83  40 - 150 U/L Final     AST 03/21/2022 51 (A) 0 - 45 U/L Final     ALT 03/21/2022 22  0 - 70 U/L Final     Protein Total 03/21/2022 7.9  6.8 - 8.8 g/dL Final     Albumin 03/21/2022 3.9  3.4 - 5.0 g/dL Final     Bilirubin Total 03/21/2022 0.9  0.2 - 1.3 mg/dL Final     GFR Estimate 03/21/2022 90  >60 mL/min/1.73m2 Final    Effective December 21, 2021 eGFRcr in adults is calculated using the 2021 CKD-EPI creatinine equation which includes age and gender (Beronica et al., NEJM, DOI: 10.1056/IKWIbl6495980)

## 2022-09-20 LAB
ALBUMIN SERPL-MCNC: 3.9 G/DL (ref 3.4–5)
ALP SERPL-CCNC: 80 U/L (ref 40–150)
ALT SERPL W P-5'-P-CCNC: 17 U/L (ref 0–70)
ANION GAP SERPL CALCULATED.3IONS-SCNC: 10 MMOL/L (ref 3–14)
AST SERPL W P-5'-P-CCNC: 27 U/L (ref 0–45)
BILIRUB SERPL-MCNC: 0.8 MG/DL (ref 0.2–1.3)
BUN SERPL-MCNC: 14 MG/DL (ref 7–30)
CALCIUM SERPL-MCNC: 9.3 MG/DL (ref 8.5–10.1)
CHLORIDE BLD-SCNC: 95 MMOL/L (ref 94–109)
CO2 SERPL-SCNC: 28 MMOL/L (ref 20–32)
CREAT SERPL-MCNC: 0.69 MG/DL (ref 0.66–1.25)
GFR SERPL CREATININE-BSD FRML MDRD: >90 ML/MIN/1.73M2
GLUCOSE BLD-MCNC: 127 MG/DL (ref 70–99)
POTASSIUM BLD-SCNC: 3.9 MMOL/L (ref 3.4–5.3)
PROT SERPL-MCNC: 7.4 G/DL (ref 6.8–8.8)
SODIUM SERPL-SCNC: 133 MMOL/L (ref 133–144)

## 2022-09-28 ENCOUNTER — HOSPITAL ENCOUNTER (INPATIENT)
Facility: CLINIC | Age: 77
LOS: 1 days | Discharge: HOME | DRG: 641 | End: 2022-09-30
Attending: EMERGENCY MEDICINE | Admitting: HOSPITALIST
Payer: MEDICARE

## 2022-09-28 DIAGNOSIS — I10 ESSENTIAL HYPERTENSION, BENIGN: ICD-10-CM

## 2022-09-28 DIAGNOSIS — R31.0 GROSS HEMATURIA: ICD-10-CM

## 2022-09-28 DIAGNOSIS — R33.9 URINARY RETENTION: ICD-10-CM

## 2022-09-28 DIAGNOSIS — K59.00 CONSTIPATION, UNSPECIFIED CONSTIPATION TYPE: Primary | ICD-10-CM

## 2022-09-28 LAB
ALBUMIN UR-MCNC: >=2000 MG/DL
ANION GAP SERPL CALCULATED.3IONS-SCNC: 13 MMOL/L (ref 7–15)
APPEARANCE UR: ABNORMAL
BACTERIA #/AREA URNS HPF: ABNORMAL /HPF
BASOPHILS # BLD AUTO: 0 10E3/UL (ref 0–0.2)
BASOPHILS NFR BLD AUTO: 1 %
BILIRUB UR QL STRIP: NEGATIVE
BUN SERPL-MCNC: 13.6 MG/DL (ref 8–23)
CALCIUM SERPL-MCNC: 10 MG/DL (ref 8.8–10.2)
CHLORIDE SERPL-SCNC: 86 MMOL/L (ref 98–107)
COLOR UR AUTO: ABNORMAL
CREAT SERPL-MCNC: 0.67 MG/DL (ref 0.67–1.17)
DEPRECATED HCO3 PLAS-SCNC: 27 MMOL/L (ref 22–29)
EOSINOPHIL # BLD AUTO: 0 10E3/UL (ref 0–0.7)
EOSINOPHIL NFR BLD AUTO: 1 %
ERYTHROCYTE [DISTWIDTH] IN BLOOD BY AUTOMATED COUNT: 12.8 % (ref 10–15)
GFR SERPL CREATININE-BSD FRML MDRD: >90 ML/MIN/1.73M2
GLUCOSE SERPL-MCNC: 123 MG/DL (ref 70–99)
GLUCOSE UR STRIP-MCNC: NEGATIVE MG/DL
HCT VFR BLD AUTO: 43.9 % (ref 40–53)
HGB BLD-MCNC: 15.3 G/DL (ref 13.3–17.7)
HGB UR QL STRIP: ABNORMAL
HOLD SPECIMEN: NORMAL
IMM GRANULOCYTES # BLD: 0 10E3/UL
IMM GRANULOCYTES NFR BLD: 1 %
KETONES UR STRIP-MCNC: 15 MG/DL
LEUKOCYTE ESTERASE UR QL STRIP: ABNORMAL
LYMPHOCYTES # BLD AUTO: 0.9 10E3/UL (ref 0.8–5.3)
LYMPHOCYTES NFR BLD AUTO: 14 %
MCH RBC QN AUTO: 32.9 PG (ref 26.5–33)
MCHC RBC AUTO-ENTMCNC: 34.9 G/DL (ref 31.5–36.5)
MCV RBC AUTO: 94 FL (ref 78–100)
MONOCYTES # BLD AUTO: 0.7 10E3/UL (ref 0–1.3)
MONOCYTES NFR BLD AUTO: 11 %
NEUTROPHILS # BLD AUTO: 4.6 10E3/UL (ref 1.6–8.3)
NEUTROPHILS NFR BLD AUTO: 72 %
NITRATE UR QL: POSITIVE
NRBC # BLD AUTO: 0 10E3/UL
NRBC BLD AUTO-RTO: 0 /100
PH UR STRIP: 8 [PH] (ref 5–7)
PLATELET # BLD AUTO: 188 10E3/UL (ref 150–450)
POTASSIUM SERPL-SCNC: 4.6 MMOL/L (ref 3.4–5.3)
RBC # BLD AUTO: 4.65 10E6/UL (ref 4.4–5.9)
RBC URINE: >100 /HPF
SODIUM SERPL-SCNC: 126 MMOL/L (ref 136–145)
SP GR UR STRIP: 1.02 (ref 1–1.03)
UROBILINOGEN UR STRIP-MCNC: NORMAL MG/DL
WBC # BLD AUTO: 6.2 10E3/UL (ref 4–11)
WBC URINE: >100 /HPF

## 2022-09-28 PROCEDURE — 99285 EMERGENCY DEPT VISIT HI MDM: CPT

## 2022-09-28 PROCEDURE — 36415 COLL VENOUS BLD VENIPUNCTURE: CPT | Performed by: EMERGENCY MEDICINE

## 2022-09-28 PROCEDURE — 80048 BASIC METABOLIC PNL TOTAL CA: CPT | Performed by: EMERGENCY MEDICINE

## 2022-09-28 PROCEDURE — 87086 URINE CULTURE/COLONY COUNT: CPT | Performed by: EMERGENCY MEDICINE

## 2022-09-28 PROCEDURE — 85025 COMPLETE CBC W/AUTO DIFF WBC: CPT | Performed by: EMERGENCY MEDICINE

## 2022-09-28 PROCEDURE — 81001 URINALYSIS AUTO W/SCOPE: CPT | Performed by: EMERGENCY MEDICINE

## 2022-09-28 RX ORDER — LIDOCAINE HYDROCHLORIDE 20 MG/ML
1 JELLY TOPICAL ONCE
Status: DISCONTINUED | OUTPATIENT
Start: 2022-09-28 | End: 2022-09-28 | Stop reason: CLARIF

## 2022-09-28 RX ORDER — LIDOCAINE HYDROCHLORIDE 20 MG/ML
6 JELLY TOPICAL ONCE
Status: DISCONTINUED | OUTPATIENT
Start: 2022-09-28 | End: 2022-09-30 | Stop reason: HOSPADM

## 2022-09-28 ASSESSMENT — ENCOUNTER SYMPTOMS
ABDOMINAL PAIN: 0
HEMATURIA: 1

## 2022-09-28 ASSESSMENT — ACTIVITIES OF DAILY LIVING (ADL): ADLS_ACUITY_SCORE: 35

## 2022-09-28 NOTE — ED TRIAGE NOTES
Pt reports hematuria since 3pm today progressively worsening. Denies pain, N/V/    Hx of prostate cancer.     ABCs intact A&Ox4.     Triage Assessment     Row Name 09/28/22 9301       Triage Assessment (Adult)    Airway WDL WDL       Respiratory WDL    Respiratory WDL WDL

## 2022-09-29 PROBLEM — R33.9 URINARY RETENTION: Status: ACTIVE | Noted: 2022-09-29

## 2022-09-29 PROBLEM — R31.0 GROSS HEMATURIA: Status: ACTIVE | Noted: 2022-09-29

## 2022-09-29 LAB
ANION GAP SERPL CALCULATED.3IONS-SCNC: 13 MMOL/L (ref 7–15)
BASOPHILS # BLD AUTO: 0 10E3/UL (ref 0–0.2)
BASOPHILS NFR BLD AUTO: 0 %
BUN SERPL-MCNC: 13.4 MG/DL (ref 8–23)
CALCIUM SERPL-MCNC: 8.4 MG/DL (ref 8.8–10.2)
CHLORIDE SERPL-SCNC: 90 MMOL/L (ref 98–107)
CREAT SERPL-MCNC: 0.57 MG/DL (ref 0.67–1.17)
DEPRECATED HCO3 PLAS-SCNC: 22 MMOL/L (ref 22–29)
EOSINOPHIL # BLD AUTO: 0 10E3/UL (ref 0–0.7)
EOSINOPHIL NFR BLD AUTO: 0 %
ERYTHROCYTE [DISTWIDTH] IN BLOOD BY AUTOMATED COUNT: 12.4 % (ref 10–15)
ERYTHROCYTE [DISTWIDTH] IN BLOOD BY AUTOMATED COUNT: 12.6 % (ref 10–15)
GFR SERPL CREATININE-BSD FRML MDRD: >90 ML/MIN/1.73M2
GLUCOSE BLDC GLUCOMTR-MCNC: 123 MG/DL (ref 70–99)
GLUCOSE BLDC GLUCOMTR-MCNC: 135 MG/DL (ref 70–99)
GLUCOSE BLDC GLUCOMTR-MCNC: 139 MG/DL (ref 70–99)
GLUCOSE BLDC GLUCOMTR-MCNC: 160 MG/DL (ref 70–99)
GLUCOSE SERPL-MCNC: 143 MG/DL (ref 70–99)
HCT VFR BLD AUTO: 34.4 % (ref 40–53)
HCT VFR BLD AUTO: 34.8 % (ref 40–53)
HGB BLD-MCNC: 10.1 G/DL (ref 13.3–17.7)
HGB BLD-MCNC: 11.8 G/DL (ref 13.3–17.7)
HGB BLD-MCNC: 12.1 G/DL (ref 13.3–17.7)
HGB BLD-MCNC: 9 G/DL (ref 13.3–17.7)
HGB BLD-MCNC: 9.8 G/DL (ref 13.3–17.7)
IMM GRANULOCYTES # BLD: 0 10E3/UL
IMM GRANULOCYTES NFR BLD: 1 %
LYMPHOCYTES # BLD AUTO: 0.8 10E3/UL (ref 0.8–5.3)
LYMPHOCYTES NFR BLD AUTO: 13 %
MCH RBC QN AUTO: 32.8 PG (ref 26.5–33)
MCH RBC QN AUTO: 32.8 PG (ref 26.5–33)
MCHC RBC AUTO-ENTMCNC: 34.3 G/DL (ref 31.5–36.5)
MCHC RBC AUTO-ENTMCNC: 34.8 G/DL (ref 31.5–36.5)
MCV RBC AUTO: 94 FL (ref 78–100)
MCV RBC AUTO: 96 FL (ref 78–100)
MONOCYTES # BLD AUTO: 0.6 10E3/UL (ref 0–1.3)
MONOCYTES NFR BLD AUTO: 11 %
NEUTROPHILS # BLD AUTO: 4.3 10E3/UL (ref 1.6–8.3)
NEUTROPHILS NFR BLD AUTO: 75 %
NRBC # BLD AUTO: 0 10E3/UL
NRBC BLD AUTO-RTO: 0 /100
PLATELET # BLD AUTO: 157 10E3/UL (ref 150–450)
PLATELET # BLD AUTO: 181 10E3/UL (ref 150–450)
POTASSIUM SERPL-SCNC: 4.3 MMOL/L (ref 3.4–5.3)
RBC # BLD AUTO: 3.6 10E6/UL (ref 4.4–5.9)
RBC # BLD AUTO: 3.69 10E6/UL (ref 4.4–5.9)
SARS-COV-2 RNA RESP QL NAA+PROBE: NEGATIVE
SODIUM SERPL-SCNC: 125 MMOL/L (ref 136–145)
WBC # BLD AUTO: 5.7 10E3/UL (ref 4–11)
WBC # BLD AUTO: 6.1 10E3/UL (ref 4–11)

## 2022-09-29 PROCEDURE — 51702 INSERT TEMP BLADDER CATH: CPT

## 2022-09-29 PROCEDURE — 96361 HYDRATE IV INFUSION ADD-ON: CPT

## 2022-09-29 PROCEDURE — 36415 COLL VENOUS BLD VENIPUNCTURE: CPT | Performed by: STUDENT IN AN ORGANIZED HEALTH CARE EDUCATION/TRAINING PROGRAM

## 2022-09-29 PROCEDURE — 258N000003 HC RX IP 258 OP 636: Performed by: STUDENT IN AN ORGANIZED HEALTH CARE EDUCATION/TRAINING PROGRAM

## 2022-09-29 PROCEDURE — 120N000001 HC R&B MED SURG/OB

## 2022-09-29 PROCEDURE — 36415 COLL VENOUS BLD VENIPUNCTURE: CPT | Performed by: HOSPITALIST

## 2022-09-29 PROCEDURE — 96360 HYDRATION IV INFUSION INIT: CPT

## 2022-09-29 PROCEDURE — 250N000013 HC RX MED GY IP 250 OP 250 PS 637: Performed by: STUDENT IN AN ORGANIZED HEALTH CARE EDUCATION/TRAINING PROGRAM

## 2022-09-29 PROCEDURE — 85018 HEMOGLOBIN: CPT | Performed by: STUDENT IN AN ORGANIZED HEALTH CARE EDUCATION/TRAINING PROGRAM

## 2022-09-29 PROCEDURE — 258N000003 HC RX IP 258 OP 636: Performed by: HOSPITALIST

## 2022-09-29 PROCEDURE — 85018 HEMOGLOBIN: CPT | Performed by: HOSPITALIST

## 2022-09-29 PROCEDURE — U0005 INFEC AGEN DETEC AMPLI PROBE: HCPCS | Performed by: EMERGENCY MEDICINE

## 2022-09-29 PROCEDURE — 80048 BASIC METABOLIC PNL TOTAL CA: CPT | Performed by: HOSPITALIST

## 2022-09-29 PROCEDURE — C9803 HOPD COVID-19 SPEC COLLECT: HCPCS

## 2022-09-29 PROCEDURE — 250N000013 HC RX MED GY IP 250 OP 250 PS 637: Performed by: EMERGENCY MEDICINE

## 2022-09-29 PROCEDURE — 85025 COMPLETE CBC W/AUTO DIFF WBC: CPT | Performed by: STUDENT IN AN ORGANIZED HEALTH CARE EDUCATION/TRAINING PROGRAM

## 2022-09-29 PROCEDURE — 99221 1ST HOSP IP/OBS SF/LOW 40: CPT | Performed by: STUDENT IN AN ORGANIZED HEALTH CARE EDUCATION/TRAINING PROGRAM

## 2022-09-29 PROCEDURE — 99223 1ST HOSP IP/OBS HIGH 75: CPT | Mod: AI | Performed by: HOSPITALIST

## 2022-09-29 PROCEDURE — 85027 COMPLETE CBC AUTOMATED: CPT | Performed by: HOSPITALIST

## 2022-09-29 PROCEDURE — 99233 SBSQ HOSP IP/OBS HIGH 50: CPT | Performed by: STUDENT IN AN ORGANIZED HEALTH CARE EDUCATION/TRAINING PROGRAM

## 2022-09-29 RX ORDER — FINASTERIDE 5 MG/1
5 TABLET, FILM COATED ORAL AT BEDTIME
Status: DISCONTINUED | OUTPATIENT
Start: 2022-09-29 | End: 2022-09-30 | Stop reason: HOSPADM

## 2022-09-29 RX ORDER — CEFAZOLIN SODIUM 2 G/100ML
2 INJECTION, SOLUTION INTRAVENOUS
Status: CANCELLED | OUTPATIENT
Start: 2022-09-29

## 2022-09-29 RX ORDER — SODIUM CHLORIDE 9 MG/ML
INJECTION, SOLUTION INTRAVENOUS CONTINUOUS
Status: DISCONTINUED | OUTPATIENT
Start: 2022-09-29 | End: 2022-09-30 | Stop reason: HOSPADM

## 2022-09-29 RX ORDER — CARBIDOPA AND LEVODOPA 25; 100 MG/1; MG/1
2 TABLET ORAL ONCE
Status: COMPLETED | OUTPATIENT
Start: 2022-09-29 | End: 2022-09-29

## 2022-09-29 RX ORDER — LIDOCAINE 40 MG/G
CREAM TOPICAL
Status: DISCONTINUED | OUTPATIENT
Start: 2022-09-29 | End: 2022-09-30 | Stop reason: HOSPADM

## 2022-09-29 RX ORDER — CARBIDOPA AND LEVODOPA 25; 100 MG/1; MG/1
2 TABLET ORAL 3 TIMES DAILY
Status: DISCONTINUED | OUTPATIENT
Start: 2022-09-29 | End: 2022-09-30 | Stop reason: HOSPADM

## 2022-09-29 RX ORDER — BISACODYL 10 MG
10 SUPPOSITORY, RECTAL RECTAL DAILY PRN
Status: DISCONTINUED | OUTPATIENT
Start: 2022-09-29 | End: 2022-09-30 | Stop reason: HOSPADM

## 2022-09-29 RX ORDER — SODIUM CHLORIDE, SODIUM LACTATE, POTASSIUM CHLORIDE, CALCIUM CHLORIDE 600; 310; 30; 20 MG/100ML; MG/100ML; MG/100ML; MG/100ML
INJECTION, SOLUTION INTRAVENOUS CONTINUOUS
Status: DISCONTINUED | OUTPATIENT
Start: 2022-09-29 | End: 2022-09-29

## 2022-09-29 RX ORDER — ONDANSETRON 2 MG/ML
4 INJECTION INTRAMUSCULAR; INTRAVENOUS EVERY 6 HOURS PRN
Status: DISCONTINUED | OUTPATIENT
Start: 2022-09-29 | End: 2022-09-30 | Stop reason: HOSPADM

## 2022-09-29 RX ORDER — POLYETHYLENE GLYCOL 3350 17 G/17G
17 POWDER, FOR SOLUTION ORAL DAILY PRN
Status: DISCONTINUED | OUTPATIENT
Start: 2022-09-29 | End: 2022-09-30 | Stop reason: HOSPADM

## 2022-09-29 RX ORDER — ACETAMINOPHEN 650 MG/1
650 SUPPOSITORY RECTAL EVERY 6 HOURS PRN
Status: DISCONTINUED | OUTPATIENT
Start: 2022-09-29 | End: 2022-09-30 | Stop reason: HOSPADM

## 2022-09-29 RX ORDER — DEXTROSE MONOHYDRATE 25 G/50ML
25-50 INJECTION, SOLUTION INTRAVENOUS
Status: DISCONTINUED | OUTPATIENT
Start: 2022-09-29 | End: 2022-09-30 | Stop reason: HOSPADM

## 2022-09-29 RX ORDER — AMOXICILLIN 250 MG
2 CAPSULE ORAL 2 TIMES DAILY PRN
Status: DISCONTINUED | OUTPATIENT
Start: 2022-09-29 | End: 2022-09-30 | Stop reason: HOSPADM

## 2022-09-29 RX ORDER — CEFAZOLIN SODIUM 2 G/100ML
2 INJECTION, SOLUTION INTRAVENOUS SEE ADMIN INSTRUCTIONS
Status: CANCELLED | OUTPATIENT
Start: 2022-09-29

## 2022-09-29 RX ORDER — AMOXICILLIN 250 MG
1 CAPSULE ORAL 2 TIMES DAILY PRN
Status: DISCONTINUED | OUTPATIENT
Start: 2022-09-29 | End: 2022-09-30 | Stop reason: HOSPADM

## 2022-09-29 RX ORDER — ACETAMINOPHEN 325 MG/1
650 TABLET ORAL EVERY 6 HOURS PRN
Status: DISCONTINUED | OUTPATIENT
Start: 2022-09-29 | End: 2022-09-30 | Stop reason: HOSPADM

## 2022-09-29 RX ORDER — ONDANSETRON 4 MG/1
4 TABLET, ORALLY DISINTEGRATING ORAL EVERY 6 HOURS PRN
Status: DISCONTINUED | OUTPATIENT
Start: 2022-09-29 | End: 2022-09-30 | Stop reason: HOSPADM

## 2022-09-29 RX ORDER — NICOTINE POLACRILEX 4 MG
15-30 LOZENGE BUCCAL
Status: DISCONTINUED | OUTPATIENT
Start: 2022-09-29 | End: 2022-09-30 | Stop reason: HOSPADM

## 2022-09-29 RX ORDER — LANOLIN ALCOHOL/MO/W.PET/CERES
3 CREAM (GRAM) TOPICAL
Status: DISCONTINUED | OUTPATIENT
Start: 2022-09-29 | End: 2022-09-30 | Stop reason: HOSPADM

## 2022-09-29 RX ADMIN — SODIUM CHLORIDE 1000 ML: 9 INJECTION, SOLUTION INTRAVENOUS at 09:36

## 2022-09-29 RX ADMIN — SODIUM CHLORIDE: 9 INJECTION, SOLUTION INTRAVENOUS at 15:37

## 2022-09-29 RX ADMIN — FINASTERIDE 5 MG: 5 TABLET, FILM COATED ORAL at 20:36

## 2022-09-29 RX ADMIN — CARBIDOPA AND LEVODOPA 2 TABLET: 25; 100 TABLET ORAL at 08:55

## 2022-09-29 RX ADMIN — CARBIDOPA AND LEVODOPA 2 TABLET: 25; 100 TABLET ORAL at 11:42

## 2022-09-29 RX ADMIN — SODIUM CHLORIDE, POTASSIUM CHLORIDE, SODIUM LACTATE AND CALCIUM CHLORIDE 1000 ML: 600; 310; 30; 20 INJECTION, SOLUTION INTRAVENOUS at 09:29

## 2022-09-29 RX ADMIN — SODIUM CHLORIDE, POTASSIUM CHLORIDE, SODIUM LACTATE AND CALCIUM CHLORIDE: 600; 310; 30; 20 INJECTION, SOLUTION INTRAVENOUS at 04:57

## 2022-09-29 RX ADMIN — CARBIDOPA AND LEVODOPA 2 TABLET: 25; 100 TABLET ORAL at 15:35

## 2022-09-29 ASSESSMENT — ACTIVITIES OF DAILY LIVING (ADL)
ADLS_ACUITY_SCORE: 35
ADLS_ACUITY_SCORE: 43
ADLS_ACUITY_SCORE: 35

## 2022-09-29 NOTE — PROGRESS NOTES
[facility]    Medicine Progress Note - Hospitalist Service    Date of Admission:  9/28/2022    Assessment & Plan          77-year-old male with history of prostate cancer (treated 10 years ago with undetectable PSA), right clear-cell renal cancer, Parkinson's disease, diabetes, hypertension and hyperlipidemia who presented with gross hematuria with clots and some burning with attempts to urinate for about 9 hours.  No fevers or chills.  No dizziness or syncope.  He is not on any blood thinners.    Upon arrival temperature 98.3  F, heart rate 72, blood pressure 146/85 and respiratory rate of 16 with oxygen saturation of 95% on room air.  Initial hemoglobin was 15.3 and sodium was 126.  Three-way catheter was inserted and bladder irrigation started.      1. Gross hematuria    On continuous bladder irrigation, urology planning to take to the OR for gross hematuria.    Monitor hemoglobin every 6 hours, transfuse for less than 7 or hemodynamic instability.  Hemoglobin trend 15.3--> 12.1--> 11.8.  Patient appears dry, give NS 1 L bolus over 4 hours and then continue NS at 100 mill per hour.    Etiology is unclear, defer need for imaging to urology.    2. Diabetes mellitus, type II.    Prior to admission on metformin, on hold and sliding scale started.    3. Essential hypertension.    Prior to admission on amlodipine/benazepril and metoprolol, will hold due to ongoing bleeding and risk of hypotension.    4. Hypertension.    Resume atorvastatin once able to take p.o.    5. Hyponatremia.    Sodium 126 on presentation, dropped to 125.  Unclear etiology, start on NS@100 ml/h.    6. Parkinson's disease.    Continue Sinemet.    Patient has tremors which are interfering with actual measurement of heart rate by pulse oximeter.       Diet: NPO per Anesthesia Guidelines for Procedure/Surgery Except for: Meds    DVT Prophylaxis: Pneumatic Compression Devices  Cantu Catheter: Not present  Central Lines:  None  Cardiac Monitoring: None  Code Status: Full Code      Disposition Plan      Expected Discharge Date: 10/01/2022                The patient's care was discussed with the Patient, RN, wife and urology PA.    Luis Toney MD  Hospitalist Service  [facility]  Securely message with the Vocera Web Console (learn more here)  Text page via [facility] Paging/Directory         Clinically Significant Risk Factors Present on Admission         # Hyponatremia: Na = 125 mmol/L (Ref range: 136 - 145 mmol/L) on admission, will monitor as appropriate  # Hypocalcemia: Ca = 8.4 mg/dL (Ref range: 8.8 - 10.2 mg/dL) and/or iCa = N/A on admission, will replace as needed        # Hypertension: home medication list includes antihypertensive(s)          ______________________________________________________________________    Interval History   Continues to have gross hematuria.  No dizziness or chest pain.    Data reviewed today: I reviewed all medications, new labs and imaging results over the last 24 hours.     Physical Exam   Vital Signs: Temp: 97.8  F (36.6  C) Temp src: Oral BP: 124/72 Pulse: 70   Resp: 16 SpO2: 96 % O2 Device: None (Room air)    Weight: 0 lbs 0 oz  General Appearance: Alert and awake, has nonintentional tremors.  Eyes: No icterus  HEENT: Dry mucosa  Respiratory: Clear to auscultation  Cardiovascular: S1 and S2 is normal  GI: Soft and nontender  Lymph/Hematologic: Not examined  Genitourinary: Has a three-way Cantu with continuous bladder irrigation and gross hematuria  Skin: No rash  Musculoskeletal: No edema  Neurologic: Nonfocal  Psychiatric: Normal mood      Data   Recent Labs   Lab 09/29/22  0812 09/29/22  0659 09/29/22  0331 09/28/22  2128   WBC  --  6.1 5.7 6.2   HGB  --  11.8* 12.1* 15.3   MCV  --  96 94 94   PLT  --  181 157 188   NA  --  125*  --  126*   POTASSIUM  --  4.3  --  4.6   CHLORIDE  --  90*  --  86*   CO2  --  22  --  27   BUN  --  13.4  --  13.6   CR  --  0.57*  --  0.67    ANIONGAP  --  13  --  13   BETH  --  8.4*  --  10.0   * 143*  --  123*     No results found for this or any previous visit (from the past 24 hour(s)).  Medications     sodium chloride         sodium chloride 0.9%  1,000 mL Intravenous Once     carbidopa-levodopa  2 tablet Oral TID     finasteride  5 mg Oral At Bedtime     insulin aspart  1-7 Units Subcutaneous TID AC     insulin aspart  1-5 Units Subcutaneous At Bedtime     lactated ringers  1,000 mL Intravenous Once     lidocaine  6 mL Urethral Once     sodium chloride (PF)  3 mL Intracatheter Q8H

## 2022-09-29 NOTE — ED NOTES
New CBI bag. Output continues to be bright red with large amounts of clots. Repeat CBC drawn. Will continue to monitor.     Addendum: Hospitalist paged regarding presence of blood and drop in hemoglobin. Will continue to monitor.

## 2022-09-29 NOTE — PHARMACY-ADMISSION MEDICATION HISTORY
Admission medication history interview status for this patient is complete. See [facility] admission navigator for allergy information, prior to admission medications and immunization status.     Medication history interview source(s):Patient  Medication history resources (including written lists, pill bottles, clinic record):None  Primary pharmacy:[facility]    Changes made to PTA medication list:  Added: none  Deleted: none  Changed: none    Actions taken by pharmacist (provider contacted, etc):None     Additional medication history information:PAged provider for am dose of Sinemet    Medication reconciliation/reorder completed by provider prior to medication history? No    For patients on insulin therapy: NO     Prior to Admission medications    Medication Sig Last Dose Taking? Auth Provider Long Term End Date   amLODIPine-benazepril (LOTREL) 10-20 MG capsule TAKE 1 CAPSULE EVERY DAY 9/28/2022 at Unknown time Yes Rakesh Clark MD Yes    aspirin 81 MG EC tablet Take 81 mg by mouth At Bedtime 9/27/2022 at Unknown time Yes Reported, Patient     atenolol (TENORMIN) 25 MG tablet TAKE 1 TABLET EVERY DAY 9/27/2022 at Unknown time Yes Rakesh Clark MD Yes    atorvastatin (LIPITOR) 80 MG tablet TAKE 1/2 TABLET EVERY DAY 9/27/2022 at Unknown time Yes Rakesh Clark MD Yes    Calcium Carbonate-Vit D-Min (CALTRATE 600+D PLUS PO) Take 1 tablet by mouth daily  9/28/2022 at Unknown time Yes Reported, Patient     carbidopa-levodopa (SINEMET)  MG per tablet Take 2 tablets by mouth 3 times daily Take at 7 am, 11 am and 3 pm 9/28/2022 at Unknown time Yes Reported, Patient Yes    finasteride (PROSCAR) 5 MG tablet TAKE 1 TABLET EVERY DAY 9/27/2022 at Unknown time Yes Rakesh Clark MD     metFORMIN (GLUCOPHAGE) 1000 MG tablet Take 1 tablet (1,000 mg) by mouth daily (with dinner) 9/28/2022 at Unknown time Yes Rakesh Clark MD Yes    multivitamin, therapeutic (THERA-VIT) TABS tablet Take 1 tablet by mouth  every morning 9/28/2022 at Unknown time Yes Reported, Patient

## 2022-09-29 NOTE — ED NOTES
"[facility]  ED Nurse Handoff Report    Kyler Rodriguez is a 77 year old male   ED Chief complaint: Hematuria  . ED Diagnosis:   Final diagnoses:   None     Allergies:   Allergies   Allergen Reactions     No Known Allergies        Code Status: Full Code  Activity level - Baseline/Home:  Independent. Activity Level - Current:   Assist X 1. Lift room needed: No. Bariatric: No   Needed: No   Isolation: No. Infection: Not Applicable.     Vital Signs:   Vitals:    09/28/22 1829   BP: (!) 146/85   Pulse: 72   Resp: 16   Temp: 98.3  F (36.8  C)   TempSrc: Temporal   SpO2: 95%       Cardiac Rhythm:  ,      Pain level:    Patient confused: No. Patient Falls Risk: Yes.   Elimination Status: Urethral catheter (thornton) in place; orders for patient to discharge with thornton    Patient Report - Initial Complaint: Hematuria. Focused Assessment:  Kyler Rodriguez is a 77 year old male with history of essential hypertension, hyperlipidemia, type 2 diabetes mellitus, and prostate cancer who presents with sudden onset of hematuria beginning around 9 hours ago. The patient states that his urine looks like fresh blood with clotting that induces periodic \"burning\" sensations when he attempts to urinate, and out of concern presented to the ED today. He denies having any falls or trauma. The patient does not have any abdominal pain. He currently does not need to urinate and expresses having no pain. He is still having visible clots in his urine. Of note, the patient had prostate cancer 10 years ago, and after beginning radiation had minor hematuria. He states that his doctors believed it may have had something to do with scarring but this was not investigated further due to lack of severity. The patient is not anticoagulated. He presents to the ED with his wife.   Tests Performed:   Labs Ordered and Resulted from Time of ED Arrival to Time of ED Departure   BASIC METABOLIC PANEL - Abnormal       Result Value    Sodium 126 " (*)     Potassium 4.6      Chloride 86 (*)     Carbon Dioxide (CO2) 27      Anion Gap 13      Urea Nitrogen 13.6      Creatinine 0.67      Calcium 10.0      Glucose 123 (*)     GFR Estimate >90     ROUTINE UA WITH MICROSCOPIC REFLEX TO CULTURE - Abnormal    Color Urine Red (*)     Appearance Urine Bloody (*)     Glucose Urine Negative      Bilirubin Urine Negative      Ketones Urine 15  (*)     Specific Gravity Urine 1.020      Blood Urine Large (*)     pH Urine 8.0 (*)     Protein Albumin Urine >=2000 (*)     Urobilinogen Urine Normal      Nitrite Urine Positive (*)     Leukocyte Esterase Urine Large (*)     Bacteria Urine Few (*)     RBC Urine >100 (*)     WBC Urine >100 (*)    CBC WITH PLATELETS AND DIFFERENTIAL    WBC Count 6.2      RBC Count 4.65      Hemoglobin 15.3      Hematocrit 43.9      MCV 94      MCH 32.9      MCHC 34.9      RDW 12.8      Platelet Count 188      % Neutrophils 72      % Lymphocytes 14      % Monocytes 11      % Eosinophils 1      % Basophils 1      % Immature Granulocytes 1      NRBCs per 100 WBC 0      Absolute Neutrophils 4.6      Absolute Lymphocytes 0.9      Absolute Monocytes 0.7      Absolute Eosinophils 0.0      Absolute Basophils 0.0      Absolute Immature Granulocytes 0.0      Absolute NRBCs 0.0     URINE CULTURE     No orders to display     Treatments provided: CBI  Family Comments: Wife at bedside  OBS brochure/video discussed/provided to patient:  Yes  ED Medications:   Medications   lidocaine (XYLOCAINE) 2 % external gel 6 mL (has no administration in time range)     Drips infusing:  No  For the majority of the shift, the patient's behavior Green. Interventions performed were NA.    Sepsis treatment initiated: No     Patient tested for COVID 19 prior to admission: YES    ED Nurse Name/Phone Number: Chel Ahn RN,   1:06 AM    RECEIVING UNIT ED HANDOFF REVIEW    Above ED Nurse Handoff Report was reviewed: Yes  Reviewed by: Martinez Durbin RN on September 29,  2022 at 5:03 PM

## 2022-09-29 NOTE — H&P
[facility]  Hospitalist H&P    Name: Kyler Rodriguez      MRN: [id number]  YOB: 1945    Age: 77 year old  Date of admission: 9/28/2022  Primary care provider: Rakesh Clark            Assessment and Plan:     Kyler Rodriguez is a 77-year-old male with a history of hypertension, hyperlipidemia, Parkinson's disease, diabetes mellitus and renal cell and prostate cancer, presents to [facility] for evaluation of hematuria.    1.  Hematuria:  He is on aspirin, but not otherwise chronically anticoagulated.  He does have a history of prostate and right clear cell renal cell cancer.  Surveillance imaging was negative for evidence of recurrence or metastases related to his renal cell cancer.  PSA had been declining as well, which was reassuring.  For now, we will keep the 3-way catheter inserted and continue bladder irrigation.  Hemoglobin is stable.  I would like to request urology consultation to see if cystoscopy or urinary tract imaging is warranted.    2.  Diabetes mellitus:  He takes metformin, which will be on hold.  We will start medium sliding scale insulin for now.    3.  Hypertension:  Blood pressure is fairly stable.  We will resume his amlodipine-benazepril after medication reconciliation.    4.  Hyperlipidemia.  Resume atorvastatin.    5.  Parkinson's disease:  Appears fairly stable.  Continue Sinemet.    6.  Hypovolemic hyponatremia:  Likely due to volume depletion.  We will start Lactated Ringer at 100 mL per hour, and recheck basic metabolic panel to ensure this is improving.    7.  The patient will be admitted to inpatient status.    8.  Code status:  FULL.            Chief Complaint:     Hematuria.         History of Present Illness:   Kyler Rodriguez is a 77-year-old male with a history of hypertension, hyperlipidemia, type 2 diabetes mellitus and prostate cancer, presents to [facility] for evaluation of hematuria.  History is obtained  from my discussion with the patient at the bedside.  I also discussed the case with the ED physician.  The electronic medical record was also reviewed.    The patient indicates that about 9 hours prior to admission, he developed some bloody urine.  He was passing clots and noticed some burning with attempts to urinate.  He denies any fevers or chills.  He denies any chest pain, shortness of breath, cough, or palpitations.  No abdominal pain.  He denies nausea or vomiting.  He denies any abnormal bowel movements.  He denies any abdominal or pelvic pain.  Due to ongoing passing of clots, he ultimately comes to the Emergency Department for evaluation.    Here in the hospital, his temperature is 98.3, heart rate 72, blood pressure 146/85, respiratory rate 16, and oxygen saturation 95% on room air.  Laboratory work shows normal CBC, but low sodium at 126 and chloride of 86, but otherwise normal basic metabolic panel.  Urinalysis is grossly abnormal with large blood, large leukocyte esterase, greater than 100 white blood cells and greater than 100 red blood cells.  Urine culture has been sent.  The patient had a 3-way catheter inserted and started bladder irrigation.  He will be admitted for further management.            Past Medical History:     Past Medical History:   Diagnosis Date     Elevated prostate specific antigen (PSA)      Essential hypertension, benign      Malignant neoplasm (H) 10/2011    prostate cancer     Mumps      Other and unspecified hyperlipidemia      Parkinsons disease (H)      Prostate infection      Spider veins      Type II or unspecified type diabetes mellitus without mention of complication, not stated as uncontrolled              Past Surgical History:     Past Surgical History:   Procedure Laterality Date     COLONOSCOPY  2009 & 2014    [facility]     COLONOSCOPY  04/08/2019    Dr. Pandey [facility]     CYSTOSCOPY       DAVINCI NEPHRECTOMY PARTIAL Right 9/4/2019    Procedure: ROBOTIC-ASSISTED RIGHT PARTIAL  NEPHRECTOMY WITH ULTRASOUND;  Surgeon: Binu Yoon MD;  Location:  OR     EYE SURGERY      cataract left eye     HC REMOVAL OF TONSILS,<13 Y/O       HC VASECTOMY UNILAT/BILAT W POSTOP SEMEN       PHACOEMULSIFICATION CLEAR CORNEA W/ STANDARD IOL IMPLANT, ENDOSCOPIC CYCLOPHOTOCOAGULATION, COMBINED  7/31/2014    Procedure: COMBINED PHACOEMULSIFICATION CLEAR CORNEA WITH STANDARD INTRAOCULAR LENS IMPLANT, ENDOSCOPIC CYCLOPHOTOCOAGULATION;  Surgeon: Leroy Crews MD;  Location:  [facility]     VASECTOMY       [facility] NONSPECIFIC PROCEDURE  1998    varicose vein stripping     [facility] NONSPECIFIC PROCEDURE  1998    colonoscopy             Social History:     Social History     Tobacco Use     Smoking status: Never Smoker     Smokeless tobacco: Never Used   Substance Use Topics     Alcohol use: Yes     Alcohol/week: 0.0 standard drinks     Comment: 1-2 drinks/daily             Family History:   The family history was fully reviewed and non-contributory in this case.         Allergies:     Allergies   Allergen Reactions     No Known Allergies              Medications:     Prior to Admission medications    Medication Sig Last Dose Taking? Auth Provider Long Term End Date   amLODIPine-benazepril (LOTREL) 10-20 MG capsule TAKE 1 CAPSULE EVERY DAY   Rakesh Clark MD Yes    aspirin 81 MG EC tablet Take 81 mg by mouth At Bedtime   Reported, Patient     atenolol (TENORMIN) 25 MG tablet TAKE 1 TABLET EVERY DAY   Rakesh Clark MD Yes    atorvastatin (LIPITOR) 80 MG tablet TAKE 1/2 TABLET EVERY DAY   Rakesh Clark MD Yes    Calcium Carbonate-Vit D-Min (CALTRATE 600+D PLUS PO) Take 1 tablet by mouth daily    Reported, Patient     carbidopa-levodopa (SINEMET)  MG per tablet Take 2 tablets by mouth 3 times daily    Reported, Patient Yes    finasteride (PROSCAR) 5 MG tablet TAKE 1 TABLET EVERY DAY   Rakesh Clark MD     metFORMIN (GLUCOPHAGE) 1000 MG tablet Take 1 tablet (1,000 mg) by mouth daily (with  dinner)   Rakesh Clark MD Yes    multivitamin, therapeutic (THERA-VIT) TABS tablet Take 1 tablet by mouth every morning   Reported, Patient               Review of Systems:     A Comprehensive greater than 10 system review of systems was carried out.  Pertinent positives and negatives are noted above.  Otherwise negative for contributory information.           Physical Exam:   Blood pressure 127/70, pulse 65, temperature 98.3  F (36.8  C), temperature source Temporal, resp. rate 16, SpO2 97 %.  Wt Readings from Last 1 Encounters:   22 83 kg (183 lb)     Exam:  GENERAL: No apparent distress. Awake, alert, and fully oriented.  HEENT: Normocephalic, atraumatic. Extraocular movements intact.  CARDIOVASCULAR: Regular rate and rhythm without murmurs or rubs. No S3.  PULMONARY: Clear to auscultation bilaterally.  ABDOMINAL: Soft, non-tender, non-distended. Bowel sounds normoactive.   EXTREMITIES: No cyanosis or clubbing. No appreciable edema.  NEUROLOGICAL: CN 2-12 grossly intact, no focal neurological deficits.  DERMATOLOGICAL: No rash, ulcer, bruising, nor jaundice.          Data:   EKG:  Personally reviewed.     Laboratory:  Recent Labs   Lab 22   WBC 6.2   HGB 15.3   HCT 43.9   MCV 94        Recent Labs   Lab 228   *   POTASSIUM 4.6   CHLORIDE 86*   CO2 27   ANIONGAP 13   *   BUN 13.6   CR 0.67   GFRESTIMATED >90   BETH 10.0     No results for input(s): CULT in the last 168 hours.    Imaging:  No results found for this or any previous visit (from the past 24 hour(s)).    Larry Cantrell DO MPH  [facility] Hospitalist  [address] [facility] [place] [address]  2022    D: 2022   T: 2022   MT: NICA    Name:     CRYSTAL VEGA  MRN:      [id number]        Account:     [id number]   :      1945           Admitted:    2022       Document: [id number]

## 2022-09-29 NOTE — PLAN OF CARE
Goal Outcome Evaluation:      Denies pain. CBI to gravity with pink clear urine outpul via 3 way thornton. Iv patent left AC. Wife present and supportive. Most recent hgb 9.8at 1400. Lab just ivon another hgb and ordered every 6 hrs.

## 2022-09-29 NOTE — ED PROVIDER NOTES
Rapid Assessment Note    History:   Kyler Rodriguez is a 77 year old male who presents with worsening gross hematuria since 3 pm today.  He noticed clots in his urine as well. He had urine frequency today as well. PMH significant for prostate cancer s/p radiation therapy 9 yrs ago.  Had self limited hematuria at that time, though not since.  On baby ASA, though no other anticoagulation.  Denies abdominal pain, fevers or chills.  Has noted passage of clots as well and difficulty getting urine out earlier today.    Exam:   General:  Alert, interactive  Cardiovascular:  Well perfused  Lungs:  No respiratory distress, no accessory muscle use  Neuro:  Moving all 4 extremities  Skin:  Warm, dry  Psych:  Normal affect    Gross hematuria with passage of clots.  Will require IV, labs, as well as 3-way catheter for likely bladder irrigation.    Plan of Care:   I evaluated the patient and developed an initial plan of care. I discussed this plan and explained that I, or one of my partners, would be returning to complete the evaluation.     I, Shraddha Fleming, am serving as a scribe to document services personally performed by Mateo Jeffery MD, based on my observations and the provider's statements to me.    9/28/2022  [employer]    Portions of this medical record were completed by a scribe. UPON MY REVIEW AND AUTHENTICATION BY ELECTRONIC SIGNATURE, this confirms (a) I performed the applicable clinical services, and (b) the record is accurate.        Mateo Jeffery MD  09/28/22 2122

## 2022-09-29 NOTE — PROGRESS NOTES
Urology    Epic glitching, not allowing me to edit my incomplete attestation, will attest the consult note from Kirsty Cruz PA-C later    Patient seen and examined    Dramatic improvement in hematuria with Cantu on traction, urine clear. Discussed with patient, no need for acute intervention today, will manage hematuria conservatively    CBI turned down  Keep on traction for the next four hours  If urine remains clear, ok for diet after 1pm today  Remain overnight on observation with continuous irrigation  Likely discharge home with Cantu tomorrow, with CT urogram and cystoscopy as outpatient, if urine remains clear    Landon Champion MD   [facility]  [phone] clinic phone

## 2022-09-29 NOTE — CONSULTS
[facility] Consultation by [facility]    Kyler Rodriguez MRN# [id number]   Age: 77 year old YOB: 1945     Date of Admission:  9/28/2022    Reason for consult: Hematuria       Requesting PA/MD: Dr. Cantrell       Level of consult: Consult, follow and place orders           Assessment and Plan:   Assessment:   Gross hematuria  History of prostate cancer with radiation therapy  Renal cell carcinoma post right partial nephrectomy  DM 2  Parkinson's disease      Plan:   -NPO.  -Cantu catheter inflated to 35 mL, as it only had 10 mL in the balloon.  -Continue with three-way Cantu catheter and CBI wide open.  Can wean as possible.  -Hand irrigate as needed with 120 ml of sterile saline for decreased output or clots.  -Cantu placed to traction.  Keep to traction with CBI running.  -Will reassess hematuria later.  If no significant improvement, would consider cystoscopy, clot evaluation, and fulgaration.  -May need to consider formal imaging either bladder US or CT Urogram if no improvement in hematuria.  -Hold aspirin and avoid blood thinners.  -Follow urine culture.  -Will need cystoscopy, urine cytology, and CT Urogram for hematuria evaluation.  This is often done as an outpatient.  -Monitor hemoglobin.  Transfuse as necessary.  -Will continue to follow.    Kirsty Cruz PA-C   [facility]  [phone]               Chief Complaint:   Hematuria     History is obtained from the patient and EMR.         History of Present Illness:   This patient is a 77 year old male who presented to the ER last evening with gross hematuria.  It had started about 3 PM yesterday.  He began to have clots in his urine and was having difficulties passing urine.  Patient did note some dysuria with clot passage.  He does have a history of prostate cancer and underwent radiation therapy approximately 9 to 10 years ago.  He did have some mild hematuria around the time of his radiation therapy.  However, he has not  had gross hematuria since that time.  Patient denies any personal history of nephrolithiasis or issues with recurrent or chronic urinary tract infections.      Family history of prostate cancer in his father and grandfather.  Patient also has a history of clear-cell renal cell carcinoma, grade 2 status post a partial nephrectomy in 2019.  This was on the right side.     Patient also has Parkinson's disease.  He occasionally have some urgency and frequency of urination.  He does take 81 mg aspirin daily.  His last imaging study was a CT urogram done on 03/09/2022 which did not show evidence of recurrent or metastatic disease.  Patient is currently afebrile with no tachycardia.    Patient denies any lightheaded or dizziness.  Denies any nausea, vomiting, fevers, chills, shortness of breath, or chest pain.  Hemoglobin has decreased since admission, 11.8 (12.1 (15.3) C) C.  WBC 6.1 (5.7) C.  Urinalysis looks concerning for possible infection and is nitrate positive.  Urine culture is in process.    Patient had a three-way silicone Cantu catheter placed and CBI was started.  He was initially on 1 bag and was transitioned to 2 days due to issues with recurrent clots and gross hematuria.  He has had multiple hand irrigations.  I hand irrigated 3 L of sterile saline with return of multiple clots as well as at times large amounts of pure blood.  CBI is running wide open with 2 bags and urine is currently a clear red.    Patient did have some bowel incontinence when he was having bladder spasms due to a large clot.    Patient typically takes 81 mg aspirin and has not had this since Tuesday.       Past Medical History:     Past Medical History:   Diagnosis Date     Elevated prostate specific antigen (PSA)      Essential hypertension, benign      Malignant neoplasm (H) 10/2011    prostate cancer     Malignant neoplasm of right kidney (H)      Mumps      Other and unspecified hyperlipidemia      Parkinsons disease (H)       Prostate infection      Spider veins      Type II or unspecified type diabetes mellitus without mention of complication, not stated as uncontrolled              Past Surgical History:     Past Surgical History:   Procedure Laterality Date     COLONOSCOPY  2009 & 2014    [facility]     COLONOSCOPY  04/08/2019    Dr. Pandey [facility]     CYSTOSCOPY       DAVINCI NEPHRECTOMY PARTIAL Right 9/4/2019    Procedure: ROBOTIC-ASSISTED RIGHT PARTIAL NEPHRECTOMY WITH ULTRASOUND;  Surgeon: Binu Yoon MD;  Location:  OR     EYE SURGERY      cataract left eye     HC REMOVAL OF TONSILS,<11 Y/O       HC VASECTOMY UNILAT/BILAT W POSTOP SEMEN       PHACOEMULSIFICATION CLEAR CORNEA W/ STANDARD IOL IMPLANT, ENDOSCOPIC CYCLOPHOTOCOAGULATION, COMBINED  7/31/2014    Procedure: COMBINED PHACOEMULSIFICATION CLEAR CORNEA WITH STANDARD INTRAOCULAR LENS IMPLANT, ENDOSCOPIC CYCLOPHOTOCOAGULATION;  Surgeon: Leroy Crews MD;  Location:  [facility]     VASECTOMY       [email or web address] NONSPECIFIC PROCEDURE  1998    varicose vein stripping     [email or web address] NONSPECIFIC PROCEDURE  1998    colonoscopy             Social History:     Never smoker.  .          Family History:     Family History   Problem Relation Age of Onset     Cancer - colorectal Father         [email or web address]     Prostate Cancer Father      Musculoskeletal Disorder Brother         fibermyalgia     Prostate Cancer Paternal Grandfather    No family history of kidney stones.             Allergies:     Allergies   Allergen Reactions     No Known Allergies              Medications:     Current Facility-Administered Medications   Medication     0.9% sodium chloride BOLUS     acetaminophen (TYLENOL) tablet 650 mg    Or     acetaminophen (TYLENOL) Suppository 650 mg     bisacodyl (DULCOLAX) suppository 10 mg     carbidopa-levodopa (SINEMET)  MG per tablet 2 tablet     glucose gel 15-30 g    Or     dextrose 50 % injection 25-50 mL    Or     glucagon injection 1 mg     finasteride (PROSCAR) tablet 5 mg      insulin aspart (NovoLOG) injection (RAPID ACTING)     insulin aspart (NovoLOG) injection (RAPID ACTING)     lactated ringers BOLUS 1,000 mL     lidocaine (LMX4) cream     lidocaine (XYLOCAINE) 2 % external gel 6 mL     lidocaine 1 % 0.1-1 mL     melatonin tablet 3 mg     ondansetron (ZOFRAN ODT) ODT tab 4 mg    Or     ondansetron (ZOFRAN) injection 4 mg     polyethylene glycol (MIRALAX) Packet 17 g     senna-docusate (SENOKOT-S/PERICOLACE) 8.6-50 MG per tablet 1 tablet    Or     senna-docusate (SENOKOT-S/PERICOLACE) 8.6-50 MG per tablet 2 tablet     sodium chloride (PF) 0.9% PF flush 3 mL     sodium chloride (PF) 0.9% PF flush 3 mL     sodium chloride 0.9% infusion     Current Outpatient Medications   Medication Sig     amLODIPine-benazepril (LOTREL) 10-20 MG capsule TAKE 1 CAPSULE EVERY DAY     aspirin 81 MG EC tablet Take 81 mg by mouth At Bedtime     atenolol (TENORMIN) 25 MG tablet TAKE 1 TABLET EVERY DAY     atorvastatin (LIPITOR) 80 MG tablet TAKE 1/2 TABLET EVERY DAY     Calcium Carbonate-Vit D-Min (CALTRATE 600+D PLUS PO) Take 1 tablet by mouth daily      carbidopa-levodopa (SINEMET)  MG per tablet Take 2 tablets by mouth 3 times daily Take at 7 am, 11 am and 3 pm     finasteride (PROSCAR) 5 MG tablet TAKE 1 TABLET EVERY DAY     metFORMIN (GLUCOPHAGE) 1000 MG tablet Take 1 tablet (1,000 mg) by mouth daily (with dinner)     multivitamin, therapeutic (THERA-VIT) TABS tablet Take 1 tablet by mouth every morning             Review of Systems:   A comprehensive 10-point review of systems was performed and found to be negative except as described in the HPI.     /72   Pulse 70   Temp 97.8  F (36.6  C) (Oral)   Resp 16   SpO2 96%   PSYCH: NAD  EYES: EOMI  MOUTH: MMM  NECK: Supple, no notable adenopathy  RESP: Unlabored breathing  CARDIAC: No LE edema  SKIN: Warm, no rashes  ABD: soft, Nontender  NEURO: AAO x3, pill rolling tremor  URO: 3-way Cantu catheter in place draining bright red urine with  clots.  CBI running on 2 bag wide open.  Hand irrigated with multiple clots and pure blood.         Data:     Lab Results   Component Value Date    WBC 6.1 09/29/2022    HGB 11.8 (L) 09/29/2022    HCT 34.4 (L) 09/29/2022    MCV 96 09/29/2022     09/29/2022     Lab Results   Component Value Date    CR 0.57 (L) 09/29/2022    CR 0.67 09/28/2022     Recent Labs   Lab 09/28/22 2129   COLOR Red*   APPEARANCE Bloody*   URINEGLC Negative   URINEBILI Negative   URINEKETONE 15 *   SG 1.020   URINEPH 8.0*   PROTEIN >=2000*   NITRITE Positive*   LEUKEST Large*   RBCU >100*   WBCU >100*     Urine culture in process.    CTUrogram 03/09/22:    IMPRESSION: Previous partial right nephrectomy in the right kidney  without evidence of metastatic or recurrent disease.

## 2022-09-29 NOTE — ED PROVIDER NOTES
"  History   Chief Complaint:  Hematuria     The history is provided by the patient.      Kyler Rodriguez is a 77 year old male with history of essential hypertension, hyperlipidemia, type 2 diabetes mellitus, and prostate cancer who presents with sudden onset of hematuria beginning around 9 hours ago. The patient states that his urine looks like fresh blood with clotting that induces periodic \"burning\" sensations when he attempts to urinate, and out of concern presented to the ED today. He denies having any falls or trauma. The patient does not have any abdominal pain. He currently does not need to urinate and expresses having no pain. He is still having visible clots in his urine. Of note, the patient had prostate cancer 10 years ago, and after beginning radiation had minor hematuria. He states that his doctors believed it may have had something to do with scarring but this was not investigated further due to lack of severity. The patient is not anticoagulated. He presents to the ED with his wife.    Review of Systems   Gastrointestinal: Negative for abdominal pain.   Genitourinary: Positive for hematuria.   All other systems reviewed and are negative.    Allergies:  No Known Allergies    Medications:  Amlodipine-benazepril  Aspirin  Atenolol  Atorvastatin  Calcium carbonate  Carbidopa-levodopa  Finasteride  Metformin    Past Medical History:     Benign essential hypertension  Unspecified disc disorder of cervical region  Hypertrophy of prostate with urinary obstruction  Type 2 diabetes mellitus with diabetic nephropathy  Hyperlipidemia  Prostate cancer  Parkinson's disease  Renal malignant neoplasm  Transient acantholytic dermatosis  Hypermetropia  Prostatic hemorrhage  Hyponatremia    Past Surgical History:    Colonoscopy  Cystoscopy  Partial nephrectomy, right  Left eye cataract surgery  Removal of tonsils  Unilateral/bilateral vasectomy with postoperative semen  Phacoemulsification clear cornea with standard IOL " implant  Endoscopic cyclo-photocoagulation  Varicose vein stripping    Family History:    Colorectal cancer  Prostate cancer  Fibromyalgia    Social History:  The patient presents to the ED with his wife.  The patient presents to the ED in a private vehicle.  PCP: Rakesh Clark     Physical Exam     Patient Vitals for the past 24 hrs:   BP Temp Temp src Pulse Resp SpO2   09/28/22 1829 (!) 146/85 98.3  F (36.8  C) Temporal 72 16 95 %     Physical Exam  General: The patient is alert, in no respiratory distress.    HENT: Mucous membranes moist.    Cardiovascular: Regular rate and rhythm. Good pulses in all four extremities. Normal capillary refill and skin turgor.     Respiratory: Lungs are clear. No nasal flaring. No retractions. No wheezing, no crackles.    Gastrointestinal: Abdomen soft. No guarding, no rebound. No palpable hernias. No abdominal tenderness.     Musculoskeletal: No gross deformity.     Skin: No rashes or petechiae.     Neurologic: The patient is alert and oriented x3. GCS 15. No testable cranial nerve deficit. Follows commands with clear and appropriate speech. Gives appropriate answers. Good strength in all extremities. No gross neurologic deficit. Gross sensation intact. Pupils are round and reactive. No meningismus.     Lymphatic: No cervical adenopathy. No lower extremity swelling.    Psychiatric: The patient is non-tearful.    Emergency Department Course   Laboratory:  Labs Ordered and Resulted from Time of ED Arrival to Time of ED Departure   BASIC METABOLIC PANEL - Abnormal       Result Value    Sodium 126 (*)     Potassium 4.6      Chloride 86 (*)     Carbon Dioxide (CO2) 27      Anion Gap 13      Urea Nitrogen 13.6      Creatinine 0.67      Calcium 10.0      Glucose 123 (*)     GFR Estimate >90     ROUTINE UA WITH MICROSCOPIC REFLEX TO CULTURE - Abnormal    Color Urine Red (*)     Appearance Urine Bloody (*)     Glucose Urine Negative      Bilirubin Urine Negative      Ketones Urine 15   (*)     Specific Gravity Urine 1.020      Blood Urine Large (*)     pH Urine 8.0 (*)     Protein Albumin Urine >=2000 (*)     Urobilinogen Urine Normal      Nitrite Urine Positive (*)     Leukocyte Esterase Urine Large (*)     Bacteria Urine Few (*)     RBC Urine >100 (*)     WBC Urine >100 (*)    CBC WITH PLATELETS AND DIFFERENTIAL    WBC Count 6.2      RBC Count 4.65      Hemoglobin 15.3      Hematocrit 43.9      MCV 94      MCH 32.9      MCHC 34.9      RDW 12.8      Platelet Count 188      % Neutrophils 72      % Lymphocytes 14      % Monocytes 11      % Eosinophils 1      % Basophils 1      % Immature Granulocytes 1      NRBCs per 100 WBC 0      Absolute Neutrophils 4.6      Absolute Lymphocytes 0.9      Absolute Monocytes 0.7      Absolute Eosinophils 0.0      Absolute Basophils 0.0      Absolute Immature Granulocytes 0.0      Absolute NRBCs 0.0     COVID-19 VIRUS (CORONAVIRUS) BY PCR   URINE CULTURE      Emergency Department Course:      Reviewed:  I reviewed nursing notes, vitals, past medical history and Care Everywhere.    Assessments:  2307 I obtained history and examined the patient as noted above.   0115 I rechecked the patient and explained findings. I discussed plan for admission and the patient is in agreement.     Consults:  0111 I consulted with Dr. Cantrell, hospitalist, regarding the patient's history and presentation here in the emergency department who accepted the patient for admission.    Interventions:  Medications   lidocaine (XYLOCAINE) 2 % external gel 6 mL (has no administration in time range)     Disposition:  The patient was admitted to the hospital under the care of Dr. Cantrell.     Impression & Plan     Medical Decision Making:  The patient has a history of prostate cancer and has had bleeding but has been remote.  He takes no blood thinners.  He however developed urinary retention with passing clots.  We did place a three-way Cantu catheter here to irrigate him and a large amount of clots  were still present.  At this point I am concerned that he will he obstruct and the patient require admission to the hospital.  He has many white and red blood cells likely indicative of the jignesh hematuria with clots.    Diagnosis:    ICD-10-CM    1. Gross hematuria  R31.0    2. Urinary retention  R33.9      Scribe Disclosure:  I Tonyajean Webb, am serving as a scribe at 11:06 PM on 9/28/2022 to document services personally performed by Deven Núñez MD based on my observations and the provider's statements to me.     Evelia BARNETT, am serving as a scribe at 12:23 AM on 9/29/2022 to document services personally performed by Deven Núñez MD based on my observations and the provider's statements to me.       Deven Núñez MD  09/29/22 0207

## 2022-09-30 ENCOUNTER — APPOINTMENT (OUTPATIENT)
Dept: PHYSICAL THERAPY | Facility: CLINIC | Age: 77
DRG: 641 | End: 2022-09-30
Attending: HOSPITALIST
Payer: MEDICARE

## 2022-09-30 ENCOUNTER — APPOINTMENT (OUTPATIENT)
Dept: OCCUPATIONAL THERAPY | Facility: CLINIC | Age: 77
DRG: 641 | End: 2022-09-30
Attending: HOSPITALIST
Payer: MEDICARE

## 2022-09-30 VITALS
HEART RATE: 73 BPM | DIASTOLIC BLOOD PRESSURE: 59 MMHG | TEMPERATURE: 98.6 F | SYSTOLIC BLOOD PRESSURE: 124 MMHG | RESPIRATION RATE: 16 BRPM | OXYGEN SATURATION: 96 % | HEIGHT: 72 IN | WEIGHT: 176.4 LBS | BODY MASS INDEX: 23.89 KG/M2

## 2022-09-30 DIAGNOSIS — R31.0 GROSS HEMATURIA: Primary | ICD-10-CM

## 2022-09-30 LAB
BACTERIA UR CULT: NORMAL
GLUCOSE BLDC GLUCOMTR-MCNC: 121 MG/DL (ref 70–99)
GLUCOSE BLDC GLUCOMTR-MCNC: 130 MG/DL (ref 70–99)
GLUCOSE BLDC GLUCOMTR-MCNC: 132 MG/DL (ref 70–99)
HGB BLD-MCNC: 8.6 G/DL (ref 13.3–17.7)
HGB BLD-MCNC: 8.7 G/DL (ref 13.3–17.7)
HGB BLD-MCNC: 8.8 G/DL (ref 13.3–17.7)
POTASSIUM SERPL-SCNC: 4 MMOL/L (ref 3.4–5.3)
SODIUM SERPL-SCNC: 133 MMOL/L (ref 136–145)

## 2022-09-30 PROCEDURE — 97535 SELF CARE MNGMENT TRAINING: CPT | Mod: GO

## 2022-09-30 PROCEDURE — 97116 GAIT TRAINING THERAPY: CPT | Mod: GP | Performed by: PHYSICAL THERAPIST

## 2022-09-30 PROCEDURE — 250N000013 HC RX MED GY IP 250 OP 250 PS 637: Performed by: STUDENT IN AN ORGANIZED HEALTH CARE EDUCATION/TRAINING PROGRAM

## 2022-09-30 PROCEDURE — 85018 HEMOGLOBIN: CPT | Performed by: STUDENT IN AN ORGANIZED HEALTH CARE EDUCATION/TRAINING PROGRAM

## 2022-09-30 PROCEDURE — 258N000003 HC RX IP 258 OP 636: Performed by: STUDENT IN AN ORGANIZED HEALTH CARE EDUCATION/TRAINING PROGRAM

## 2022-09-30 PROCEDURE — 36415 COLL VENOUS BLD VENIPUNCTURE: CPT | Performed by: STUDENT IN AN ORGANIZED HEALTH CARE EDUCATION/TRAINING PROGRAM

## 2022-09-30 PROCEDURE — 97165 OT EVAL LOW COMPLEX 30 MIN: CPT | Mod: GO

## 2022-09-30 PROCEDURE — 99239 HOSP IP/OBS DSCHRG MGMT >30: CPT | Performed by: STUDENT IN AN ORGANIZED HEALTH CARE EDUCATION/TRAINING PROGRAM

## 2022-09-30 PROCEDURE — 97161 PT EVAL LOW COMPLEX 20 MIN: CPT | Mod: GP | Performed by: PHYSICAL THERAPIST

## 2022-09-30 PROCEDURE — 84295 ASSAY OF SERUM SODIUM: CPT | Performed by: STUDENT IN AN ORGANIZED HEALTH CARE EDUCATION/TRAINING PROGRAM

## 2022-09-30 PROCEDURE — 84132 ASSAY OF SERUM POTASSIUM: CPT | Performed by: STUDENT IN AN ORGANIZED HEALTH CARE EDUCATION/TRAINING PROGRAM

## 2022-09-30 PROCEDURE — 97530 THERAPEUTIC ACTIVITIES: CPT | Mod: GP | Performed by: PHYSICAL THERAPIST

## 2022-09-30 RX ORDER — POLYETHYLENE GLYCOL 3350 17 G/17G
17 POWDER, FOR SOLUTION ORAL DAILY
Qty: 510 G
Start: 2022-09-30 | End: 2023-10-18

## 2022-09-30 RX ORDER — AMLODIPINE AND BENAZEPRIL HYDROCHLORIDE 10; 20 MG/1; MG/1
1 CAPSULE ORAL DAILY
Qty: 90 CAPSULE | Refills: 2
Start: 2022-10-01 | End: 2023-08-30

## 2022-09-30 RX ADMIN — CARBIDOPA AND LEVODOPA 2 TABLET: 25; 100 TABLET ORAL at 06:42

## 2022-09-30 RX ADMIN — CARBIDOPA AND LEVODOPA 2 TABLET: 25; 100 TABLET ORAL at 11:00

## 2022-09-30 RX ADMIN — SODIUM CHLORIDE: 9 INJECTION, SOLUTION INTRAVENOUS at 01:32

## 2022-09-30 ASSESSMENT — ACTIVITIES OF DAILY LIVING (ADL)
ADLS_ACUITY_SCORE: 43

## 2022-09-30 NOTE — DISCHARGE SUMMARY
[facility]  Hospitalist Discharge Summary      Date of Admission:  9/28/2022  Date of Discharge:  9/30/2022  Discharging Provider: Luis Toney MD  Discharge Service: Hospitalist Service    Discharge Diagnoses       Gross hematuria.    Diabetes mellitus type 2     Essential hypertension.    Hyponatremia    Parkinson's disease    Hyperlipidemia        Follow-ups Needed After Discharge   Follow-up Appointments     Follow-up and recommended labs and tests       Follow up with urology on Monday for voiding trial             Discharge Disposition   Discharged to home  Condition at discharge: Stable    Hospital Course   77-year-old male with history of prostate cancer (treated 10 years ago with undetectable PSA), right clear-cell renal cancer, Parkinson's disease, diabetes, hypertension and hyperlipidemia who presented with gross hematuria with clots and some burning with attempts to urinate for about 9 hours.  No fevers or chills.  No dizziness or syncope.  He is not on any blood thinners.     Upon arrival temperature 98.3  F, heart rate 72, blood pressure 146/85 and respiratory rate of 16 with oxygen saturation of 95% on room air.  Initial hemoglobin was 15.3 and sodium was 126.  Three-way catheter was inserted and bladder irrigation started.        1. Gross hematuria    Patient had gross hematuria and initial plan was to go to the OR for cystoscopy but hematuria resolved with continuous bladder irrigation.  He will discharge home with Cantu and follow-up outpatient for voiding trial on Monday.    Hemoglobin trend 15.3--> 12.1--> 11.8--> 8.8--> 8.7.    Did not need any blood transfusion.     2. Diabetes mellitus, type II.    Prior to admission on metformin, resume on discharge.     3. Essential hypertension.    Prior to admission on amlodipine/benazepril, resume on discharge starting tomorrow.     4. Hypertension.    Resume atorvastatin on discharge.     5. Hyponatremia.    Sodium 126 on  presentation, dropped to 125.    Given  mill per hour, sodium improved to 133.     6. Parkinson's disease.    Continue Sinemet.     Consultations This Hospital Stay   CARE MANAGEMENT / SOCIAL WORK IP CONSULT  PHYSICAL THERAPY ADULT IP CONSULT  OCCUPATIONAL THERAPY ADULT IP CONSULT  UROLOGY IP CONSULT  CARE MANAGEMENT / SOCIAL WORK IP CONSULT    Code Status   Full Code    Time Spent on this Encounter   ILuis MD, personally saw the patient today and spent greater than 30 minutes discharging this patient.       Luis Toney MD  [address] SURGICAL  [address] [place] [address]  Phone: [phone]  Fax: [phone]  ______________________________________________________________________    Physical Exam   Vital Signs: Temp: 98.6  F (37  C) Temp src: Oral BP: 124/59 Pulse: 73   Resp: 16 SpO2: 96 % O2 Device: None (Room air)    Weight: 176 lbs 6.4 oz  General Appearance: Alert awake and oriented x3.  Eyes: No icterus  HEENT: Moist mucosa  Respiratory: Clear to auscultation  Cardiovascular: S1 and S2 is normal  GI: Soft and nontender  Lymph/Hematologic: Not examined  Genitourinary: Has a Cantu  Skin: No rash  Musculoskeletal: No edema  Neurologic: Nonfocal  Psychiatric: Normal mood         Primary Care Physician   Rakesh Clark    Discharge Orders      Reason for your hospital stay    Hematuria     Follow-up and recommended labs and tests     Follow up with urology on Monday for voiding trial     Activity    Your activity upon discharge: activity as tolerated     Diet    Follow this diet upon discharge: Orders Placed This Encounter      Regular Diet Adult       Significant Results and Procedures   Most Recent 3 CBC's:Recent Labs   Lab Test 09/30/22  1224 09/30/22  0721 09/30/22  0006 09/29/22  1152 09/29/22  0659 09/29/22  0331 09/28/22  2128   WBC  --   --   --   --  6.1 5.7 6.2   HGB 8.6* 8.7* 8.8*   < > 11.8* 12.1* 15.3   MCV  --   --   --   --  96 94 94    PLT  --   --   --   --  181 157 188    < > = values in this interval not displayed.     Most Recent 3 BMP's:Recent Labs   Lab Test 09/30/22  1125 09/30/22  0747 09/30/22  0721 09/30/22  0148 09/29/22  0812 09/29/22  0659 09/28/22 2128 09/19/22  0959 09/19/22  0959   NA  --   --  133*  --   --  125* 126*  --  133   POTASSIUM  --   --  4.0  --   --  4.3 4.6  --  3.9   CHLORIDE  --   --   --   --   --  90* 86*  --  95   CO2  --   --   --   --   --  22 27  --  28   BUN  --   --   --   --   --  13.4 13.6  --  14   CR  --   --   --   --   --  0.57* 0.67  --  0.69   ANIONGAP  --   --   --   --   --  13 13  --  10   BETH  --   --   --   --   --  8.4* 10.0  --  9.3   * 130*  --  121*   < > 143* 123*   < > 127*    < > = values in this interval not displayed.     Most Recent 2 LFT's:Recent Labs   Lab Test 09/19/22  0959 03/21/22  1005   AST 27 51*   ALT 17 22   ALKPHOS 80 83   BILITOTAL 0.8 0.9   ,   Results for orders placed or performed during the hospital encounter of 03/09/22   CT Urogram wo & w Contrast    Narrative    CT UROGRAM WITH AND WITHOUT CONTRAST March 9, 2022 2:15 PM    CLINICAL HISTORY: Renal malignant neoplasm, right (H).    TECHNIQUE: CT scan of the abdomen and pelvis was performed before and  after injection of IV contrast. Multiplanar reformats were obtained.  Dose reduction techniques were used.  CONTRAST: 96mL Isovue-370.    COMPARISON: 2/11/2021.    FINDINGS:   Right kidney and ureter: There is evidence of prior partial  nephrectomy on the right. No evidence of recurrent or new renal mass.  Tiny cyst at the lower pole right kidney unchanged. No urinary tract  calculus or hydronephrosis. Right ureter unremarkable.    Left kidney and ureter: No hydronephrosis, hydroureter, or urinary  tract calculus. No worrisome renal mass. Subcentimeter renal cysts are  unchanged and do not require specific follow-up. Left ureter is  unremarkable.    Urinary bladder: Mild bladder wall thickening. No evidence of  focal  mass. Small right-sided diverticulum present near the UVJ.    LOWER CHEST: Mild scarring right lung base.    HEPATOBILIARY: Normal.    PANCREAS: Normal.    SPLEEN: Normal.    ADRENAL GLANDS: Normal.    BOWEL: Sigmoid diverticulosis without evidence of diverticulitis.    PELVIC ORGANS: Normal.    ADDITIONAL FINDINGS: No ascites or lymphadenopathy.    MUSCULOSKELETAL: No destructive bone lesions.      Impression    IMPRESSION: Previous partial right nephrectomy in the right kidney  without evidence of metastatic or recurrent disease.    BARRY TAVAREZ MD         SYSTEM ID:  [id number]       Discharge Medications   Current Discharge Medication List      START taking these medications    Details   polyethylene glycol (MIRALAX) 17 GM/Dose powder Take 17 g by mouth daily  Qty: 510 g    Associated Diagnoses: Constipation, unspecified constipation type         CONTINUE these medications which have CHANGED    Details   amLODIPine-benazepril (LOTREL) 10-20 MG capsule Take 1 capsule by mouth daily  Qty: 90 capsule, Refills: 2    Associated Diagnoses: Essential hypertension, benign         CONTINUE these medications which have NOT CHANGED    Details   atenolol (TENORMIN) 25 MG tablet TAKE 1 TABLET EVERY DAY  Qty: 90 tablet, Refills: 0    Associated Diagnoses: Essential hypertension, benign      atorvastatin (LIPITOR) 80 MG tablet TAKE 1/2 TABLET EVERY DAY  Qty: 45 tablet, Refills: 0    Associated Diagnoses: Hyperlipidemia LDL goal <100      Calcium Carbonate-Vit D-Min (CALTRATE 600+D PLUS PO) Take 1 tablet by mouth daily     Associated Diagnoses: Type 2 diabetes, HbA1c goal < 7% (H); Hyperlipidemia LDL goal <100; Prostate cancer (H); Essential hypertension, benign      carbidopa-levodopa (SINEMET)  MG per tablet Take 2 tablets by mouth 3 times daily Take at 7 am, 11 am and 3 pm      finasteride (PROSCAR) 5 MG tablet TAKE 1 TABLET EVERY DAY  Qty: 90 tablet, Refills: 1    Associated Diagnoses: Prostatic hemorrhage       metFORMIN (GLUCOPHAGE) 1000 MG tablet Take 1 tablet (1,000 mg) by mouth daily (with dinner)  Qty: 90 tablet, Refills: 1    Associated Diagnoses: Type 2 diabetes mellitus with diabetic nephropathy, without long-term current use of insulin (H)      multivitamin, therapeutic (THERA-VIT) TABS tablet Take 1 tablet by mouth every morning         STOP taking these medications       aspirin 81 MG EC tablet Comments:   Reason for Stopping:             Allergies   Allergies   Allergen Reactions     No Known Allergies

## 2022-09-30 NOTE — PLAN OF CARE
Occupational Therapy Discharge Summary    Reason for therapy discharge:    All goals and outcomes met, no further needs identified.    Progress towards therapy goal(s). See goals on Care Plan in [facility] electronic health record for goal details.  Goals met    Therapy recommendation(s):    No further therapy is recommended.

## 2022-09-30 NOTE — PROGRESS NOTES
Patient hospitalized for Hematuria. Cleared for discharge to home today per MD. Discharge instructions, medications, and follow-ups reviewed with patient and spouse in detail. Belongings were returned to patient at time of discharge. Spouse providing transport home.

## 2022-09-30 NOTE — PLAN OF CARE
End of Shift Summary  For vital signs and complete assessments, please see documentation flowsheets.     Pertinent assessments: A&Ox4. VSS on room air, afebrile. Denies pain. CBI running - clear yellow output with no clots noted. Ax1 with walker and belt     Major Shift Events: Uneventful  Treatment Plan: CBI, urology consult, possible discharge home today 9/30 with thornton in place   Bedside Nurse: Tiara Gracia RN

## 2022-09-30 NOTE — PROGRESS NOTES
Urology    78 yo M with h/o prostate cancer s/p radiation, right clear cell renal cell carcinoma s/p right partial nephrectomy 9/4/2019, presenting with gross hematuria.    Urine now clear yellow with minimal cb      - CBI clamped   - ok for discharge home with Cantu if urine remains clear; trial of void Monday 10/3/2022   - return for CT urogram and cystoscopy as outpatient with Dr. Yoon   - messages sent to schedulers to arrange followup    Landon Champion MD   [facility]  [phone] clinic phone

## 2022-09-30 NOTE — CONSULTS
Care Management Discharge Note    Discharge Date: 09/30/2022       Discharge Disposition: Home    Discharge Services: None    Discharge DME: Walker - it pt decides to get one, he will go to the local [facility] and get one    Discharge Transportation: family or friend will provide    Private pay costs discussed: Not applicable    PAS Confirmation Code:  (N/A)  Patient/family educated on Medicare website which has current facility and service quality ratings: no      Patient/Family in Agreement with the Plan: yes    Handoff Referral Completed: Yes    Additional Information:  Pt identified as a Service Bundle #2. No needs or assessment needed at this time.     PT/OT cleared the pt discharge to home with spouse and assistance with spouse.  Per chart review, there are no discharge needs identified at this time.    Please update Sw if needs arise.      ESCOBAR Justin, [facility]  Inpatient Care Coordination  [facility]  [phone]

## 2022-09-30 NOTE — PROGRESS NOTES
09/30/22 0920   Quick Adds   Type of Visit Initial PT Evaluation   Living Environment   People in Home spouse   Current Living Arrangements house   Home Accessibility stairs to enter home;stairs within home   Number of Stairs, Main Entrance 2   Stair Railings, Main Entrance railing on left side (ascending)   Number of Stairs, Within Home, Primary greater than 10 stairs   Stair Railings, Within Home, Primary railing on left side (ascending)   Transportation Anticipated car, drives self;family or friend will provide   Living Environment Comments per chart:tubshower with no shower chair or grab bars. standard height toilet with vanity next to push off of; normally no use of an assistive device   Self-Care   Usual Activity Tolerance good   Current Activity Tolerance moderate   Equipment Currently Used at Home none   Fall history within last six months no   Activity/Exercise/Self-Care Comment patient was independent with mobility and ADL's prior to hospitalization per patient and spouse report   General Information   Onset of Illness/Injury or Date of Surgery 09/28/22   Referring Physician Larry Cantrell,    Patient/Family Therapy Goals Statement (PT) return home today with assist from spouse as needed   Pertinent History of Current Problem (include personal factors and/or comorbidities that impact the POC) per chart: 77-year-old male with history of prostate cancer (treated 10 years ago with undetectable PSA), right clear-cell renal cancer, Parkinson's disease, diabetes, hypertension and hyperlipidemia who presented with gross hematuria and weakness; was on CBI; see medical record for further information   Existing Precautions/Restrictions no known precautions/restrictions   General Observations patient in bed; spouse present for session   Cognition   Cognitive Status Comments appears intact during session   Pain Assessment   Patient Currently in Pain Yes, see Vital Sign flowsheet  (slight R hip pain; reports  "is is \"better than normal\")   Posture    Posture Comments slight forward flexed posture   Range of Motion (ROM)   ROM Comment WFL   Strength (Manual Muscle Testing)   Strength Comments mild functional weakness from inactivity   Bed Mobility   Comment, (Bed Mobility) mod I with use of rail; HOB elevated   Transfers   Comment, (Transfers) sit<>stand with SBA; use of walker   Gait/Stairs (Locomotion)   Comment, (Gait/Stairs) able to ambulate > 50 feet with use of walker; CGA/SBA;   Balance   Balance Comments appears intact with use of walker; no gross LOB   Clinical Impression   Criteria for Skilled Therapeutic Intervention Yes, treatment indicated   PT Diagnosis (PT) impaired functional mobility   Influenced by the following impairments mild functional weakness; mild unsteadiness   Functional limitations due to impairments impaired independence with mobility and cares secondary to above deficits   Clinical Presentation (PT Evaluation Complexity) Stable/Uncomplicated   Clinical Presentation Rationale clinical judgement; level of assist   Clinical Decision Making (Complexity) low complexity   Planned Therapy Interventions (PT) gait training;strengthening;transfer training   Anticipated Equipment Needs at Discharge (PT) walker, rolling   Risk & Benefits of therapy have been explained evaluation/treatment results reviewed;care plan/treatment goals reviewed;risks/benefits reviewed;current/potential barriers reviewed;participants voiced agreement with care plan;participants included;patient;spouse/significant other   PT Discharge Planning   PT Discharge Recommendation (DC Rec) home;home with assist   PT Rationale for DC Rec Anticipate patient will be safe to discharge to home with assist of spouse; would benefit currently from use of a walker; plans to [facility] one [facility] if he decides to use one   PT Brief overview of current status SBA with use of walker   Total Evaluation Time   Total Evaluation Time (Minutes) 10 "   Physical Therapy Goals   PT Frequency Daily   PT Predicted Duration/Target Date for Goal Attainment 10/01/22   PT Goals Transfers;Gait;Stairs   PT: Transfers Sit to/from stand;Bed to/from chair;Independent   PT: Gait Supervision/stand-by assist;Greater than 200 feet   PT: Stairs Supervision/stand-by assist;Greater than 10 stairs  (with a railing)

## 2022-09-30 NOTE — PLAN OF CARE
Vitals: WNL  Neuro: A/Ox4  Cardiac: normal rate and rhythm  GI: no n/v, no BM  : output from Cantu with CBI was pink to clear, secured with stat lock to L thigh  Resp: on room air, no SoB  Activity: stayed on bed all shift, Ax1-2 with IV pole upon arrival to floor (baseline independent)   Pain: denies any pain  Skin: no pertinent skin issues     Plan: CBI, monitor for bleeding, Hgb Q6, possible discharge tomorrow with Cantu

## 2022-10-02 ENCOUNTER — PATIENT OUTREACH (OUTPATIENT)
Dept: CARE COORDINATION | Facility: CLINIC | Age: 77
End: 2022-10-02

## 2022-10-02 NOTE — PROGRESS NOTES
Clinic Care Coordination Contact  [facility]: Post-Discharge Note  SITUATION                                                      Admission:    Admission Date: 09/28/22   Reason for Admission: Gross hematuria     with clots  Discharge:   Discharge Date: 09/30/22  Discharge Diagnosis: Urinary retention    Gross hematuria    BACKGROUND                                                      Per hospital discharge summary and inpatient provider notes:  Kyler Rodirguez is a 77-year-old male with a history of hypertension, hyperlipidemia, type 2 diabetes mellitus and prostate cancer, presents to [facility] for evaluation of hematuria.  History is obtained from my discussion with the patient at the bedside.  I also discussed the case with the ED physician.  The electronic medical record was also reviewed.     The patient indicates that about 9 hours prior to admission, he developed some bloody urine.  He was passing clots and noticed some burning with attempts to urinate.  He denies any fevers or chills.  He denies any chest pain, shortness of breath, cough, or palpitations.  No abdominal pain.  He denies nausea or vomiting.  He denies any abnormal bowel movements.  He denies any abdominal or pelvic pain.  Due to ongoing passing of clots, he ultimately comes to the Emergency Department for evaluation.     Here in the hospital, his temperature is 98.3, heart rate 72, blood pressure 146/85, respiratory rate 16, and oxygen saturation 95% on room air.  Laboratory work shows normal CBC, but low sodium at 126 and chloride of 86, but otherwise normal basic metabolic panel.  Urinalysis is grossly abnormal with large blood, large leukocyte esterase, greater than 100 white blood cells and greater than 100 red blood cells.  Urine culture has been sent.  The patient had a 3-way catheter inserted and started bladder irrigation.  He will be admitted for further management.       ASSESSMENT           Discharge  Assessment  How are you doing now that you are home?: great  How are your symptoms? (Red Flag symptoms escalate to triage hotline per guidelines): Improved  Do you feel your condition is stable enough to be safe at home until your provider visit?: Yes  Does the patient have their discharge instructions? : Yes  Does the patient have questions regarding their discharge instructions? : No  Were you started on any new medications or were there changes to any of your previous medications? : Yes  Does the patient have all of their medications?: Yes  Do you have questions regarding any of your medications? : No  Do you have all of your needed medical supplies or equipment (DME)?  (i.e. oxygen tank, CPAP, cane, etc.): Yes  Discharge follow-up appointment scheduled within 14 calendar days? : Yes                  PLAN                                                      Outpatient Plan:  Follow-up Appointments     Follow-up and recommended labs and tests       Follow up with urology on Monday for voiding trial       Future Appointments   Date Time Provider Department Center   10/3/2022  1:30 PM UA NURSE [facility]   10/19/2022  8:40 AM [facility]   10/28/2022 10:30 AM Binu Yoon MD [facility]   3/22/2023  9:00 AM Rakesh Clakr MD [facility]         For any urgent concerns, please contact our 24 hour nurse triage line: [phone] ([phone])         Erin Oliver MA

## 2022-10-03 ENCOUNTER — ALLIED HEALTH/NURSE VISIT (OUTPATIENT)
Dept: UROLOGY | Facility: CLINIC | Age: 77
End: 2022-10-03
Payer: MEDICARE

## 2022-10-03 DIAGNOSIS — R30.0 DYSURIA: ICD-10-CM

## 2022-10-03 DIAGNOSIS — R31.0 GROSS HEMATURIA: ICD-10-CM

## 2022-10-03 DIAGNOSIS — N48.89 PAIN IN PENIS: Primary | ICD-10-CM

## 2022-10-03 LAB
ALBUMIN UR-MCNC: >=300 MG/DL
APPEARANCE UR: CLEAR
BILIRUB UR QL STRIP: ABNORMAL
COLOR UR AUTO: YELLOW
GLUCOSE UR STRIP-MCNC: NEGATIVE MG/DL
HGB UR QL STRIP: ABNORMAL
KETONES UR STRIP-MCNC: 15 MG/DL
LEUKOCYTE ESTERASE UR QL STRIP: ABNORMAL
NITRATE UR QL: NEGATIVE
PH UR STRIP: 6 [PH] (ref 5–7)
RESIDUAL VOLUME (RV) (EXTERNAL): 49
SP GR UR STRIP: 1.02 (ref 1–1.03)
UROBILINOGEN UR STRIP-ACNC: 0.2 E.U./DL

## 2022-10-03 PROCEDURE — 87086 URINE CULTURE/COLONY COUNT: CPT

## 2022-10-03 PROCEDURE — 51798 US URINE CAPACITY MEASURE: CPT

## 2022-10-03 PROCEDURE — 81003 URINALYSIS AUTO W/O SCOPE: CPT | Mod: QW

## 2022-10-03 NOTE — PROGRESS NOTES
Kyler Rodriguez comes into clinic today at the request of zeus lomas  Ordering Provider for TOV (trial of void).      This service provided today was under the supervising provider of the day danial khalil, who was available if needed.    Approx 130 cc of  Normal Saline  instilled into the bladder via catheter/syringe.  Catheter then removed with out difficulty   Patient voided approx 50 cc's of BLOODY urine.  Patient tolerated procedure well   Teaching done with patient verbally as to where to go or call  if unable to urinate post catheter removal.  Cipro given per protocol.    Pt had blood dripping from end of penis when the cath was removed.  Pt had urge to void and wanted to stop the TOV.  I took the cath out and he was unable to void.  Cath was removed without problems.    PVR= 49mL    PT INFORMED TO DRINK WATER AND AVOID BLADDER IRRITANTS.  PT ADVISED TO GO TO ED IF UNABLE TO VOID AND TO CALL OUR OFFICE.  PT LEFT OFFICE WITH NO CATHETER.    CROW Sherman CMA

## 2022-10-05 LAB — BACTERIA UR CULT: NORMAL

## 2022-10-19 ENCOUNTER — HOSPITAL ENCOUNTER (OUTPATIENT)
Dept: CT IMAGING | Facility: CLINIC | Age: 77
Discharge: HOME | End: 2022-10-19
Attending: STUDENT IN AN ORGANIZED HEALTH CARE EDUCATION/TRAINING PROGRAM | Admitting: STUDENT IN AN ORGANIZED HEALTH CARE EDUCATION/TRAINING PROGRAM
Payer: MEDICARE

## 2022-10-19 DIAGNOSIS — R31.0 GROSS HEMATURIA: ICD-10-CM

## 2022-10-19 PROCEDURE — 250N000009 HC RX 250: Performed by: STUDENT IN AN ORGANIZED HEALTH CARE EDUCATION/TRAINING PROGRAM

## 2022-10-19 PROCEDURE — G1010 CDSM STANSON: HCPCS

## 2022-10-19 PROCEDURE — 250N000011 HC RX IP 250 OP 636: Performed by: STUDENT IN AN ORGANIZED HEALTH CARE EDUCATION/TRAINING PROGRAM

## 2022-10-19 RX ORDER — IOPAMIDOL 755 MG/ML
500 INJECTION, SOLUTION INTRAVASCULAR ONCE
Status: COMPLETED | OUTPATIENT
Start: 2022-10-19 | End: 2022-10-19

## 2022-10-19 RX ADMIN — SODIUM CHLORIDE 60 ML: 9 INJECTION, SOLUTION INTRAVENOUS at 08:33

## 2022-10-19 RX ADMIN — IOPAMIDOL 85 ML: 755 INJECTION, SOLUTION INTRAVENOUS at 08:33

## 2022-10-27 ENCOUNTER — IMMUNIZATION (OUTPATIENT)
Dept: PEDIATRICS | Facility: CLINIC | Age: 77
End: 2022-10-27
Payer: MEDICARE

## 2022-10-27 DIAGNOSIS — Z23 HIGH PRIORITY FOR 2019-NCOV VACCINE: Primary | ICD-10-CM

## 2022-10-27 PROCEDURE — 0124A COVID-19,PF,PFIZER BOOSTER BIVALENT: CPT

## 2022-10-27 PROCEDURE — 99207 PR NO CHARGE LOS: CPT

## 2022-10-27 PROCEDURE — 91312 COVID-19,PF,PFIZER BOOSTER BIVALENT: CPT

## 2022-10-28 ENCOUNTER — OFFICE VISIT (OUTPATIENT)
Dept: UROLOGY | Facility: CLINIC | Age: 77
End: 2022-10-28
Payer: MEDICARE

## 2022-10-28 VITALS
DIASTOLIC BLOOD PRESSURE: 60 MMHG | HEART RATE: 78 BPM | HEIGHT: 72 IN | BODY MASS INDEX: 24.38 KG/M2 | WEIGHT: 180 LBS | SYSTOLIC BLOOD PRESSURE: 126 MMHG

## 2022-10-28 DIAGNOSIS — C61 PROSTATE CANCER (H): ICD-10-CM

## 2022-10-28 DIAGNOSIS — C64.1 RENAL MALIGNANT NEOPLASM, RIGHT (H): ICD-10-CM

## 2022-10-28 DIAGNOSIS — Z79.2 PROPHYLACTIC ANTIBIOTIC: ICD-10-CM

## 2022-10-28 DIAGNOSIS — R31.0 GROSS HEMATURIA: Primary | ICD-10-CM

## 2022-10-28 PROCEDURE — 52000 CYSTOURETHROSCOPY: CPT | Performed by: UROLOGY

## 2022-10-28 PROCEDURE — 99214 OFFICE O/P EST MOD 30 MIN: CPT | Mod: 25 | Performed by: UROLOGY

## 2022-10-28 RX ORDER — ASPIRIN 81 MG/81MG
CAPSULE ORAL EVERY 24 HOURS
COMMUNITY
Start: 2022-08-11 | End: 2023-09-21

## 2022-10-28 RX ORDER — ATORVASTATIN CALCIUM 40 MG/1
TABLET, FILM COATED ORAL EVERY 24 HOURS
COMMUNITY
Start: 2022-08-11 | End: 2023-03-22

## 2022-10-28 RX ORDER — LIDOCAINE HYDROCHLORIDE 20 MG/ML
JELLY TOPICAL ONCE
Status: COMPLETED | OUTPATIENT
Start: 2022-10-28 | End: 2022-10-28

## 2022-10-28 RX ORDER — CIPROFLOXACIN 500 MG/1
500 TABLET, FILM COATED ORAL ONCE
Qty: 1 TABLET | Refills: 0 | Status: SHIPPED | OUTPATIENT
Start: 2022-10-28 | End: 2022-10-28

## 2022-10-28 RX ADMIN — LIDOCAINE HYDROCHLORIDE: 20 JELLY TOPICAL at 10:33

## 2022-10-28 ASSESSMENT — PAIN SCALES - GENERAL: PAINLEVEL: NO PAIN (0)

## 2022-10-28 NOTE — LETTER
10/28/2022       RE: Kyler Rodriguez  [id number] [address] [id number]     Dear Colleague,    Thank you for referring your patient, Kyler Rodriguez, to the [facility] at [facility]. Please see a copy of my visit note below.    [place]   CHIEF COMPLAINT   It was my pleasure to see Kyler Rodriguez who is a 77 year old male for follow-up of s/p RIGHT PNx.      HPI   Kyler Rodriguez is a very pleasant 77 year old male who presents with a history of prostate cancer s/p radiation and RIGHT renal mass now s/p robotic partial nephrectomy.    ##Kidney Cancer Follow up##  Patient is status post Robotic Partial Nephrectomy on 9/4/19.   Tumor was on the right side.   Maximal tumor dimension was 1.2 cm.    The histologic subtype was Clear Cell.    ISUP Grade 2.    Pathologic Stage T1a.    Tumor thrombus level 0 (renal vein).    Tumor necrosis present: No.  Sarcomatoid features present: No.  Lymph Nodes Removed No.   Number of nodes removed n/a, number of node positive for cancer n/a.   Was the ipsilateral adrenal gland removed? No.  Neoadjuvant Chemotherapy? No.  Was Margin Positive? No.    There were not deviations from a normal post-operative course or complications?.    The patient was not readmitted to the hospital since post-operative discharge.    2/15/21:  He is doing well with no issues.   He is still waiting for his COVID vaccine     3/11/22:  He is doing well with no issues over the last year    9/29/2022:  Seen as a hospital consult at [facility] for gross hematuria.  A three-way catheter was placed with irrigation    10/3/2022:  RN visit for trial of void which was successful though noted to have continued bloody urine    TODAY 10/28/2022:  He has been doing very well and his urine has been fairly clear  He follows up today for surveillance cystoscopy    PHYSICAL EXAM  Patient is a 77 year old  male   Vitals: Blood pressure 126/60, pulse  78, height 1.829 m (6'), weight 81.6 kg (180 lb).  General Appearance Adult: Body mass index is 24.41 kg/m .  Alert, no acute distress, oriented  HENT: throat/mouth:normal, good dentition  Lungs: no respiratory distress, or pursed lip breathing  Heart: No obvious jugular venous distension present  Abdomen: soft, nontender, no organomegaly or masses  Musculoskeltal: extremities normal, no peripheral edema  Skin: no suspicious lesions or rashes  Neuro: Alert, oriented, speech and mentation normal  Psych: affect and mood normal  Gait: Normal    Component PSA PSA Diag Urologic Phys   Latest Ref Rng & Units 0.00 - 4.00 ug/L 0.00 - 4.00 ng/mL   7/19/2004 10.85 (H)    10/13/2006 7.62 (H)    9/11/2007 10.30 (H)    8/18/2008 15.40 (H)    2/4/2015 0.1    2/3/2017  0.04   8/16/2017  <0.04   2/13/2018 0.02    8/17/2018  <0.04   3/13/2019  <0.04   2/5/2020 0.02    2/8/2021 0.03    3/11/2022 <0.04      Creatinine   Date Value Ref Range Status   09/29/2022 0.57 (L) 0.67 - 1.17 mg/dL Final   02/08/2021 0.73 0.66 - 1.25 mg/dL Final      PATHOLOGY:  FINAL DIAGNOSIS:   A: Kidney, right, renal mass base, margin, excision   - Negative for malignancy     B: Kidney, right, partial nephrectomy   - Clear cell renal cell carcinoma, WHO/ISUP grade 2   - Tumor size: 1.2 cm   - Renal parenchymal margin negative   - Tumor extends to the inked, cauterized margin of the perirenal tissue   (see comment)   - Please see below for tumor synopsis     IMAGING:  I reviewed all pertinent available imaging    CT ABD/PEL 2/11/21:                                  IMPRESSION:    1. No evidence of recurrent mass in the region of partial right  nephrectomy. Kidneys are otherwise unremarkable with probable tiny  renal cortical cysts too small to characterize by CT.  2. Circumferential bladder wall thickening possibly related to chronic  bladder outlet obstruction. No significant prostate gland enlargement  is evident. Recurrent UTI and/or neurogenic bladder  could appear  similar.  3. Abdominal organs are otherwise within normal limits. No bowel  obstruction. No enlarged lymph nodes.    CHEST X-RAY 2/11/21:  IMPRESSION: PA and lateral views of the chest. Lungs are clear. Heart  is normal in size. No effusions are evident. No pneumothorax. Mild  degenerative curvature thoracic spine is unchanged.    CT UROGRAM 3/9/22:  IMPRESSION: Previous partial right nephrectomy in the right kidney  without evidence of metastatic or recurrent disease.    CHEST X-RAY 3/9/22:  IMPRESSION: No radiographic evidence of acute chest abnormality.     CT UROGRAM 10/19/2022:  FINDINGS:   LOWER CHEST: A few small indeterminate pulmonary nodules at both lung  bases are unchanged for greater than 2 years, and are highly likely to  be of benign etiology. Mild scarring at the right lung base laterally  is also unchanged. Coronary artery calcification.     HEPATOBILIARY: Unremarkable. No hepatic masses are seen.     PANCREAS: Normal.     SPLEEN: Normal.     ADRENAL GLANDS: Normal.     RIGHT KIDNEY/URETER: Postoperative changes of partial right  nephrectomy. No evidence for recurrent neoplasm in the right kidney.  Small cortical cyst in the lower pole of the right kidney posteriorly  is unchanged, and would require no specific follow-up. No urinary  calculi. No hydronephrosis. The collecting system and proximal ureter  are moderately well opacified, and no filling defects are identified.     LEFT KIDNEY/URETER: No urinary calculi. No hydronephrosis. No solid  renal masses. Few small left renal cortical cysts are unchanged, and  would require no specific follow-up. The collecting system and  proximal ureter are moderately well opacified, and no filling defects  are identified.     URINARY BLADDER: Moderate diffuse bladder wall thickening is slightly  more prominent on today's exam. No bladder stones or masses are  identified.     BOWEL: Diverticulosis in the colon. No acute inflammatory change.  No  obstruction.      PELVIC ORGANS: Mild prostatic enlargement.     LYMPH NODES: No enlarged lymph nodes are identified in the abdomen or  pelvis.     VASCULATURE: Moderate atherosclerotic aortoiliac calcification.     ADDITIONAL FINDINGS: None.     MUSCULOSKELETAL: Unremarkable.                                                                      IMPRESSION:   1.  Postoperative changes of prior partial right nephrectomy. No  evidence for recurrent or metastatic malignancy.  2.  Moderate diffuse bladder wall thickening is increased slightly,  and may be related to cystitis and/or prostatic enlargement.    ASSESSMENT and PLAN  77 year old man with history of prostate cancer s/p radiation therapy and hormonal therapy several years ago and now s/p RIGHT robotic pnx on 9/4/19    Right clear cell renal cell  - We discussed that his pathology was reassuring with pT1a disease. Reported as positive margin, however this is at the capsule with no evidence of capsular invasion  -Reviewed his CT urogram and reviewed these images personally.  There is no evidence of recurrent disease    Prostate cancer   -We reviewed his PSA history and noted that his last PSA was 0.03 which is not concerning  -Recommend continued annual PSA monitoring    Gross hematuria  - Cystoscopy today with evidence of mild radiation cystitis changes with no evidence of a bladder tumor    Time spent: 15 minutes spent on the date of the encounter doing chart review, history and exam, documentation and further activities as noted above.  This was in addition to cystoscopy time    Binu Yoon MD   Urology  [facility]  Clinic Phone [phone]

## 2022-10-28 NOTE — PROCEDURES
CYSTOSCOPY PROCEDURE NOTE:    Kyler Rodriguez is a 77 year old male  who presents with gross hematuria for cystoscopy.    Pt ID verified with patient: Yes     Procedure verified with patient: Yes     Procedure confirmed with physician and support staff: Yes     Consent form confirmed with physician and support staff.    Sign In  History and Physical Exam reviewed .  Informed Consent Discussed: Yes   Sign in Communication: Yes   Time Out:  Team Confirms the Correct Patient, Correct Procedure; Yes , Correct Site and Site Marking, Correct Position (if applicable).    Affirmation of Time Out: Yes   Sign Out:  Sign Out Discussion: Yes   Physician: Binu Yoon MD    A urinalysis was performed revealing no evidence of infection.    The benefits, risks, alternatives of the cystoscopy procedure and personnel were discussed with the patient. The verbal consent was obtained and the patient agrees to proceed.      Description of procedure:   After fully informed, voluntary consent was obtained, the patient was brought into the procedure room, identified and placed in a supine position on the cystoscopy table.  The groin/scrotum were prepped with betadine and draped in a sterile fashion.  Urojet lidocaine gel was introduced.  A 15F flexible cystoscope was inserted into the urethra, and the bladder and urethra wereexamined in a systematic manner.  The patient tolerated the procedure well and there were no complications.      Cystoscopic findings:  The urethra was normal without strictures.  The prostate was 3cm long and demonstrated mild bilobar hypertrophy.  There was no median lobe.  The external sphincter coapted normally and the bladder neck was normal. The bladder was  entered and careful pan endoscopy was carried out. The posterior, superior and lateral walls and dome of the bladder were all well visualized and the scope was retroflexed upon itself..  There was mild trabeculation.  There were no neoplasms, stones, or  diverticula identifed.  The ureteric orifices were normal in position and number and effluxing clear urine.  There was evidence of mild radiation cystitis type changes throughout the bladder and bladder neck which was likely the etiology of the gross hematuria.    Assessment/Plan:   Kyler Rodriguez is a 77 year old male with a history of gross hematuria     -See clinic note      Binu Yoon MD

## 2022-10-28 NOTE — PROGRESS NOTES
[place]   CHIEF COMPLAINT   It was my pleasure to see Kyler Rodriguez who is a 77 year old male for follow-up of s/p RIGHT PNx.      HPI   Kyler Rodriguez is a very pleasant 77 year old male who presents with a history of prostate cancer s/p radiation and RIGHT renal mass now s/p robotic partial nephrectomy.    ##Kidney Cancer Follow up##  Patient is status post Robotic Partial Nephrectomy on 9/4/19.   Tumor was on the right side.   Maximal tumor dimension was 1.2 cm.    The histologic subtype was Clear Cell.    ISUP Grade 2.    Pathologic Stage T1a.    Tumor thrombus level 0 (renal vein).    Tumor necrosis present: No.  Sarcomatoid features present: No.  Lymph Nodes Removed No.   Number of nodes removed n/a, number of node positive for cancer n/a.   Was the ipsilateral adrenal gland removed? No.  Neoadjuvant Chemotherapy? No.  Was Margin Positive? No.    There were not deviations from a normal post-operative course or complications?.    The patient was not readmitted to the hospital since post-operative discharge.    2/15/21:  He is doing well with no issues.   He is still waiting for his COVID vaccine     3/11/22:  He is doing well with no issues over the last year    9/29/2022:  Seen as a hospital consult at [facility] for gross hematuria.  A three-way catheter was placed with irrigation    10/3/2022:  RN visit for trial of void which was successful though noted to have continued bloody urine    TODAY 10/28/2022:  He has been doing very well and his urine has been fairly clear  He follows up today for surveillance cystoscopy    PHYSICAL EXAM  Patient is a 77 year old  male   Vitals: Blood pressure 126/60, pulse 78, height 1.829 m (6'), weight 81.6 kg (180 lb).  General Appearance Adult: Body mass index is 24.41 kg/m .  Alert, no acute distress, oriented  HENT: throat/mouth:normal, good dentition  Lungs: no respiratory distress, or pursed lip breathing  Heart: No obvious jugular venous distension present  Abdomen:  soft, nontender, no organomegaly or masses  Musculoskeltal: extremities normal, no peripheral edema  Skin: no suspicious lesions or rashes  Neuro: Alert, oriented, speech and mentation normal  Psych: affect and mood normal  Gait: Normal    Component PSA PSA Diag Urologic Phys   Latest Ref Rng & Units 0.00 - 4.00 ug/L 0.00 - 4.00 ng/mL   7/19/2004 10.85 (H)    10/13/2006 7.62 (H)    9/11/2007 10.30 (H)    8/18/2008 15.40 (H)    2/4/2015 0.1    2/3/2017  0.04   8/16/2017  <0.04   2/13/2018 0.02    8/17/2018  <0.04   3/13/2019  <0.04   2/5/2020 0.02    2/8/2021 0.03    3/11/2022 <0.04      Creatinine   Date Value Ref Range Status   09/29/2022 0.57 (L) 0.67 - 1.17 mg/dL Final   02/08/2021 0.73 0.66 - 1.25 mg/dL Final      PATHOLOGY:  FINAL DIAGNOSIS:   A: Kidney, right, renal mass base, margin, excision   - Negative for malignancy     B: Kidney, right, partial nephrectomy   - Clear cell renal cell carcinoma, WHO/ISUP grade 2   - Tumor size: 1.2 cm   - Renal parenchymal margin negative   - Tumor extends to the inked, cauterized margin of the perirenal tissue   (see comment)   - Please see below for tumor synopsis     IMAGING:  I reviewed all pertinent available imaging    CT ABD/PEL 2/11/21:                                  IMPRESSION:    1. No evidence of recurrent mass in the region of partial right  nephrectomy. Kidneys are otherwise unremarkable with probable tiny  renal cortical cysts too small to characterize by CT.  2. Circumferential bladder wall thickening possibly related to chronic  bladder outlet obstruction. No significant prostate gland enlargement  is evident. Recurrent UTI and/or neurogenic bladder could appear  similar.  3. Abdominal organs are otherwise within normal limits. No bowel  obstruction. No enlarged lymph nodes.    CHEST X-RAY 2/11/21:  IMPRESSION: PA and lateral views of the chest. Lungs are clear. Heart  is normal in size. No effusions are evident. No pneumothorax. Mild  degenerative  curvature thoracic spine is unchanged.    CT UROGRAM 3/9/22:  IMPRESSION: Previous partial right nephrectomy in the right kidney  without evidence of metastatic or recurrent disease.    CHEST X-RAY 3/9/22:  IMPRESSION: No radiographic evidence of acute chest abnormality.     CT UROGRAM 10/19/2022:  FINDINGS:   LOWER CHEST: A few small indeterminate pulmonary nodules at both lung  bases are unchanged for greater than 2 years, and are highly likely to  be of benign etiology. Mild scarring at the right lung base laterally  is also unchanged. Coronary artery calcification.     HEPATOBILIARY: Unremarkable. No hepatic masses are seen.     PANCREAS: Normal.     SPLEEN: Normal.     ADRENAL GLANDS: Normal.     RIGHT KIDNEY/URETER: Postoperative changes of partial right  nephrectomy. No evidence for recurrent neoplasm in the right kidney.  Small cortical cyst in the lower pole of the right kidney posteriorly  is unchanged, and would require no specific follow-up. No urinary  calculi. No hydronephrosis. The collecting system and proximal ureter  are moderately well opacified, and no filling defects are identified.     LEFT KIDNEY/URETER: No urinary calculi. No hydronephrosis. No solid  renal masses. Few small left renal cortical cysts are unchanged, and  would require no specific follow-up. The collecting system and  proximal ureter are moderately well opacified, and no filling defects  are identified.     URINARY BLADDER: Moderate diffuse bladder wall thickening is slightly  more prominent on today's exam. No bladder stones or masses are  identified.     BOWEL: Diverticulosis in the colon. No acute inflammatory change. No  obstruction.      PELVIC ORGANS: Mild prostatic enlargement.     LYMPH NODES: No enlarged lymph nodes are identified in the abdomen or  pelvis.     VASCULATURE: Moderate atherosclerotic aortoiliac calcification.     ADDITIONAL FINDINGS: None.     MUSCULOSKELETAL: Unremarkable.                                                                       IMPRESSION:   1.  Postoperative changes of prior partial right nephrectomy. No  evidence for recurrent or metastatic malignancy.  2.  Moderate diffuse bladder wall thickening is increased slightly,  and may be related to cystitis and/or prostatic enlargement.    ASSESSMENT and PLAN  77 year old man with history of prostate cancer s/p radiation therapy and hormonal therapy several years ago and now s/p RIGHT robotic pnx on 9/4/19    Right clear cell renal cell  - We discussed that his pathology was reassuring with pT1a disease. Reported as positive margin, however this is at the capsule with no evidence of capsular invasion  -Reviewed his CT urogram and reviewed these images personally.  There is no evidence of recurrent disease    Prostate cancer   -We reviewed his PSA history and noted that his last PSA was 0.03 which is not concerning  -Recommend continued annual PSA monitoring    Gross hematuria  - Cystoscopy today with evidence of mild radiation cystitis changes with no evidence of a bladder tumor    Time spent: 15 minutes spent on the date of the encounter doing chart review, history and exam, documentation and further activities as noted above.  This was in addition to cystoscopy time    Binu Yoon MD   Urology  [facility]  Clinic Phone [phone]

## 2022-10-28 NOTE — PATIENT INSTRUCTIONS
"AFTER YOUR CYSTOSCOPY  ?  ?  You have just completed a cystoscopy, or \"cysto\", which allowed your physician to learn more about your bladder (or to remove a stent placed after surgery). We suggest that you continue to avoid caffeine, fruit juice, and alcohol for the next 24 hours, however, you are encouraged to return to your normal activities.  ?  ?  A few things that are considered normal after your cystoscopy:  ?  * small amount of bleeding (or spotting) that clears within the next 24 hours  ?  * slight burning sensation with urination  ?  * sensation of needing to void (urinate) more frequently  ?  * the feeling of \"air\" in your urine  ?  * mild discomfort that is relieved with Tylenol    * bladder spasms  ?  ?  ?  Please contact our office promptly if you:  ?  * develop a fever above 101 degrees  ?  * are unable to urinate  ?  * develop bright red blood that does not stop  ?  * experience severe pain or swelling  ?  ?  ?  And of course, please contact our office with any concerns or questions [phone].  ?    AFTER YOUR CYSTOSCOPY        You have just completed a cystoscopy, or \"cysto\", which allowed your physician to learn more about your bladder (or to remove a stent placed after surgery). We suggest that you continue to avoid caffeine, fruit juice, and alcohol for the next 24 hours, however, you are encouraged to return to your normal activities.         A few things that are considered normal after your cystoscopy:     * Small amount of bleeding (or spotting) that clears within the next 24 hours     * Slight burning sensation with urination     * Sensation to of needing to avoid more frequently     * The feeling of \"air\" in your urine     * Mild discomfort that is relieved with Tylenol        Please contact our office promptly if you:     * Develop a fever above 101 degrees     * Are unable to urinate     * Develop bright red blood that does not stop     * Severe pain or swelling         Please contact " our office with any concerns or questions @[facility].

## 2022-10-28 NOTE — NURSING NOTE
Prior to the start of the procedure and with procedural staff participation, I verbally confirmed the patient s identity using two indicators, relevant allergies, that the procedure was appropriate and matched the consent or emergent situation, and that the correct equipment/implants were available. Immediately prior to starting the procedure I conducted the Time Out with the procedural staff and re-confirmed the patient s name, procedure, and site/side. I have wiped the patient off with the povidone-Iodine solution, draped them,  used Lidocaine hydrochloride jelly, and instilled sterile water into the bladder. (The Joint Commission universal protocol was followed.)  Yes    Sedation (Moderate or Deep): None  5mL 2% lidocaine hydrochloride Urojet instilled into urethra.    NDC# [address]-1  Lot #: [id number] Date:  {12/23

## 2022-12-26 ENCOUNTER — TELEPHONE (OUTPATIENT)
Dept: UROLOGY | Facility: CLINIC | Age: 77
End: 2022-12-26

## 2022-12-26 NOTE — TELEPHONE ENCOUNTER
[facility] Call Center    Phone Message    May a detailed message be left on voicemail: yes     Reason for Call: Pt called to set up yearly follow up, scheduled 4/28. Pt stated he usually has CT scan done before appt. Order entered expires 3/11. Pt wondering if he should wait until closer to appt 4/28.  Please enter new order if after and call pt to set up for appropriate time frame. (pt would like done in [place])  Thank you    Action Taken: Message routed to:  Other: Uro    Travel Screening: Not Applicable

## 2022-12-29 DIAGNOSIS — E78.5 HYPERLIPIDEMIA LDL GOAL <100: ICD-10-CM

## 2022-12-29 RX ORDER — ATORVASTATIN CALCIUM 80 MG/1
TABLET, FILM COATED ORAL
Qty: 45 TABLET | Refills: 0 | Status: SHIPPED | OUTPATIENT
Start: 2022-12-29 | End: 2023-07-05

## 2022-12-29 NOTE — TELEPHONE ENCOUNTER
Routing refill request to provider for review/approval because:  A break in medication    Daily FERMIN RN

## 2023-02-02 ENCOUNTER — TRANSFERRED RECORDS (OUTPATIENT)
Dept: HEALTH INFORMATION MANAGEMENT | Facility: CLINIC | Age: 78
End: 2023-02-02

## 2023-03-20 ASSESSMENT — ENCOUNTER SYMPTOMS
ABDOMINAL PAIN: 0
HEMATOCHEZIA: 0
PALPITATIONS: 0
ARTHRALGIAS: 1
HEADACHES: 0
SORE THROAT: 0
DYSURIA: 0
NERVOUS/ANXIOUS: 0
DIZZINESS: 0
FEVER: 0
CHILLS: 0
WEAKNESS: 0
SHORTNESS OF BREATH: 0
PARESTHESIAS: 0
JOINT SWELLING: 0
NAUSEA: 0
FREQUENCY: 0
CONSTIPATION: 0
MYALGIAS: 0
COUGH: 0
HEARTBURN: 0
HEMATURIA: 0
DIARRHEA: 0
EYE PAIN: 0

## 2023-03-20 ASSESSMENT — ACTIVITIES OF DAILY LIVING (ADL): CURRENT_FUNCTION: NO ASSISTANCE NEEDED

## 2023-03-22 ENCOUNTER — OFFICE VISIT (OUTPATIENT)
Dept: INTERNAL MEDICINE | Facility: CLINIC | Age: 78
End: 2023-03-22
Payer: MEDICARE

## 2023-03-22 VITALS
RESPIRATION RATE: 18 BRPM | WEIGHT: 181.3 LBS | SYSTOLIC BLOOD PRESSURE: 122 MMHG | TEMPERATURE: 97.8 F | OXYGEN SATURATION: 100 % | HEART RATE: 82 BPM | HEIGHT: 72 IN | DIASTOLIC BLOOD PRESSURE: 64 MMHG | BODY MASS INDEX: 24.56 KG/M2

## 2023-03-22 DIAGNOSIS — G20.C PARKINSONISM, UNSPECIFIED PARKINSONISM TYPE (H): ICD-10-CM

## 2023-03-22 DIAGNOSIS — Z12.5 SCREENING FOR PROSTATE CANCER: ICD-10-CM

## 2023-03-22 DIAGNOSIS — M54.50 MIDLINE LOW BACK PAIN WITHOUT SCIATICA, UNSPECIFIED CHRONICITY: ICD-10-CM

## 2023-03-22 DIAGNOSIS — N40.1 HYPERTROPHY OF PROSTATE WITH URINARY OBSTRUCTION: ICD-10-CM

## 2023-03-22 DIAGNOSIS — E78.5 HYPERLIPIDEMIA LDL GOAL <100: ICD-10-CM

## 2023-03-22 DIAGNOSIS — E11.21 TYPE 2 DIABETES MELLITUS WITH DIABETIC NEPHROPATHY, UNSPECIFIED WHETHER LONG TERM INSULIN USE (H): ICD-10-CM

## 2023-03-22 DIAGNOSIS — C64.1 RENAL MALIGNANT NEOPLASM, RIGHT (H): ICD-10-CM

## 2023-03-22 DIAGNOSIS — N13.8 HYPERTROPHY OF PROSTATE WITH URINARY OBSTRUCTION: ICD-10-CM

## 2023-03-22 DIAGNOSIS — M25.551 BILATERAL HIP PAIN: ICD-10-CM

## 2023-03-22 DIAGNOSIS — Z00.00 ENCOUNTER FOR PREVENTATIVE ADULT HEALTH CARE EXAMINATION: Primary | ICD-10-CM

## 2023-03-22 DIAGNOSIS — I10 ESSENTIAL HYPERTENSION, BENIGN: ICD-10-CM

## 2023-03-22 DIAGNOSIS — M25.552 BILATERAL HIP PAIN: ICD-10-CM

## 2023-03-22 LAB
ALBUMIN SERPL BCG-MCNC: 4.2 G/DL (ref 3.5–5.2)
ALP SERPL-CCNC: 76 U/L (ref 40–129)
ALT SERPL W P-5'-P-CCNC: 6 U/L (ref 10–50)
ANION GAP SERPL CALCULATED.3IONS-SCNC: 16 MMOL/L (ref 7–15)
AST SERPL W P-5'-P-CCNC: 19 U/L (ref 10–50)
BILIRUB SERPL-MCNC: 0.5 MG/DL
BUN SERPL-MCNC: 17.8 MG/DL (ref 8–23)
CALCIUM SERPL-MCNC: 9.9 MG/DL (ref 8.8–10.2)
CHLORIDE SERPL-SCNC: 99 MMOL/L (ref 98–107)
CHOLEST SERPL-MCNC: 148 MG/DL
CREAT SERPL-MCNC: 0.83 MG/DL (ref 0.67–1.17)
CREAT UR-MCNC: 85 MG/DL
DEPRECATED HCO3 PLAS-SCNC: 22 MMOL/L (ref 22–29)
ERYTHROCYTE [DISTWIDTH] IN BLOOD BY AUTOMATED COUNT: 24.2 % (ref 10–15)
GFR SERPL CREATININE-BSD FRML MDRD: >90 ML/MIN/1.73M2
GLUCOSE SERPL-MCNC: 122 MG/DL (ref 70–99)
HBA1C MFR BLD: 7 % (ref 0–5.6)
HCT VFR BLD AUTO: 41.1 % (ref 40–53)
HDLC SERPL-MCNC: 69 MG/DL
HGB BLD-MCNC: 12 G/DL (ref 13.3–17.7)
LDLC SERPL CALC-MCNC: 65 MG/DL
MCH RBC QN AUTO: 19.6 PG (ref 26.5–33)
MCHC RBC AUTO-ENTMCNC: 29.2 G/DL (ref 31.5–36.5)
MCV RBC AUTO: 67 FL (ref 78–100)
MICROALBUMIN UR-MCNC: 31.9 MG/L
MICROALBUMIN/CREAT UR: 37.53 MG/G CR (ref 0–17)
NONHDLC SERPL-MCNC: 79 MG/DL
PLATELET # BLD AUTO: 296 10E3/UL (ref 150–450)
POTASSIUM SERPL-SCNC: 4.5 MMOL/L (ref 3.4–5.3)
PROT SERPL-MCNC: 7.1 G/DL (ref 6.4–8.3)
PSA SERPL DL<=0.01 NG/ML-MCNC: 0.03 NG/ML (ref 0–6.5)
RBC # BLD AUTO: 6.13 10E6/UL (ref 4.4–5.9)
SODIUM SERPL-SCNC: 137 MMOL/L (ref 136–145)
TRIGL SERPL-MCNC: 70 MG/DL
TSH SERPL DL<=0.005 MIU/L-ACNC: 3.46 UIU/ML (ref 0.3–4.2)
WBC # BLD AUTO: 7.9 10E3/UL (ref 4–11)

## 2023-03-22 PROCEDURE — 82043 UR ALBUMIN QUANTITATIVE: CPT | Performed by: INTERNAL MEDICINE

## 2023-03-22 PROCEDURE — G0439 PPPS, SUBSEQ VISIT: HCPCS | Performed by: INTERNAL MEDICINE

## 2023-03-22 PROCEDURE — 82570 ASSAY OF URINE CREATININE: CPT | Performed by: INTERNAL MEDICINE

## 2023-03-22 PROCEDURE — 84443 ASSAY THYROID STIM HORMONE: CPT | Performed by: INTERNAL MEDICINE

## 2023-03-22 PROCEDURE — 80061 LIPID PANEL: CPT | Performed by: INTERNAL MEDICINE

## 2023-03-22 PROCEDURE — 85027 COMPLETE CBC AUTOMATED: CPT | Performed by: INTERNAL MEDICINE

## 2023-03-22 PROCEDURE — 83036 HEMOGLOBIN GLYCOSYLATED A1C: CPT | Performed by: INTERNAL MEDICINE

## 2023-03-22 PROCEDURE — 36415 COLL VENOUS BLD VENIPUNCTURE: CPT | Performed by: INTERNAL MEDICINE

## 2023-03-22 PROCEDURE — 99214 OFFICE O/P EST MOD 30 MIN: CPT | Mod: 25 | Performed by: INTERNAL MEDICINE

## 2023-03-22 PROCEDURE — G0103 PSA SCREENING: HCPCS | Performed by: INTERNAL MEDICINE

## 2023-03-22 PROCEDURE — 80053 COMPREHEN METABOLIC PANEL: CPT | Performed by: INTERNAL MEDICINE

## 2023-03-22 ASSESSMENT — ENCOUNTER SYMPTOMS
NERVOUS/ANXIOUS: 0
ABDOMINAL PAIN: 0
NAUSEA: 0
FREQUENCY: 0
EYE PAIN: 0
SORE THROAT: 0
DIZZINESS: 0
HEARTBURN: 0
PALPITATIONS: 0
CONSTIPATION: 0
WEAKNESS: 0
DYSURIA: 0
ARTHRALGIAS: 1
HEMATURIA: 0
COUGH: 0
HEADACHES: 0
CHILLS: 0
DIARRHEA: 0
JOINT SWELLING: 0
FEVER: 0
HEMATOCHEZIA: 0
SHORTNESS OF BREATH: 0
PARESTHESIAS: 0
MYALGIAS: 0

## 2023-03-22 ASSESSMENT — PAIN SCALES - GENERAL: PAINLEVEL: MODERATE PAIN (4)

## 2023-03-22 ASSESSMENT — ACTIVITIES OF DAILY LIVING (ADL): CURRENT_FUNCTION: NO ASSISTANCE NEEDED

## 2023-03-22 NOTE — PROGRESS NOTES
"SUBJECTIVE:   Kyler is a 77 year old who presents for Preventive Visit.  No flowsheet data found.  Patient has been advised of split billing requirements and indicates understanding: Yes  Are you in the first 12 months of your Medicare coverage?  No    Healthy Habits:     In general, how would you rate your overall health?  Fair    Frequency of exercise:  2-3 days/week    Duration of exercise:  Less than 15 minutes    Do you usually eat at least 4 servings of fruit and vegetables a day, include whole grains    & fiber and avoid regularly eating high fat or \"junk\" foods?  No    Taking medications regularly:  Yes    Medication side effects:  None    Ability to successfully perform activities of daily living:  No assistance needed    Home Safety:  No safety concerns identified    Hearing Impairment:  No hearing concerns    In the past 6 months, have you been bothered by leaking of urine?  No    In general, how would you rate your overall mental or emotional health?  Good      PHQ-2 Total Score: 0    Additional concerns today:  No      Have you ever done Advance Care Planning? (For example, a Health Directive, POLST, or a discussion with a medical provider or your loved ones about your wishes): Yes, advance care planning is on file.       Fall risk  Fallen 2 or more times in the past year?: No  Any fall with injury in the past year?: No    Cognitive Screening   1) Repeat 3 items (Leader, Season, Table)    2) Clock draw: NORMAL  3) 3 item recall: Recalls 3 objects  Results: 3 items recalled: COGNITIVE IMPAIRMENT LESS LIKELY    Mini-CogTM Copyright S Jorge. Licensed by the author for use in [facility]; reprinted with permission ([email or web address]). All rights reserved.      Do you have sleep apnea, excessive snoring or daytime drowsiness?: no    Reviewed and updated as needed this visit by clinical staff   Tobacco  Allergies  Meds  Problems  Med Hx  Surg Hx  Fam Hx          Reviewed and updated as needed " this visit by Provider                 Social History     Tobacco Use     Smoking status: Never     Smokeless tobacco: Never   Substance Use Topics     Alcohol use: Not Currently     Comment: 1-2 drinks/daily         Alcohol Use 3/20/2023   Prescreen: >3 drinks/day or >7 drinks/week? Not Applicable   AUDIT SCORE  -     Do you have a current opioid prescription? No  Do you use any other controlled substances or medications that are not prescribed by a provider? None          PROBLEMS TO ADD ON...  Has h/o HTN. on medical treatment. BP has been controlled. No side effects from medications. No CP, HA, dizziness. good compliance with medications and low salt diet.  Has H/O DM. On diet , exercise and oral Metformin. Blood sugars are controlled. No parestesias. No hypoglycemias.  Has H/O hyperlipidemia. On medical treatment and diet. No side effects. No muscle weakness, myalgias or upset stomach.   Has h/o Parkinson's disease, on Sinemet, follows with neurology.   Reports LBP and bilateral hip pains in the trochanteric area. Seen ortho, has had steroid injections in the hip bursa. Had temporary improvement. Has pain with ambulation over 100 feet, needs to sit and rest. No leg weakness or numbness.   Has h/o prostate and kidney cancer, follows with urology, no recurrence.     Current providers sharing in care for this patient include:   Patient Care Team:  Rakesh Clark MD as PCP - General (Internal Medicine)  Rakesh Clark MD as Assigned PCP  Taqueria Amador MD as MD (Neurology)  Colten Messina MD as MD (Clinical Neurophysiology)  Binu Yoon MD as Assigned Surgical Provider    The following health maintenance items are reviewed in Epic and correct as of today:  Health Maintenance   Topic Date Due     DIABETIC FOOT EXAM  10/13/2015     LIPID  09/21/2022     EYE EXAM  11/10/2022     A1C  03/19/2023     MICROALBUMIN  03/21/2023     ANNUAL REVIEW OF HM ORDERS  03/21/2023     MEDICARE ANNUAL  WELLNESS VISIT  03/21/2023     [facility]  09/29/2023     FALL RISK ASSESSMENT  03/22/2024     DTAP/TDAP/TD IMMUNIZATION (3 - Td or Tdap) 02/20/2028     ADVANCE CARE PLANNING  03/22/2028     HEPATITIS C SCREENING  Completed     PHQ-2 (once per calendar year)  Completed     INFLUENZA VACCINE  Completed     Pneumococcal Vaccine: 65+ Years  Completed     ZOSTER IMMUNIZATION  Completed     COVID-19 Vaccine  Completed     IPV IMMUNIZATION  Aged Out     MENINGITIS IMMUNIZATION  Aged Out     COLORECTAL CANCER SCREENING  Discontinued     Lab work is in process  Labs reviewed in EPIC          Review of Systems   Constitutional: Negative for chills and fever.   HENT: Negative for congestion, ear pain, hearing loss and sore throat.    Eyes: Negative for pain and visual disturbance.   Respiratory: Negative for cough and shortness of breath.    Cardiovascular: Negative for chest pain, palpitations and peripheral edema.   Gastrointestinal: Negative for abdominal pain, constipation, diarrhea, heartburn, hematochezia and nausea.   Genitourinary: Negative for dysuria, frequency, genital sores, hematuria, impotence, penile discharge and urgency.   Musculoskeletal: Positive for arthralgias. Negative for joint swelling and myalgias.   Skin: Negative for rash.   Neurological: Negative for dizziness, weakness, headaches and paresthesias.   Psychiatric/Behavioral: Negative for mood changes. The patient is not nervous/anxious.          OBJECTIVE:   /64 (BP Location: Left arm, Cuff Size: Adult Regular)   Pulse 82   Temp 97.8  F (36.6  C) (Oral)   Resp 18   Ht 1.829 m (6')   Wt 82.2 kg (181 lb 4.8 oz)   SpO2 100%   BMI 24.59 kg/m   Estimated body mass index is 24.59 kg/m  as calculated from the following:    Height as of this encounter: 1.829 m (6').    Weight as of this encounter: 82.2 kg (181 lb 4.8 oz).  Physical Exam  GENERAL: frail appearing, alert and no distress  EYES: Eyes grossly normal to inspection, PERRL and conjunctivae  and sclerae normal  HENT: ear canals and TM's normal, nose and mouth without ulcers or lesions  NECK: no adenopathy, no asymmetry, masses, or scars and thyroid normal to palpation  RESP: lungs clear to auscultation - no rales, rhonchi or wheezes  CV: regular rate and rhythm, normal S1 S2, no S3 or S4, no murmur, click or rub, no peripheral edema and peripheral pulses strong  ABDOMEN: soft, nontender, no hepatosplenomegaly, no masses and bowel sounds normal  MS: no gross musculoskeletal defects noted, no edema, LS spine paravertebral palpatory pain  SKIN: no suspicious lesions or rashes  NEURO: Normal strength and tone, mentation intact and speech normal, resting arms and head tremor, unstable gait   PSYCH: mentation appears normal, affect normal/bright    Diagnostic Test Results:  Labs reviewed in Epic    ASSESSMENT / PLAN:       ICD-10-CM    1. Encounter for preventative adult health care examination  Z00.00 Physical Therapy Referral     CBC with platelets     Comprehensive metabolic panel (BMP + Alb, Alk Phos, ALT, AST, Total. Bili, TP)     TSH with free T4 reflex     PSA, screen      2. Type 2 diabetes mellitus with diabetic nephropathy, unspecified whether long term insulin use (H)  E11.21 Lipid panel reflex to direct LDL Non-fasting     HEMOGLOBIN A1C     Albumin Random Urine Quantitative with Creat Ratio     OFFICE/OUTPT VISIT,EST,LEVL III      3. Parkinsonism, unspecified Parkinsonism type (H)  G20       4. Hyperlipidemia LDL goal <100  E78.5 OFFICE/OUTPT VISIT,EST,LEVL III      5. Essential hypertension, benign  I10 CBC with platelets     Comprehensive metabolic panel (BMP + Alb, Alk Phos, ALT, AST, Total. Bili, TP)     TSH with free T4 reflex     OFFICE/OUTPT VISIT,EST,LEVL III      6. Hypertrophy of prostate with urinary obstruction  N40.1     N13.8       7. Renal malignant neoplasm, right (H)  C64.1       8. Midline low back pain without sciatica, unspecified chronicity  M54.50 Physical Therapy Referral      OFFICE/OUTPT VISIT,EST,LEVL III      9. Bilateral hip pain  M25.551 Physical Therapy Referral    M25.552 OFFICE/OUTPT VISIT,EST,LEVL III      10. Screening for prostate cancer  Z12.5 PSA, screen        Assess lab work   Continue treatment  Controlled HTN and DM  Refer to PT  Handicapped parking form filled in for 1 year       Patient has been advised of split billing requirements and indicates understanding: Yes      COUNSELING:  Reviewed preventive health counseling, as reflected in patient instructions       Regular exercise       Healthy diet/nutrition       Vision screening       Hearing screening       Colon cancer screening       Prostate cancer screening        He reports that he has never smoked. He has never used smokeless tobacco.      Appropriate preventive services were discussed with this patient, including applicable screening as appropriate for cardiovascular disease, diabetes, osteopenia/osteoporosis, and glaucoma.  As appropriate for age/gender, discussed screening for colorectal cancer, prostate cancer, breast cancer, and cervical cancer. Checklist reviewing preventive services available has been given to the patient.    Reviewed patients plan of care and provided an AVS. The Intermediate Care Plan ( asthma action plan, low back pain action plan, and migraine action plan) for Kyler meets the Care Plan requirement. This Care Plan has been established and reviewed with the Patient.          Rakesh Clark MD  [facility]    Identified Health Risks:    I have reviewed Opioid Use Disorder and Substance Use Disorder risk factors and made any needed referrals.

## 2023-03-22 NOTE — LETTER
March 24, 2023      Kyler Rodriguez  [address] [address] [address]        Dear ,    We are writing to inform you of your test results. Your labs show slightly worsened diabetic control, try to improve diet and exercise. The rest of the results are acceptable. Follow up in 6 months.       Resulted Orders   Lipid panel reflex to direct LDL Non-fasting   Result Value Ref Range    Cholesterol 148 <200 mg/dL    Triglycerides 70 <150 mg/dL    Direct Measure HDL 69 >=40 mg/dL    LDL Cholesterol Calculated 65 <=100 mg/dL    Non HDL Cholesterol 79 <130 mg/dL    Narrative    Cholesterol  Desirable:  <200 mg/dL    Triglycerides  Normal:  Less than 150 mg/dL  Borderline High:  150-199 mg/dL  High:  200-499 mg/dL  Very High:  Greater than or equal to 500 mg/dL    Direct Measure HDL  Female:  Greater than or equal to 50 mg/dL   Male:  Greater than or equal to 40 mg/dL    LDL Cholesterol  Desirable:  <100mg/dL  Above Desirable:  100-129 mg/dL   Borderline High:  130-159 mg/dL   High:  160-189 mg/dL   Very High:  >= 190 mg/dL    Non HDL Cholesterol  Desirable:  130 mg/dL  Above Desirable:  130-159 mg/dL  Borderline High:  160-189 mg/dL  High:  190-219 mg/dL  Very High:  Greater than or equal to 220 mg/dL   HEMOGLOBIN A1C   Result Value Ref Range    Hemoglobin A1C 7.0 (H) 0.0 - 5.6 %      Comment:      Normal <5.7%   Prediabetes 5.7-6.4%    Diabetes 6.5% or higher     Note: Adopted from ADA consensus guidelines.   Albumin Random Urine Quantitative with Creat Ratio   Result Value Ref Range    Creatinine Urine mg/dL 85.0 mg/dL      Comment:      The reference ranges have not been established in urine creatinine. The results should be integrated into the clinical context for interpretation.    Albumin Urine mg/L 31.9 mg/L      Comment:      The reference ranges have not been established in urine albumin. The results should be integrated into the clinical context for interpretation.    Albumin Urine mg/g Cr 37.53  (H) 0.00 - 17.00 mg/g Cr      Comment:      Microalbuminuria is defined as an albumin:creatinine ratio of 17 to 299 for males and 25 to 299 for females. A ratio of albumin:creatinine of 300 or higher is indicative of overt proteinuria.  Due to biologic variability, positive results should be confirmed by a second, first-morning random or 24-hour timed urine specimen. If there is discrepancy, a third specimen is recommended. When 2 out of 3 results are in the microalbuminuria range, this is evidence for incipient nephropathy and warrants increased efforts at glucose control, blood pressure control, and institution of therapy with an angiotensin-converting-enzyme (ACE) inhibitor (if the patient can tolerate it).     CBC with platelets   Result Value Ref Range    WBC Count 7.9 4.0 - 11.0 10e3/uL    RBC Count 6.13 (H) 4.40 - 5.90 10e6/uL    Hemoglobin 12.0 (L) 13.3 - 17.7 g/dL    Hematocrit 41.1 40.0 - 53.0 %    MCV 67 (L) 78 - 100 fL    MCH 19.6 (L) 26.5 - 33.0 pg    MCHC 29.2 (L) 31.5 - 36.5 g/dL    RDW 24.2 (H) 10.0 - 15.0 %    Platelet Count 296 150 - 450 10e3/uL    Narrative    Tech Comments  Name of [facility]  Laboratory Phone [phone]  What is Abnormal Anisocytosis  Provider Follow Up Needed no  If Yes, Provider Contact Name n/a  If Yes, Provider Phone/Pager n/a         Comprehensive metabolic panel (BMP + Alb, Alk Phos, ALT, AST, Total. Bili, TP)   Result Value Ref Range    Sodium 137 136 - 145 mmol/L    Potassium 4.5 3.4 - 5.3 mmol/L    Chloride 99 98 - 107 mmol/L    Carbon Dioxide (CO2) 22 22 - 29 mmol/L    Anion Gap 16 (H) 7 - 15 mmol/L    Urea Nitrogen 17.8 8.0 - 23.0 mg/dL    Creatinine 0.83 0.67 - 1.17 mg/dL    Calcium 9.9 8.8 - 10.2 mg/dL    Glucose 122 (H) 70 - 99 mg/dL    Alkaline Phosphatase 76 40 - 129 U/L    AST 19 10 - 50 U/L    ALT 6 (L) 10 - 50 U/L    Protein Total 7.1 6.4 - 8.3 g/dL    Albumin 4.2 3.5 - 5.2 g/dL    Bilirubin Total 0.5 <=1.2 mg/dL    GFR Estimate >90 >60 mL/min/1.73m2       Comment:      eGFR calculated using 2021 CKD-EPI equation.   TSH with free T4 reflex   Result Value Ref Range    TSH 3.46 0.30 - 4.20 uIU/mL   PSA, screen   Result Value Ref Range    Prostate Specific Antigen Screen 0.03 0.00 - 6.50 ng/mL    Narrative    This result is obtained using the Roche Elecsys total PSA method on the edwin e801 immunoassay analyzer. Results obtained with different assay methods or kits cannot be used interchangeably.       If you have any questions or concerns, please call the clinic at the number listed above.       Sincerely,      Rakesh Clark MD      [facility]

## 2023-04-25 ENCOUNTER — HOSPITAL ENCOUNTER (OUTPATIENT)
Dept: CT IMAGING | Facility: CLINIC | Age: 78
Discharge: HOME | End: 2023-04-25
Attending: UROLOGY
Payer: MEDICARE

## 2023-04-25 ENCOUNTER — HOSPITAL ENCOUNTER (OUTPATIENT)
Dept: GENERAL RADIOLOGY | Facility: CLINIC | Age: 78
Discharge: HOME | End: 2023-04-25
Attending: UROLOGY
Payer: MEDICARE

## 2023-04-25 DIAGNOSIS — C64.1 RENAL MALIGNANT NEOPLASM, RIGHT (H): ICD-10-CM

## 2023-04-25 LAB
CREAT BLD-MCNC: 0.9 MG/DL (ref 0.7–1.3)
GFR SERPL CREATININE-BSD FRML MDRD: >60 ML/MIN/1.73M2

## 2023-04-25 PROCEDURE — 250N000009 HC RX 250: Performed by: RADIOLOGY

## 2023-04-25 PROCEDURE — G1004 CDSM NDSC: HCPCS

## 2023-04-25 PROCEDURE — 82565 ASSAY OF CREATININE: CPT

## 2023-04-25 PROCEDURE — 71046 X-RAY EXAM CHEST 2 VIEWS: CPT

## 2023-04-25 PROCEDURE — 250N000011 HC RX IP 250 OP 636: Performed by: RADIOLOGY

## 2023-04-25 RX ORDER — IOPAMIDOL 755 MG/ML
500 INJECTION, SOLUTION INTRAVASCULAR ONCE
Status: COMPLETED | OUTPATIENT
Start: 2023-04-25 | End: 2023-04-25

## 2023-04-25 RX ADMIN — SODIUM CHLORIDE 64 ML: 9 INJECTION, SOLUTION INTRAVENOUS at 13:15

## 2023-04-25 RX ADMIN — IOPAMIDOL 100 ML: 755 INJECTION, SOLUTION INTRAVENOUS at 13:15

## 2023-04-28 ENCOUNTER — OFFICE VISIT (OUTPATIENT)
Dept: UROLOGY | Facility: CLINIC | Age: 78
End: 2023-04-28
Payer: MEDICARE

## 2023-04-28 VITALS
HEART RATE: 62 BPM | DIASTOLIC BLOOD PRESSURE: 75 MMHG | OXYGEN SATURATION: 99 % | HEIGHT: 72 IN | SYSTOLIC BLOOD PRESSURE: 128 MMHG | BODY MASS INDEX: 25.06 KG/M2 | WEIGHT: 185 LBS

## 2023-04-28 DIAGNOSIS — C64.1 RENAL MALIGNANT NEOPLASM, RIGHT (H): Primary | ICD-10-CM

## 2023-04-28 DIAGNOSIS — C61 PROSTATE CANCER (H): ICD-10-CM

## 2023-04-28 PROCEDURE — 99214 OFFICE O/P EST MOD 30 MIN: CPT | Performed by: UROLOGY

## 2023-04-28 ASSESSMENT — PAIN SCALES - GENERAL: PAINLEVEL: MILD PAIN (3)

## 2023-04-28 NOTE — LETTER
4/28/2023       RE: Kyler Rodriguez  [address] [address] [address]     Dear Colleague,    Thank you for referring your patient, Kyler Rodriguez, to the [facility]. Please see a copy of my visit note below.    [place]   CHIEF COMPLAINT   It was my pleasure to see Kyler Rodriguez who is a 77 year old male for follow-up of s/p RIGHT PNx.      HPI   Kyler Rodriguez is a very pleasant 77 year old male who presents with a history of prostate cancer s/p radiation and RIGHT renal mass now s/p robotic partial nephrectomy.    ##Kidney Cancer Follow up##  Patient is status post Robotic Partial Nephrectomy on 9/4/19.   Tumor was on the right side.   Maximal tumor dimension was 1.2 cm.    The histologic subtype was Clear Cell.    ISUP Grade 2.    Pathologic Stage T1a.    Tumor thrombus level 0 (renal vein).    Tumor necrosis present: No.  Sarcomatoid features present: No.  Lymph Nodes Removed No.   Number of nodes removed n/a, number of node positive for cancer n/a.   Was the ipsilateral adrenal gland removed? No.  Neoadjuvant Chemotherapy? No.  Was Margin Positive? No.    There were not deviations from a normal post-operative course or complications?.    The patient was not readmitted to the hospital since post-operative discharge.    2/15/21:  He is doing well with no issues.   He is still waiting for his COVID vaccine     3/11/22:  He is doing well with no issues over the last year    9/29/2022:  Seen as a hospital consult at [facility] for gross hematuria.  A three-way catheter was placed with irrigation    10/3/2022:  RN visit for trial of void which was successful though noted to have continued bloody urine    10/28/2022:  He has been doing very well and his urine has been fairly clear  He follows up today for surveillance cystoscopy    TODAY 4/28/2023:  He has been doing well  Urine has remained clear  Follow up to review  surveillance CT    PHYSICAL EXAM  Patient is a 77 year old  male   Vitals: Blood pressure 128/75, pulse 62, height 1.829 m (6'), weight 83.9 kg (185 lb), SpO2 99 %.  General Appearance Adult: Body mass index is 25.09 kg/m .  Alert, no acute distress, oriented  HENT: throat/mouth:normal, good dentition  Lungs: no respiratory distress, or pursed lip breathing  Heart: No obvious jugular venous distension present  Abdomen: soft, nontender, no organomegaly or masses  Musculoskeltal: extremities normal, no peripheral edema  Skin: no suspicious lesions or rashes  Neuro: Alert, oriented, speech and mentation normal  Psych: affect and mood normal  Gait: Normal    Component PSA PSA Diag Urologic Phys   Latest Ref Rng & Units 0.00 - 4.00 ug/L 0.00 - 4.00 ng/mL   7/19/2004 10.85 (H)    10/13/2006 7.62 (H)    9/11/2007 10.30 (H)    8/18/2008 15.40 (H)    2/4/2015 0.1    2/3/2017  0.04   8/16/2017  <0.04   2/13/2018 0.02    8/17/2018  <0.04   3/13/2019  <0.04   2/5/2020 0.02    2/8/2021 0.03    3/11/2022 <0.04      Creatinine   Date Value Ref Range Status   03/22/2023 0.83 0.67 - 1.17 mg/dL Final   02/08/2021 0.73 0.66 - 1.25 mg/dL Final     Creatinine POCT   Date Value Ref Range Status   04/25/2023 0.9 0.7 - 1.3 mg/dL Final      PATHOLOGY:  FINAL DIAGNOSIS:   A: Kidney, right, renal mass base, margin, excision   - Negative for malignancy     B: Kidney, right, partial nephrectomy   - Clear cell renal cell carcinoma, WHO/ISUP grade 2   - Tumor size: 1.2 cm   - Renal parenchymal margin negative   - Tumor extends to the inked, cauterized margin of the perirenal tissue   (see comment)   - Please see below for tumor synopsis     IMAGING:  I reviewed all pertinent available imaging    CT ABD/PEL 2/11/21:                                  IMPRESSION:    1. No evidence of recurrent mass in the region of partial right  nephrectomy. Kidneys are otherwise unremarkable with probable tiny  renal cortical cysts too small to characterize by  CT.  2. Circumferential bladder wall thickening possibly related to chronic  bladder outlet obstruction. No significant prostate gland enlargement  is evident. Recurrent UTI and/or neurogenic bladder could appear  similar.  3. Abdominal organs are otherwise within normal limits. No bowel  obstruction. No enlarged lymph nodes.    CHEST X-RAY 2/11/21:  IMPRESSION: PA and lateral views of the chest. Lungs are clear. Heart  is normal in size. No effusions are evident. No pneumothorax. Mild  degenerative curvature thoracic spine is unchanged.    CT UROGRAM 3/9/22:  IMPRESSION: Previous partial right nephrectomy in the right kidney  without evidence of metastatic or recurrent disease.    CHEST X-RAY 3/9/22:  IMPRESSION: No radiographic evidence of acute chest abnormality.     CT RENAL MASS 4/25/2023:  FINDINGS:   LOWER CHEST: A few small pulmonary nodules at both lung bases are  unchanged for greater than 2 years, and are considered benign.  Coronary artery calcification.     HEPATOBILIARY: Unremarkable. No hepatic masses are seen.     PANCREAS: Normal.     SPLEEN: Normal.     ADRENAL GLANDS: Normal.     RIGHT KIDNEY/URETER: Postoperative changes of partial nephrectomy in  the upper pole of the right kidney. No convincing evidence for  recurrent neoplasm in the right kidney. No urinary calculi. No  hydronephrosis. Small cortical cyst in the lower pole of the right  kidney is unchanged. No solid renal masses. The collecting system and  proximal ureter are moderately well opacified, and no filling defects  are identified.     LEFT KIDNEY/URETER: No urinary calculi. No hydronephrosis. No solid  renal masses. A few small cortical cysts in the left kidney are  unchanged. The collecting system and proximal ureter are moderately  well opacified, and no filling defects are identified.     BOWEL: The visualized loops of small bowel and colon are of normal  caliber.     LYMPH NODES: No lymphadenopathy.     VASCULATURE: Moderate  atherosclerotic aortoiliac calcification.     ADDITIONAL FINDINGS: None.     MUSCULOSKELETAL: Degenerative changes in the visualized thoracolumbar  spine.                                                            IMPRESSION:   1.  No evidence for recurrent or metastatic malignancy in the abdomen.  2.  Postoperative changes of right partial nephrectomy.       ASSESSMENT and PLAN  77 year old man with history of prostate cancer s/p radiation therapy and hormonal therapy several years ago and now s/p RIGHT robotic pnx on 9/4/19    Right clear cell renal cell  - We discussed that his pathology was reassuring with pT1a disease. Reported as positive margin, however this is at the capsule with no evidence of capsular invasion  -Reviewed his CT Abd/Pel and reviewed these images personally.  There is no evidence of recurrent disease  - Follow up in 18 months with repeat CT and this will be his last surveillance CT    Prostate cancer   -We reviewed his PSA history and noted that his last PSA was 0.03 which is not concerning  -Recommend continued annual PSA monitoring    Gross hematuria  - Cystoscopy previously with evidence of mild radiation cystitis changes with no evidence of a bladder tumor    Time spent: 15 minutes spent on the date of the encounter doing chart review, history and exam, documentation and further activities as noted above.    Binu Yoon MD   Urology  [facility]  Clinic Phone [phone]

## 2023-04-28 NOTE — NURSING NOTE
Chief Complaint   Patient presents with     Follow Up     YEARLY FOLLOW UP AND CT RENAL MASS DONE ON 04-   talk with dr about the ct results today   Everything is well pr patient   Stephanie Wallis, clinic assistant

## 2023-04-28 NOTE — PROGRESS NOTES
[facility]   CHIEF COMPLAINT   It was my pleasure to see Kyler Rodriguez who is a 77 year old male for follow-up of s/p RIGHT PNx.      HPI   Kyler Rodriguez is a very pleasant 77 year old male who presents with a history of prostate cancer s/p radiation and RIGHT renal mass now s/p robotic partial nephrectomy.    ##Kidney Cancer Follow up##  Patient is status post Robotic Partial Nephrectomy on 9/4/19.   Tumor was on the right side.   Maximal tumor dimension was 1.2 cm.    The histologic subtype was Clear Cell.    ISUP Grade 2.    Pathologic Stage T1a.    Tumor thrombus level 0 (renal vein).    Tumor necrosis present: No.  Sarcomatoid features present: No.  Lymph Nodes Removed No.   Number of nodes removed n/a, number of node positive for cancer n/a.   Was the ipsilateral adrenal gland removed? No.  Neoadjuvant Chemotherapy? No.  Was Margin Positive? No.    There were not deviations from a normal post-operative course or complications?.    The patient was not readmitted to the hospital since post-operative discharge.    2/15/21:  He is doing well with no issues.   He is still waiting for his COVID vaccine     3/11/22:  He is doing well with no issues over the last year    9/29/2022:  Seen as a hospital consult at [facility] for gross hematuria.  A three-way catheter was placed with irrigation    10/3/2022:  RN visit for trial of void which was successful though noted to have continued bloody urine    10/28/2022:  He has been doing very well and his urine has been fairly clear  He follows up today for surveillance cystoscopy    TODAY 4/28/2023:  He has been doing well  Urine has remained clear  Follow up to review surveillance CT    PHYSICAL EXAM  Patient is a 77 year old  male   Vitals: Blood pressure 128/75, pulse 62, height 1.829 m (6'), weight 83.9 kg (185 lb), SpO2 99 %.  General Appearance Adult: Body mass index is 25.09 kg/m .  Alert, no acute distress, oriented  HENT: throat/mouth:normal, good dentition  Lungs: no  respiratory distress, or pursed lip breathing  Heart: No obvious jugular venous distension present  Abdomen: soft, nontender, no organomegaly or masses  Musculoskeltal: extremities normal, no peripheral edema  Skin: no suspicious lesions or rashes  Neuro: Alert, oriented, speech and mentation normal  Psych: affect and mood normal  Gait: Normal    Component PSA PSA Diag Urologic Phys   Latest Ref Rng & Units 0.00 - 4.00 ug/L 0.00 - 4.00 ng/mL   7/19/2004 10.85 (H)    10/13/2006 7.62 (H)    9/11/2007 10.30 (H)    8/18/2008 15.40 (H)    2/4/2015 0.1    2/3/2017  0.04   8/16/2017  <0.04   2/13/2018 0.02    8/17/2018  <0.04   3/13/2019  <0.04   2/5/2020 0.02    2/8/2021 0.03    3/11/2022 <0.04      Creatinine   Date Value Ref Range Status   03/22/2023 0.83 0.67 - 1.17 mg/dL Final   02/08/2021 0.73 0.66 - 1.25 mg/dL Final     Creatinine POCT   Date Value Ref Range Status   04/25/2023 0.9 0.7 - 1.3 mg/dL Final      PATHOLOGY:  FINAL DIAGNOSIS:   A: Kidney, right, renal mass base, margin, excision   - Negative for malignancy     B: Kidney, right, partial nephrectomy   - Clear cell renal cell carcinoma, WHO/ISUP grade 2   - Tumor size: 1.2 cm   - Renal parenchymal margin negative   - Tumor extends to the inked, cauterized margin of the perirenal tissue   (see comment)   - Please see below for tumor synopsis     IMAGING:  I reviewed all pertinent available imaging    CT ABD/PEL 2/11/21:                                  IMPRESSION:    1. No evidence of recurrent mass in the region of partial right  nephrectomy. Kidneys are otherwise unremarkable with probable tiny  renal cortical cysts too small to characterize by CT.  2. Circumferential bladder wall thickening possibly related to chronic  bladder outlet obstruction. No significant prostate gland enlargement  is evident. Recurrent UTI and/or neurogenic bladder could appear  similar.  3. Abdominal organs are otherwise within normal limits. No bowel  obstruction. No enlarged  lymph nodes.    CHEST X-RAY 2/11/21:  IMPRESSION: PA and lateral views of the chest. Lungs are clear. Heart  is normal in size. No effusions are evident. No pneumothorax. Mild  degenerative curvature thoracic spine is unchanged.    CT UROGRAM 3/9/22:  IMPRESSION: Previous partial right nephrectomy in the right kidney  without evidence of metastatic or recurrent disease.    CHEST X-RAY 3/9/22:  IMPRESSION: No radiographic evidence of acute chest abnormality.     CT RENAL MASS 4/25/2023:  FINDINGS:   LOWER CHEST: A few small pulmonary nodules at both lung bases are  unchanged for greater than 2 years, and are considered benign.  Coronary artery calcification.     HEPATOBILIARY: Unremarkable. No hepatic masses are seen.     PANCREAS: Normal.     SPLEEN: Normal.     ADRENAL GLANDS: Normal.     RIGHT KIDNEY/URETER: Postoperative changes of partial nephrectomy in  the upper pole of the right kidney. No convincing evidence for  recurrent neoplasm in the right kidney. No urinary calculi. No  hydronephrosis. Small cortical cyst in the lower pole of the right  kidney is unchanged. No solid renal masses. The collecting system and  proximal ureter are moderately well opacified, and no filling defects  are identified.     LEFT KIDNEY/URETER: No urinary calculi. No hydronephrosis. No solid  renal masses. A few small cortical cysts in the left kidney are  unchanged. The collecting system and proximal ureter are moderately  well opacified, and no filling defects are identified.     BOWEL: The visualized loops of small bowel and colon are of normal  caliber.     LYMPH NODES: No lymphadenopathy.     VASCULATURE: Moderate atherosclerotic aortoiliac calcification.     ADDITIONAL FINDINGS: None.     MUSCULOSKELETAL: Degenerative changes in the visualized thoracolumbar  spine.                                                            IMPRESSION:   1.  No evidence for recurrent or metastatic malignancy in the abdomen.  2.  Postoperative  changes of right partial nephrectomy.       ASSESSMENT and PLAN  77 year old man with history of prostate cancer s/p radiation therapy and hormonal therapy several years ago and now s/p RIGHT robotic pnx on 9/4/19    Right clear cell renal cell  - We discussed that his pathology was reassuring with pT1a disease. Reported as positive margin, however this is at the capsule with no evidence of capsular invasion  -Reviewed his CT Abd/Pel and reviewed these images personally.  There is no evidence of recurrent disease  - Follow up in 18 months with repeat CT and this will be his last surveillance CT    Prostate cancer   -We reviewed his PSA history and noted that his last PSA was 0.03 which is not concerning  -Recommend continued annual PSA monitoring    Gross hematuria  - Cystoscopy previously with evidence of mild radiation cystitis changes with no evidence of a bladder tumor    Time spent: 15 minutes spent on the date of the encounter doing chart review, history and exam, documentation and further activities as noted above.    Binu Yoon MD   Urology  [facility]  Clinic Phone [phone]

## 2023-05-23 DIAGNOSIS — N42.1: ICD-10-CM

## 2023-05-24 RX ORDER — FINASTERIDE 5 MG/1
TABLET, FILM COATED ORAL
Qty: 90 TABLET | Refills: 2 | Status: SHIPPED | OUTPATIENT
Start: 2023-05-24 | End: 2024-04-17

## 2023-05-24 NOTE — TELEPHONE ENCOUNTER
Pending Prescriptions:                       Disp   Refills    finasteride (PROSCAR) 5 MG tablet [Pharma*90 tab*2            Sig: TAKE 1 TABLET EVERY DAY    Prescription approved per [facility] Refill Protocol.

## 2023-06-19 ENCOUNTER — TRANSFERRED RECORDS (OUTPATIENT)
Dept: HEALTH INFORMATION MANAGEMENT | Facility: CLINIC | Age: 78
End: 2023-06-19
Payer: MEDICARE

## 2023-07-03 DIAGNOSIS — I10 ESSENTIAL HYPERTENSION, BENIGN: ICD-10-CM

## 2023-07-03 DIAGNOSIS — E78.5 HYPERLIPIDEMIA LDL GOAL <100: ICD-10-CM

## 2023-07-05 RX ORDER — ATORVASTATIN CALCIUM 80 MG/1
TABLET, FILM COATED ORAL
Qty: 45 TABLET | Refills: 2 | Status: ON HOLD | OUTPATIENT
Start: 2023-07-05 | End: 2023-10-24

## 2023-07-05 RX ORDER — ATENOLOL 25 MG/1
TABLET ORAL
Qty: 90 TABLET | Refills: 2 | Status: SHIPPED | OUTPATIENT
Start: 2023-07-05 | End: 2024-04-17

## 2023-07-05 NOTE — TELEPHONE ENCOUNTER
Prescription approved per [facility] Refill Protocol.    Aniket Samaniego, Triage RN [place]  3:04 PM 7/5/2023

## 2023-07-05 NOTE — TELEPHONE ENCOUNTER
Prescription approved per [facility] Refill Protocol.    Aniket Samaniego, Triage RN [place]  3:19 PM 7/5/2023

## 2023-07-20 ENCOUNTER — TRANSFERRED RECORDS (OUTPATIENT)
Dept: HEALTH INFORMATION MANAGEMENT | Facility: CLINIC | Age: 78
End: 2023-07-20

## 2023-08-07 DIAGNOSIS — E11.21 TYPE 2 DIABETES MELLITUS WITH DIABETIC NEPHROPATHY, WITHOUT LONG-TERM CURRENT USE OF INSULIN (H): ICD-10-CM

## 2023-08-10 ENCOUNTER — TRANSFERRED RECORDS (OUTPATIENT)
Dept: HEALTH INFORMATION MANAGEMENT | Facility: CLINIC | Age: 78
End: 2023-08-10
Payer: MEDICARE

## 2023-08-29 DIAGNOSIS — I10 ESSENTIAL HYPERTENSION, BENIGN: ICD-10-CM

## 2023-08-30 RX ORDER — AMLODIPINE AND BENAZEPRIL HYDROCHLORIDE 10; 20 MG/1; MG/1
1 CAPSULE ORAL DAILY
Qty: 90 CAPSULE | Refills: 0 | Status: SHIPPED | OUTPATIENT
Start: 2023-08-30 | End: 2024-02-21

## 2023-09-06 ENCOUNTER — TRANSFERRED RECORDS (OUTPATIENT)
Dept: HEALTH INFORMATION MANAGEMENT | Facility: CLINIC | Age: 78
End: 2023-09-06
Payer: MEDICARE

## 2023-09-21 ENCOUNTER — OFFICE VISIT (OUTPATIENT)
Dept: INTERNAL MEDICINE | Facility: CLINIC | Age: 78
End: 2023-09-21
Payer: MEDICARE

## 2023-09-21 VITALS
SYSTOLIC BLOOD PRESSURE: 128 MMHG | HEIGHT: 72 IN | TEMPERATURE: 97.2 F | HEART RATE: 58 BPM | BODY MASS INDEX: 25.91 KG/M2 | OXYGEN SATURATION: 99 % | DIASTOLIC BLOOD PRESSURE: 67 MMHG | RESPIRATION RATE: 14 BRPM | WEIGHT: 191.3 LBS

## 2023-09-21 DIAGNOSIS — I10 ESSENTIAL HYPERTENSION, BENIGN: ICD-10-CM

## 2023-09-21 DIAGNOSIS — E11.21 TYPE 2 DIABETES MELLITUS WITH DIABETIC NEPHROPATHY, WITHOUT LONG-TERM CURRENT USE OF INSULIN (H): Primary | ICD-10-CM

## 2023-09-21 DIAGNOSIS — E78.5 HYPERLIPIDEMIA LDL GOAL <100: ICD-10-CM

## 2023-09-21 LAB — HBA1C MFR BLD: 6.7 % (ref 0–5.6)

## 2023-09-21 PROCEDURE — G0008 ADMIN INFLUENZA VIRUS VAC: HCPCS | Performed by: INTERNAL MEDICINE

## 2023-09-21 PROCEDURE — 90662 IIV NO PRSV INCREASED AG IM: CPT | Performed by: INTERNAL MEDICINE

## 2023-09-21 PROCEDURE — 83036 HEMOGLOBIN GLYCOSYLATED A1C: CPT | Performed by: INTERNAL MEDICINE

## 2023-09-21 PROCEDURE — 99214 OFFICE O/P EST MOD 30 MIN: CPT | Mod: 25 | Performed by: INTERNAL MEDICINE

## 2023-09-21 PROCEDURE — 36415 COLL VENOUS BLD VENIPUNCTURE: CPT | Performed by: INTERNAL MEDICINE

## 2023-09-21 ASSESSMENT — PAIN SCALES - GENERAL: PAINLEVEL: SEVERE PAIN (6)

## 2023-09-21 NOTE — LETTER
September 21, 2023      Kyler MARTIN [place]  [address] [address] [address]        Dear ,    We are writing to inform you of your test results.    Your diabetes is controlled.      Resulted Orders   HEMOGLOBIN A1C   Result Value Ref Range    Hemoglobin A1C 6.7 (H) 0.0 - 5.6 %      Comment:      Normal <5.7%   Prediabetes 5.7-6.4%    Diabetes 6.5% or higher     Note: Adopted from ADA consensus guidelines.       If you have any questions or concerns, please call the clinic at the number listed above.       Sincerely,      Rakesh Clark MD

## 2023-09-21 NOTE — PROGRESS NOTES
Assessment & Plan     Type 2 diabetes mellitus with diabetic nephropathy, without long-term current use of insulin (H)  Assess diabetic control   - HEMOGLOBIN A1C  HTN - controlled on treatment  Hyperlipidemia- on statin, controlled        BMI:   Estimated body mass index is 25.94 kg/m  as calculated from the following:    Height as of this encounter: 1.829 m (6').    Weight as of this encounter: 86.8 kg (191 lb 4.8 oz).       See Patient Instructions    MD JUAN ANTONIO Ly [facility] [address] DANDY Chávez is a 78 year old, presenting for the following health issues:  RECHECK and Diabetes        9/21/2023     9:10 AM   Additional Questions   Roomed by Miriam ROMANO   Accompanied by n/a       History of Present Illness       Diabetes:   He presents for follow up of diabetes.    He is not checking blood glucose.         He has no concerns regarding his diabetes at this time.   He is not experiencing numbness or burning in feet, excessive thirst, blurry vision, weight changes or redness, sores or blisters on feet.           He eats 2-3 servings of fruits and vegetables daily.He consumes 1 sweetened beverage(s) daily.He exercises with enough effort to increase his heart rate 9 or less minutes per day.  He exercises with enough effort to increase his heart rate 3 or less days per week.   He is taking medications regularly.     Has H/O DM. On diet , exercise. Blood sugars are controlled. No parestesias. No hypoglycemias.  Has h/o HTN. on medical treatment. BP has been controlled. No side effects from medications. No CP, HA, dizziness. good compliance with medications and low salt diet.  Has H/O hyperlipidemia. On medical treatment and diet. No side effects. No muscle weakness, myalgias or upset stomach.     Has LBP, lumbar stenosis. Planned for surgery in one month.     Has parkinson's disease, follows with neurology. On Sinemet.         Review of Systems   Constitutional, HEENT, cardiovascular,  pulmonary, gi and gu systems are negative, except as otherwise noted.      Objective    /67 (BP Location: Left arm, Cuff Size: Adult Regular)   Pulse 58   Temp 97.2  F (36.2  C) (Tympanic)   Resp 14   Ht 1.829 m (6')   Wt 86.8 kg (191 lb 4.8 oz)   SpO2 99%   BMI 25.94 kg/m    Body mass index is 25.94 kg/m .  Physical Exam   GENERAL: healthy, alert and no distress  EYES: Eyes grossly normal to inspection, PERRL and conjunctivae and sclerae normal  HENT: ear canals and TM's normal, nose and mouth without ulcers or lesions  NECK: no adenopathy, no asymmetry, masses, or scars and thyroid normal to palpation  RESP: lungs clear to auscultation - no rales, rhonchi or wheezes  CV: regular rate and rhythm, normal S1 S2, no S3 or S4, no murmur, click or rub,  peripheral pulses strong  ABDOMEN: soft, nontender, no hepatosplenomegaly, no masses and bowel sounds normal  MS: no gross musculoskeletal defects noted, trace ankles edema    Hospital Outpatient Visit on 04/25/2023   Component Date Value Ref Range Status    Creatinine POCT 04/25/2023 0.9  0.7 - 1.3 mg/dL Final    GFR, ESTIMATED POCT 04/25/2023 >60  >60 mL/min/1.73m2 Final

## 2023-09-22 ENCOUNTER — MYC MEDICAL ADVICE (OUTPATIENT)
Dept: INTERNAL MEDICINE | Facility: CLINIC | Age: 78
End: 2023-09-22

## 2023-09-22 NOTE — TELEPHONE ENCOUNTER
Sent [employer] message to patient.    Aniket Samaniego, Triage RN [place]  4:09 PM 9/22/2023

## 2023-09-22 NOTE — TELEPHONE ENCOUNTER
Please see patient's mychart message below.      Please advise, thanks.    Aniket Samaniego, Triage RN [facility]  8:44 AM 9/22/2023

## 2023-09-22 NOTE — TELEPHONE ENCOUNTER
Duplicate medications were cleared. He can continue to take one or the other of the calcium D preparations. Not to take both.

## 2023-10-17 ENCOUNTER — OFFICE VISIT (OUTPATIENT)
Dept: INTERNAL MEDICINE | Facility: CLINIC | Age: 78
End: 2023-10-17
Payer: MEDICARE

## 2023-10-17 VITALS
SYSTOLIC BLOOD PRESSURE: 126 MMHG | OXYGEN SATURATION: 99 % | WEIGHT: 189.6 LBS | TEMPERATURE: 96.7 F | HEART RATE: 57 BPM | RESPIRATION RATE: 20 BRPM | BODY MASS INDEX: 25.68 KG/M2 | HEIGHT: 72 IN | DIASTOLIC BLOOD PRESSURE: 69 MMHG

## 2023-10-17 DIAGNOSIS — Z29.11 NEED FOR VACCINATION AGAINST RESPIRATORY SYNCYTIAL VIRUS: ICD-10-CM

## 2023-10-17 DIAGNOSIS — E78.5 HYPERLIPIDEMIA LDL GOAL <100: ICD-10-CM

## 2023-10-17 DIAGNOSIS — Z01.818 PREOP GENERAL PHYSICAL EXAM: Primary | ICD-10-CM

## 2023-10-17 DIAGNOSIS — I48.0 PAROXYSMAL ATRIAL FIBRILLATION (H): ICD-10-CM

## 2023-10-17 DIAGNOSIS — E11.21 TYPE 2 DIABETES MELLITUS WITH DIABETIC NEPHROPATHY, WITHOUT LONG-TERM CURRENT USE OF INSULIN (H): ICD-10-CM

## 2023-10-17 DIAGNOSIS — I10 ESSENTIAL HYPERTENSION, BENIGN: ICD-10-CM

## 2023-10-17 DIAGNOSIS — G20.C PARKINSONISM, UNSPECIFIED PARKINSONISM TYPE (H): ICD-10-CM

## 2023-10-17 DIAGNOSIS — Z23 NEED FOR PROPHYLACTIC VACCINATION AGAINST HEPATITIS B VIRUS: ICD-10-CM

## 2023-10-17 DIAGNOSIS — M51.369 DDD (DEGENERATIVE DISC DISEASE), LUMBAR: ICD-10-CM

## 2023-10-17 LAB
ALBUMIN SERPL BCG-MCNC: 4.4 G/DL (ref 3.5–5.2)
ALP SERPL-CCNC: 70 U/L (ref 40–129)
ALT SERPL W P-5'-P-CCNC: 8 U/L (ref 0–70)
ANION GAP SERPL CALCULATED.3IONS-SCNC: 12 MMOL/L (ref 7–15)
AST SERPL W P-5'-P-CCNC: 22 U/L (ref 0–45)
BILIRUB SERPL-MCNC: 0.3 MG/DL
BUN SERPL-MCNC: 22.8 MG/DL (ref 8–23)
CALCIUM SERPL-MCNC: 9.5 MG/DL (ref 8.8–10.2)
CHLORIDE SERPL-SCNC: 94 MMOL/L (ref 98–107)
CREAT SERPL-MCNC: 0.74 MG/DL (ref 0.67–1.17)
DEPRECATED HCO3 PLAS-SCNC: 25 MMOL/L (ref 22–29)
EGFRCR SERPLBLD CKD-EPI 2021: >90 ML/MIN/1.73M2
ERYTHROCYTE [DISTWIDTH] IN BLOOD BY AUTOMATED COUNT: 17.5 % (ref 10–15)
GLUCOSE SERPL-MCNC: 121 MG/DL (ref 70–99)
HCT VFR BLD AUTO: 40 % (ref 40–53)
HGB BLD-MCNC: 12.9 G/DL (ref 13.3–17.7)
MCH RBC QN AUTO: 23.2 PG (ref 26.5–33)
MCHC RBC AUTO-ENTMCNC: 32.3 G/DL (ref 31.5–36.5)
MCV RBC AUTO: 72 FL (ref 78–100)
PLATELET # BLD AUTO: 254 10E3/UL (ref 150–450)
POTASSIUM SERPL-SCNC: 4.6 MMOL/L (ref 3.4–5.3)
PROT SERPL-MCNC: 7.1 G/DL (ref 6.4–8.3)
RBC # BLD AUTO: 5.56 10E6/UL (ref 4.4–5.9)
SODIUM SERPL-SCNC: 131 MMOL/L (ref 135–145)
WBC # BLD AUTO: 8.8 10E3/UL (ref 4–11)

## 2023-10-17 PROCEDURE — 93000 ELECTROCARDIOGRAM COMPLETE: CPT | Performed by: INTERNAL MEDICINE

## 2023-10-17 PROCEDURE — 99215 OFFICE O/P EST HI 40 MIN: CPT | Mod: 25 | Performed by: INTERNAL MEDICINE

## 2023-10-17 PROCEDURE — 80053 COMPREHEN METABOLIC PANEL: CPT | Performed by: INTERNAL MEDICINE

## 2023-10-17 PROCEDURE — 36415 COLL VENOUS BLD VENIPUNCTURE: CPT | Performed by: INTERNAL MEDICINE

## 2023-10-17 PROCEDURE — 85027 COMPLETE CBC AUTOMATED: CPT | Performed by: INTERNAL MEDICINE

## 2023-10-17 RX ORDER — RESPIRATORY SYNCYTIAL VIRUS VACCINE 120MCG/0.5
0.5 KIT INTRAMUSCULAR ONCE
Qty: 1 EACH | Refills: 0 | Status: CANCELLED | OUTPATIENT
Start: 2023-10-17 | End: 2023-10-17

## 2023-10-17 ASSESSMENT — PAIN SCALES - GENERAL: PAINLEVEL: SEVERE PAIN (7)

## 2023-10-17 NOTE — LETTER
October 20, 2023      Kyler Rodriguez  [address] [address] [id number]        Dear ,    We are writing to inform you of your test results.    If stress test is normal, OK to go on with surgery.     Resulted Orders   CBC with platelets   Result Value Ref Range    WBC Count 8.8 4.0 - 11.0 10e3/uL    RBC Count 5.56 4.40 - 5.90 10e6/uL    Hemoglobin 12.9 (L) 13.3 - 17.7 g/dL    Hematocrit 40.0 40.0 - 53.0 %    MCV 72 (L) 78 - 100 fL    MCH 23.2 (L) 26.5 - 33.0 pg    MCHC 32.3 31.5 - 36.5 g/dL    RDW 17.5 (H) 10.0 - 15.0 %    Platelet Count 254 150 - 450 10e3/uL   Comprehensive metabolic panel (BMP + Alb, Alk Phos, ALT, AST, Total. Bili, TP)   Result Value Ref Range    Sodium 131 (L) 135 - 145 mmol/L      Comment:      Reference intervals for this test were updated on 09/26/2023 to more accurately reflect our healthy population. There may be differences in the flagging of prior results with similar values performed with this method. Interpretation of those prior results can be made in the context of the updated reference intervals.     Potassium 4.6 3.4 - 5.3 mmol/L    Carbon Dioxide (CO2) 25 22 - 29 mmol/L    Anion Gap 12 7 - 15 mmol/L    Urea Nitrogen 22.8 8.0 - 23.0 mg/dL    Creatinine 0.74 0.67 - 1.17 mg/dL    GFR Estimate >90 >60 mL/min/1.73m2    Calcium 9.5 8.8 - 10.2 mg/dL    Chloride 94 (L) 98 - 107 mmol/L    Glucose 121 (H) 70 - 99 mg/dL    Alkaline Phosphatase 70 40 - 129 U/L    AST 22 0 - 45 U/L      Comment:      Reference intervals for this test were updated on 6/12/2023 to more accurately reflect our healthy population. There may be differences in the flagging of prior results with similar values performed with this method. Interpretation of those prior results can be made in the context of the updated reference intervals.    ALT 8 0 - 70 U/L      Comment:      Reference intervals for this test were updated on 6/12/2023 to more accurately reflect our healthy population. There may be  differences in the flagging of prior results with similar values performed with this method. Interpretation of those prior results can be made in the context of the updated reference intervals.      Protein Total 7.1 6.4 - 8.3 g/dL    Albumin 4.4 3.5 - 5.2 g/dL    Bilirubin Total 0.3 <=1.2 mg/dL       If you have any questions or concerns, please call the clinic at the number listed above.       Sincerely,      Rakesh Clark MD

## 2023-10-17 NOTE — H&P (VIEW-ONLY)
[place] [address]  [address]  [facility] [address]  Phone: [phone]  Primary Provider: Marlyn Clark  Pre-op Performing Provider: MARLYN CLARK      PREOPERATIVE EVALUATION:  Today's date: 10/17/2023    Kyler is a 78 year old male who presents for a preoperative evaluation.      10/17/2023     1:12 PM   Additional Questions   Roomed by Miriam ROMANO   Accompanied by n/a       Surgical Information:  Surgery/Procedure: Lumbar 2 to Lumbar 3, Lumbar 3 to Lumbar 4 transforaminal lumbar interbody fusion   Surgery Location: [place] [address]  Surgeon: Dr. Shields  Surgery Date: 10/24/2023  Time of Surgery: 12:45 pm  Where patient plans to recover: At home with family  Fax number for surgical facility: [phone]    Assessment & Plan     The proposed surgical procedure is considered INTERMEDIATE risk.      Preop general physical exam  Assess lab, EKG - new atrial fibrillation , RBBB  - EKG 12-lead complete w/read - Clinics  - CBC with platelets  - Comprehensive metabolic panel (BMP + Alb, Alk Phos, ALT, AST, Total. Bili, TP)    DDD (degenerative disc disease), lumbar  Planned surgical treatment     HYPERLIPIDEMIA LDL GOAL <100  Continue statin     Parkinsonism, unspecified Parkinsonism type  On Sinemet, continue     Essential hypertension, benign  Controlled on treatment     Type 2 diabetes mellitus with diabetic nephropathy, without long-term current use of insulin (H)  Controlled on Metformin          - No identified additional risk factors other than previously addressed    Antiplatelet or Anticoagulation Medication Instructions:   - Patient is on no antiplatelet or anticoagulation medications.    Additional Medication Instructions:   - metformin: HOLD day of surgery.   - ibuprofen (Advil, Motrin): HOLD 1 day before surgery.     RECOMMENDATION:  Assess stress test prior to procedure for new onset atrial fibrillation and poor physical performance to assess cardiovascular risk              Subjective       HPI related to upcoming procedure: patient is scheduled for lumbar fusion and laminectomy for history of lumbar spine OA, DDD, spinal stenosis.   No acute complaints, no medication change or new medical conditions.  Has h/o HTN. on medical treatment. BP has been controlled. No side effects from medications. No CP, HA, dizziness. good compliance with medications and low salt diet.  Has H/O hyperlipidemia. On medical treatment and diet. No side effects. No muscle weakness, myalgias or upset stomach.   Has H/O DM. On diet , exercise and PO Metformin. Blood sugars are controlled. No parestesias. No hypoglycemias.  Has parkinson's disease, symptoms of  tremor, abnormal gait. On treatment.         10/10/2023     1:16 PM   Preop Questions   1. Have you ever had a heart attack or stroke? No   2. Have you ever had surgery on your heart or blood vessels, such as a stent placement, a coronary artery bypass, or surgery on an artery in your head, neck, heart, or legs? No   3. Do you have chest pain with activity? No   4. Do you have a history of  heart failure? No   5. Do you currently have a cold, bronchitis or symptoms of other infection? No   6. Do you have a cough, shortness of breath, or wheezing? No   7. Do you or anyone in your family have previous history of blood clots? No   8. Do you or does anyone in your family have a serious bleeding problem such as prolonged bleeding following surgeries or cuts? No   9. Have you ever had problems with anemia or been told to take iron pills? No   10. Have you had any abnormal blood loss such as black, tarry or bloody stools? No   11. Have you ever had a blood transfusion? No   12. Are you willing to have a blood transfusion if it is medically needed before, during, or after your surgery? Yes   13. Have you or any of your relatives ever had problems with anesthesia? No   14. Do you have sleep apnea, excessive snoring or daytime drowsiness? No   15. Do you  have any artifical heart valves or other implanted medical devices like a pacemaker, defibrillator, or continuous glucose monitor? No   16. Do you have artificial joints? No   17. Are you allergic to latex? YES:        Health Care Directive:  Patient has a Health Care Directive on file      Preoperative Review of :   reviewed - no record of controlled substances prescribed.      Status of Chronic Conditions:  See problem list for active medical problems.  Problems all longstanding and stable, except as noted/documented.  See ROS for pertinent symptoms related to these conditions.    Review of Systems  CONSTITUTIONAL: NEGATIVE for fever, chills, change in weight  INTEGUMENTARY/SKIN: NEGATIVE for worrisome rashes, moles or lesions  EYES: NEGATIVE for vision changes or irritation  ENT/MOUTH: NEGATIVE for ear, mouth and throat problems  RESP: NEGATIVE for significant cough or SOB  CV: NEGATIVE for chest pain, palpitations or peripheral edema  GI: NEGATIVE for nausea, abdominal pain, heartburn, or change in bowel habits  : NEGATIVE for frequency, dysuria, or hematuria  MUSCULOSKELETAL: NEGATIVE for significant arthralgias or myalgia  NEURO: NEGATIVE for weakness, dizziness or paresthesias  ENDOCRINE: NEGATIVE for temperature intolerance, skin/hair changes  HEME: NEGATIVE for bleeding problems  PSYCHIATRIC: NEGATIVE for changes in mood or affect    Patient Active Problem List    Diagnosis Date Noted    Urinary retention 09/29/2022     Priority: Medium    Gross hematuria 09/29/2022     Priority: Medium    Renal malignant neoplasm, right (H) 09/04/2019     Priority: Medium     Ready for delivery at 3/16/20 [facility] [address]      Parkinsonism, unspecified Parkinsonism type 02/18/2019     Priority: Medium    RBBB 10/13/2014     Priority: Medium    Advance Care Planning 10/08/2013     Priority: Medium     Advance Care Planning 9/21/2016: Receipt of ACP document:  Received: Health Care Directive which was witnessed or  notarized on 8-15-16.  Document not previously scanned.  Validation form completed and sent with document to be scanned.  Code Status needs to be updated to reflect choices in most recent ACP document.  Confirmed/documented designated decision maker(s).  Added by Laurie Dow RN Advance Care Planning Liaison with [employer]  Discussed Advance Directive planning with patient; information given to patient to review.      Prostate cancer (H) 10/27/2011     Priority: Medium     8/2011 positive bx      Type 2 diabetes mellitus with diabetic nephropathy (H) 10/31/2010     Priority: Medium    HYPERLIPIDEMIA LDL GOAL <100 10/31/2010     Priority: Medium    Hypertrophy of prostate with urinary obstruction 08/08/2005     Priority: Medium     Problem list name updated by automated process. Provider to review      Other and unspecified disc disorder of cervical region 08/18/2003     Priority: Medium    Essential hypertension, benign 09/23/2002     Priority: Medium      Past Medical History:   Diagnosis Date    Elevated prostate specific antigen (PSA)     Essential hypertension, benign     Malignant neoplasm (H) 10/2011    prostate cancer    Malignant neoplasm of right kidney (H)     Mumps     Other and unspecified hyperlipidemia     Parkinsons disease     Prostate infection     Spider veins     Type II or unspecified type diabetes mellitus without mention of complication, not stated as uncontrolled      Past Surgical History:   Procedure Laterality Date    BIOPSY      COLONOSCOPY  2009 & 2014    [facility]    COLONOSCOPY  04/08/2019    Dr. Pandey [facility]    CYSTOSCOPY      DAVINCI NEPHRECTOMY PARTIAL Right 09/04/2019    Procedure: ROBOTIC-ASSISTED RIGHT PARTIAL NEPHRECTOMY WITH ULTRASOUND;  Surgeon: Binu Yoon MD;  Location:  OR    EYE SURGERY      cataract left eye    HC REMOVAL OF TONSILS,<14 Y/O      HC VASECTOMY UNILAT/BILAT W POSTOP SEMEN      PHACOEMULSIFICATION CLEAR CORNEA W/ STANDARD IOL IMPLANT, ENDOSCOPIC  CYCLOPHOTOCOAGULATION, COMBINED  07/31/2014    Procedure: COMBINED PHACOEMULSIFICATION CLEAR CORNEA WITH STANDARD INTRAOCULAR LENS IMPLANT, ENDOSCOPIC CYCLOPHOTOCOAGULATION;  Surgeon: Leroy Crews MD;  Location:  SD    VASECTOMY      [facility] NONSPECIFIC PROCEDURE  1998    varicose vein stripping    [facility] NONSPECIFIC PROCEDURE  1998    colonoscopy     Current Outpatient Medications   Medication Sig Dispense Refill    amLODIPine-benazepril (LOTREL) 10-20 MG capsule TAKE 1 CAPSULE EVERY DAY 90 capsule 0    atenolol (TENORMIN) 25 MG tablet TAKE 1 TABLET EVERY DAY 90 tablet 2    atorvastatin (LIPITOR) 80 MG tablet TAKE 1/2 TABLET EVERY DAY 45 tablet 2    Calcium Carb-Cholecalciferol (CALTRATE 600+D3 SOFT) 600-800 MG-UNIT CHEW Take by mouth every 24 hours      carbidopa-levodopa (SINEMET)  MG per tablet Take 2 tablets by mouth 3 times daily Take at 7 am, 11 am and 3 pm      finasteride (PROSCAR) 5 MG tablet TAKE 1 TABLET EVERY DAY 90 tablet 2    metFORMIN (GLUCOPHAGE) 1000 MG tablet TAKE 1 TABLET EVERY DAY WITH DINNER 90 tablet 1    multivitamin, therapeutic (THERA-VIT) TABS tablet Take 1 tablet by mouth every morning      polyethylene glycol (MIRALAX) 17 GM/Dose powder Take 17 g by mouth daily 510 g        Allergies   Allergen Reactions    Latex Rash        Social History     Tobacco Use    Smoking status: Never    Smokeless tobacco: Never   Substance Use Topics    Alcohol use: Not Currently     Comment: 1-2 drinks/daily     Family History   Problem Relation Age of Onset    Cancer - colorectal Father         derceased@74    Prostate Cancer Father     Colon Cancer Father     Prostate Cancer Paternal Grandfather     Musculoskeletal Disorder Brother         fibermyalgia     History   Drug Use No         Objective     /69 (BP Location: Right arm, Cuff Size: Adult Regular)   Pulse 57   Temp (!) 96.7  F (35.9  C) (Tympanic)   Resp 20   Ht 1.829 m (6')   Wt 86 kg (189 lb 9.6 oz)   SpO2 99%   BMI 25.71  kg/m      Physical Exam    GENERAL APPEARANCE: frail, alert and no distress     EYES: EOMI,  PERRL     HENT: ear canals and TM's normal and nose and mouth without ulcers or lesions     NECK: no adenopathy, no asymmetry, masses, or scars and thyroid normal to palpation     RESP: lungs clear to auscultation - no rales, rhonchi or wheezes     CV: regular rates and rhythm, normal S1 S2, no S3 or S4 and no murmur, click or rub     ABDOMEN:  soft, nontender, no HSM or masses and bowel sounds normal     MS: extremities normal- no gross deformities noted, no evidence of inflammation in joints, FROM in all extremities.     SKIN: no suspicious lesions or rashes     NEURO: Normal strength and tone, sensory exam grossly normal, mentation intact and speech normal, resting arms tremor, unstable gait      PSYCH: mentation appears normal. and affect normal/bright     LYMPHATICS: No cervical adenopathy    Recent Labs   Lab Test 09/21/23  0959 04/25/23  1310 03/22/23  0933 09/30/22  1224 09/30/22  0721 09/29/22  1152 09/29/22  0659   HGB  --   --  12.0* 8.6* 8.7*   < > 11.8*   PLT  --   --  296  --   --   --  181   NA  --   --  137  --  133*  --  125*   POTASSIUM  --   --  4.5  --  4.0  --  4.3   CR  --  0.9 0.83  --   --   --  0.57*   A1C 6.7*  --  7.0*  --   --   --   --     < > = values in this interval not displayed.        Diagnostics:  Labs pending at this time.  Results will be reviewed when available.   EKG: atrial fibrillation, rate 74, no LVH by voltage criteria, Right Bundle Branch Block, unchanged from previous tracings    Revised Cardiac Risk Index (RCRI):  The patient has the following serious cardiovascular risks for perioperative complications:   - No serious cardiac risks = 0 points     RCRI Interpretation: 0 points: Class I (very low risk - 0.4% complication rate)         Signed Electronically by: Rakesh Clark MD  Copy of this evaluation report is provided to requesting physician.

## 2023-10-17 NOTE — PROGRESS NOTES
[place] [address]  [address]  [facility] [address]  Phone: [phone]  Primary Provider: Marlyn Clark  Pre-op Performing Provider: MARLYN CLARK      PREOPERATIVE EVALUATION:  Today's date: 10/17/2023    Kyler is a 78 year old male who presents for a preoperative evaluation.      10/17/2023     1:12 PM   Additional Questions   Roomed by Miriam ROMANO   Accompanied by n/a       Surgical Information:  Surgery/Procedure: Lumbar 2 to Lumbar 3, Lumbar 3 to Lumbar 4 transforaminal lumbar interbody fusion   Surgery Location: [place] [address]  Surgeon: Dr. Shields  Surgery Date: 10/24/2023  Time of Surgery: 12:45 pm  Where patient plans to recover: At home with family  Fax number for surgical facility: [phone]    Assessment & Plan     The proposed surgical procedure is considered INTERMEDIATE risk.      Preop general physical exam  Assess lab, EKG - new atrial fibrillation , RBBB  - EKG 12-lead complete w/read - Clinics  - CBC with platelets  - Comprehensive metabolic panel (BMP + Alb, Alk Phos, ALT, AST, Total. Bili, TP)    DDD (degenerative disc disease), lumbar  Planned surgical treatment     HYPERLIPIDEMIA LDL GOAL <100  Continue statin     Parkinsonism, unspecified Parkinsonism type  On Sinemet, continue     Essential hypertension, benign  Controlled on treatment     Type 2 diabetes mellitus with diabetic nephropathy, without long-term current use of insulin (H)  Controlled on Metformin          - No identified additional risk factors other than previously addressed    Antiplatelet or Anticoagulation Medication Instructions:   - Patient is on no antiplatelet or anticoagulation medications.    Additional Medication Instructions:   - metformin: HOLD day of surgery.   - ibuprofen (Advil, Motrin): HOLD 1 day before surgery.     RECOMMENDATION:  Assess stress test prior to procedure for new onset atrial fibrillation and poor physical performance to assess cardiovascular risk              Subjective       HPI related to upcoming procedure: patient is scheduled for lumbar fusion and laminectomy for history of lumbar spine OA, DDD, spinal stenosis.   No acute complaints, no medication change or new medical conditions.  Has h/o HTN. on medical treatment. BP has been controlled. No side effects from medications. No CP, HA, dizziness. good compliance with medications and low salt diet.  Has H/O hyperlipidemia. On medical treatment and diet. No side effects. No muscle weakness, myalgias or upset stomach.   Has H/O DM. On diet , exercise and PO Metformin. Blood sugars are controlled. No parestesias. No hypoglycemias.  Has parkinson's disease, symptoms of  tremor, abnormal gait. On treatment.         10/10/2023     1:16 PM   Preop Questions   1. Have you ever had a heart attack or stroke? No   2. Have you ever had surgery on your heart or blood vessels, such as a stent placement, a coronary artery bypass, or surgery on an artery in your head, neck, heart, or legs? No   3. Do you have chest pain with activity? No   4. Do you have a history of  heart failure? No   5. Do you currently have a cold, bronchitis or symptoms of other infection? No   6. Do you have a cough, shortness of breath, or wheezing? No   7. Do you or anyone in your family have previous history of blood clots? No   8. Do you or does anyone in your family have a serious bleeding problem such as prolonged bleeding following surgeries or cuts? No   9. Have you ever had problems with anemia or been told to take iron pills? No   10. Have you had any abnormal blood loss such as black, tarry or bloody stools? No   11. Have you ever had a blood transfusion? No   12. Are you willing to have a blood transfusion if it is medically needed before, during, or after your surgery? Yes   13. Have you or any of your relatives ever had problems with anesthesia? No   14. Do you have sleep apnea, excessive snoring or daytime drowsiness? No   15. Do you  have any artifical heart valves or other implanted medical devices like a pacemaker, defibrillator, or continuous glucose monitor? No   16. Do you have artificial joints? No   17. Are you allergic to latex? YES:        Health Care Directive:  Patient has a Health Care Directive on file      Preoperative Review of :   reviewed - no record of controlled substances prescribed.      Status of Chronic Conditions:  See problem list for active medical problems.  Problems all longstanding and stable, except as noted/documented.  See ROS for pertinent symptoms related to these conditions.    Review of Systems  CONSTITUTIONAL: NEGATIVE for fever, chills, change in weight  INTEGUMENTARY/SKIN: NEGATIVE for worrisome rashes, moles or lesions  EYES: NEGATIVE for vision changes or irritation  ENT/MOUTH: NEGATIVE for ear, mouth and throat problems  RESP: NEGATIVE for significant cough or SOB  CV: NEGATIVE for chest pain, palpitations or peripheral edema  GI: NEGATIVE for nausea, abdominal pain, heartburn, or change in bowel habits  : NEGATIVE for frequency, dysuria, or hematuria  MUSCULOSKELETAL: NEGATIVE for significant arthralgias or myalgia  NEURO: NEGATIVE for weakness, dizziness or paresthesias  ENDOCRINE: NEGATIVE for temperature intolerance, skin/hair changes  HEME: NEGATIVE for bleeding problems  PSYCHIATRIC: NEGATIVE for changes in mood or affect    Patient Active Problem List    Diagnosis Date Noted    Urinary retention 09/29/2022     Priority: Medium    Gross hematuria 09/29/2022     Priority: Medium    Renal malignant neoplasm, right (H) 09/04/2019     Priority: Medium     Ready for delivery at 3/16/20 [facility]      Parkinsonism, unspecified Parkinsonism type 02/18/2019     Priority: Medium    RBBB 10/13/2014     Priority: Medium    Advance Care Planning 10/08/2013     Priority: Medium     Advance Care Planning 9/21/2016: Receipt of ACP document:  Received: Health Care Directive which was witnessed or  notarized on 8-15-16.  Document not previously scanned.  Validation form completed and sent with document to be scanned.  Code Status needs to be updated to reflect choices in most recent ACP document.  Confirmed/documented designated decision maker(s).  Added by Laurie Dow RN Advance Care Planning Liaison with [employer]  Discussed Advance Directive planning with patient; information given to patient to review.      Prostate cancer (H) 10/27/2011     Priority: Medium     8/2011 positive bx      Type 2 diabetes mellitus with diabetic nephropathy (H) 10/31/2010     Priority: Medium    HYPERLIPIDEMIA LDL GOAL <100 10/31/2010     Priority: Medium    Hypertrophy of prostate with urinary obstruction 08/08/2005     Priority: Medium     Problem list name updated by automated process. Provider to review      Other and unspecified disc disorder of cervical region 08/18/2003     Priority: Medium    Essential hypertension, benign 09/23/2002     Priority: Medium      Past Medical History:   Diagnosis Date    Elevated prostate specific antigen (PSA)     Essential hypertension, benign     Malignant neoplasm (H) 10/2011    prostate cancer    Malignant neoplasm of right kidney (H)     Mumps     Other and unspecified hyperlipidemia     Parkinsons disease     Prostate infection     Spider veins     Type II or unspecified type diabetes mellitus without mention of complication, not stated as uncontrolled      Past Surgical History:   Procedure Laterality Date    BIOPSY      COLONOSCOPY  2009 & 2014    [facility]    COLONOSCOPY  04/08/2019    Dr. Pandey [facility]    CYSTOSCOPY      DAVINCI NEPHRECTOMY PARTIAL Right 09/04/2019    Procedure: ROBOTIC-ASSISTED RIGHT PARTIAL NEPHRECTOMY WITH ULTRASOUND;  Surgeon: Binu Yoon MD;  Location:  OR    EYE SURGERY      cataract left eye    HC REMOVAL OF TONSILS,<14 Y/O      HC VASECTOMY UNILAT/BILAT W POSTOP SEMEN      PHACOEMULSIFICATION CLEAR CORNEA W/ STANDARD IOL IMPLANT, ENDOSCOPIC  CYCLOPHOTOCOAGULATION, COMBINED  07/31/2014    Procedure: COMBINED PHACOEMULSIFICATION CLEAR CORNEA WITH STANDARD INTRAOCULAR LENS IMPLANT, ENDOSCOPIC CYCLOPHOTOCOAGULATION;  Surgeon: Leroy Crews MD;  Location:  [facility]    VASECTOMY      [facility] NONSPECIFIC PROCEDURE  1998    varicose vein stripping    [facility] NONSPECIFIC PROCEDURE  1998    colonoscopy     Current Outpatient Medications   Medication Sig Dispense Refill    amLODIPine-benazepril (LOTREL) 10-20 MG capsule TAKE 1 CAPSULE EVERY DAY 90 capsule 0    atenolol (TENORMIN) 25 MG tablet TAKE 1 TABLET EVERY DAY 90 tablet 2    atorvastatin (LIPITOR) 80 MG tablet TAKE 1/2 TABLET EVERY DAY 45 tablet 2    Calcium Carb-Cholecalciferol (CALTRATE 600+D3 SOFT) 600-800 MG-UNIT CHEW Take by mouth every 24 hours      carbidopa-levodopa (SINEMET)  MG per tablet Take 2 tablets by mouth 3 times daily Take at 7 am, 11 am and 3 pm      finasteride (PROSCAR) 5 MG tablet TAKE 1 TABLET EVERY DAY 90 tablet 2    metFORMIN (GLUCOPHAGE) 1000 MG tablet TAKE 1 TABLET EVERY DAY WITH DINNER 90 tablet 1    multivitamin, therapeutic (THERA-VIT) TABS tablet Take 1 tablet by mouth every morning      polyethylene glycol (MIRALAX) 17 GM/Dose powder Take 17 g by mouth daily 510 g        Allergies   Allergen Reactions    Latex Rash        Social History     Tobacco Use    Smoking status: Never    Smokeless tobacco: Never   Substance Use Topics    Alcohol use: Not Currently     Comment: 1-2 drinks/daily     Family History   Problem Relation Age of Onset    Cancer - colorectal Father         derceased@74    Prostate Cancer Father     Colon Cancer Father     Prostate Cancer Paternal Grandfather     Musculoskeletal Disorder Brother         fibermyalgia     History   Drug Use No         Objective     /69 (BP Location: Right arm, Cuff Size: Adult Regular)   Pulse 57   Temp (!) 96.7  F (35.9  C) (Tympanic)   Resp 20   Ht 1.829 m (6')   Wt 86 kg (189 lb 9.6 oz)   SpO2 99%   BMI 25.71  kg/m      Physical Exam    GENERAL APPEARANCE: frail, alert and no distress     EYES: EOMI,  PERRL     HENT: ear canals and TM's normal and nose and mouth without ulcers or lesions     NECK: no adenopathy, no asymmetry, masses, or scars and thyroid normal to palpation     RESP: lungs clear to auscultation - no rales, rhonchi or wheezes     CV: regular rates and rhythm, normal S1 S2, no S3 or S4 and no murmur, click or rub     ABDOMEN:  soft, nontender, no HSM or masses and bowel sounds normal     MS: extremities normal- no gross deformities noted, no evidence of inflammation in joints, FROM in all extremities.     SKIN: no suspicious lesions or rashes     NEURO: Normal strength and tone, sensory exam grossly normal, mentation intact and speech normal, resting arms tremor, unstable gait      PSYCH: mentation appears normal. and affect normal/bright     LYMPHATICS: No cervical adenopathy    Recent Labs   Lab Test 09/21/23  0959 04/25/23  1310 03/22/23  0933 09/30/22  1224 09/30/22  0721 09/29/22  1152 09/29/22  0659   HGB  --   --  12.0* 8.6* 8.7*   < > 11.8*   PLT  --   --  296  --   --   --  181   NA  --   --  137  --  133*  --  125*   POTASSIUM  --   --  4.5  --  4.0  --  4.3   CR  --  0.9 0.83  --   --   --  0.57*   A1C 6.7*  --  7.0*  --   --   --   --     < > = values in this interval not displayed.        Diagnostics:  Labs pending at this time.  Results will be reviewed when available.   EKG: atrial fibrillation, rate 74, no LVH by voltage criteria, Right Bundle Branch Block, unchanged from previous tracings    Revised Cardiac Risk Index (RCRI):  The patient has the following serious cardiovascular risks for perioperative complications:   - No serious cardiac risks = 0 points     RCRI Interpretation: 0 points: Class I (very low risk - 0.4% complication rate)         Signed Electronically by: Rakesh Clark MD  Copy of this evaluation report is provided to requesting physician.

## 2023-10-18 NOTE — PHARMACY-ADMISSION MEDICATION HISTORY
Medication history and patient interview completed by pre-admitting RN or pre-op/PACU RN. Reviewed by pharmacist, including [facility] dispense records, [facility] Everywhere, and chart review.       Robert Mcadams, Pharm.D., BCPS      Status Changed by Time of change   Nurse Temitope Foster, RN Tue Sep 26, 2023 10:18 AM       Prior to Admission medications    Medication Sig Last Dose Taking? Auth Provider Long Term End Date   amLODIPine-benazepril (LOTREL) 10-20 MG capsule TAKE 1 CAPSULE EVERY DAY  Yes Rakesh Clark MD Yes    atenolol (TENORMIN) 25 MG tablet TAKE 1 TABLET EVERY DAY  Yes Rakesh Clark MD Yes    atorvastatin (LIPITOR) 80 MG tablet TAKE 1/2 TABLET EVERY DAY  Yes Jazlyn Mcclure MD Yes    Calcium Carb-Cholecalciferol (CALTRATE 600+D3 SOFT) 600-800 MG-UNIT CHEW Take by mouth every 24 hours  Yes Reported, Patient     carbidopa-levodopa (SINEMET)  MG per tablet Take 2 tablets by mouth 3 times daily Take at 7 am, 11 am and 3 pm  Yes Reported, Patient Yes    finasteride (PROSCAR) 5 MG tablet TAKE 1 TABLET EVERY DAY  Yes Rakesh Clark MD     metFORMIN (GLUCOPHAGE) 1000 MG tablet TAKE 1 TABLET EVERY DAY WITH DINNER  Yes Rakesh Clark MD Yes    multivitamin, therapeutic (THERA-VIT) TABS tablet Take 1 tablet by mouth every morning  Yes Reported, Patient

## 2023-10-23 ENCOUNTER — HOSPITAL ENCOUNTER (OUTPATIENT)
Dept: NUCLEAR MEDICINE | Facility: CLINIC | Age: 78
Setting detail: NUCLEAR MEDICINE
Discharge: HOME | DRG: 454 | End: 2023-10-23
Attending: INTERNAL MEDICINE
Payer: MEDICARE

## 2023-10-23 ENCOUNTER — TELEPHONE (OUTPATIENT)
Dept: INTERNAL MEDICINE | Facility: CLINIC | Age: 78
End: 2023-10-23
Payer: MEDICARE

## 2023-10-23 ENCOUNTER — HOSPITAL ENCOUNTER (OUTPATIENT)
Dept: CARDIOLOGY | Facility: CLINIC | Age: 78
Discharge: HOME | DRG: 454 | End: 2023-10-23
Attending: INTERNAL MEDICINE
Payer: MEDICARE

## 2023-10-23 DIAGNOSIS — I48.0 PAROXYSMAL ATRIAL FIBRILLATION (H): ICD-10-CM

## 2023-10-23 DIAGNOSIS — Z01.818 PREOP GENERAL PHYSICAL EXAM: ICD-10-CM

## 2023-10-23 LAB
CV STRESS MAX HR HE: 80
RATE PRESSURE PRODUCT: 8080
STRESS ECHO BASELINE DIASTOLIC HE: 80
STRESS ECHO BASELINE HR: 63 BPM
STRESS ECHO BASELINE SYSTOLIC BP: 158
STRESS ECHO CALCULATED PERCENT HR: 56 %
STRESS ECHO LAST STRESS DIASTOLIC BP: 74
STRESS ECHO LAST STRESS SYSTOLIC BP: 101
STRESS ECHO TARGET HR: 143

## 2023-10-23 PROCEDURE — 78452 HT MUSCLE IMAGE SPECT MULT: CPT | Mod: 26 | Performed by: INTERNAL MEDICINE

## 2023-10-23 PROCEDURE — 343N000001 HC RX 343: Performed by: INTERNAL MEDICINE

## 2023-10-23 PROCEDURE — 250N000011 HC RX IP 250 OP 636

## 2023-10-23 PROCEDURE — 78452 HT MUSCLE IMAGE SPECT MULT: CPT | Mod: ME

## 2023-10-23 PROCEDURE — 93018 CV STRESS TEST I&R ONLY: CPT | Performed by: INTERNAL MEDICINE

## 2023-10-23 PROCEDURE — 93017 CV STRESS TEST TRACING ONLY: CPT

## 2023-10-23 PROCEDURE — A9500 TC99M SESTAMIBI: HCPCS | Performed by: INTERNAL MEDICINE

## 2023-10-23 PROCEDURE — G1010 CDSM STANSON: HCPCS | Performed by: INTERNAL MEDICINE

## 2023-10-23 PROCEDURE — 93016 CV STRESS TEST SUPVJ ONLY: CPT | Performed by: INTERNAL MEDICINE

## 2023-10-23 PROCEDURE — 250N000011 HC RX IP 250 OP 636: Performed by: INTERNAL MEDICINE

## 2023-10-23 RX ORDER — ACYCLOVIR 200 MG/1
0-1 CAPSULE ORAL
Status: DISCONTINUED | OUTPATIENT
Start: 2023-10-23 | End: 2023-10-24 | Stop reason: HOSPADM

## 2023-10-23 RX ORDER — ALBUTEROL SULFATE 90 UG/1
2 AEROSOL, METERED RESPIRATORY (INHALATION) EVERY 5 MIN PRN
Status: DISCONTINUED | OUTPATIENT
Start: 2023-10-23 | End: 2023-10-24 | Stop reason: HOSPADM

## 2023-10-23 RX ORDER — REGADENOSON 0.08 MG/ML
INJECTION, SOLUTION INTRAVENOUS
Status: DISCONTINUED
Start: 2023-10-23 | End: 2023-10-24 | Stop reason: HOSPADM

## 2023-10-23 RX ORDER — REGADENOSON 0.08 MG/ML
0.4 INJECTION, SOLUTION INTRAVENOUS ONCE
Status: COMPLETED | OUTPATIENT
Start: 2023-10-23 | End: 2023-10-23

## 2023-10-23 RX ORDER — AMINOPHYLLINE 25 MG/ML
50-100 INJECTION, SOLUTION INTRAVENOUS
Status: DISCONTINUED | OUTPATIENT
Start: 2023-10-23 | End: 2023-10-24 | Stop reason: HOSPADM

## 2023-10-23 RX ADMIN — REGADENOSON 0.4 MG: 0.08 INJECTION, SOLUTION INTRAVENOUS at 12:56

## 2023-10-23 RX ADMIN — Medication 33 MILLICURIE: at 13:03

## 2023-10-23 RX ADMIN — Medication 10.6 MILLICURIE: at 11:43

## 2023-10-23 NOTE — TELEPHONE ENCOUNTER
"Wife calls wondering if patient needs to hold off on taking Amlodipine today prior to his stress test/Lexiscan today.  Wife says they weren't told anything about this, but she saw online that any \"pine\" meds shouldn't be taken prior to stress test.    Please advise.     Thank you,  Aniket Samaniego, Triage RN [facility]  9:19 AM 10/23/2023     "

## 2023-10-24 ENCOUNTER — ANESTHESIA (OUTPATIENT)
Dept: SURGERY | Facility: CLINIC | Age: 78
DRG: 454 | End: 2023-10-24
Payer: MEDICARE

## 2023-10-24 ENCOUNTER — ANESTHESIA EVENT (OUTPATIENT)
Dept: SURGERY | Facility: CLINIC | Age: 78
DRG: 454 | End: 2023-10-24
Payer: MEDICARE

## 2023-10-24 ENCOUNTER — HOSPITAL ENCOUNTER (INPATIENT)
Facility: CLINIC | Age: 78
LOS: 3 days | Discharge: NURSING, REHAB OR LONG-TERM CARE | DRG: 454 | End: 2023-10-27
Attending: ORTHOPAEDIC SURGERY | Admitting: ORTHOPAEDIC SURGERY
Payer: MEDICARE

## 2023-10-24 ENCOUNTER — APPOINTMENT (OUTPATIENT)
Dept: GENERAL RADIOLOGY | Facility: CLINIC | Age: 78
DRG: 454 | End: 2023-10-24
Attending: ORTHOPAEDIC SURGERY
Payer: MEDICARE

## 2023-10-24 DIAGNOSIS — E78.5 HYPERLIPIDEMIA LDL GOAL <100: ICD-10-CM

## 2023-10-24 DIAGNOSIS — Z98.1 S/P LUMBAR FUSION: Primary | ICD-10-CM

## 2023-10-24 LAB
ABO/RH(D): NORMAL
ANTIBODY SCREEN: NEGATIVE
GLUCOSE BLDC GLUCOMTR-MCNC: 118 MG/DL (ref 70–99)
GLUCOSE BLDC GLUCOMTR-MCNC: 133 MG/DL (ref 70–99)
GLUCOSE BLDC GLUCOMTR-MCNC: 171 MG/DL (ref 70–99)
SPECIMEN EXPIRATION DATE: NORMAL

## 2023-10-24 PROCEDURE — 01NB0ZZ RELEASE LUMBAR NERVE, OPEN APPROACH: ICD-10-PCS | Performed by: ORTHOPAEDIC SURGERY

## 2023-10-24 PROCEDURE — 370N000017 HC ANESTHESIA TECHNICAL FEE, PER MIN: Performed by: ORTHOPAEDIC SURGERY

## 2023-10-24 PROCEDURE — 36415 COLL VENOUS BLD VENIPUNCTURE: CPT | Performed by: ORTHOPAEDIC SURGERY

## 2023-10-24 PROCEDURE — 250N000011 HC RX IP 250 OP 636: Mod: JZ | Performed by: ANESTHESIOLOGY

## 2023-10-24 PROCEDURE — 258N000003 HC RX IP 258 OP 636: Performed by: PHYSICIAN ASSISTANT

## 2023-10-24 PROCEDURE — 999N000179 XR SURGERY CARM FLUORO LESS THAN 5 MIN W STILLS: Mod: TC

## 2023-10-24 PROCEDURE — 250N000009 HC RX 250

## 2023-10-24 PROCEDURE — 258N000003 HC RX IP 258 OP 636: Performed by: ORTHOPAEDIC SURGERY

## 2023-10-24 PROCEDURE — 272N000004 HC RX 272: Performed by: ORTHOPAEDIC SURGERY

## 2023-10-24 PROCEDURE — 86850 RBC ANTIBODY SCREEN: CPT | Performed by: ORTHOPAEDIC SURGERY

## 2023-10-24 PROCEDURE — 250N000011 HC RX IP 250 OP 636: Mod: JZ

## 2023-10-24 PROCEDURE — 0SG1071 FUSION OF 2 OR MORE LUMBAR VERTEBRAL JOINTS WITH AUTOLOGOUS TISSUE SUBSTITUTE, POSTERIOR APPROACH, POSTERIOR COLUMN, OPEN APPROACH: ICD-10-PCS | Performed by: ORTHOPAEDIC SURGERY

## 2023-10-24 PROCEDURE — 250N000011 HC RX IP 250 OP 636: Performed by: PHYSICIAN ASSISTANT

## 2023-10-24 PROCEDURE — 710N000009 HC RECOVERY PHASE 1, LEVEL 1, PER MIN: Performed by: ORTHOPAEDIC SURGERY

## 2023-10-24 PROCEDURE — C1762 CONN TISS, HUMAN(INC FASCIA): HCPCS | Performed by: ORTHOPAEDIC SURGERY

## 2023-10-24 PROCEDURE — 250N000013 HC RX MED GY IP 250 OP 250 PS 637: Performed by: ANESTHESIOLOGY

## 2023-10-24 PROCEDURE — 250N000009 HC RX 250: Performed by: NURSE ANESTHETIST, CERTIFIED REGISTERED

## 2023-10-24 PROCEDURE — 0SB20ZZ EXCISION OF LUMBAR VERTEBRAL DISC, OPEN APPROACH: ICD-10-PCS | Performed by: ORTHOPAEDIC SURGERY

## 2023-10-24 PROCEDURE — 999N000141 HC STATISTIC PRE-PROCEDURE NURSING ASSESSMENT: Performed by: ORTHOPAEDIC SURGERY

## 2023-10-24 PROCEDURE — 250N000011 HC RX IP 250 OP 636: Performed by: ORTHOPAEDIC SURGERY

## 2023-10-24 PROCEDURE — 120N000001 HC R&B MED SURG/OB

## 2023-10-24 PROCEDURE — 8E0WXBF COMPUTER ASSISTED PROCEDURE OF TRUNK REGION, WITH FLUOROSCOPY: ICD-10-PCS | Performed by: ORTHOPAEDIC SURGERY

## 2023-10-24 PROCEDURE — 250N000025 HC SEVOFLURANE, PER MIN: Performed by: ORTHOPAEDIC SURGERY

## 2023-10-24 PROCEDURE — 250N000009 HC RX 250: Performed by: ORTHOPAEDIC SURGERY

## 2023-10-24 PROCEDURE — 86901 BLOOD TYPING SEROLOGIC RH(D): CPT | Performed by: ORTHOPAEDIC SURGERY

## 2023-10-24 PROCEDURE — 272N000001 HC OR GENERAL SUPPLY STERILE: Performed by: ORTHOPAEDIC SURGERY

## 2023-10-24 PROCEDURE — C1713 ANCHOR/SCREW BN/BN,TIS/BN: HCPCS | Performed by: ORTHOPAEDIC SURGERY

## 2023-10-24 PROCEDURE — 258N000003 HC RX IP 258 OP 636: Performed by: ANESTHESIOLOGY

## 2023-10-24 PROCEDURE — 360N000085 HC SURGERY LEVEL 5 W/ FLUORO, PER MIN: Performed by: ORTHOPAEDIC SURGERY

## 2023-10-24 PROCEDURE — 258N000003 HC RX IP 258 OP 636: Performed by: NURSE ANESTHETIST, CERTIFIED REGISTERED

## 2023-10-24 PROCEDURE — 250N000013 HC RX MED GY IP 250 OP 250 PS 637: Performed by: PHYSICIAN ASSISTANT

## 2023-10-24 PROCEDURE — 0SG10AJ FUSION OF 2 OR MORE LUMBAR VERTEBRAL JOINTS WITH INTERBODY FUSION DEVICE, POSTERIOR APPROACH, ANTERIOR COLUMN, OPEN APPROACH: ICD-10-PCS | Performed by: ORTHOPAEDIC SURGERY

## 2023-10-24 PROCEDURE — 250N000011 HC RX IP 250 OP 636: Mod: JZ | Performed by: NURSE ANESTHETIST, CERTIFIED REGISTERED

## 2023-10-24 PROCEDURE — 250N000011 HC RX IP 250 OP 636: Mod: JZ | Performed by: PHYSICIAN ASSISTANT

## 2023-10-24 DEVICE — IMP SCREW BN MEDT SPINE 45X7.5MM 5.5/6MM ROD: Type: IMPLANTABLE DEVICE | Site: SPINE LUMBAR | Status: FUNCTIONAL

## 2023-10-24 DEVICE — IMP SCR SET MEDT SOLERA BREAK OFF 5.5MM TI 5540030: Type: IMPLANTABLE DEVICE | Site: SPINE LUMBAR | Status: FUNCTIONAL

## 2023-10-24 DEVICE — IMP SCREW BN MEDT SPINE 50X6.5MM 5.5/6MM ROD: Type: IMPLANTABLE DEVICE | Site: SPINE LUMBAR | Status: FUNCTIONAL

## 2023-10-24 DEVICE — GRAFT BONE MAGNIFUSE 1CMX5CM 7509215: Type: IMPLANTABLE DEVICE | Site: SPINE LUMBAR | Status: FUNCTIONAL

## 2023-10-24 DEVICE — IMP ROD MEDT SOLERA CVD 5.5X100MM TI 1553201100: Type: IMPLANTABLE DEVICE | Site: SPINE LUMBAR | Status: FUNCTIONAL

## 2023-10-24 DEVICE — IMP SCR MEDT 5.5/6.0MM SOLERA 8.5X45MM MA 55840008545: Type: IMPLANTABLE DEVICE | Site: SPINE LUMBAR | Status: FUNCTIONAL

## 2023-10-24 DEVICE — SPACER 6068096 CATALYFT PL LONG 9MM
Type: IMPLANTABLE DEVICE | Site: SPINE LUMBAR | Status: FUNCTIONAL
Brand: CATALYFT PL EXPANDABLE INTERBODY SYSTEM

## 2023-10-24 DEVICE — GRAFT BONE FIBERS DBF 9ML INJ T50309 PRELOADED: Type: IMPLANTABLE DEVICE | Site: SPINE LUMBAR | Status: FUNCTIONAL

## 2023-10-24 RX ORDER — HYDROMORPHONE HCL IN WATER/PF 6 MG/30 ML
0.4 PATIENT CONTROLLED ANALGESIA SYRINGE INTRAVENOUS EVERY 5 MIN PRN
Status: DISCONTINUED | OUTPATIENT
Start: 2023-10-24 | End: 2023-10-24 | Stop reason: HOSPADM

## 2023-10-24 RX ORDER — ONDANSETRON 2 MG/ML
4 INJECTION INTRAMUSCULAR; INTRAVENOUS EVERY 6 HOURS PRN
Status: DISCONTINUED | OUTPATIENT
Start: 2023-10-24 | End: 2023-10-27 | Stop reason: HOSPADM

## 2023-10-24 RX ORDER — SODIUM CHLORIDE 9 MG/ML
INJECTION, SOLUTION INTRAVENOUS CONTINUOUS
Status: DISCONTINUED | OUTPATIENT
Start: 2023-10-24 | End: 2023-10-25

## 2023-10-24 RX ORDER — PROPOFOL 10 MG/ML
INJECTION, EMULSION INTRAVENOUS CONTINUOUS PRN
Status: DISCONTINUED | OUTPATIENT
Start: 2023-10-24 | End: 2023-10-24

## 2023-10-24 RX ORDER — SODIUM CHLORIDE, SODIUM LACTATE, POTASSIUM CHLORIDE, CALCIUM CHLORIDE 600; 310; 30; 20 MG/100ML; MG/100ML; MG/100ML; MG/100ML
INJECTION, SOLUTION INTRAVENOUS CONTINUOUS
Status: DISCONTINUED | OUTPATIENT
Start: 2023-10-24 | End: 2023-10-24 | Stop reason: HOSPADM

## 2023-10-24 RX ORDER — LIDOCAINE HYDROCHLORIDE 20 MG/ML
INJECTION, SOLUTION INFILTRATION; PERINEURAL PRN
Status: DISCONTINUED | OUTPATIENT
Start: 2023-10-24 | End: 2023-10-24

## 2023-10-24 RX ORDER — HYDRALAZINE HYDROCHLORIDE 20 MG/ML
INJECTION INTRAMUSCULAR; INTRAVENOUS PRN
Status: DISCONTINUED | OUTPATIENT
Start: 2023-10-24 | End: 2023-10-24

## 2023-10-24 RX ORDER — METOPROLOL TARTRATE 1 MG/ML
1-2 INJECTION, SOLUTION INTRAVENOUS EVERY 5 MIN PRN
Status: DISCONTINUED | OUTPATIENT
Start: 2023-10-24 | End: 2023-10-24 | Stop reason: HOSPADM

## 2023-10-24 RX ORDER — ATORVASTATIN CALCIUM 40 MG/1
40 TABLET, FILM COATED ORAL DAILY
Status: DISCONTINUED | OUTPATIENT
Start: 2023-10-25 | End: 2023-10-27 | Stop reason: HOSPADM

## 2023-10-24 RX ORDER — ONDANSETRON 2 MG/ML
INJECTION INTRAMUSCULAR; INTRAVENOUS PRN
Status: DISCONTINUED | OUTPATIENT
Start: 2023-10-24 | End: 2023-10-24

## 2023-10-24 RX ORDER — ONDANSETRON 4 MG/1
4 TABLET, ORALLY DISINTEGRATING ORAL EVERY 30 MIN PRN
Status: DISCONTINUED | OUTPATIENT
Start: 2023-10-24 | End: 2023-10-24 | Stop reason: HOSPADM

## 2023-10-24 RX ORDER — OXYCODONE HYDROCHLORIDE 5 MG/1
5 TABLET ORAL EVERY 4 HOURS PRN
Status: DISCONTINUED | OUTPATIENT
Start: 2023-10-24 | End: 2023-10-27 | Stop reason: HOSPADM

## 2023-10-24 RX ORDER — HYDROMORPHONE HCL IN WATER/PF 6 MG/30 ML
0.2 PATIENT CONTROLLED ANALGESIA SYRINGE INTRAVENOUS
Status: DISCONTINUED | OUTPATIENT
Start: 2023-10-24 | End: 2023-10-27 | Stop reason: HOSPADM

## 2023-10-24 RX ORDER — CEFAZOLIN SODIUM/WATER 2 G/20 ML
2 SYRINGE (ML) INTRAVENOUS SEE ADMIN INSTRUCTIONS
Status: DISCONTINUED | OUTPATIENT
Start: 2023-10-24 | End: 2023-10-24 | Stop reason: HOSPADM

## 2023-10-24 RX ORDER — FINASTERIDE 5 MG/1
5 TABLET, FILM COATED ORAL DAILY
Status: DISCONTINUED | OUTPATIENT
Start: 2023-10-25 | End: 2023-10-27 | Stop reason: HOSPADM

## 2023-10-24 RX ORDER — DEXAMETHASONE SODIUM PHOSPHATE 4 MG/ML
INJECTION, SOLUTION INTRA-ARTICULAR; INTRALESIONAL; INTRAMUSCULAR; INTRAVENOUS; SOFT TISSUE PRN
Status: DISCONTINUED | OUTPATIENT
Start: 2023-10-24 | End: 2023-10-24

## 2023-10-24 RX ORDER — NALOXONE HYDROCHLORIDE 0.4 MG/ML
0.4 INJECTION, SOLUTION INTRAMUSCULAR; INTRAVENOUS; SUBCUTANEOUS
Status: DISCONTINUED | OUTPATIENT
Start: 2023-10-24 | End: 2023-10-27 | Stop reason: HOSPADM

## 2023-10-24 RX ORDER — PROCHLORPERAZINE MALEATE 5 MG
5 TABLET ORAL EVERY 6 HOURS PRN
Status: DISCONTINUED | OUTPATIENT
Start: 2023-10-24 | End: 2023-10-27 | Stop reason: HOSPADM

## 2023-10-24 RX ORDER — FENTANYL CITRATE 50 UG/ML
50 INJECTION, SOLUTION INTRAMUSCULAR; INTRAVENOUS EVERY 5 MIN PRN
Status: DISCONTINUED | OUTPATIENT
Start: 2023-10-24 | End: 2023-10-24 | Stop reason: HOSPADM

## 2023-10-24 RX ORDER — HYDROXYZINE HYDROCHLORIDE 10 MG/1
10 TABLET, FILM COATED ORAL EVERY 6 HOURS PRN
Status: DISCONTINUED | OUTPATIENT
Start: 2023-10-24 | End: 2023-10-27 | Stop reason: HOSPADM

## 2023-10-24 RX ORDER — ACETAMINOPHEN 325 MG/1
975 TABLET ORAL EVERY 8 HOURS
Qty: 27 TABLET | Refills: 0 | Status: DISCONTINUED | OUTPATIENT
Start: 2023-10-24 | End: 2023-10-26

## 2023-10-24 RX ORDER — GABAPENTIN 100 MG/1
100 CAPSULE ORAL
Status: COMPLETED | OUTPATIENT
Start: 2023-10-24 | End: 2023-10-24

## 2023-10-24 RX ORDER — LIDOCAINE 40 MG/G
CREAM TOPICAL
Status: DISCONTINUED | OUTPATIENT
Start: 2023-10-24 | End: 2023-10-27 | Stop reason: HOSPADM

## 2023-10-24 RX ORDER — METHOCARBAMOL 500 MG/1
500 TABLET, FILM COATED ORAL EVERY 6 HOURS PRN
Status: DISCONTINUED | OUTPATIENT
Start: 2023-10-24 | End: 2023-10-27 | Stop reason: HOSPADM

## 2023-10-24 RX ORDER — CEFAZOLIN SODIUM 2 G/100ML
2 INJECTION, SOLUTION INTRAVENOUS EVERY 8 HOURS
Qty: 200 ML | Refills: 0 | Status: COMPLETED | OUTPATIENT
Start: 2023-10-24 | End: 2023-10-25

## 2023-10-24 RX ORDER — ACETAMINOPHEN 325 MG/1
650 TABLET ORAL EVERY 4 HOURS PRN
Status: DISCONTINUED | OUTPATIENT
Start: 2023-10-27 | End: 2023-10-26

## 2023-10-24 RX ORDER — VITAMIN B COMPLEX
25 TABLET ORAL 2 TIMES DAILY
Status: DISCONTINUED | OUTPATIENT
Start: 2023-10-24 | End: 2023-10-27 | Stop reason: HOSPADM

## 2023-10-24 RX ORDER — AMOXICILLIN 250 MG
1 CAPSULE ORAL 2 TIMES DAILY
Status: DISCONTINUED | OUTPATIENT
Start: 2023-10-24 | End: 2023-10-27 | Stop reason: HOSPADM

## 2023-10-24 RX ORDER — FENTANYL CITRATE 50 UG/ML
25 INJECTION, SOLUTION INTRAMUSCULAR; INTRAVENOUS EVERY 5 MIN PRN
Status: DISCONTINUED | OUTPATIENT
Start: 2023-10-24 | End: 2023-10-24 | Stop reason: HOSPADM

## 2023-10-24 RX ORDER — CARBIDOPA AND LEVODOPA 25; 100 MG/1; MG/1
2 TABLET ORAL 3 TIMES DAILY
Status: DISCONTINUED | OUTPATIENT
Start: 2023-10-24 | End: 2023-10-24 | Stop reason: HOSPADM

## 2023-10-24 RX ORDER — ATORVASTATIN CALCIUM 80 MG/1
40 TABLET, FILM COATED ORAL DAILY
Qty: 45 TABLET | Refills: 1 | Status: SHIPPED | OUTPATIENT
Start: 2023-10-24 | End: 2024-04-17

## 2023-10-24 RX ORDER — EPHEDRINE SULFATE 50 MG/ML
INJECTION, SOLUTION INTRAMUSCULAR; INTRAVENOUS; SUBCUTANEOUS PRN
Status: DISCONTINUED | OUTPATIENT
Start: 2023-10-24 | End: 2023-10-24

## 2023-10-24 RX ORDER — ONDANSETRON 2 MG/ML
4 INJECTION INTRAMUSCULAR; INTRAVENOUS EVERY 30 MIN PRN
Status: DISCONTINUED | OUTPATIENT
Start: 2023-10-24 | End: 2023-10-24 | Stop reason: HOSPADM

## 2023-10-24 RX ORDER — CARBIDOPA AND LEVODOPA 25; 100 MG/1; MG/1
2 TABLET ORAL ONCE
Status: COMPLETED | OUTPATIENT
Start: 2023-10-24 | End: 2023-10-24

## 2023-10-24 RX ORDER — BISACODYL 10 MG
10 SUPPOSITORY, RECTAL RECTAL DAILY PRN
Status: DISCONTINUED | OUTPATIENT
Start: 2023-10-24 | End: 2023-10-27 | Stop reason: HOSPADM

## 2023-10-24 RX ORDER — NALOXONE HYDROCHLORIDE 0.4 MG/ML
0.2 INJECTION, SOLUTION INTRAMUSCULAR; INTRAVENOUS; SUBCUTANEOUS
Status: DISCONTINUED | OUTPATIENT
Start: 2023-10-24 | End: 2023-10-27 | Stop reason: HOSPADM

## 2023-10-24 RX ORDER — CARBIDOPA AND LEVODOPA 25; 100 MG/1; MG/1
2 TABLET ORAL
Status: DISCONTINUED | OUTPATIENT
Start: 2023-10-25 | End: 2023-10-27 | Stop reason: HOSPADM

## 2023-10-24 RX ORDER — BUPIVACAINE HYDROCHLORIDE AND EPINEPHRINE 2.5; 5 MG/ML; UG/ML
INJECTION, SOLUTION EPIDURAL; INFILTRATION; INTRACAUDAL; PERINEURAL PRN
Status: DISCONTINUED | OUTPATIENT
Start: 2023-10-24 | End: 2023-10-24 | Stop reason: HOSPADM

## 2023-10-24 RX ORDER — HYDROMORPHONE HCL IN WATER/PF 6 MG/30 ML
0.2 PATIENT CONTROLLED ANALGESIA SYRINGE INTRAVENOUS EVERY 5 MIN PRN
Status: DISCONTINUED | OUTPATIENT
Start: 2023-10-24 | End: 2023-10-24 | Stop reason: HOSPADM

## 2023-10-24 RX ORDER — PROPOFOL 10 MG/ML
INJECTION, EMULSION INTRAVENOUS PRN
Status: DISCONTINUED | OUTPATIENT
Start: 2023-10-24 | End: 2023-10-24

## 2023-10-24 RX ORDER — OXYCODONE HYDROCHLORIDE 5 MG/1
10 TABLET ORAL EVERY 4 HOURS PRN
Status: DISCONTINUED | OUTPATIENT
Start: 2023-10-24 | End: 2023-10-27 | Stop reason: HOSPADM

## 2023-10-24 RX ORDER — CEFAZOLIN SODIUM/WATER 2 G/20 ML
2 SYRINGE (ML) INTRAVENOUS
Status: COMPLETED | OUTPATIENT
Start: 2023-10-24 | End: 2023-10-24

## 2023-10-24 RX ORDER — ATENOLOL 25 MG/1
25 TABLET ORAL DAILY
Status: DISCONTINUED | OUTPATIENT
Start: 2023-10-25 | End: 2023-10-27 | Stop reason: HOSPADM

## 2023-10-24 RX ORDER — FENTANYL CITRATE 50 UG/ML
INJECTION, SOLUTION INTRAMUSCULAR; INTRAVENOUS PRN
Status: DISCONTINUED | OUTPATIENT
Start: 2023-10-24 | End: 2023-10-24

## 2023-10-24 RX ORDER — POLYETHYLENE GLYCOL 3350 17 G/17G
17 POWDER, FOR SOLUTION ORAL DAILY
Status: DISCONTINUED | OUTPATIENT
Start: 2023-10-25 | End: 2023-10-27 | Stop reason: HOSPADM

## 2023-10-24 RX ORDER — HYDROMORPHONE HCL IN WATER/PF 6 MG/30 ML
0.4 PATIENT CONTROLLED ANALGESIA SYRINGE INTRAVENOUS
Status: DISCONTINUED | OUTPATIENT
Start: 2023-10-24 | End: 2023-10-27 | Stop reason: HOSPADM

## 2023-10-24 RX ORDER — LIDOCAINE 40 MG/G
CREAM TOPICAL
Status: DISCONTINUED | OUTPATIENT
Start: 2023-10-24 | End: 2023-10-24 | Stop reason: HOSPADM

## 2023-10-24 RX ORDER — GLYCOPYRROLATE 0.2 MG/ML
INJECTION, SOLUTION INTRAMUSCULAR; INTRAVENOUS PRN
Status: DISCONTINUED | OUTPATIENT
Start: 2023-10-24 | End: 2023-10-24

## 2023-10-24 RX ORDER — ONDANSETRON 4 MG/1
4 TABLET, ORALLY DISINTEGRATING ORAL EVERY 6 HOURS PRN
Status: DISCONTINUED | OUTPATIENT
Start: 2023-10-24 | End: 2023-10-27 | Stop reason: HOSPADM

## 2023-10-24 RX ADMIN — Medication 25 MCG: at 20:30

## 2023-10-24 RX ADMIN — Medication 5 MG: at 13:10

## 2023-10-24 RX ADMIN — GLYCOPYRROLATE 0.2 MG: 0.2 INJECTION, SOLUTION INTRAMUSCULAR; INTRAVENOUS at 11:22

## 2023-10-24 RX ADMIN — PROPOFOL 150 MG: 10 INJECTION, EMULSION INTRAVENOUS at 11:22

## 2023-10-24 RX ADMIN — ACETAMINOPHEN 975 MG: 325 TABLET, FILM COATED ORAL at 20:30

## 2023-10-24 RX ADMIN — Medication 2 G: at 15:12

## 2023-10-24 RX ADMIN — HYDRALAZINE HYDROCHLORIDE 5 MG: 20 INJECTION INTRAMUSCULAR; INTRAVENOUS at 12:18

## 2023-10-24 RX ADMIN — PROPOFOL 50 MCG/KG/MIN: 10 INJECTION, EMULSION INTRAVENOUS at 11:37

## 2023-10-24 RX ADMIN — CARBIDOPA AND LEVODOPA 2 TABLET: 25; 100 TABLET ORAL at 17:39

## 2023-10-24 RX ADMIN — LIDOCAINE HYDROCHLORIDE 30 MG: 20 INJECTION, SOLUTION INFILTRATION; PERINEURAL at 11:22

## 2023-10-24 RX ADMIN — DEXAMETHASONE SODIUM PHOSPHATE 8 MG: 4 INJECTION, SOLUTION INTRA-ARTICULAR; INTRALESIONAL; INTRAMUSCULAR; INTRAVENOUS; SOFT TISSUE at 11:22

## 2023-10-24 RX ADMIN — FENTANYL CITRATE 100 MCG: 50 INJECTION INTRAMUSCULAR; INTRAVENOUS at 11:22

## 2023-10-24 RX ADMIN — ROCURONIUM BROMIDE 10 MG: 50 INJECTION, SOLUTION INTRAVENOUS at 12:55

## 2023-10-24 RX ADMIN — HYDROMORPHONE HYDROCHLORIDE 0.2 MG: 0.2 INJECTION, SOLUTION INTRAMUSCULAR; INTRAVENOUS; SUBCUTANEOUS at 17:29

## 2023-10-24 RX ADMIN — CEFAZOLIN SODIUM 2 G: 2 INJECTION, SOLUTION INTRAVENOUS at 22:36

## 2023-10-24 RX ADMIN — FENTANYL CITRATE 50 MCG: 50 INJECTION INTRAMUSCULAR; INTRAVENOUS at 12:13

## 2023-10-24 RX ADMIN — Medication 5 MG: at 12:31

## 2023-10-24 RX ADMIN — FENTANYL CITRATE 25 MCG: 50 INJECTION, SOLUTION INTRAMUSCULAR; INTRAVENOUS at 16:44

## 2023-10-24 RX ADMIN — GABAPENTIN 100 MG: 100 CAPSULE ORAL at 10:21

## 2023-10-24 RX ADMIN — SODIUM CHLORIDE, POTASSIUM CHLORIDE, SODIUM LACTATE AND CALCIUM CHLORIDE: 600; 310; 30; 20 INJECTION, SOLUTION INTRAVENOUS at 12:29

## 2023-10-24 RX ADMIN — FENTANYL CITRATE 25 MCG: 50 INJECTION, SOLUTION INTRAMUSCULAR; INTRAVENOUS at 17:17

## 2023-10-24 RX ADMIN — ROCURONIUM BROMIDE 20 MG: 50 INJECTION, SOLUTION INTRAVENOUS at 12:06

## 2023-10-24 RX ADMIN — Medication 5 MG: at 12:46

## 2023-10-24 RX ADMIN — HYDROMORPHONE HYDROCHLORIDE 0.5 MG: 1 INJECTION, SOLUTION INTRAMUSCULAR; INTRAVENOUS; SUBCUTANEOUS at 12:01

## 2023-10-24 RX ADMIN — SODIUM CHLORIDE: 9 INJECTION, SOLUTION INTRAVENOUS at 20:31

## 2023-10-24 RX ADMIN — ONDANSETRON 4 MG: 2 INJECTION INTRAMUSCULAR; INTRAVENOUS at 15:37

## 2023-10-24 RX ADMIN — SODIUM CHLORIDE, POTASSIUM CHLORIDE, SODIUM LACTATE AND CALCIUM CHLORIDE: 600; 310; 30; 20 INJECTION, SOLUTION INTRAVENOUS at 11:17

## 2023-10-24 RX ADMIN — FENTANYL CITRATE 25 MCG: 50 INJECTION, SOLUTION INTRAMUSCULAR; INTRAVENOUS at 16:54

## 2023-10-24 RX ADMIN — SENNOSIDES AND DOCUSATE SODIUM 1 TABLET: 8.6; 5 TABLET ORAL at 20:30

## 2023-10-24 RX ADMIN — FENTANYL CITRATE 25 MCG: 50 INJECTION, SOLUTION INTRAMUSCULAR; INTRAVENOUS at 17:05

## 2023-10-24 RX ADMIN — Medication 2 G: at 11:17

## 2023-10-24 RX ADMIN — TRANEXAMIC ACID 1 G: 1 INJECTION, SOLUTION INTRAVENOUS at 11:41

## 2023-10-24 RX ADMIN — HYDROMORPHONE HYDROCHLORIDE 0.5 MG: 1 INJECTION, SOLUTION INTRAMUSCULAR; INTRAVENOUS; SUBCUTANEOUS at 11:44

## 2023-10-24 RX ADMIN — Medication 5 MG: at 13:32

## 2023-10-24 RX ADMIN — OXYCODONE HYDROCHLORIDE 5 MG: 5 TABLET ORAL at 18:23

## 2023-10-24 RX ADMIN — OXYCODONE HYDROCHLORIDE 5 MG: 5 TABLET ORAL at 22:33

## 2023-10-24 RX ADMIN — ROCURONIUM BROMIDE 50 MG: 50 INJECTION, SOLUTION INTRAVENOUS at 11:22

## 2023-10-24 RX ADMIN — METHOCARBAMOL 500 MG: 500 TABLET ORAL at 22:33

## 2023-10-24 RX ADMIN — SUGAMMADEX 200 MG: 100 INJECTION, SOLUTION INTRAVENOUS at 15:45

## 2023-10-24 RX ADMIN — ROCURONIUM BROMIDE 10 MG: 50 INJECTION, SOLUTION INTRAVENOUS at 13:45

## 2023-10-24 RX ADMIN — CARBIDOPA AND LEVODOPA 2 TABLET: 25; 100 TABLET ORAL at 11:10

## 2023-10-24 RX ADMIN — Medication 5 MG: at 13:53

## 2023-10-24 RX ADMIN — Medication 1 TABLET: at 20:30

## 2023-10-24 ASSESSMENT — ACTIVITIES OF DAILY LIVING (ADL)
ADLS_ACUITY_SCORE: 20
ADLS_ACUITY_SCORE: 21

## 2023-10-24 ASSESSMENT — ENCOUNTER SYMPTOMS: DYSRHYTHMIAS: 1

## 2023-10-24 NOTE — ANESTHESIA POSTPROCEDURE EVALUATION
Patient: Kyler Rodriguez    Procedure: Procedure(s):  Lumbar 2 to Lumbar 3, Lumbar 3 to Lumbar 4, lumbar 4 to 5 transforaminal lumbar interbody fusion  with Lumbar 1 to Lumbar 2, and Lumbar 4 to Lumbar 5 laminectomies       Anesthesia Type:  General    Note:  Disposition: Admission   Postop Pain Control: Uneventful            Sign Out: Well controlled pain   PONV: No   Neuro/Psych: Uneventful            Sign Out: Acceptable/Baseline neuro status   Airway/Respiratory: Uneventful            Sign Out: Acceptable/Baseline resp. status   CV/Hemodynamics: Uneventful            Sign Out: Acceptable CV status; No obvious hypovolemia; No obvious fluid overload   Other NRE: NONE   DID A NON-ROUTINE EVENT OCCUR? No           Last vitals:  Vitals Value Taken Time   /68 10/24/23 1823   Temp 97.2  F (36.2  C) 10/24/23 1815   Pulse 82 10/24/23 1833   Resp 12 10/24/23 1833   SpO2 96 % 10/24/23 1833   Vitals shown include unfiled device data.    Electronically Signed By: Aris Zapata MD  October 24, 2023  6:34 PM

## 2023-10-24 NOTE — INTERVAL H&P NOTE
I have reviewed the surgical (or preoperative) H&P that is linked to this encounter, and examined the patient. There are no significant changes    Clinical Conditions Present on Arrival:  Clinically Significant Risk Factors Present on Admission         # Hyponatremia: Lowest Na = 131 mmol/L in last 30 days, will monitor as appropriate          # DMII: A1C = 6.7 % (Ref range: 0.0 - 5.6 %) within past 6 months  # Overweight: Estimated body mass index is 25.71 kg/m  as calculated from the following:    Height as of 10/17/23: 1.829 m (6').    Weight as of 10/17/23: 86 kg (189 lb 9.6 oz).

## 2023-10-24 NOTE — ANESTHESIA PREPROCEDURE EVALUATION
Anesthesia Pre-Procedure Evaluation    Patient: Kyler Rodriguez   MRN: [id number] : 1945        Procedure : Procedure(s):  Lumbar 2 to Lumbar 3, Lumbar 3 to Lumbar 4 transforaminal lumbar interbody fusion  with Lumbar 1 to Lumbar 2, and Lumbar 4 to Lumbar 5 laminectomies          Past Medical History:   Diagnosis Date    Elevated prostate specific antigen (PSA)     Essential hypertension, benign     Malignant neoplasm (H) 10/2011    prostate cancer    Malignant neoplasm of right kidney (H)     Mumps     Other and unspecified hyperlipidemia     Parkinsons disease     Prostate infection     Spider veins     Type II or unspecified type diabetes mellitus without mention of complication, not stated as uncontrolled       Past Surgical History:   Procedure Laterality Date    BIOPSY      COLONOSCOPY   &     [facility]    COLONOSCOPY  2019    Dr. Pandey [facility]    CYSTOSCOPY      DAVINCI NEPHRECTOMY PARTIAL Right 2019    Procedure: ROBOTIC-ASSISTED RIGHT PARTIAL NEPHRECTOMY WITH ULTRASOUND;  Surgeon: Binu Yoon MD;  Location:  OR    EYE SURGERY      cataract left eye    HC REMOVAL OF TONSILS,<12 Y/O      HC VASECTOMY UNILAT/BILAT W POSTOP SEMEN      PHACOEMULSIFICATION CLEAR CORNEA W/ STANDARD IOL IMPLANT, ENDOSCOPIC CYCLOPHOTOCOAGULATION, COMBINED  2014    Procedure: COMBINED PHACOEMULSIFICATION CLEAR CORNEA WITH STANDARD INTRAOCULAR LENS IMPLANT, ENDOSCOPIC CYCLOPHOTOCOAGULATION;  Surgeon: Leroy Crews MD;  Location:  SD    VASECTOMY      ZZC NONSPECIFIC PROCEDURE      varicose vein stripping    Z NONSPECIFIC PROCEDURE      colonoscopy      Allergies   Allergen Reactions    Latex Rash      Social History     Tobacco Use    Smoking status: Never    Smokeless tobacco: Never   Substance Use Topics    Alcohol use: Not Currently     Comment: 1-2 drinks/daily      Wt Readings from Last 1 Encounters:   10/17/23 86 kg (189 lb 9.6 oz)        Anesthesia Evaluation            ROS/MED  HX  ENT/Pulmonary:  - neg pulmonary ROS  (-) sleep apnea   Neurologic:     (+)    peripheral neuropathy,             Parkinson's disease,               Cardiovascular:     (+) Dyslipidemia hypertension- -   -  - -                        dysrhythmias (New A fib per H&P. Stress test negative.), a-fib,        Previous cardiac testing   Echo: Date: Results:    Stress Test:  Date: 10/17/23 Results:  Negative for ischemia  ECG Reviewed:  Date: Results:    Cath:  Date: Results:   (-) CAD, CHF and syncope   METS/Exercise Tolerance: >4 METS    Hematologic:  - neg hematologic  ROS     Musculoskeletal: Comment: DDD - neg musculoskeletal ROS     GI/Hepatic:  - neg GI/hepatic ROS  (-) GERD   Renal/Genitourinary:     (+) renal disease, type: CRI,            Endo:     (+)  type II DM,                    Psychiatric/Substance Use:  - neg psychiatric ROS     Infectious Disease:  - neg infectious disease ROS     Malignancy:   (+) Malignancy, History of Prostate and Other.Prostate CA Remission status post.  Other CA Kidney Active status post.    Other:  - neg other ROS          Physical Exam    Airway        Mallampati: II   TM distance: > 3 FB   Neck ROM: full   Mouth opening: > 3 cm    Respiratory Devices and Support         Dental           Cardiovascular   cardiovascular exam normal          Pulmonary   pulmonary exam normal            Other findings: Lab Test        10/17/23     03/22/23     09/30/22     09/29/22     09/29/22                       1417          0933          1224          1152          0659          WBC          8.8          7.9           --           --          6.1           HGB          12.9*        12.0*        8.6*           < >        11.8*         MCV          72*          67*           --           --          96            PLT          254          296           --           --          181            < > = values in this interval not displayed.                  Lab Test        10/17/23     04/25/23      03/22/23 09/30/22 09/30/22 09/30/22 09/29/22 09/29/22                       1417          1310          0933          1125          0747          0721          0812          0659          NA           131*          --          137           --           --          133*          --          125*          POTASSIUM    4.6           --          4.5           --           --          4.0           --          4.3           CHLORIDE     94*           --          99            --           --           --           --          90*           CO2          25            --          22            --           --           --           --          22            BUN          22.8          --          17.8          --           --           --           --          13.4          CR           0.74         0.9          0.83          --           --           --           --          0.57*         ANIONGAP     12            --          16*           --           --           --           --          13            BETH          9.5           --          9.9           --           --           --           --          8.4*          GLC          121*          --          122*         132*           < >         --            < >        143*           < > = values in this interval not displayed.                     OUTSIDE LABS:  CBC:   Lab Results   Component Value Date    WBC 8.8 10/17/2023    WBC 7.9 03/22/2023    HGB 12.9 (L) 10/17/2023    HGB 12.0 (L) 03/22/2023    HCT 40.0 10/17/2023    HCT 41.1 03/22/2023     10/17/2023     03/22/2023     BMP:   Lab Results   Component Value Date     (L) 10/17/2023     03/22/2023    POTASSIUM 4.6 10/17/2023    POTASSIUM 4.5 03/22/2023    CHLORIDE 94 (L) 10/17/2023    CHLORIDE 99 03/22/2023    CO2 25 10/17/2023    CO2 22 03/22/2023    BUN 22.8 10/17/2023    BUN 17.8 03/22/2023    CR 0.74 10/17/2023    CR 0.9 04/25/2023     (H) 10/17/2023    GLC  122 (H) 03/22/2023     COAGS:   Lab Results   Component Value Date    PTT 31 08/10/2011    INR 1.05 08/10/2011     POC:   Lab Results   Component Value Date     (H) 09/05/2019     HEPATIC:   Lab Results   Component Value Date    ALBUMIN 4.4 10/17/2023    PROTTOTAL 7.1 10/17/2023    ALT 8 10/17/2023    AST 22 10/17/2023    ALKPHOS 70 10/17/2023    BILITOTAL 0.3 10/17/2023     OTHER:   Lab Results   Component Value Date    A1C 6.7 (H) 09/21/2023    BETH 9.5 10/17/2023    MAG 1.7 05/20/2010    TSH 3.46 03/22/2023       Anesthesia Plan    ASA Status:  3       Anesthesia Type: General.     - Airway: ETT   Induction: Propofol.   Maintenance: Balanced.   Techniques and Equipment:     - Lines/Monitors: 2nd IV     Consents    Anesthesia Plan(s) and associated risks, benefits, and realistic alternatives discussed. Questions answered and patient/representative(s) expressed understanding.     - Discussed: Risks, Benefits and Alternatives for the PROCEDURE were discussed     - Discussed with:  Patient      - Extended Intubation/Ventilatory Support Discussed: No.      - Patient is DNR/DNI Status: No     Use of blood products discussed: Yes.     - Discussed with: Patient.     - Consented: consented to blood products            Reason for refusal: other.     Postoperative Care    Pain management: IV analgesics, Oral pain medications.   PONV prophylaxis: Ondansetron (or other 5HT-3), Background Propofol Infusion     Comments:                Larry Ambrocio MD

## 2023-10-24 NOTE — ANESTHESIA CARE TRANSFER NOTE
Patient: Kyler Rodriguez    Procedure: Procedure(s):  Lumbar 2 to Lumbar 3, Lumbar 3 to Lumbar 4, lumbar 4 to 5 transforaminal lumbar interbody fusion  with Lumbar 1 to Lumbar 2, and Lumbar 4 to Lumbar 5 laminectomies       Diagnosis: Spinal stenosis, lumbar region, without neurogenic claudication [M48.061]  Degeneration of lumbar or lumbosacral intervertebral disc [M51.37]  Diagnosis Additional Information: No value filed.    Anesthesia Type:   General     Note:    Oropharynx: oropharynx clear of all foreign objects  Level of Consciousness: drowsy  Oxygen Supplementation: face mask  Level of Supplemental Oxygen (L/min / FiO2): 6  Independent Airway: airway patency satisfactory and stable  Dentition: dentition unchanged  Vital Signs Stable: post-procedure vital signs reviewed and stable  Report to RN Given: handoff report given  Patient transferred to: PACU    Handoff Report: Identifed the Patient, Identified the Reponsible Provider, Reviewed the pertinent medical history, Discussed the surgical course, Reviewed Intra-OP anesthesia mangement and issues during anesthesia, Set expectations for post-procedure period and Allowed opportunity for questions and acknowledgement of understanding      Vitals:  Vitals Value Taken Time   BP 67/39 10/24/23 1625   Temp 97.9  F (36.6  C) 10/24/23 1625   Pulse 83 10/24/23 1626   Resp 13 10/24/23 1626   SpO2 96 % 10/24/23 1626   Vitals shown include unfiled device data.    Electronically Signed By: FRANCE Bustos CRNA  October 24, 2023  4:27 PM

## 2023-10-24 NOTE — ANESTHESIA PROCEDURE NOTES
Airway       Patient location during procedure: OR       Procedure Start/Stop Times: 10/24/2023 11:24 AM  Staff -        CRNA: Maye Hernandez APRN CRNA       Performed By: CRNA  Consent for Airway        Urgency: elective  Indications and Patient Condition       Indications for airway management: lexii-procedural       Induction type:intravenous       Mask difficulty assessment: 1 - vent by mask    Final Airway Details       Final airway type: endotracheal airway       Successful airway: ETT - single  Endotracheal Airway Details        ETT size (mm): 8.0       Cuffed: yes       Successful intubation technique: direct laryngoscopy       DL Blade Type: MAC 4       Grade View of Cords: 1       Adjucts: stylet       Bite block used: Soft    Post intubation assessment        Placement verified by: capnometry        Number of attempts at approach: 1       Secured with: plastic tape       Ease of procedure: easy       Dentition: Intact and Unchanged    Medication(s) Administered   Medication Administration Time: 10/24/2023 11:24 AM

## 2023-10-24 NOTE — BRIEF OP NOTE
[facility] Brief Operative Note    Preop Dx:   Multilevel spinal stenosis, severe L1-L5  Dysplastic facet joints  Severe chronic neurogenic claudication  Multilevel lumbar spine degenerative disc disease     Postop Dx: Same     Procedure:   L2-3 transforaminal lumbar interbody fusion, TLIF  L3-4 TLIF  L4-5 TLIF  Application of intervertebral biomechanical device for interbody fusion purposes at L2-3, L3-4, L4-5.  Posterior spinal fusion using a posterolateral bone grafting technique at L2-L5  Posterior pedicle screw and sana construct for fusion purposes spanning from L2-5 bilaterally  Central laminectomy L1  Central laminectomy L2  Central laminectomy L3  Central laminectomy L4  Central laminectomy L5  Local autograft bone for fusion purposes both in the disc space in the posterolateral spine L2-5  Allograft bone, bone graft extender with use of 3 boxes of Magnifuse 1 x 5.  6 bone bags total  Fluoroscopy  ProjectSpeaker O-arm     Surgeon: Dr. Wilmer Shields MD     Assist: Sonido Crawford PA-C     Anesthesia: General     Complication: None     Drains: 1 Hemovac drain placed deep to the lumbar fascia taken out a separate focal entire to the skin with a nylon stitch to prevent backout     EBL: 425 total however we were able to reinfused 100 cc with Cell Saver     Findings: Full decompression L1-5.  Stable instrumentation.  No complications.  Counts are correct prior to closure.     Specimen: None     Stable to PACU     Instrumentation: [employer]onic Solera pedicle screws.  L2 excepted 6.5 x 50 mm screws   L3 excepted 6.5 x 45 mm screws  L4 excepted 8.5 x 45 mm screws  L5 excepted 7.5 x 45 mm screws  2 lordotic 100 mm titanium 5.5 mm diameter rods  8 standard end caps  3 [employer] ITS ComplianceyTotSpot expandable titanium interbody cage all sized 9 x 27 mm

## 2023-10-24 NOTE — OP NOTE
Operative Report    Preop Dx:   Multilevel spinal stenosis, severe L1-L5  Dysplastic facet joints  Severe chronic neurogenic claudication  Multilevel lumbar spine degenerative disc disease    Postop Dx: Same    Procedure:   L2-3 transforaminal lumbar interbody fusion, TLIF  L3-4 TLIF  L4-5 TLIF  Application of intervertebral biomechanical device for interbody fusion purposes at L2-3, L3-4, L4-5.  Posterior spinal fusion using a posterolateral bone grafting technique at L2-L5  Posterior pedicle screw and sana construct for fusion purposes spanning from L2-5 bilaterally  Central laminectomy L1  Central laminectomy L2  Central laminectomy L3  Central laminectomy L4  Central laminectomy L5  Local autograft bone for fusion purposes both in the disc space in the posterolateral spine L2-5  Allograft bone, bone graft extender with use of 3 boxes of Magnifuse 1 x 5.  6 bone bags total  Fluoroscopy  Medtronic O-arm    Surgeon: Dr. Wilmer Shields MD    Assist: Sonido Crawford PA-C    Anesthesia: General    Complication: None    Drains: 1 Hemovac drain placed deep to the lumbar fascia taken out a separate focal entire to the skin with a nylon stitch to prevent backout    EBL: 425 total however we were able to reinfused 100 cc with Cell Saver    Findings: Full decompression L1-5.  Stable instrumentation.  No complications.  Counts are correct prior to closure.    Specimen: None    Stable to PACU    Instrumentation: Medtronic Solera pedicle screws.  L2 excepted 6.5 x 50 mm screws   L3 excepted 6.5 x 45 mm screws  L4 excepted 8.5 x 45 mm screws  L5 excepted 7.5 x 45 mm screws  2 lordotic 100 mm titanium 5.5 mm diameter rods  8 standard end caps  3 Medtronic Catalyft expandable titanium interbody cage all sized 9 x 27 mm      INDICATIONS FOR SURGERY:  Kyler Rodriguez is a 78 year old male who is followed in orthopedic spine surgery clinic.  The diagnoses are consistent with those listed above, primarily severe recalcitrant lumbar  neurogenic claudication as a result of severe multilevel spinal stenosis and facet dysplasia as a result of chronic osteoarthritis and degenerative disc disease..  They have failed to successfully improve with conservative care which may have consisted of observation, timely healing, medications, therapies and spinal injections.  Based on the lack of improvement with conservative care we discussed the above-mentioned procedure as a possible means to improve or alleviate their symptoms.  We discussed the risk, benefits and alternatives to surgical intervention.  Ultimately they have elected to proceed with surgical intervention after fully understanding the risk benefits and alternatives.  They had been presented with a consent form which was read, understood and signed.  All questions were answered.  There were referred for preoperative history and physical examination.  They were aware of the perioperative risks as well as the postoperative restrictions and possible postoperative bracing recommendations.    On the date of the procedure they were seen in the preoperative area.  Questions were answered.  Skin was marked.  Consent was once again signed by both parties.  After finding no contraindications to proceeding with surgery in the preoperative anesthesia assessment they were brought back into the operating room where they were successfully sedated and endotracheal tube was placed.  Cantu catheter was placed under sterile conditions.  They were then rolled from the supine to a prone position over four-post frame on the Yifan table.  Eyes were free of compression.  SCDs were in place.  Shoulders and elbows  were placed at 90 degrees angles with the shoulders slightly flexed forward.  The surgical region was prepped and draped in standard fashion.  Timeout was performed and IV antibiotics were administered.      EXPOSURE:  We localized over the surgical area.  The midline was identified and infiltrated with  Marcaine mixed with epinephrine.  A 10 blade was used to create a midline incision.  We dissected down through the skin and subcutaneous tissues.  Hemostasis was obtained with Bovie electrocautery.  Puja and Bovie were then used to dissect down to the lumbar fascia.  The midline lumbar fascia was identified and then split in the midline.  A Kocher clamp was applied to the posterior elements.  A lateral fluoroscopic image was obtained.  We verified our level of Kocher clamp and continued with that numbering system/nomenclature in mind.  A subperiosteal dissection was completed over the surgical field.  Dissection was taken down to the level of the transverse process on those levels intended to be included in the fusion.  The supra-adjacent level facet joints were spared while the levels of fusion did have there facet joints removed as well as there facet osteophytes.  It should be noted that all local autograft bone removed during the procedure today was placed on the back table, cleaned and later incorporated into our bone graft mixture used for fusion purposes later in the procedure.  With the exposure completed we then placed a spinous process clamp on the spinous process directly above the levels of fusion.  Because we did a laminectomy of L1, we placed the spinous process clamp on T12 today.  The [employer] O-arm was brought into the room.  A sterile spin was completed.  The information was successfully transferred to the [email or web address] system and we carried on.    PEDICLE SCREW PREPARATION:  We reentered the wound at that point.  We then moved onto establish our  holes for pedicle screws at bilateral L2, L3, L4, L5.  We used a Stealth guided drill to make our initial  holes in bilateral pedicles at the areas of fusion.  These primary  holes were then upsized using a Stealth guided awl.  During this process appropriate screw lengths were custom measured.    POSTEROLATERAL BONY FUSION:  Prior to placing  her pedicle screws I did perform a bony posterior fusion at the levels of intended fusion at L2-3, L3-4, L4-5.  This included decortication of the transverse processes of the levels of fusion.  Also we decorticated the lateral facet joints at those levels, intra facet decortication and decortication of the pars interarticularis.  After decortication of the bilateral posterior lateral gutters I then introduced a bone graft extender (Allograft bone).  Today we used a total of 3 boxes of 1 x 5 Magnifuse.  3 bags of allograft were placed on each side of the spine which spanned from L2-5.  This completed our bony posterior fusion.  It should be noted however that later in the case we did come back to further backfilled the gutters with local autograft bone which had been collected later in the decompression and our procedure.  This completed posterior lateral fusion at the intended levels of fusion.    SCREW PLACEMENT:  I then moved onto pedicle screw instrumentation.  Using a Stealth guided Power-Ease drill we placed our pedicle screws.  Screws were placed in the bilateral pedicles of the intended levels of fusion.  All screws were placed without known complication and had a good endpoint.    LAMINECTOMIES  We then moved onto our decompression process.  We performed central laminectomies at the intended levels of fusion with a combination of instruments including a Leksell rongeur, high-speed bur and Kerrison rongeurs.  The high-speed bur was used to thin down the lamina to finally expose the ligamentum flavum quite widely.  Aggressive subtotal facetectomies were performed at L2-3, L3-4, L4-5 and were necessary to fully decompress the underlying neurologic elements due to the chronic dysplastic changes through the facet joints related to chronic facet joint osteoarthritis.  The ligamentum flavum was then removed to fully decompress the neurologic elements.  Foraminotomies were also completed at the levels of intended  fusion.  During the process of high-speed drilling and decompression otherwise, we did harvest all of the local autograft bone and used later in the fusion process both in the posterior lateral gutters and the disc space.    TRANSFORAMINAL LUMBAR INTERBODY FUSION (TLIF)  Transforaminal lumbar interbody fusion was performed at L2-3, L3-4 and L4-5.  This was performed by exposing the nerve roots via laminectomies.  With the neurological elements exposed they were then protected via cottonoids and a DeRico retractor.  I then verified our level at the disc base with a passive plantar probe.  Under direct visualization then a box type annulotomy was grade with a 15 blade.  We then went through a sequential process of discectomy by using disc space marli, curettes and pituitary rongeurs.  Discectomy was taken down to the bleeding bony endplates throughout the assessable disc space.  Trialing of implant size was then completed.  With discectomy trial completed we then moved onto placement of our final interbody cage.  The expandable titanium interbody cage was elevated to the proper height with excellent mechanical feedback and friction.  This completed the application of the intervertebral biomechanical device for interbody fusion purposes.  We then backfilled the disc space itself with a combination of autograft and allograft bone.  This completed the interbody fusion process at this level.  The same process was performed at each of the 3 levels.  In total we performed a 3 level TLIF.    COMPLETION OF INSTRUMENTATION:  We completed her instrumentation by placing appropriate length 100mm pre-bent 5.5 mm titanium rods through the pedicle tulips.  Rods were bent to allow for an in-situ placement rather than any form of reduction based on bone quality.  End caps were then applied at each level and final tightened.  X-rays were then obtained in both AP and lateral projection and saved.    CLOSURE:  The wound was copiously  irrigated and suctioned.  We then took some time to investigate the entire surgical bed.  We did not identify any apparent material throughout the surgical bed.  None of the bone graft material had spilled over into the thecal sac.  The nerve roots were fully decompressed through the central canal, lateral recesses and bilateral foramen.  This completed our procedure and inspection.  Counts are correct at this point time.  Retractors were removed.  No significant ongoing bleeding in the soft tissues.  2 g of vancomycin powder were added to the wound for empirical antibiotic purposes due to the extensive incision as well as the lengthy surgery.  Furthermore we did place a medium Hemovac drain deep to the lumbar fascia taken out a separate focal and tied to the skin with a nylon stitch to prevent backout.  The wound was then closed closed in layers including the lumbar fascia, subcutaneous tissue and skin.  The wound was cleaned and dried.  Dermabond is applied.  Sterile dressing were applied.  Drapes were taken down. Patient was then rolled back into a supine position.  They were eventually successfully extubated and brought to the PACU in stable condition.    Sonido Crawford PA-C was present through the entirety of the procedure.  He was present from start to finish and absolutely necessary.  He participated in all aspects of the procedure today including positioning, exposure, suctioning, retracting, working with the instrumentation, protecting the neurologic elements, bone grafting, wound closure and final repositioning.  All goals were accomplished.  We did not a experience any complications.  Counts were correct prior to closure.    Wilmer Shields MD

## 2023-10-25 ENCOUNTER — APPOINTMENT (OUTPATIENT)
Dept: PHYSICAL THERAPY | Facility: CLINIC | Age: 78
DRG: 454 | End: 2023-10-25
Attending: ORTHOPAEDIC SURGERY
Payer: MEDICARE

## 2023-10-25 ENCOUNTER — APPOINTMENT (OUTPATIENT)
Dept: OCCUPATIONAL THERAPY | Facility: CLINIC | Age: 78
DRG: 454 | End: 2023-10-25
Attending: ORTHOPAEDIC SURGERY
Payer: MEDICARE

## 2023-10-25 LAB
ANION GAP SERPL CALCULATED.3IONS-SCNC: 9 MMOL/L (ref 7–15)
BUN SERPL-MCNC: 13.4 MG/DL (ref 8–23)
CALCIUM SERPL-MCNC: 8.6 MG/DL (ref 8.8–10.2)
CHLORIDE SERPL-SCNC: 100 MMOL/L (ref 98–107)
CREAT SERPL-MCNC: 0.72 MG/DL (ref 0.67–1.17)
DEPRECATED HCO3 PLAS-SCNC: 25 MMOL/L (ref 22–29)
EGFRCR SERPLBLD CKD-EPI 2021: >90 ML/MIN/1.73M2
ERYTHROCYTE [DISTWIDTH] IN BLOOD BY AUTOMATED COUNT: 17.2 % (ref 10–15)
GLUCOSE BLDC GLUCOMTR-MCNC: 158 MG/DL (ref 70–99)
GLUCOSE BLDC GLUCOMTR-MCNC: 207 MG/DL (ref 70–99)
GLUCOSE BLDC GLUCOMTR-MCNC: 238 MG/DL (ref 70–99)
GLUCOSE SERPL-MCNC: 199 MG/DL (ref 70–99)
GLUCOSE SERPL-MCNC: 199 MG/DL (ref 70–99)
HCT VFR BLD AUTO: 32.9 % (ref 40–53)
HGB BLD-MCNC: 10.2 G/DL (ref 13.3–17.7)
MCH RBC QN AUTO: 23.1 PG (ref 26.5–33)
MCHC RBC AUTO-ENTMCNC: 31 G/DL (ref 31.5–36.5)
MCV RBC AUTO: 75 FL (ref 78–100)
PLATELET # BLD AUTO: 190 10E3/UL (ref 150–450)
POTASSIUM SERPL-SCNC: 4.5 MMOL/L (ref 3.4–5.3)
RBC # BLD AUTO: 4.41 10E6/UL (ref 4.4–5.9)
SODIUM SERPL-SCNC: 134 MMOL/L (ref 135–145)
WBC # BLD AUTO: 11 10E3/UL (ref 4–11)

## 2023-10-25 PROCEDURE — 250N000012 HC RX MED GY IP 250 OP 636 PS 637: Performed by: PHYSICIAN ASSISTANT

## 2023-10-25 PROCEDURE — 99222 1ST HOSP IP/OBS MODERATE 55: CPT | Performed by: PHYSICIAN ASSISTANT

## 2023-10-25 PROCEDURE — 80048 BASIC METABOLIC PNL TOTAL CA: CPT | Performed by: NURSE PRACTITIONER

## 2023-10-25 PROCEDURE — 250N000011 HC RX IP 250 OP 636: Mod: JZ | Performed by: PHYSICIAN ASSISTANT

## 2023-10-25 PROCEDURE — 97535 SELF CARE MNGMENT TRAINING: CPT | Mod: GO | Performed by: REHABILITATION PRACTITIONER

## 2023-10-25 PROCEDURE — 250N000013 HC RX MED GY IP 250 OP 250 PS 637: Performed by: NURSE PRACTITIONER

## 2023-10-25 PROCEDURE — 99207 PR NO CHARGE LOS: CPT | Performed by: INTERNAL MEDICINE

## 2023-10-25 PROCEDURE — 97530 THERAPEUTIC ACTIVITIES: CPT | Mod: GP

## 2023-10-25 PROCEDURE — 97161 PT EVAL LOW COMPLEX 20 MIN: CPT | Mod: GP

## 2023-10-25 PROCEDURE — 120N000001 HC R&B MED SURG/OB

## 2023-10-25 PROCEDURE — 85014 HEMATOCRIT: CPT | Performed by: NURSE PRACTITIONER

## 2023-10-25 PROCEDURE — 36415 COLL VENOUS BLD VENIPUNCTURE: CPT | Performed by: NURSE PRACTITIONER

## 2023-10-25 PROCEDURE — 97116 GAIT TRAINING THERAPY: CPT | Mod: GP

## 2023-10-25 PROCEDURE — 97165 OT EVAL LOW COMPLEX 30 MIN: CPT | Mod: GO | Performed by: REHABILITATION PRACTITIONER

## 2023-10-25 PROCEDURE — 250N000013 HC RX MED GY IP 250 OP 250 PS 637: Performed by: PHYSICIAN ASSISTANT

## 2023-10-25 PROCEDURE — 82947 ASSAY GLUCOSE BLOOD QUANT: CPT | Performed by: ORTHOPAEDIC SURGERY

## 2023-10-25 RX ORDER — LISINOPRIL 20 MG/1
20 TABLET ORAL DAILY
Status: DISCONTINUED | OUTPATIENT
Start: 2023-10-26 | End: 2023-10-27 | Stop reason: HOSPADM

## 2023-10-25 RX ORDER — AMLODIPINE BESYLATE 10 MG/1
10 TABLET ORAL DAILY
Status: DISCONTINUED | OUTPATIENT
Start: 2023-10-26 | End: 2023-10-27 | Stop reason: HOSPADM

## 2023-10-25 RX ORDER — NICOTINE POLACRILEX 4 MG
15-30 LOZENGE BUCCAL
Status: DISCONTINUED | OUTPATIENT
Start: 2023-10-25 | End: 2023-10-27 | Stop reason: HOSPADM

## 2023-10-25 RX ORDER — DEXTROSE MONOHYDRATE 25 G/50ML
25-50 INJECTION, SOLUTION INTRAVENOUS
Status: DISCONTINUED | OUTPATIENT
Start: 2023-10-25 | End: 2023-10-27 | Stop reason: HOSPADM

## 2023-10-25 RX ADMIN — HYDROXYZINE HYDROCHLORIDE 10 MG: 10 TABLET ORAL at 19:48

## 2023-10-25 RX ADMIN — METFORMIN HYDROCHLORIDE 1000 MG: 500 TABLET ORAL at 16:09

## 2023-10-25 RX ADMIN — HYDROXYZINE HYDROCHLORIDE 10 MG: 10 TABLET ORAL at 02:53

## 2023-10-25 RX ADMIN — INSULIN ASPART 1 UNITS: 100 INJECTION, SOLUTION INTRAVENOUS; SUBCUTANEOUS at 17:01

## 2023-10-25 RX ADMIN — ATENOLOL 25 MG: 25 TABLET ORAL at 09:20

## 2023-10-25 RX ADMIN — Medication 1 TABLET: at 12:08

## 2023-10-25 RX ADMIN — Medication 25 MCG: at 19:39

## 2023-10-25 RX ADMIN — Medication 1 TABLET: at 07:02

## 2023-10-25 RX ADMIN — INSULIN ASPART 1 UNITS: 100 INJECTION, SOLUTION INTRAVENOUS; SUBCUTANEOUS at 13:14

## 2023-10-25 RX ADMIN — ATORVASTATIN CALCIUM 40 MG: 40 TABLET, FILM COATED ORAL at 09:20

## 2023-10-25 RX ADMIN — Medication 1 TABLET: at 16:08

## 2023-10-25 RX ADMIN — SENNOSIDES AND DOCUSATE SODIUM 1 TABLET: 8.6; 5 TABLET ORAL at 09:20

## 2023-10-25 RX ADMIN — CARBIDOPA AND LEVODOPA 2 TABLET: 25; 100 TABLET ORAL at 12:08

## 2023-10-25 RX ADMIN — METHOCARBAMOL 500 MG: 500 TABLET ORAL at 05:16

## 2023-10-25 RX ADMIN — ACETAMINOPHEN 975 MG: 325 TABLET, FILM COATED ORAL at 19:38

## 2023-10-25 RX ADMIN — CEFAZOLIN SODIUM 2 G: 2 INJECTION, SOLUTION INTRAVENOUS at 06:20

## 2023-10-25 RX ADMIN — Medication 25 MCG: at 09:20

## 2023-10-25 RX ADMIN — METHOCARBAMOL 500 MG: 500 TABLET ORAL at 23:50

## 2023-10-25 RX ADMIN — CARBIDOPA AND LEVODOPA 2 TABLET: 25; 100 TABLET ORAL at 07:02

## 2023-10-25 RX ADMIN — CARBIDOPA AND LEVODOPA 2 TABLET: 25; 100 TABLET ORAL at 16:08

## 2023-10-25 RX ADMIN — ACETAMINOPHEN 975 MG: 325 TABLET, FILM COATED ORAL at 12:08

## 2023-10-25 RX ADMIN — OXYCODONE HYDROCHLORIDE 5 MG: 5 TABLET ORAL at 05:16

## 2023-10-25 RX ADMIN — FINASTERIDE 5 MG: 5 TABLET, FILM COATED ORAL at 09:20

## 2023-10-25 RX ADMIN — SENNOSIDES AND DOCUSATE SODIUM 1 TABLET: 8.6; 5 TABLET ORAL at 19:39

## 2023-10-25 RX ADMIN — METHOCARBAMOL 500 MG: 500 TABLET ORAL at 17:00

## 2023-10-25 RX ADMIN — ACETAMINOPHEN 975 MG: 325 TABLET, FILM COATED ORAL at 02:53

## 2023-10-25 ASSESSMENT — ACTIVITIES OF DAILY LIVING (ADL)
ADLS_ACUITY_SCORE: 21
ADLS_ACUITY_SCORE: 28
ADLS_ACUITY_SCORE: 28
ADLS_ACUITY_SCORE: 21
ADLS_ACUITY_SCORE: 28
ADLS_ACUITY_SCORE: 28

## 2023-10-25 NOTE — PLAN OF CARE
Pt arrived to unit at 1850, transferred via hover mat. VSS. On 1L NC with capno. Drowsy but comfortably resting in bed. Hemovac and thornton in place.

## 2023-10-25 NOTE — CONSULTS
[facility]  Consult Note - Hospitalist Service  Date of Admission:  10/24/2023  Consult Requested by: Sonido Crawford PA-C  Reason for Consult: Postoperative medical management    Assessment & Plan   Kyler Rodriguez is a 78 year old male with PMH significant for HLD, parkinsonism, HTN, DM2, prostate cancer s/p radiation (2011), and DDD admitted on 10/24/2023. He s/p L2-L3 TILF, central laminectomy of L1-L5.    #S/p L2-L3 TILF, central laminectomy of L1-L5: no complications and tolerate procedure. Pain currently controlled on current regimen.   -pain control, DVT prophylaxis, therapy, and discharge planning per primary team    #HTN: BP stable  -PTA Atenolol ordered  -will restart Amlodipine-Benazepril on 10/26 with parameters    #DM2: most recent HgbA1c of 6.7 on 9/21/2023, BG in 130-199 range  -blood glucose checks TID AC  -continue PTA Metformin  -low sliding scale insulin available     #Parkinsonism: tremor noted on exam  -continue PTA Sinemet     #HLD: resume PTA Atorvastatin     #H/o prostate cancer s/p radiation  -continue PTA Finasteride      The patient's care was discussed with the Attending Physician, Dr. Shannon, Patient, and Patient's Family.    Clinically Significant Risk Factors                  # Hypertension: Noted on problem list       # DMII: A1C = 6.7 % (Ref range: 0.0 - 5.6 %) within past 6 months, PRESENT ON ADMISSION  # Overweight: Estimated body mass index is 25.12 kg/m  as calculated from the following:    Height as of 10/17/23: 1.829 m (6').    Weight as of this encounter: 84 kg (185 lb 3 oz)., PRESENT ON ADMISSION            Elsa Membreno PA-C  Hospitalist Service  Securely message with [employer] (more info)  Text page via [facility] Paging/Directory   ______________________________________________________________________    Chief Complaint   S/p L2-L3 TILF, central laminectomy of L1-L5    History is obtained from the patient, patient's wife at bedside during interview, and  chart review.     History of Present Illness   Kyler Rodriguez is a 78 year old male who s/p L2-L3 TILF, central laminectomy of L1-L5.  Patient reports acute pain after surgery but this has improved overnight and is much more tolerable this morning.  Denies any nausea, vomiting, chest pain, shortness of breath, or abdominal pain.  He had his catheter just removed about an hour ago and is due to void.  Passing flatus but has not a bowel movement.  Denies any recent illness prior to surgery.    Results reviewed over the last 24 hours, VSS, Hgb stable at 10.2, and net I&Os of +865 mL.     Past Medical History    Past Medical History:   Diagnosis Date    Elevated prostate specific antigen (PSA)     Essential hypertension, benign     Malignant neoplasm (H) 10/2011    prostate cancer    Malignant neoplasm of right kidney (H)     Mumps     Other and unspecified hyperlipidemia     Parkinsons disease     Prostate infection     Spider veins     Type II or unspecified type diabetes mellitus without mention of complication, not stated as uncontrolled      Past Surgical History   Past Surgical History:   Procedure Laterality Date    BIOPSY      COLONOSCOPY  2009 & 2014    [facility]    COLONOSCOPY  04/08/2019    Dr. Pandey [facility]    CYSTOSCOPY      DAVINCI NEPHRECTOMY PARTIAL Right 09/04/2019    Procedure: ROBOTIC-ASSISTED RIGHT PARTIAL NEPHRECTOMY WITH ULTRASOUND;  Surgeon: Binu Yoon MD;  Location:  OR    EYE SURGERY      cataract left eye    HC REMOVAL OF TONSILS,<12 Y/O      HC VASECTOMY UNILAT/BILAT W POSTOP SEMEN      PHACOEMULSIFICATION CLEAR CORNEA W/ STANDARD IOL IMPLANT, ENDOSCOPIC CYCLOPHOTOCOAGULATION, COMBINED  07/31/2014    Procedure: COMBINED PHACOEMULSIFICATION CLEAR CORNEA WITH STANDARD INTRAOCULAR LENS IMPLANT, ENDOSCOPIC CYCLOPHOTOCOAGULATION;  Surgeon: Leroy Crews MD;  Location:  [facility]    VASECTOMY      [facility] NONSPECIFIC PROCEDURE  1998    varicose vein stripping    [facility] NONSPECIFIC PROCEDURE  1998     colonoscopy     Medications   Medications Prior to Admission   Medication Sig Dispense Refill Last Dose    amLODIPine-benazepril (LOTREL) 10-20 MG capsule TAKE 1 CAPSULE EVERY DAY 90 capsule 0 10/23/2023    atenolol (TENORMIN) 25 MG tablet TAKE 1 TABLET EVERY DAY 90 tablet 2 10/23/2023    Calcium Carb-Cholecalciferol (CALTRATE 600+D3 SOFT) 600-800 MG-UNIT CHEW Take by mouth every 24 hours   10/17/2023    carbidopa-levodopa (SINEMET)  MG per tablet Take 2 tablets by mouth 3 times daily Take at 7 am, 11 am and 3 pm   10/23/2023    finasteride (PROSCAR) 5 MG tablet TAKE 1 TABLET EVERY DAY 90 tablet 2 10/23/2023    metFORMIN (GLUCOPHAGE) 1000 MG tablet TAKE 1 TABLET EVERY DAY WITH DINNER 90 tablet 1 10/23/2023    multivitamin, therapeutic (THERA-VIT) TABS tablet Take 1 tablet by mouth every morning   10/17/2023          Review of Systems    The 10 point Review of Systems is negative other than noted in the HPI.    Social History   I have reviewed this patient's social history and updated it with pertinent information if needed.  Social History     Tobacco Use    Smoking status: Never    Smokeless tobacco: Never   Vaping Use    Vaping Use: Never used   Substance Use Topics    Alcohol use: Not Currently     Comment: 1-2 drinks/daily    Drug use: No     Family History   I have reviewed this patient's family history and updated it with pertinent information if needed.  Family History   Problem Relation Age of Onset    Cancer - colorectal Father         [email or web address]    Prostate Cancer Father     Colon Cancer Father     Prostate Cancer Paternal Grandfather     Musculoskeletal Disorder Brother         fibermyalgia        Physical Exam   Vital Signs: Temp: 96.8  F (36  C) Temp src: Temporal BP: 135/71 Pulse: 71   Resp: 16 SpO2: 97 % O2 Device: None (Room air) Oxygen Delivery: 1 LPM  Weight: 185 lbs 2.98 oz    General Appearance: sitting up in bed, pleasant, interactive, NAD  Respiratory: CTAB without wheezing or  rales  Cardiovascular: RRR without murmur or rub  GI: soft, nontender, nondistended, normoactive bowel sounds   Skin: warm, dry, no lesions in visualized areas  Musculoskeletal: strength grossly intact, back - dressing clean/dry/intact with drain in place   Neurologic: sensation grossly intact, resting tremor noted of bilateral hands  Psychiatric: mildly masked facies     Medical Decision Making       45 MINUTES SPENT BY ME on the date of service doing chart review, history, exam, documentation & further activities per the note.      Data   ------------------------- PAST 24 HR DATA REVIEWED -----------------------------------------------

## 2023-10-25 NOTE — PROGRESS NOTES
10/25/23 1039   Appointment Info   Signing Clinician's Name / Credentials (OT) Samara Thomas, OTR/L   Living Environment   People in Home spouse   Current Living Arrangements house  ([facility])   Home Accessibility stairs to enter home   Number of Stairs, Main Entrance 2   Transportation Anticipated family or friend will provide   Living Environment Comments Pt reports 2 HUDSON then all needs met. Walk in shower with chair and grab bars, elevated toilet.   Self-Care   Equipment Currently Used at Home grab bar, tub/shower;raised toilet seat;transfer board;walker, gumaro  (sockaid)   Activity/Exercise/Self-Care Comment Pt reports IND at baseline. Spouse assists at times with compression socks. Wife does cooking/cleaning. Pt reports owning FWW but was not using. Minimal mobility and elaning on furniture prior to surgery d/t pain   Instrumental Activities of Daily Living (IADL)   IADL Comments spouse  to complete   General Information   Onset of Illness/Injury or Date of Surgery 10/24/23   Referring Physician Sonido Crawford PA-C   Patient/Family Therapy Goal Statement (OT) to return home at discharge   Additional Occupational Profile Info/Pertinent History of Current Problem patient is POD #1 for L2-5 TLIF, h/o DM and parkinsons   Existing Precautions/Restrictions spinal;brace worn when out of bed   Cognitive Status Examination   Orientation Status orientation to person, place and time   Visual Perception   Visual Impairment/Limitations corrective lenses for reading   Sensory   Sensory Quick Adds sensation intact   Posture   Posture not impaired   Range of Motion Comprehensive   General Range of Motion bilateral upper extremity ROM WFL   Strength Comprehensive (MMT)   General Manual Muscle Testing (MMT) Assessment no strength deficits identified  (in B UE)   Muscle Tone Assessment   Muscle Tone Quick Adds No deficits were identified   Coordination   Upper Extremity Coordination No deficits were identified    Coordination Comments has tremors from Parkinsons   Bed Mobility   Comment (Bed Mobility) min A semi sit to sit at EOB   Transfers   Transfers shower transfer   Transfer Comments CGA, fww sit<>stand   Shower Transfer   [place] Level (Shower Transfer) minimum assist (75% patient effort)   Balance   Balance Comments LOB was note noted; general unsteadiness to be expected   Activities of Daily Living   BADL Assessment/Intervention lower body dressing;toileting   Lower Body Dressing Assessment/Training   [place] Level (Lower Body Dressing) moderate assist (50% patient effort)   Toileting   [place] Level (Toileting) moderate assist (50% patient effort)   Clinical Impression   Criteria for Skilled Therapeutic Interventions Met (OT) Yes, treatment indicated   OT Diagnosis decreased ADl/IADls   OT Problem List-Impairments impacting ADL activity tolerance impaired;balance;coordination;range of motion (ROM);strength;pain;post-surgical precautions   Assessment of Occupational Performance 5 or more Performance Deficits   Identified Performance Deficits dsg, toileting, bathing, functional/community mobility, work adn household duties   Planned Therapy Interventions (OT) ADL retraining;progressive activity/exercise;transfer training   Clinical Decision Making Complexity (OT) problem focused assessment/low complexity   Risk & Benefits of therapy have been explained evaluation/treatment results reviewed;care plan/treatment goals reviewed;risks/benefits reviewed;current/potential barriers reviewed;participants included;patient;spouse/significant other   OT Total Evaluation Time   OT Eval, Low Complexity Minutes (57182) 8   OT Goals   Therapy Frequency (OT) Daily   OT Predicted Duration/Target Date for Goal Attainment 10/27/23   OT Goals Upper Body Dressing;Lower Body Dressing;Transfers;Toilet Transfer/Toileting;OT Goal 1   OT: Upper Body Dressing Minimal assist;within precautions;including orthotic   OT: Lower Body  Dressing Minimal assist;using adaptive equipment;within precautions   OT: Transfer Supervision/stand-by assist;with assistive device;within precautions;Goal Met  (walk in shower)   OT: Toilet Transfer/Toileting Supervision/stand-by assist;toilet transfer;using adaptive equipment;within precautions;Goal Met   OT: Goal 1 Patient will verbalize at least 2-3 adaptations to ADLs routines at home to accommodate energy level and safety needs.- goal met   OT Discharge Planning   OT Plan TB dressing, review AE needs   OT Discharge Recommendation (DC Rec) home with assist   OT Rationale for DC Rec Anticipate patient will meet needed goals for safe discharge home with family to A as needed with IADL's; household chores, driving, errands, cooking and bathing. Recommended AE; toilet safety frame.   OT Brief overview of current status CGA to SBA, fww sit<>stand, functional mobility and shower and toilet transfers, min A to bed mobility   Total Session Time   Total Session Time (sum of timed and untimed services) 8

## 2023-10-25 NOTE — PLAN OF CARE
Goal Outcome Evaluation:      Plan of Care Reviewed With: patient    Overall Patient Progress: improvingOverall Patient Progress: improving    Patient is A&Ox4 VSS on RA. PIV SL. Pain managed with scheduled tylenol and PRN oxycodone. Tolerating diet well. Started on ACHS BG checks and sliding scale insulin today. BG was 238. Up assist x1 or 2 + walker, BG and back brace. Up with therapy today, tolerated well. Cantu discontinued this morning, still DTV, bladder scan at 1145 was 234. CMS and dressing intact, hemovac in place, possible discharge tomorrow.

## 2023-10-25 NOTE — PLAN OF CARE
Goal Outcome Evaluation:      Plan of Care Reviewed With: patient    Overall Patient Progress: improvingOverall Patient Progress: improving     Patient vital signs are at baseline: Yes  Patient able to ambulate as they were prior to admission or with assist devices provided by therapies during their stay:  No,  Reason:  Assist x2 with walker, gait belt, and back brace.  Patient MUST void prior to discharge:  No,  Reason:  Cantu removed this AM, due to void.  Patient able to tolerate oral intake:  Yes  Pain has adequate pain control using Oral analgesics:  Yes  Does patient have an identified :  Yes  Has goal D/C date and time been discussed with patient:  No,  Reason:  Plan is to work with therapy this AM.     Pt A/O x4. VSS. PIV SL. Pain managed with scheduled tylenol and PRN Oxycodone, atarax, and robaxin. Assist x2w ith walker and gait belt, back brace on when OOB. Cantu removed this AM, due to void. Tolerating a regular diet well. CMS intact Dressing CDI. Hemovac had 95 ml out over shift. Plan is to work with therapy this AM.

## 2023-10-25 NOTE — PROVIDER NOTIFICATION
2113 Provider notified that Pt needs home medications resumed also pt has T2DM with no blood sugar check orders.

## 2023-10-25 NOTE — PROGRESS NOTES
10/25/23 1003   Appointment Info   Signing Clinician's Name / Credentials (PT) Ailyn Hilliard DPT   Rehab Comments (PT) spinal, brace OOB   Living Environment   People in Home spouse   [facility]   Home Accessibility stairs to enter home   Number of Stairs, Main Entrance 2   Stair Railings, Main Entrance railing on left side (ascending)   Transportation Anticipated family or friend will provide   Living Environment Comments Pt reports 2 HUDSON then all needs met. Walk in shower with chair and grab bars.   Self-Care   Usual Activity Tolerance good   Current Activity Tolerance moderate   Regular Exercise No   Equipment Currently Used at Home none   Fall history within last six months no   Activity/Exercise/Self-Care Comment Pt reports IND at baseline. Spouse assists at times with compression socks. Wife does cooking/cleaning. Pt reports owning FWW but was not using. Minimal mobility and elaning on furniture prior to surgery d/t pain.   General Information   Onset of Illness/Injury or Date of Surgery 10/24/23   Referring Physician Sonido Crawford PA-C   Patient/Family Therapy Goals Statement (PT) return home   Pertinent History of Current Problem (include personal factors and/or comorbidities that impact the POC) Pt is 77 yo male who underwent L2-3 transforaminal lumbar interbody fusion, TLIF  L3-4 TLIF  L4-5 TLIF on 10/24/2023.   Existing Precautions/Restrictions fall;brace worn when out of bed;spinal   Cognition   Affect/Mental Status (Cognition) WFL   Orientation Status (Cognition) oriented x 4   Follows Commands (Cognition) WFL   Pain Assessment   Patient Currently in Pain Yes, see Vital Sign flowsheet   Integumentary/Edema   Integumentary/Edema Comments spinal dressing and drain intact   Posture    Posture Forward head position;Protracted shoulders   Range of Motion (ROM)   Range of Motion ROM deficits secondary to surgical procedure   Strength (Manual  Muscle Testing)   Strength (Manual Muscle Testing) Deficits observed during functional mobility   Bed Mobility   Comment, (Bed Mobility) supine<>sit with mod A, bedrail and HOB elevated   Transfers   Comment, (Transfers) sit<>stand with min A and FWW   Gait/Stairs (Locomotion)   Comment, (Gait/Stairs) amb with FWW and CGA, slow gait, heavy UE reliance, flexed posture   Balance   Balance Comments impaired; needing FWW and CGA   Sensory Examination   Sensory Perception patient reports no sensory changes   Clinical Impression   Criteria for Skilled Therapeutic Intervention Yes, treatment indicated   PT Diagnosis (PT) impaired functional mobility   Influenced by the following impairments weakness, pain, post op status, spinal precautions   Functional limitations due to impairments difficulty with bed mobility, transfers, ambulation, stairs   Clinical Presentation (PT Evaluation Complexity) stable   Clinical Presentation Rationale clinical judgement   Clinical Decision Making (Complexity) low complexity   Planned Therapy Interventions (PT) balance training;bed mobility training;gait training;home exercise program;neuromuscular re-education;patient/family education;ROM (range of motion);stair training;strengthening;transfer training;progressive activity/exercise;risk factor education;home program guidelines   Risk & Benefits of therapy have been explained evaluation/treatment results reviewed;care plan/treatment goals reviewed;risks/benefits reviewed;current/potential barriers reviewed;participants voiced agreement with care plan;participants included;patient;spouse/significant other   PT Total Evaluation Time   PT Eval, Low Complexity Minutes (19980) 10   Physical Therapy Goals   PT Frequency 2x/day   PT Predicted Duration/Target Date for Goal Attainment 10/28/23   PT Goals Bed Mobility;Transfers;Gait;Stairs   PT: Bed Mobility Supervision/stand-by assist;Supine to/from sit;Within precautions   PT: Transfers  Supervision/stand-by assist;Sit to/from stand;Assistive device;Within precautions   PT: Gait Supervision/stand-by assist;Assistive device;Within precautions;Greater than 200 feet   PT: Stairs Supervision/stand-by assist;2 stairs;Assistive device;Rail on left   PT Discharge Planning   PT Plan PT: review precautions, IND bed mob no rail, repeated STS from various heights, progress gait, trial stairs   PT Discharge Recommendation (DC Rec) home with assist   PT Rationale for DC Rec Patient is beloow baseline functional mobility. Pt lives with spouse, 2 HUDSON then all needs met, reprots IND at baseline. Currently needing A x 1 for basic mobiltiy. Anticiapte with continued skilled IP PT that pt will meet IP PT goals and be able to d/c home with assist of spouse as needed.   PT Brief overview of current status A x 1 FWW   PT Equipment Needed at Discharge   (owns FWW)   Total Session Time   Total Session Time (sum of timed and untimed services) 10

## 2023-10-26 ENCOUNTER — APPOINTMENT (OUTPATIENT)
Dept: PHYSICAL THERAPY | Facility: CLINIC | Age: 78
DRG: 454 | End: 2023-10-26
Attending: ORTHOPAEDIC SURGERY
Payer: MEDICARE

## 2023-10-26 ENCOUNTER — APPOINTMENT (OUTPATIENT)
Dept: OCCUPATIONAL THERAPY | Facility: CLINIC | Age: 78
DRG: 454 | End: 2023-10-26
Attending: ORTHOPAEDIC SURGERY
Payer: MEDICARE

## 2023-10-26 LAB
GLUCOSE BLDC GLUCOMTR-MCNC: 156 MG/DL (ref 70–99)
GLUCOSE BLDC GLUCOMTR-MCNC: 157 MG/DL (ref 70–99)
GLUCOSE BLDC GLUCOMTR-MCNC: 159 MG/DL (ref 70–99)
GLUCOSE BLDC GLUCOMTR-MCNC: 161 MG/DL (ref 70–99)
GLUCOSE BLDC GLUCOMTR-MCNC: 167 MG/DL (ref 70–99)

## 2023-10-26 PROCEDURE — 250N000013 HC RX MED GY IP 250 OP 250 PS 637: Performed by: PHYSICIAN ASSISTANT

## 2023-10-26 PROCEDURE — 120N000001 HC R&B MED SURG/OB

## 2023-10-26 PROCEDURE — 97116 GAIT TRAINING THERAPY: CPT | Mod: GP

## 2023-10-26 PROCEDURE — 250N000013 HC RX MED GY IP 250 OP 250 PS 637: Performed by: INTERNAL MEDICINE

## 2023-10-26 PROCEDURE — 99232 SBSQ HOSP IP/OBS MODERATE 35: CPT | Performed by: INTERNAL MEDICINE

## 2023-10-26 PROCEDURE — 97530 THERAPEUTIC ACTIVITIES: CPT | Mod: GP

## 2023-10-26 PROCEDURE — 97535 SELF CARE MNGMENT TRAINING: CPT | Mod: GO | Performed by: REHABILITATION PRACTITIONER

## 2023-10-26 PROCEDURE — 250N000013 HC RX MED GY IP 250 OP 250 PS 637: Performed by: NURSE PRACTITIONER

## 2023-10-26 RX ORDER — ACETAMINOPHEN 325 MG/1
975 TABLET ORAL 4 TIMES DAILY
Status: DISCONTINUED | OUTPATIENT
Start: 2023-10-26 | End: 2023-10-27 | Stop reason: HOSPADM

## 2023-10-26 RX ADMIN — METHOCARBAMOL 500 MG: 500 TABLET ORAL at 17:18

## 2023-10-26 RX ADMIN — METHOCARBAMOL 500 MG: 500 TABLET ORAL at 11:03

## 2023-10-26 RX ADMIN — SENNOSIDES AND DOCUSATE SODIUM 1 TABLET: 8.6; 5 TABLET ORAL at 20:04

## 2023-10-26 RX ADMIN — ACETAMINOPHEN 975 MG: 325 TABLET, FILM COATED ORAL at 12:30

## 2023-10-26 RX ADMIN — SENNOSIDES AND DOCUSATE SODIUM 1 TABLET: 8.6; 5 TABLET ORAL at 08:09

## 2023-10-26 RX ADMIN — INSULIN ASPART 1 UNITS: 100 INJECTION, SOLUTION INTRAVENOUS; SUBCUTANEOUS at 10:49

## 2023-10-26 RX ADMIN — INSULIN ASPART 1 UNITS: 100 INJECTION, SOLUTION INTRAVENOUS; SUBCUTANEOUS at 12:36

## 2023-10-26 RX ADMIN — CARBIDOPA AND LEVODOPA 2 TABLET: 25; 100 TABLET ORAL at 07:45

## 2023-10-26 RX ADMIN — METFORMIN HYDROCHLORIDE 1000 MG: 500 TABLET ORAL at 17:18

## 2023-10-26 RX ADMIN — ATENOLOL 25 MG: 25 TABLET ORAL at 08:09

## 2023-10-26 RX ADMIN — OXYCODONE HYDROCHLORIDE 5 MG: 5 TABLET ORAL at 20:04

## 2023-10-26 RX ADMIN — Medication 1 TABLET: at 12:30

## 2023-10-26 RX ADMIN — FINASTERIDE 5 MG: 5 TABLET, FILM COATED ORAL at 08:09

## 2023-10-26 RX ADMIN — OXYCODONE HYDROCHLORIDE 5 MG: 5 TABLET ORAL at 12:30

## 2023-10-26 RX ADMIN — HYDROXYZINE HYDROCHLORIDE 10 MG: 10 TABLET ORAL at 13:56

## 2023-10-26 RX ADMIN — CARBIDOPA AND LEVODOPA 2 TABLET: 25; 100 TABLET ORAL at 12:30

## 2023-10-26 RX ADMIN — Medication 25 MCG: at 20:04

## 2023-10-26 RX ADMIN — ACETAMINOPHEN 975 MG: 325 TABLET, FILM COATED ORAL at 02:47

## 2023-10-26 RX ADMIN — AMLODIPINE BESYLATE 10 MG: 10 TABLET ORAL at 08:08

## 2023-10-26 RX ADMIN — LISINOPRIL 20 MG: 20 TABLET ORAL at 08:09

## 2023-10-26 RX ADMIN — OXYCODONE HYDROCHLORIDE 5 MG: 5 TABLET ORAL at 08:11

## 2023-10-26 RX ADMIN — INSULIN ASPART 1 UNITS: 100 INJECTION, SOLUTION INTRAVENOUS; SUBCUTANEOUS at 17:21

## 2023-10-26 RX ADMIN — ACETAMINOPHEN 975 MG: 325 TABLET, FILM COATED ORAL at 20:03

## 2023-10-26 RX ADMIN — ACETAMINOPHEN 975 MG: 325 TABLET, FILM COATED ORAL at 08:08

## 2023-10-26 RX ADMIN — Medication 25 MCG: at 08:09

## 2023-10-26 RX ADMIN — Medication 1 TABLET: at 08:08

## 2023-10-26 RX ADMIN — POLYETHYLENE GLYCOL 3350 17 G: 17 POWDER, FOR SOLUTION ORAL at 08:09

## 2023-10-26 RX ADMIN — HYDROXYZINE HYDROCHLORIDE 10 MG: 10 TABLET ORAL at 20:04

## 2023-10-26 RX ADMIN — ATORVASTATIN CALCIUM 40 MG: 40 TABLET, FILM COATED ORAL at 08:09

## 2023-10-26 RX ADMIN — CARBIDOPA AND LEVODOPA 2 TABLET: 25; 100 TABLET ORAL at 15:41

## 2023-10-26 RX ADMIN — HYDROXYZINE HYDROCHLORIDE 10 MG: 10 TABLET ORAL at 02:47

## 2023-10-26 RX ADMIN — Medication 1 TABLET: at 17:19

## 2023-10-26 ASSESSMENT — ACTIVITIES OF DAILY LIVING (ADL)
ADLS_ACUITY_SCORE: 28
DEPENDENT_IADLS:: INDEPENDENT;MEDICATION MANAGEMENT
ADLS_ACUITY_SCORE: 28

## 2023-10-26 NOTE — PROGRESS NOTES
Ortho Rounding Note    S: In bed, more LBP/incisional pain today. Denies n/v/f/c, SOB, CP. No additional ortho concerns.     O:  Vital signs:   Blood pressure 109/51, pulse 66, temperature 97.2  F (36.2  C), temperature source Temporal, resp. rate 20, weight 84 kg (185 lb 3 oz), SpO2 95%.  Estimated body mass index is 25.12 kg/m  as calculated from the following:    Height as of 10/17/23: 1.829 m (6').    Weight as of this encounter: 84 kg (185 lb 3 oz).      Intake/Output Summary (Last 24 hours) at 10/26/2023 1154  Last data filed at 10/26/2023 1000  Gross per 24 hour   Intake 1190 ml   Output 1215 ml   Net -25 ml         Dressings c/d/i  5/5 motor and SPLT in BL UE and LE    A:  POD #2 s/p L1-L5 central laminectomy with L2-L5 lumbar fusion     P:  General: Overall doing well. More incisional pain today, not unexpected. Continue with PT/OT and daily cares. Okay to remove Hemovac.   Pain: PO  Act: up ad jasper, with therapy  DVT: Mech only  ID: routine postop abx to be completed 24 hours after surgery  Dispo: Home vs. TCU at time of discharge. Possible discharge tomorrow pending therapy and TCU placement.     Sonido Crawford PA-C

## 2023-10-26 NOTE — PLAN OF CARE
Goal Outcome Evaluation:  Vital signs stable.  Lungs clear, encouraged inspirometer use.  Pt has tremors to hands.  Transfers with assist of 2, using gait belt, walker and wearing tlso brace.  CMS intact.  Dressing to spine intact, hemovac patent.  Skin dry and flaky.  Pt unable to void, bladder scanned @7.45 pm for 541 mls, straight catheterised for 600 mls of urine.  Pain controlled with tylenol , atarax, robaxin and ice pack application.  Log rolled to turn, reposition.  Take pills whole with apple sauce.  Falls and aspiration risk.    Plan of Care Reviewed With: patient

## 2023-10-26 NOTE — CONSULTS
Care Management Follow Up    Length of Stay (days): 2    Expected Discharge Date: 10/26/2023     Concerns to be Addressed: discharge planning, care coordination/care conferences     Patient plan of care discussed at interdisciplinary rounds: Yes    Anticipated Discharge Disposition: Transitional Care     Anticipated Discharge Services:    Anticipated Discharge DME:      Patient/family educated on Medicare website which has current facility and service quality ratings: yes  Education Provided on the Discharge Plan: no   Patient/Family in Agreement with the Plan: yes    Referrals Placed by CM/SW: Post Acute Facilities  Private pay costs discussed: private room/amenity fees    Additional Information:  Patient admitted for L2-3 TILF central laminectomy of L1-5. He is followed by Ortho and the Hospitalist.  CM met with patient at the bedside.  He lives with his wife in a townhouse, with all one level living. He's independent in all cares, except for medication management. He ambulated without any assistive devices. Transportation via agency or his wife- to be determined.  Spouse arrived and CM discussed PT recommendations of TCU. Reviewed TCU booklet with them both and Medicare.gov website. They have chosen [facility], [facility], [facility] and [facility]. Referrals were sent. Patient prefers a private room.    Eryn Blanco RN, BSN, CM  Inpatient Care Coordination  [facility]  [phone]

## 2023-10-26 NOTE — PROGRESS NOTES
[facility]    Medicine Progress Note - Hospitalist Service    Date of Admission:  10/24/2023    Primary Care Physician   Rakesh Clark      Assessment & Plan   Kyler Rodriguez is a 78 year old male with PMH significant for HLD, parkinsonism, HTN, DM2, prostate cancer s/p radiation (2011), and DDD admitted on 10/24/2023. He s/p L2-L3 TILF, central laminectomy of L1-L5.     S/p L2-L3 TILF, central laminectomy of L1-L5: no complications and tolerate procedure on 10/24/23. Pain currently controlled on current regimen. Will place on a higher schedule tylenol dose.  Continue on opiates as needed.  Physical therapy to see.  Hoping to leave sooner than later but needs to handle going home to his town home with the support of his wife. Discharge timing per surgery.         Hypertension :   BP stable at 133-141/57-61.  Is on Atenolol , Amlodipine-Benazepril at home and was restarted on these here.  Given his age and high falls risk, need to be care about the pharmacy and leading to hypotension, dizziness, and falls.  Patient is on atenolol and this is cleared by the kidneys so if he ever goes into renal failure this could be a problem.  I think his primary care provider can look at his medications and titrate them appropriately.  Would recommend changing his atenolol to Toprol.      Type 2 noninsulin dependent diabetes:   Most recent HgbA1c of 6.7 on 9/21/2023, BG in 130-199 range.  Blood sugars have been ranging 161-238 here.  He is on metformin at home and this was restarted.  Is on an insulin correction scale as well while hospitalized.  Goal needs to be to prevent hypoglycemia to prevent falls going forward.  Goal blood sugar she will less than 200.       Parkinsonism: tremor noted on exam  Continue PTA Sinemet      Hyperlipidemia  He will continue on his home Atorvastatin.  Patient's primary care provider to look at polypharmacy and his risks of falls.  The risk may outweigh the benefit of  Lipitor going forward.  Consider discontinuing this.     H/o prostate cancer s/p radiation/ urinary retention  Continue PTA Finasteride. Had thornton placed 10/26 for 500 ml retained urine.  Will keep thornton in and plan is for him to go to a Tcu at discharge where a voiding trial can be done in two weeks once he is more ambulatory.     Falls risk, high  Patient is at high risk for falls with his ongoing back pain, recent surgery, pain medications, parkinsons, blood pressure medications, diabetes, statin.  Pharmacy will certainly can play a role in this as well.  We need to really focus on preventing hypotension, weakness, and falls going forward.  Need to consider changing and stopping some of his medications.  Patient can follow-up with his primary care provider to talk further about this.  Falling would lead to more morbidity in this patient.         4Ms:  Polypharmacy: Medications reviewed, would consider changing his atenolol to Toprol due to the fact that it is renally cleared, consider stopping his statin altogether as the risk likely outweighs the benefits, need to be worried about his falls risk  Mentation:  SLUMS N/A,  BIMS N/A,  CPT N/A,  Capacity intact to make his own medical decisions  Social support: , [facility], can live on 1 floor if necessary  Mobility: Has a 4 wheeled walker  What is importmant: To be at home with his wife      Discussed plan of care with nursing, social work, case management      Diet: Advance Diet as Tolerated: Regular Diet Adult    DVT Prophylaxis: Defer to primary service  Thornton Catheter: PRESENT, indication: Retention, Anesthesia  Lines: None     Cardiac Monitoring: None    RESTRAINTS: Not indicated  Code Status: Full Code        Follow up plan: Should see his primary care provider as necessary to follow-up his hypertension and diabetes.  Also need ongoing discussions about falls risk and polypharmacy    This document was created using voice  recognition technology.  Please excuse any typographical errors that may have occurred.  Please call with any questions.         Clinically Significant Risk Factors                  # Hypertension: Noted on problem list       # DMII: A1C = 6.7 % (Ref range: 0.0 - 5.6 %) within past 6 months, PRESENT ON ADMISSION  # Overweight: Estimated body mass index is 25.12 kg/m  as calculated from the following:    Height as of 10/17/23: 1.829 m (6').    Weight as of this encounter: 84 kg (185 lb 3 oz)., PRESENT ON ADMISSION            Disposition Plan I have filled out the patient's medical medications for discharge     Expected Discharge Date: 10/26/2023        Discharge Comments: Home          Barrier to discharge: None medically, discharge per surgery    Luis A Shannon MD  Hospitalist Service  [facility]  Securely message with [employer] (more info)  Text page via [facility] Paging/Directory   ______________________________________________________________________    Interval History   Patient new to me and seen for the first time today.  Found sitting in bed and having pain on postop day 1.  Has no problems breathing, denies any fever chills or sweats.  Is hoping to be able to go home later today or tomorrow depending on how well he is feeling.  He resides in a townhome that he can live on 1 floor and he has the support of his wife there as well.  No new complaints otherwise.      ROS: A comprehensive review of systems was negative except for items noted in the HPI.  Patient currently denies any fever, chills, sweats, nausea, vomiting, diarrhea, shortness of breath, or chest pain.     Physical Exam   Vital Signs: Temp: 97.6  F (36.4  C) Temp src: Temporal BP: (!) 141/57 Pulse: 70   Resp: 18 SpO2: 96 % O2 Device: None (Room air)    Weight: 185 lbs 2.98 oz      General appearance: Patient is alert and oriented x3, no apparent distress while sitting still, pleasant and conversing normally, speaking in full  sentences, appears stated age, sitting up in bed  HEENT:    Mucous membranes are moist  RESPIRATORY: Clear to auscultation bilateral, good air movement  CARDIOVASCULAR: Regular rate and rhythm, normal S1/S2, no murmurs  GASTROINTESTINAL: Non-distended, non-tender, soft, bowel sounds present throughout  NEUROLOGIC:  Cranial nerves II-XII intact, without any focal deficits, strength 5/5 throughout  EXTREMITIES:  Moves all extremities, no clubbing, cyanosis, nor edema      Data         Imaging:   Results for orders placed or performed during the hospital encounter of 10/24/23   XR Surgery GRACE L/T 5 Min Fluoro w Stills    Narrative    This exam was marked as non-reportable because it will not be read by a   radiologist or a [facility] non-radiologist provider.         XR Surgery GRACE L/T 5 Min Fluoro w Stills    Narrative    This exam was marked as non-reportable because it will not be read by a   radiologist or a [facility] non-radiologist provider.           Procedures:  L2-L3 TILF, central laminectomy of L1-L5 10/24/23    I have personally have reviewed the patient's most up to date radiologic exams, EKG, labs, orders, and medications myself

## 2023-10-26 NOTE — PLAN OF CARE
Goal Outcome Evaluation:      Plan of Care Reviewed With: patient    Overall Patient Progress: improvingOverall Patient Progress: improving     Patient vital signs are at baseline: Yes  Patient able to ambulate as they were prior to admission or with assist devices provided by therapies during their stay:  No,  Reason:  Assist x2 with walker and gait belt, Back brace when OOB  Patient MUST void prior to discharge:  No,  Reason: Pt unable to void, thornton placed following bladder management order. Will need a urology consult.   Patient able to tolerate oral intake:  Yes  Pain has adequate pain control using Oral analgesics:  Yes  Does patient have an identified :  Yes  Has goal D/C date and time been discussed with patient:  Yes    Pt A/O x4. VSS. PIV SL. Pain managed with scheduled tylenol and PRN Atarax and robaxin. Assist x2 with walker and gait belt, back brace when OOB. Pt unable to void, thornton placed following bladder management order. Will need a urology consult. Tolerating a regular diet well. Hemovac in place, 30cc out over shift. CMS intact. Dressing CDI. Plan is possibly home at discharge.

## 2023-10-27 ENCOUNTER — APPOINTMENT (OUTPATIENT)
Dept: PHYSICAL THERAPY | Facility: CLINIC | Age: 78
DRG: 454 | End: 2023-10-27
Attending: ORTHOPAEDIC SURGERY
Payer: MEDICARE

## 2023-10-27 ENCOUNTER — APPOINTMENT (OUTPATIENT)
Dept: OCCUPATIONAL THERAPY | Facility: CLINIC | Age: 78
DRG: 454 | End: 2023-10-27
Attending: ORTHOPAEDIC SURGERY
Payer: MEDICARE

## 2023-10-27 VITALS
OXYGEN SATURATION: 95 % | TEMPERATURE: 97.3 F | WEIGHT: 185.19 LBS | DIASTOLIC BLOOD PRESSURE: 53 MMHG | HEART RATE: 65 BPM | SYSTOLIC BLOOD PRESSURE: 112 MMHG | BODY MASS INDEX: 25.12 KG/M2 | RESPIRATION RATE: 20 BRPM

## 2023-10-27 LAB
GLUCOSE BLDC GLUCOMTR-MCNC: 107 MG/DL (ref 70–99)
GLUCOSE BLDC GLUCOMTR-MCNC: 127 MG/DL (ref 70–99)
GLUCOSE BLDC GLUCOMTR-MCNC: 169 MG/DL (ref 70–99)

## 2023-10-27 PROCEDURE — 99232 SBSQ HOSP IP/OBS MODERATE 35: CPT | Performed by: INTERNAL MEDICINE

## 2023-10-27 PROCEDURE — 250N000013 HC RX MED GY IP 250 OP 250 PS 637: Performed by: PHYSICIAN ASSISTANT

## 2023-10-27 PROCEDURE — 97530 THERAPEUTIC ACTIVITIES: CPT | Mod: GP

## 2023-10-27 PROCEDURE — 97535 SELF CARE MNGMENT TRAINING: CPT | Mod: GO

## 2023-10-27 PROCEDURE — 250N000013 HC RX MED GY IP 250 OP 250 PS 637: Performed by: NURSE PRACTITIONER

## 2023-10-27 PROCEDURE — 250N000013 HC RX MED GY IP 250 OP 250 PS 637: Performed by: INTERNAL MEDICINE

## 2023-10-27 RX ORDER — AMOXICILLIN 250 MG
1-2 CAPSULE ORAL 2 TIMES DAILY
Qty: 30 TABLET | Refills: 0 | Status: SHIPPED | OUTPATIENT
Start: 2023-10-27 | End: 2024-04-17

## 2023-10-27 RX ORDER — OXYCODONE HYDROCHLORIDE 5 MG/1
5-10 TABLET ORAL EVERY 6 HOURS PRN
Qty: 30 TABLET | Refills: 0 | Status: SHIPPED | OUTPATIENT
Start: 2023-10-27 | End: 2023-11-15

## 2023-10-27 RX ORDER — ACETAMINOPHEN 325 MG/1
975 TABLET ORAL 4 TIMES DAILY
Qty: 100 TABLET | Refills: 0 | Status: SHIPPED | OUTPATIENT
Start: 2023-10-27 | End: 2024-04-17

## 2023-10-27 RX ORDER — METHOCARBAMOL 500 MG/1
500 TABLET, FILM COATED ORAL EVERY 6 HOURS PRN
Qty: 30 TABLET | Refills: 0 | Status: SHIPPED | OUTPATIENT
Start: 2023-10-27 | End: 2023-11-15

## 2023-10-27 RX ORDER — VITAMIN B COMPLEX
25 TABLET ORAL 2 TIMES DAILY
Qty: 180 TABLET | Refills: 0 | Status: SHIPPED | OUTPATIENT
Start: 2023-10-27

## 2023-10-27 RX ADMIN — AMLODIPINE BESYLATE 10 MG: 10 TABLET ORAL at 08:10

## 2023-10-27 RX ADMIN — INSULIN ASPART 1 UNITS: 100 INJECTION, SOLUTION INTRAVENOUS; SUBCUTANEOUS at 11:55

## 2023-10-27 RX ADMIN — FINASTERIDE 5 MG: 5 TABLET, FILM COATED ORAL at 08:10

## 2023-10-27 RX ADMIN — Medication 1 TABLET: at 08:10

## 2023-10-27 RX ADMIN — ATORVASTATIN CALCIUM 40 MG: 40 TABLET, FILM COATED ORAL at 08:10

## 2023-10-27 RX ADMIN — LISINOPRIL 20 MG: 20 TABLET ORAL at 08:10

## 2023-10-27 RX ADMIN — Medication 25 MCG: at 08:10

## 2023-10-27 RX ADMIN — OXYCODONE HYDROCHLORIDE 5 MG: 5 TABLET ORAL at 02:47

## 2023-10-27 RX ADMIN — ACETAMINOPHEN 975 MG: 325 TABLET, FILM COATED ORAL at 11:10

## 2023-10-27 RX ADMIN — OXYCODONE HYDROCHLORIDE 5 MG: 5 TABLET ORAL at 12:53

## 2023-10-27 RX ADMIN — ACETAMINOPHEN 975 MG: 325 TABLET, FILM COATED ORAL at 08:10

## 2023-10-27 RX ADMIN — CARBIDOPA AND LEVODOPA 2 TABLET: 25; 100 TABLET ORAL at 08:04

## 2023-10-27 RX ADMIN — SENNOSIDES AND DOCUSATE SODIUM 1 TABLET: 8.6; 5 TABLET ORAL at 08:10

## 2023-10-27 RX ADMIN — ACETAMINOPHEN 975 MG: 325 TABLET, FILM COATED ORAL at 16:06

## 2023-10-27 RX ADMIN — ATENOLOL 25 MG: 25 TABLET ORAL at 08:10

## 2023-10-27 RX ADMIN — POLYETHYLENE GLYCOL 3350 17 G: 17 POWDER, FOR SOLUTION ORAL at 08:09

## 2023-10-27 RX ADMIN — CARBIDOPA AND LEVODOPA 2 TABLET: 25; 100 TABLET ORAL at 16:06

## 2023-10-27 RX ADMIN — CARBIDOPA AND LEVODOPA 2 TABLET: 25; 100 TABLET ORAL at 11:10

## 2023-10-27 RX ADMIN — Medication 1 TABLET: at 11:52

## 2023-10-27 ASSESSMENT — ACTIVITIES OF DAILY LIVING (ADL)
ADLS_ACUITY_SCORE: 28

## 2023-10-27 NOTE — PROGRESS NOTES
Care Management Follow Up    Length of Stay (days): 3    Expected Discharge Date: 10/27/2023     Concerns to be Addressed: discharge planning, care coordination/care conferences     Patient plan of care discussed at interdisciplinary rounds: Yes    Anticipated Discharge Disposition: Transitional Care    Patient/family educated on Medicare website which has current facility and service quality ratings: yes  Education Provided on the Discharge Plan:    Patient/Family in Agreement with the Plan: yes    Referrals Placed by CM/SW: Post Acute Facilities  Private pay costs discussed: private room/amenity fees and transportation costs    Additional Information:  Patient has been accepted at [facility], [place] and [facility]. Met with patient and he deferred the decision to his wife. Called his wife and she chose [facility].  Updated [facility] to accept the bed. They stated he can come anytime after 1600.  Reviewed out of pocket cost for [employer] transport, $89.98 base and $5.79 per mile to the destination. Spoke with patient and spouse, they expressed understanding and are agreeable to this.  Wheelchair transport scheduled for today at 5038-0108.  Updated [facility], bedside RN and patient/spouse.    Care Management Discharge Note    Discharge Date: 10/27/2023       Discharge Disposition: Transitional Care    Discharge Services:  Rehab    Discharge DME:  none    Discharge Transportation: family or friend will provide    Private pay costs discussed: private room/amenity fees and transportation costs    Does the patient's insurance plan have a 3 day qualifying hospital stay waiver?  No    PAS Confirmation Code: [id number]  Patient/family educated on Medicare website which has current facility and service quality ratings: yes    Education Provided on the Discharge Plan:    Persons Notified of Discharge Plans: patient, spouse  Patient/Family in Agreement with the Plan: yes    Handoff Referral Completed:  Yes    Additional Information:  Faxed discharge orders to [facility]. Patient leaving this afternoon.     Hoda Nails RN  Care Coordinator  [facility]

## 2023-10-27 NOTE — PROGRESS NOTES
Pt is alert and oriented, lung sounds clear, up with assist of one walker and gait.Dressing is c/d/I.Pain controlled with Oxy and scheduled tylenol.Denies numbness and tingling.Cantu intact. Blood  sugar monitoring. Plan for tcu at discharge.

## 2023-10-27 NOTE — PLAN OF CARE
Goal Outcome Evaluation:  Vital signs stable.  Lungs clear, encouraged inspirometer use.  CMS intact, baseline tremors to hands from Parkinson.  Pain controlled with tylenol, robaxin , atarax and oxycodone.  Cantu patent, cares done.  Ambulated with walker, gait belt, wearing tlso brace and 1 assist.  Dressing intact to lumbar spine,  Log rolled to turn, reposition. Mepilex intact to reddened coccyx.   following on discharge planning.    Plan of Care Reviewed With: patient

## 2023-10-27 NOTE — PROGRESS NOTES
[facility]    Medicine Progress Note - Hospitalist Service    Date of Admission:  10/24/2023    Primary Care Physician   Rakesh Clark      Assessment & Plan   Kyler Rodriguez is a 78 year old male with PMH significant for HLD, parkinsonism, HTN, DM2, prostate cancer s/p radiation (2011), and DDD admitted on 10/24/2023. He s/p L2-L3 TILF, central laminectomy of L1-L5.     S/p L2-L3 TILF, central laminectomy of L1-L5: no complications and tolerate procedure on 10/24/23. Pain currently controlled on current regimen. Will place on a higher schedule tylenol dose.  Continue on opiates as needed.  Physical therapy to see.  Hoping to leave sooner than later but needs to handle going home to his town home with the support of his wife. Discharge timing per surgery.         Hypertension :   BP stable at /50s.  Is on Atenolol , Amlodipine-Benazepril at home and was restarted on these here.  Given his age and high falls risk, need to be care about the pharmacy and leading to hypotension, dizziness, and falls.  Patient is on atenolol and this is cleared by the kidneys so if he ever goes into renal failure this could be a problem.  I think his primary care provider can look at his medications and titrate them appropriately.  Would recommend changing his atenolol to Toprol.      Type 2 noninsulin dependent diabetes:   Most recent HgbA1c of 6.7 on 9/21/2023, BG in 130-199 range.  Blood sugars have been ranging  here 107-159.  He is on metformin at home and this was restarted.  Is on an insulin correction scale as well while hospitalized.  Goal needs to be to prevent hypoglycemia to prevent falls going forward.  Goal blood sugar she will less than 200.      Urinary retention:  Patient had urinary retention postop day 2 requiring a Cantu be placed.  He will go to the TCU with this and have a voiding trial in 2 weeks once he is more ambulatory.  Discharge orders done for this.     Parkinsonism: tremor  noted on exam  Continue PTA Sinemet      Hyperlipidemia  He will continue on his home Atorvastatin.  Patient's primary care provider to look at polypharmacy and his risks of falls.  The risk may outweigh the benefit of Lipitor going forward.  Consider discontinuing this.     H/o prostate cancer s/p radiation/ urinary retention  Continue PTA Finasteride. Had thornton placed 10/26 for 500 ml retained urine.  Will keep thornton in and plan is for him to go to a Tcu at discharge where a voiding trial can be done in two weeks once he is more ambulatory.     Falls risk, high  Patient is at high risk for falls with his ongoing back pain, recent surgery, pain medications, parkinsons, blood pressure medications, diabetes, statin.  Pharmacy will certainly can play a role in this as well.  We need to really focus on preventing hypotension, weakness, and falls going forward.  Need to consider changing and stopping some of his medications.  Patient can follow-up with his primary care provider to talk further about this.  Falling would lead to more morbidity in this patient.         4Ms:  Polypharmacy: Medications reviewed, would consider changing his atenolol to Toprol due to the fact that it is renally cleared, consider stopping his statin altogether as the risk likely outweighs the benefits, need to be worried about his falls risk  Mentation:  SLUMS N/A,  BIMS N/A,  CPT N/A,  Capacity intact to make his own medical decisions  Social support: [facility], can live on 1 floor if necessary  Mobility: Has a 4 wheeled walker  What is importmant: To be at home with his wife      Discussed plan of care with social work, case management      Diet: Advance Diet as Tolerated: Regular Diet Adult  Diet    DVT Prophylaxis: Defer to primary service  Thornton Catheter: PRESENT, indication: Retention, Anesthesia  Lines: None     Cardiac Monitoring: None    RESTRAINTS: Not indicated  Code Status: Full Code        Follow  up plan: Should see his primary care provider as necessary to follow-up his hypertension and diabetes.  Also need ongoing discussions about falls risk and polypharmacy.  Then to discharge to a TCU I have placed medications from medical standpoint in the DC orders        This document was created using voice recognition technology.  Please excuse any typographical errors that may have occurred.  Please call with any questions.         Clinically Significant Risk Factors                  # Hypertension: Noted on problem list       # DMII: A1C = 6.7 % (Ref range: 0.0 - 5.6 %) within past 6 months, PRESENT ON ADMISSION  # Overweight: Estimated body mass index is 25.12 kg/m  as calculated from the following:    Height as of 10/17/23: 1.829 m (6').    Weight as of this encounter: 84 kg (185 lb 3 oz)., PRESENT ON ADMISSION            Disposition Plan I have filled out the patient's medical medications for discharge and will go out with a Cantu     Expected Discharge Date: 10/27/2023      Destination: inpatient rehabilitation facility  Discharge Comments: Home          Barrier to discharge: None medically, discharge per surgery to TCU    Luis A Shannon MD  Hospitalist Service  [facility]  Securely message with NextDoc[employer] (more info)  Text page via [employer] Paging/Directory   ______________________________________________________________________    Interval History   Patient doing fine overnight.  Does have a Cantu in place which she will go to a TCU with.  Plan now is to go to a TCU rather than home.  Pain seems to be controlled but he is better when he is not moving.      ROS: A comprehensive review of systems was negative except for items noted in the HPI.  Patient currently denies any fever, chills, sweats, nausea, vomiting, diarrhea, shortness of breath, or chest pain.     Physical Exam   Vital Signs: Temp: 99.5  F (37.5  C) Temp src: Temporal BP: 130/58 Pulse: 71   Resp: 16 SpO2: 91 % O2 Device: None  (Room air)    Weight: 185 lbs 2.98 oz    Exam is stable from yesterday  General appearance: Patient is alert and oriented x3, no apparent distress while sitting still, pleasant and conversing normally, speaking in full sentences, appears stated age, sitting up in bed  HEENT:    Mucous membranes are moist  RESPIRATORY: Clear to auscultation bilateral, good air movement  CARDIOVASCULAR: Regular rate and rhythm, normal S1/S2, no murmurs  GASTROINTESTINAL: Non-distended, non-tender, soft, bowel sounds present throughout  NEUROLOGIC:  Cranial nerves II-XII intact, without any focal deficits, strength 5/5 throughout  EXTREMITIES:  Moves all extremities, no clubbing, cyanosis, nor edema      Data         Imaging:   Results for orders placed or performed during the hospital encounter of 10/24/23   XR Surgery GRACE L/T 5 Min Fluoro w Stills    Narrative    This exam was marked as non-reportable because it will not be read by a   radiologist or a [facility] non-radiologist provider.         XR Surgery GRACE L/T 5 Min Fluoro w Stills    Narrative    This exam was marked as non-reportable because it will not be read by a   radiologist or a [facility] non-radiologist provider.           Procedures:  L2-L3 TILF, central laminectomy of L1-L5 10/24/23    I have personally have reviewed the patient's most up to date radiologic exams, EKG, labs, orders, and medications myself

## 2023-10-27 NOTE — PLAN OF CARE
A&Ox4. Ax2 with walker and gait belt.  VSS. 02 - 91% on RA. LS - clear.  Blood sugar - 107 & 167  Chronic thornton, will be discharged with thornton today  to TCU, per MD order. Output - >200cc, tea colored.   Dressing - CD&I.  IV - SL.  Good appetite.

## 2023-10-27 NOTE — PLAN OF CARE
Physical Therapy Discharge Summary    Reason for therapy discharge:    Discharged to transitional care facility.    Progress towards therapy goal(s). See goals on Care Plan in [facility] electronic health record for goal details.  Goals not met.  Barriers to achieving goals:   discharge from facility.    Therapy recommendation(s):    Continued therapy is recommended.  Rationale/Recommendations:  Rec TCU for progression of strength and IND with mobility.

## 2023-10-27 NOTE — DISCHARGE INSTRUCTIONS
Incision Instructions     Your incision is covered with an Aquacel dressing. This is a waterproof dressing that you are able to shower with. Do not submerge your dressing in water. You should remove your dressing 7 days following your surgery to inspect your incision for infection: Redness, warmth, drainage.     However, if you notice a significant amount of drainage on your dressing, or there is any concern for possible incision infection, remove to inspect the incision prior to the 7 days.    If there is no concern for infection, cover with simple dressing. We suggest a folded piece of gauze with a few pieces of tape to hold in place. This will allow the incision to remain protected. Please make sure to cover your dressing with plastic/waterproof dressing to keep it waterproof while in the shower. We ask that you continue to waterproof it until you are seen at your two week post op appointment.     Activity Instructions   Minimize bending, lifting, twisting. No lifting greater than 10 lbs. Remember, 1 gallon of milk is 8 lbs.    Please call our office at [phone] should you develop the following issues:  1.) Increased/persistent redness, bleeding, localized warmth, increased swelling, and/or drainage (yellow/clear/odorous) incision site.   2.) Increased pain not controlled with oral pain medications   3.) Persistent headache, dizziness, lightheaded  4.) Persistent constipation despite taking OTC stool softeners as directed  5.) Calf pain/swollen/hard/warm area, swelling chest pain or shortness of breath  6.) Increased/persistent numbness or tingling in arm or legs, weakness in extremities or falls  7.) Generalized feelings of illness  8.) Persistent fever, chills, sweats, Temp 101 or greater  9.) Trouble voiding, incontinence of bowel and/or bladder  10.) Too sleepy-could be amount of pain medication  11.) If unable to wake call 911    Other instructions:  1.) No heavy lifting, nothing more than 6-10lbs.  Minimize your bending, lifting, and twisting. Attempt to avoid prolonged period of sitting. Follow physical therapy restrictions and exercises - slowly increase your activity.   2.) Avoid sitting or laying in one position too long, walk as tolerated, log roll  3.) Wear brace when up per physical therapy, inspect skin under brace daily and call md if sore area starts  4.) Take an over the counter stool softener as directed while on narcotics to prevent constipation or to stay regular. Take a suppository or laxative if no bowel movement in 2 days despite taking softener     Follow Up   Follow up with Sonido Crawford PA-C in two weeks at [facility]. Please call [phone] to schedule.

## 2023-10-28 NOTE — PLAN OF CARE
Occupational Therapy Discharge Summary    Reason for therapy discharge:    Discharged to transitional care facility.    Progress towards therapy goal(s). See goals on Care Plan in [facility] electronic health record for goal details.  Goals partially met.  Barriers to achieving goals:   limited tolerance for therapy.    Therapy recommendation(s):    Continued therapy is recommended.  Rationale/Recommendations:  Patient is below baseline functional mobility which impact ADl indp.  Rec TCU for progression of strength and IND with mobility and ADLs, would be a fall risk. Pt primarily limited in independence by pain, generalized weakness, spine precautions.

## 2023-10-30 ENCOUNTER — TRANSITIONAL CARE UNIT VISIT (OUTPATIENT)
Dept: GERIATRICS | Facility: CLINIC | Age: 78
End: 2023-10-30
Payer: MEDICARE

## 2023-10-30 VITALS
OXYGEN SATURATION: 93 % | RESPIRATION RATE: 16 BRPM | BODY MASS INDEX: 25.6 KG/M2 | SYSTOLIC BLOOD PRESSURE: 128 MMHG | TEMPERATURE: 97.3 F | HEART RATE: 76 BPM | HEIGHT: 72 IN | DIASTOLIC BLOOD PRESSURE: 72 MMHG | WEIGHT: 189 LBS

## 2023-10-30 DIAGNOSIS — G20.A1 PARKINSON'S DISEASE, UNSPECIFIED WHETHER DYSKINESIA PRESENT, UNSPECIFIED WHETHER MANIFESTATIONS FLUCTUATE (H): ICD-10-CM

## 2023-10-30 DIAGNOSIS — C61 PROSTATE CANCER (H): ICD-10-CM

## 2023-10-30 DIAGNOSIS — M50.30 DDD (DEGENERATIVE DISC DISEASE), CERVICAL: Primary | ICD-10-CM

## 2023-10-30 DIAGNOSIS — R33.9 URINARY RETENTION: ICD-10-CM

## 2023-10-30 DIAGNOSIS — E11.21 TYPE 2 DIABETES MELLITUS WITH DIABETIC NEPHROPATHY, WITHOUT LONG-TERM CURRENT USE OF INSULIN (H): ICD-10-CM

## 2023-10-30 DIAGNOSIS — Z98.1 S/P LUMBAR FUSION: ICD-10-CM

## 2023-10-30 DIAGNOSIS — R53.81 PHYSICAL DECONDITIONING: ICD-10-CM

## 2023-10-30 DIAGNOSIS — I10 ESSENTIAL HYPERTENSION, BENIGN: ICD-10-CM

## 2023-10-30 PROCEDURE — 99310 SBSQ NF CARE HIGH MDM 45: CPT | Performed by: NURSE PRACTITIONER

## 2023-10-30 NOTE — PROGRESS NOTES
[facility] GERIATRICS    PRIMARY CARE PROVIDER AND CLINIC:  Rakesh Clark MD, [address] / [facility] [address]  Chief Complaint   Patient presents with    Hospital F/U      [facility] Record Number:  [id number]  Place of Service where encounter took place:  [facility]) [25]    Kyler Rodriguez  is a 78 year old  (1945), admitted to the above facility from  [facility]. Hospital stay 10/24 through 10/27..   HPI:    PMH HTN, HLD, DMII, prostate cancer, parkinsonism and DDD who admitted for elective surgery  DDD s/p L2-3 TILF, central laminectomy of L1-L5  No post op complications noted  Prostate cancer s/p chemotherapy and radiation  Urinary retention  Cantu in place, attempt TOV in U  Parkinsonism: continue PTA sinemet  HTN: BP medication adjusted from atenolol to metoprolol to improve risk of falls.   On exam today: patient is resting in bed, states pain in low back is very little at rest, does increase to 7/10 with activity/movement, denies pain radiating down legs, denies fever, chills, cough, CP, palpitations, N/V/D or constipation, slept well last night    CODE STATUS/ADVANCE DIRECTIVES DISCUSSION:  Prior  CPR/Full code   ALLERGIES:   Allergies   Allergen Reactions    Latex Rash      PAST MEDICAL HISTORY:   Past Medical History:   Diagnosis Date    Elevated prostate specific antigen (PSA)     Essential hypertension, benign     Malignant neoplasm (H) 10/2011    prostate cancer    Malignant neoplasm of right kidney (H)     Mumps     Other and unspecified hyperlipidemia     Parkinsons disease     Prostate infection     Spider veins     Type II or unspecified type diabetes mellitus without mention of complication, not stated as uncontrolled       PAST SURGICAL HISTORY:   has a past surgical history that includes NONSPECIFIC PROCEDURE (1998); NONSPECIFIC PROCEDURE (1998); VASECTOMY UNILAT/BILAT W POSTOP SEMEN; Eye surgery; Phacoemulsification clear  cornea with standard intraocular lens implant, endoscopic cyclophotocoagulation, combined (07/31/2014); Cystoscopy; Vasectomy; colonoscopy (2009 & 2014); colonoscopy (04/08/2019); REMOVAL OF TONSILS,<13 Y/O; daVINCI nephrectomy partial (Right, 09/04/2019); biopsy; Optical tracking system fusion spine posterior lumbar three+ levels (N/A, 10/24/2023); and Laminectomy lumbar posterior microscopic two levels (N/A, 10/24/2023).  FAMILY HISTORY: family history includes Cancer - colorectal in his father; Colon Cancer in his father; Musculoskeletal Disorder in his brother; Prostate Cancer in his father and paternal grandfather.  SOCIAL HISTORY:   reports that he has never smoked. He has never used smokeless tobacco. He reports that he does not currently use alcohol. He reports that he does not use drugs.  Patient's living condition: lives with spouse    Post Discharge Medication Reconciliation Status:   MED REC REQUIRED  Post Medication Reconciliation Status: discharge medications reconciled and changed, per note/orders       Current Outpatient Medications   Medication Sig    acetaminophen (TYLENOL) 325 MG tablet Take 3 tablets (975 mg) by mouth 4 times daily    amLODIPine-benazepril (LOTREL) 10-20 MG capsule TAKE 1 CAPSULE EVERY DAY    atenolol (TENORMIN) 25 MG tablet TAKE 1 TABLET EVERY DAY    atorvastatin (LIPITOR) 80 MG tablet Take 0.5 tablets (40 mg) by mouth daily    Calcium Carb-Cholecalciferol (CALTRATE 600+D3 SOFT) 600-800 MG-UNIT CHEW Take by mouth every 24 hours    carbidopa-levodopa (SINEMET)  MG per tablet Take 2 tablets by mouth 3 times daily Take at 7 am, 11 am and 3 pm    finasteride (PROSCAR) 5 MG tablet TAKE 1 TABLET EVERY DAY    metFORMIN (GLUCOPHAGE) 1000 MG tablet TAKE 1 TABLET EVERY DAY WITH DINNER    methocarbamol (ROBAXIN) 500 MG tablet Take 1 tablet (500 mg) by mouth every 6 hours as needed for muscle spasms    multivitamin, therapeutic (THERA-VIT) TABS tablet Take 1 tablet by mouth every  morning    oxyCODONE (ROXICODONE) 5 MG tablet Take 1-2 tablets (5-10 mg) by mouth every 6 hours as needed for moderate to severe pain    senna-docusate (SENOKOT-S/PERICOLACE) 8.6-50 MG tablet Take 1-2 tablets by mouth 2 times daily Take while on oral narcotics to prevent or treat constipation.    Vitamin D3 (CHOLECALCIFEROL) 25 mcg (1000 units) tablet Take 1 tablet (25 mcg) by mouth 2 times daily     No current facility-administered medications for this visit.       ROS:  10 point ROS of systems including Constitutional, Eyes, Respiratory, Cardiovascular, Gastroenterology, Genitourinary, Integumentary, Musculoskeletal, Psychiatric were all negative except for pertinent positives noted in my HPI.    Vitals:  /72   Pulse 76   Temp 97.3  F (36.3  C)   Resp 16   Ht 1.829 m (6')   Wt 85.7 kg (189 lb)   SpO2 93%   BMI 25.63 kg/m    Exam:  GENERAL APPEARANCE:  Alert, in no distress, morbidly obese  ENT:  Mouth and posterior oropharynx normal, moist mucous membranes, normal hearing acuity  EYES:  EOM, conjunctivae, lids, pupils and irises normal, PERRL  RESP:  respiratory effort and palpation of chest normal, lungs clear to auscultation , no respiratory distress  CV:  Palpation and auscultation of heart done , regular rate and rhythm, no murmur, rub, or gallop, peripheral edema trace+ in LE bilaterally  ABDOMEN:  normal bowel sounds, soft, nontender, no hepatosplenomegaly or other masses  M/S:   patient resting in bed, able to move all 4 extremities  SKIN:  Inspection of skin and subcutaneous tissue baseline  NEURO:   speech wnl  PSYCH:  affect and mood normal    Lab/Diagnostic data:  Recent labs in [facility] reviewed by me today.  and Most Recent 3 CBC's:  Recent Labs   Lab Test 10/25/23  0643 10/17/23  1417 03/22/23  0933   WBC 11.0 8.8 7.9   HGB 10.2* 12.9* 12.0*   MCV 75* 72* 67*    254 296     Most Recent 3 BMP's:  Recent Labs   Lab Test 10/27/23  1151 10/27/23  0803 10/27/23  0228 10/25/23  1246  10/25/23  0643 10/24/23  0957 10/17/23  1417 04/25/23  1310 03/22/23  0933   NA  --   --   --   --  134*  --  131*  --  137   POTASSIUM  --   --   --   --  4.5  --  4.6  --  4.5   CHLORIDE  --   --   --   --  100  --  94*  --  99   CO2  --   --   --   --  25  --  25  --  22   BUN  --   --   --   --  13.4  --  22.8  --  17.8   CR  --   --   --   --  0.72  --  0.74 0.9 0.83   ANIONGAP  --   --   --   --  9  --  12  --  16*   BETH  --   --   --   --  8.6*  --  9.5  --  9.9   * 107* 127*   < > 199*  199*   < > 121*  --  122*    < > = values in this interval not displayed.       ASSESSMENT/PLAN:    (M50.30) DDD (degenerative disc disease), cervical  (primary encounter diagnosis)  (Z98.1) S/P lumbar fusion  (R53.81) Physical deconditioning  Comment: acute/ongoing  S/p L2-L3 TILF, central laminectomy of L1-L5  Plan: PT and OT, continue tylenol 975mg QID, robaxin 500mg q 6 hours prn, oxycodone 5-10mg q 6 hours prn,   F/u with spine surgery as directed    (G20.A1) Parkinson's disease, unspecified whether dyskinesia present, unspecified whether manifestations fluctuate  Comment: ongoing  Plan: continue sinemet 25/100mg 2 tabs TID,     (C61) Prostate cancer (H)  (R33.9) Urinary retention  Comment: acute/ongoing  Plan: continue proscar 5mg QD, thornton catheter in place, will attempt TOV once patient is ambulatory    (I10) Essential hypertension, benign  Comment: acute/ongoing  Plan: BMP follow, continue amlodipine-benazepril 10-20mg QD, atenolol 25mg QD    (E11.21) Type 2 diabetes mellitus with diabetic nephropathy, without long-term current use of insulin (H)  Comment: acute/ongoing  Plan: BMP follow, continue to follow off Rx      Orders:  BMP and Hgb on Thursday      Total time spent with patient visit at the skilled nursing facility was 45 min including patient visit and review of past records. Reviewed internal medicine and spine surgery progress notes, lab and imaging results, discussed pain management and  medications at bedside with patient    Electronically signed by:  Tonya Lynn Haase, APRN CNP

## 2023-10-30 NOTE — LETTER
10/30/2023        RE: Kyler Rodriguez  [address] [facility] [id number]        [facility] GERIATRICS    PRIMARY CARE PROVIDER AND CLINIC:  Rakesh Clark MD, [facility] [address] / [place] [address]  Chief Complaint   Patient presents with     Hospital F/U      [facility] Medical Record Number:  [id number]  [facility]    Kyler Rodriguez  is a 78 year old  (1945), admitted to the above facility from  [facility]. Hospital stay 10/24 through 10/27..   HPI:    PMH HTN, HLD, DMII, prostate cancer, parkinsonism and DDD who admitted for elective surgery  DDD s/p L2-3 TILF, central laminectomy of L1-L5  No post op complications noted  Prostate cancer s/p chemotherapy and radiation  Urinary retention  Cantu in place, attempt TOV in TCU  Parkinsonism: continue PTA sinemet  HTN: BP medication adjusted from atenolol to metoprolol to improve risk of falls.   On exam today: patient is resting in bed, states pain in low back is very little at rest, does increase to 7/10 with activity/movement, denies pain radiating down legs, denies fever, chills, cough, CP, palpitations, N/V/D or constipation, slept well last night    CODE STATUS/ADVANCE DIRECTIVES DISCUSSION:  Prior  CPR/Full code   ALLERGIES:   Allergies   Allergen Reactions     Latex Rash      PAST MEDICAL HISTORY:   Past Medical History:   Diagnosis Date     Elevated prostate specific antigen (PSA)      Essential hypertension, benign      Malignant neoplasm (H) 10/2011    prostate cancer     Malignant neoplasm of right kidney (H)      Mumps      Other and unspecified hyperlipidemia      Parkinsons disease      Prostate infection      Spider veins      Type II or unspecified type diabetes mellitus without mention of complication, not stated as uncontrolled       PAST SURGICAL HISTORY:   has a past surgical history that includes NONSPECIFIC PROCEDURE (1998);  NONSPECIFIC PROCEDURE (1998); VASECTOMY UNILAT/BILAT W POSTOP SEMEN; Eye surgery; Phacoemulsification clear cornea with standard intraocular lens implant, endoscopic cyclophotocoagulation, combined (07/31/2014); Cystoscopy; Vasectomy; colonoscopy (2009 & 2014); colonoscopy (04/08/2019); REMOVAL OF TONSILS,<11 Y/O; daVINCI nephrectomy partial (Right, 09/04/2019); biopsy; Optical tracking system fusion spine posterior lumbar three+ levels (N/A, 10/24/2023); and Laminectomy lumbar posterior microscopic two levels (N/A, 10/24/2023).  FAMILY HISTORY: family history includes Cancer - colorectal in his father; Colon Cancer in his father; Musculoskeletal Disorder in his brother; Prostate Cancer in his father and paternal grandfather.  SOCIAL HISTORY:   reports that he has never smoked. He has never used smokeless tobacco. He reports that he does not currently use alcohol. He reports that he does not use drugs.  Patient's living condition: lives with spouse    Post Discharge Medication Reconciliation Status:   MED REC REQUIRED  Post Medication Reconciliation Status: discharge medications reconciled and changed, per note/orders       Current Outpatient Medications   Medication Sig     acetaminophen (TYLENOL) 325 MG tablet Take 3 tablets (975 mg) by mouth 4 times daily     amLODIPine-benazepril (LOTREL) 10-20 MG capsule TAKE 1 CAPSULE EVERY DAY     atenolol (TENORMIN) 25 MG tablet TAKE 1 TABLET EVERY DAY     atorvastatin (LIPITOR) 80 MG tablet Take 0.5 tablets (40 mg) by mouth daily     Calcium Carb-Cholecalciferol (CALTRATE 600+D3 SOFT) 600-800 MG-UNIT CHEW Take by mouth every 24 hours     carbidopa-levodopa (SINEMET)  MG per tablet Take 2 tablets by mouth 3 times daily Take at 7 am, 11 am and 3 pm     finasteride (PROSCAR) 5 MG tablet TAKE 1 TABLET EVERY DAY     metFORMIN (GLUCOPHAGE) 1000 MG tablet TAKE 1 TABLET EVERY DAY WITH DINNER     methocarbamol (ROBAXIN) 500 MG tablet Take 1 tablet (500 mg) by mouth every 6  hours as needed for muscle spasms     multivitamin, therapeutic (THERA-VIT) TABS tablet Take 1 tablet by mouth every morning     oxyCODONE (ROXICODONE) 5 MG tablet Take 1-2 tablets (5-10 mg) by mouth every 6 hours as needed for moderate to severe pain     senna-docusate (SENOKOT-S/PERICOLACE) 8.6-50 MG tablet Take 1-2 tablets by mouth 2 times daily Take while on oral narcotics to prevent or treat constipation.     Vitamin D3 (CHOLECALCIFEROL) 25 mcg (1000 units) tablet Take 1 tablet (25 mcg) by mouth 2 times daily     No current facility-administered medications for this visit.       ROS:  10 point ROS of systems including Constitutional, Eyes, Respiratory, Cardiovascular, Gastroenterology, Genitourinary, Integumentary, Musculoskeletal, Psychiatric were all negative except for pertinent positives noted in my HPI.    Vitals:  /72   Pulse 76   Temp 97.3  F (36.3  C)   Resp 16   Ht 1.829 m (6')   Wt 85.7 kg (189 lb)   SpO2 93%   BMI 25.63 kg/m    Exam:  GENERAL APPEARANCE:  Alert, in no distress, morbidly obese  ENT:  Mouth and posterior oropharynx normal, moist mucous membranes, normal hearing acuity  EYES:  EOM, conjunctivae, lids, pupils and irises normal, PERRL  RESP:  respiratory effort and palpation of chest normal, lungs clear to auscultation , no respiratory distress  CV:  Palpation and auscultation of heart done , regular rate and rhythm, no murmur, rub, or gallop, peripheral edema trace+ in LE bilaterally  ABDOMEN:  normal bowel sounds, soft, nontender, no hepatosplenomegaly or other masses  M/S:   patient resting in bed, able to move all 4 extremities  SKIN:  Inspection of skin and subcutaneous tissue baseline  NEURO:   speech wnl  PSYCH:  affect and mood normal    Lab/Diagnostic data:  Recent labs in [facility] reviewed by me today.  and Most Recent 3 CBC's:  Recent Labs   Lab Test 10/25/23  0643 10/17/23  1417 03/22/23  0933   WBC 11.0 8.8 7.9   HGB 10.2* 12.9* 12.0*   MCV 75* 72* 67*    254  296     Most Recent 3 BMP's:  Recent Labs   Lab Test 10/27/23  1151 10/27/23  0803 10/27/23  0228 10/25/23  1246 10/25/23  0643 10/24/23  0957 10/17/23  1417 04/25/23  1310 03/22/23  0933   NA  --   --   --   --  134*  --  131*  --  137   POTASSIUM  --   --   --   --  4.5  --  4.6  --  4.5   CHLORIDE  --   --   --   --  100  --  94*  --  99   CO2  --   --   --   --  25  --  25  --  22   BUN  --   --   --   --  13.4  --  22.8  --  17.8   CR  --   --   --   --  0.72  --  0.74 0.9 0.83   ANIONGAP  --   --   --   --  9  --  12  --  16*   BETH  --   --   --   --  8.6*  --  9.5  --  9.9   * 107* 127*   < > 199*  199*   < > 121*  --  122*    < > = values in this interval not displayed.       ASSESSMENT/PLAN:    (M50.30) DDD (degenerative disc disease), cervical  (primary encounter diagnosis)  (Z98.1) S/P lumbar fusion  (R53.81) Physical deconditioning  Comment: acute/ongoing  S/p L2-L3 TILF, central laminectomy of L1-L5  Plan: PT and OT, continue tylenol 975mg QID, robaxin 500mg q 6 hours prn, oxycodone 5-10mg q 6 hours prn,   F/u with spine surgery as directed    (G20.A1) Parkinson's disease, unspecified whether dyskinesia present, unspecified whether manifestations fluctuate  Comment: ongoing  Plan: continue sinemet 25/100mg 2 tabs TID,     (C61) Prostate cancer (H)  (R33.9) Urinary retention  Comment: acute/ongoing  Plan: continue proscar 5mg QD, thornton catheter in place, will attempt TOV once patient is ambulatory    (I10) Essential hypertension, benign  Comment: acute/ongoing  Plan: BMP follow, continue amlodipine-benazepril 10-20mg QD, atenolol 25mg QD    (E11.21) Type 2 diabetes mellitus with diabetic nephropathy, without long-term current use of insulin (H)  Comment: acute/ongoing  Plan: BMP follow, continue to follow off Rx      Orders:  BMP and Hgb on Thursday      Total time spent with patient visit at the skilled nursing facility was 45 min including patient visit and review of past records. Reviewed  internal medicine and spine surgery progress notes, lab and imaging results, discussed pain management and medications at bedside with patient    Electronically signed by:  Tonya Lynn Haase, APRN CNP                   Sincerely,        Tonya Lynn Haase, APRN CNP

## 2023-11-01 ENCOUNTER — PATIENT OUTREACH (OUTPATIENT)
Dept: CARE COORDINATION | Facility: CLINIC | Age: 78
End: 2023-11-01

## 2023-11-01 ENCOUNTER — TRANSITIONAL CARE UNIT VISIT (OUTPATIENT)
Dept: GERIATRICS | Facility: CLINIC | Age: 78
End: 2023-11-01
Payer: MEDICARE

## 2023-11-01 VITALS
TEMPERATURE: 98.5 F | HEART RATE: 68 BPM | DIASTOLIC BLOOD PRESSURE: 77 MMHG | WEIGHT: 187.6 LBS | HEIGHT: 72 IN | OXYGEN SATURATION: 96 % | RESPIRATION RATE: 20 BRPM | BODY MASS INDEX: 25.41 KG/M2 | SYSTOLIC BLOOD PRESSURE: 153 MMHG

## 2023-11-01 DIAGNOSIS — I10 ESSENTIAL HYPERTENSION, BENIGN: ICD-10-CM

## 2023-11-01 DIAGNOSIS — E11.21 TYPE 2 DIABETES MELLITUS WITH DIABETIC NEPHROPATHY, WITHOUT LONG-TERM CURRENT USE OF INSULIN (H): ICD-10-CM

## 2023-11-01 DIAGNOSIS — G20.A1 PARKINSON'S DISEASE, UNSPECIFIED WHETHER DYSKINESIA PRESENT, UNSPECIFIED WHETHER MANIFESTATIONS FLUCTUATE (H): ICD-10-CM

## 2023-11-01 DIAGNOSIS — Z98.1 S/P LUMBAR FUSION: Primary | ICD-10-CM

## 2023-11-01 DIAGNOSIS — R33.9 URINARY RETENTION: ICD-10-CM

## 2023-11-01 PROCEDURE — 99305 1ST NF CARE MODERATE MDM 35: CPT | Performed by: INTERNAL MEDICINE

## 2023-11-01 NOTE — PROGRESS NOTES
Clinic Care Coordination Contact  Care Coordination Transition Communication    Clinical Data: Patient was hospitalized at [facility] from 10/24/2023 to 10/27/2023 with diagnosis of S/P lumbar fusion.     Assessment: Patient has transitioned to TCU for short term rehabilitation:    Facility Name: [facility]  Transition Communication:  Notified facility of Primary Care- Care Coordination support via Epic fax.    Plan: Care Coordinator will await notification from facility staff informing of patient's discharge plans/needs. Care Coordinator will review chart and outreach to facility staff every 4 weeks and as needed.     ESCOBAR [facility]  Social Work Care Coordinator  [facility] [place] [place]  Phone: [phone]

## 2023-11-01 NOTE — DISCHARGE SUMMARY
[facility] Discharge Summary    Kyler Rodriguez MRN# [id number]   Age: 78 year old YOB: 1945     Date of Admission:  10/24/2023  Date of Discharge::  10/27/2023  5:08 PM  Admitting Physician:  Wilmer Shields MD  Discharge Physician:  Sonido Crawford PA-C     Home clinic: [facility]           Admission Diagnoses:   Spinal stenosis, lumbar region, without neurogenic claudication [M48.061]  Degeneration of lumbar or lumbosacral intervertebral disc [M51.37]  S/P lumbar fusion [Z98.1]          Discharge Diagnosis:     Patient Active Problem List    Diagnosis    S/P lumbar fusion    Urinary retention    Gross hematuria    Renal malignant neoplasm, right (H)    Parkinsonism, unspecified Parkinsonism type    RBBB    Advance Care Planning    Prostate cancer (H)    Type 2 diabetes mellitus with diabetic nephropathy (H)    HYPERLIPIDEMIA LDL GOAL <100    Hypertrophy of prostate with urinary obstruction    Other and unspecified disc disorder of cervical region    Essential hypertension, benign   Acute blood loss anemia           Procedures:     Procedure(s): L1-L5 central laminectomies with L2-L5 lumbar fusion                 Medications Prior to Admission:     No medications prior to admission.             Discharge Medications:     Discharge Medication List as of 10/27/2023  4:46 PM        START taking these medications    Details   acetaminophen (TYLENOL) 325 MG tablet Take 3 tablets (975 mg) by mouth 4 times daily, Disp-100 tablet, R-0, Local Print      methocarbamol (ROBAXIN) 500 MG tablet Take 1 tablet (500 mg) by mouth every 6 hours as needed for muscle spasms, Disp-30 tablet, R-0, Local Print      oxyCODONE (ROXICODONE) 5 MG tablet Take 1-2 tablets (5-10 mg) by mouth every 6 hours as needed for moderate to severe pain, Disp-30 tablet, R-0, Local Print      senna-docusate (SENOKOT-S/PERICOLACE) 8.6-50 MG tablet Take 1-2 tablets by mouth 2 times daily Take while on oral narcotics  to prevent or treat constipation., Disp-30 tablet, R-0, Local PrintWhile taking narcotics      Vitamin D3 (CHOLECALCIFEROL) 25 mcg (1000 units) tablet Take 1 tablet (25 mcg) by mouth 2 times daily, Disp-180 tablet, R-0, Local Print           CONTINUE these medications which have NOT CHANGED    Details   amLODIPine-benazepril (LOTREL) 10-20 MG capsule TAKE 1 CAPSULE EVERY DAY, Disp-90 capsule, R-0, E-Prescribe      atenolol (TENORMIN) 25 MG tablet TAKE 1 TABLET EVERY DAY, Disp-90 tablet, R-2, E-Prescribe      atorvastatin (LIPITOR) 80 MG tablet Take 0.5 tablets (40 mg) by mouth daily, Disp-45 tablet, R-1, E-Prescribe      Calcium Carb-Cholecalciferol (CALTRATE 600+D3 SOFT) 600-800 MG-UNIT CHEW Take by mouth every 24 hours, Historical      carbidopa-levodopa (SINEMET)  MG per tablet Take 2 tablets by mouth 3 times daily Take at 7 am, 11 am and 3 pm, Historical      finasteride (PROSCAR) 5 MG tablet TAKE 1 TABLET EVERY DAY, Disp-90 tablet, R-2, E-Prescribe      metFORMIN (GLUCOPHAGE) 1000 MG tablet TAKE 1 TABLET EVERY DAY WITH DINNER, Disp-90 tablet, R-1, E-Prescribe      multivitamin, therapeutic (THERA-VIT) TABS tablet Take 1 tablet by mouth every morning, Historical                   Consultations:   PT, OT, Hospitalist               Hospital Course:   The patient's hospital course was unremarkable.  He recovered as anticipated and experienced no post-operative complications.           Discharge Instructions and Follow-Up:     Discharge diet: Regular   Discharge activity: Activity as tolerated  Minimize bending, lifting, twisting. No lifting greater than 10 lbs. Remember, 1 gallon of milk is 8 lbs.     Discharge follow-up: Follow up with Sonido Crawford PA-C in two weeks at [facility]. Please call [phone] to schedule.      Wound care: Apply bandage daily  Keep wound clean and dry           Discharge Disposition:     Discharged to short-term care facility      Attestation:  I have reviewed  today's vital signs, notes, medications, labs and imaging.    Sonido Crawford PA-C

## 2023-11-01 NOTE — LETTER
[facility]   To:       Please give to facility    From:   Yocasta Barlow    Care Coordinator   [facility]   P: [phone] [email or web address]   Patient Name:  Kyler Rodriguez YOB: 1945   Admit date: 10/27/2023      *Information Needed:  Please contact me when the patient will discharge (or if they will move to long term care)- include the discharge date, disposition, and main diagnosis   If the patient is discharged with home care services, please provide the name of the agency    Also- Please inform me if a care conference is being held.   Phone, Fax or Email with information                Thank you

## 2023-11-01 NOTE — PROGRESS NOTES
[facility] GERIATRICS    PRIMARY CARE PROVIDER AND CLINIC:  Rakesh Clark MD, [address] / [facility] [address]  Chief Complaint   Patient presents with    Hospital F/U      [facility] Medical Record Number:  [id number]  Place of Service where encounter took place:  [facility]     Kyler Rodriguez  is a 78 year old  (1945), admitted to the above facility from  [facility]. Hospital stay 10/24/23 through 10/27/23..     Hospital course was reviewed by me, is as per the hospital discharge summary nurse practitioner note    Patient is a past medical history of degenerative disc disease, prostate cancer, Parkinson's disease, diabetes mellitus type 2, hypertension.    He underwent elective L to-L3 T IL F.  There were no significant postoperative medical complications with exception of urinary retention.  Atenolol was changed to metoprolol in attempt to decrease risk of falls.    Patient reports feeling well.  Back pain is gradually been improving.  He is slowly progressing in therapy.  He denies lower extremity pain or weakness.  He has been having bowel movements.  He is anxious to have catheter discontinued.  He states he was not having voiding difficulties prior to surgery    Vital signs have been stable.  Blood glucoses have been stable            CODE STATUS/ADVANCE DIRECTIVES DISCUSSION:  Prior  CPR/Full code   ALLERGIES:   Allergies   Allergen Reactions    Latex Rash      PAST MEDICAL HISTORY:   Past Medical History:   Diagnosis Date    Elevated prostate specific antigen (PSA)     Essential hypertension, benign     Malignant neoplasm (H) 10/2011    prostate cancer    Malignant neoplasm of right kidney (H)     Mumps     Other and unspecified hyperlipidemia     Parkinsons disease     Prostate infection     Spider veins     Type II or unspecified type diabetes mellitus without mention of complication, not stated as uncontrolled       PAST SURGICAL HISTORY:   has  a past surgical history that includes NONSPECIFIC PROCEDURE (1998); NONSPECIFIC PROCEDURE (1998); VASECTOMY UNILAT/BILAT W POSTOP SEMEN; Eye surgery; Phacoemulsification clear cornea with standard intraocular lens implant, endoscopic cyclophotocoagulation, combined (07/31/2014); Cystoscopy; Vasectomy; colonoscopy (2009 & 2014); colonoscopy (04/08/2019); REMOVAL OF TONSILS,<14 Y/O; daVINCI nephrectomy partial (Right, 09/04/2019); biopsy; Optical tracking system fusion spine posterior lumbar three+ levels (N/A, 10/24/2023); and Laminectomy lumbar posterior microscopic two levels (N/A, 10/24/2023).  FAMILY HISTORY: family history includes Cancer - colorectal in his father; Colon Cancer in his father; Musculoskeletal Disorder in his brother; Prostate Cancer in his father and paternal grandfather.  SOCIAL HISTORY:   reports that he has never smoked. He has never used smokeless tobacco. He reports that he does not currently use alcohol. He reports that he does not use drugs.  Patient's living condition: lives with spouse    Current medications were reviewed by me today    Current Outpatient Medications   Medication Sig    acetaminophen (TYLENOL) 325 MG tablet Take 3 tablets (975 mg) by mouth 4 times daily    amLODIPine-benazepril (LOTREL) 10-20 MG capsule TAKE 1 CAPSULE EVERY DAY    atenolol (TENORMIN) 25 MG tablet TAKE 1 TABLET EVERY DAY    atorvastatin (LIPITOR) 80 MG tablet Take 0.5 tablets (40 mg) by mouth daily    Calcium Carb-Cholecalciferol (CALTRATE 600+D3 SOFT) 600-800 MG-UNIT CHEW Take by mouth every 24 hours    carbidopa-levodopa (SINEMET)  MG per tablet Take 2 tablets by mouth 3 times daily Take at 7 am, 11 am and 3 pm    finasteride (PROSCAR) 5 MG tablet TAKE 1 TABLET EVERY DAY    metFORMIN (GLUCOPHAGE) 1000 MG tablet TAKE 1 TABLET EVERY DAY WITH DINNER    methocarbamol (ROBAXIN) 500 MG tablet Take 1 tablet (500 mg) by mouth every 6 hours as needed for muscle spasms    multivitamin, therapeutic  (THERA-VIT) TABS tablet Take 1 tablet by mouth every morning    oxyCODONE (ROXICODONE) 5 MG tablet Take 1-2 tablets (5-10 mg) by mouth every 6 hours as needed for moderate to severe pain    senna-docusate (SENOKOT-S/PERICOLACE) 8.6-50 MG tablet Take 1-2 tablets by mouth 2 times daily Take while on oral narcotics to prevent or treat constipation.    Vitamin D3 (CHOLECALCIFEROL) 25 mcg (1000 units) tablet Take 1 tablet (25 mcg) by mouth 2 times daily     No current facility-administered medications for this visit.       ROS:  10 point ROS of systems including Constitutional, Eyes, Respiratory, Cardiovascular, Gastroenterology, Genitourinary, Integumentary, Musculoskeletal, Psychiatric were all negative except for pertinent positives noted in my HPI.    Vitals:  BP (!) 153/77   Pulse 68   Temp 98.5  F (36.9  C)   Resp 20   Ht 1.829 m (6')   Wt 85.1 kg (187 lb 9.6 oz)   SpO2 96%   BMI 25.44 kg/m    Exam:  Pleasant, well-nourished appearing male, lying in bed  Appears comfortable  HEENT: Pharynx benign  Lungs clear  CVs regular rhythm  Abdomen soft, nondistended  Extremities without edema  Neuro: Alert, fully oriented.  Face slightly masked.  Speech soft.  No tremors.  No rigidity.  Lower extremity strength grossly intact as assessed the patient lying in bed    Lab/Diagnostic data:  Most Recent 3 CBC's:  Recent Labs   Lab Test 10/25/23  0643 10/17/23  1417 03/22/23  0933   WBC 11.0 8.8 7.9   HGB 10.2* 12.9* 12.0*   MCV 75* 72* 67*    254 296     Most Recent 3 BMP's:  Recent Labs   Lab Test 10/27/23  1151 10/27/23  0803 10/27/23  0228 10/25/23  1246 10/25/23  0643 10/24/23  0957 10/17/23  1417 04/25/23  1310 03/22/23  0933   NA  --   --   --   --  134*  --  131*  --  137   POTASSIUM  --   --   --   --  4.5  --  4.6  --  4.5   CHLORIDE  --   --   --   --  100  --  94*  --  99   CO2  --   --   --   --  25  --  25  --  22   BUN  --   --   --   --  13.4  --  22.8  --  17.8   CR  --   --   --   --  0.72  --  0.74  0.9 0.83   ANIONGAP  --   --   --   --  9  --  12  --  16*   BETH  --   --   --   --  8.6*  --  9.5  --  9.9   * 107* 127*   < > 199*  199*   < > 121*  --  122*    < > = values in this interval not displayed.     Most Recent 2 LFT's:  Recent Labs   Lab Test 10/17/23  1417 03/22/23  0933   AST 22 19   ALT 8 6*   ALKPHOS 70 76   BILITOTAL 0.3 0.5       ASSESSMENT/PLAN:      (M50.30) DDD (degenerative disc disease), cervical  (primary encounter diagnosis)  (Z98.1) S/P lumbar fusion  (R53.81) Physical deconditioning  Comment: Pain control fair.  Patient is progressing in therapy  S/p L2-L3 TILF, central laminectomy of L1-L5  Plan: PT and OT, continue tylenol 975mg QID, robaxin 500mg q 6 hours prn, oxycodone 5-10mg q 6 hours prn,   F/u with spine surgery as directed     (G20.A1) Parkinson's disease, unspecified whether dyskinesia present, unspecified whether manifestations fluctuate  Comment: ongoing  Plan: continue sinemet 25/100mg 2 tabs TID,      (C61) Prostate cancer (H)  (R33.9) Urinary retention  Comment: acute/ongoing  Plan: continue proscar 5mg QD, thornton catheter in place, will attempt TOV once patient is ambulatory  Avoid constipation     (I10) Essential hypertension, benign  Comment: Stable  Plan:  continue amlodipine-benazepril 10-20mg QD, atenolol 25mg every day (per chart, atenolol had been changed to metoprolol the patient was discharged on atenolol)  Monitor blood pressure and heart rate, at rest and with activity     (E11.21) Type 2 diabetes mellitus with diabetic nephropathy, without long-term current use of insulin (H)  Hemoglobin A1c was 6.7 on 9/21/2023  Comment: Stable  Plan: Monitor blood glucoses, currently on no medications         Pb Rodriguez MD

## 2023-11-01 NOTE — LETTER
11/1/2023        RE: Kyler Rodriguez  [address] [facility] [address]        [facility] GERIATRICS    PRIMARY CARE PROVIDER AND CLINIC:  Rakesh Clark MD, [address] / [place] [address]  Chief Complaint   Patient presents with     Hospital F/U      [facility] Medical Record Number:  [id number]  Place of Service where encounter took place:  [facility] (TCU)     Kyler Rodriguez  is a 78 year old  (1945), admitted to the above facility from  [facility]. Hospital stay 10/24/23 through 10/27/23..     Hospital course was reviewed by me, is as per the hospital discharge summary nurse practitioner note    Patient is a past medical history of degenerative disc disease, prostate cancer, Parkinson's disease, diabetes mellitus type 2, hypertension.    He underwent elective L to-L3 T IL F.  There were no significant postoperative medical complications with exception of urinary retention.  Atenolol was changed to metoprolol in attempt to decrease risk of falls.    Patient reports feeling well.  Back pain is gradually been improving.  He is slowly progressing in therapy.  He denies lower extremity pain or weakness.  He has been having bowel movements.  He is anxious to have catheter discontinued.  He states he was not having voiding difficulties prior to surgery    Vital signs have been stable.  Blood glucoses have been stable            CODE STATUS/ADVANCE DIRECTIVES DISCUSSION:  Prior  CPR/Full code   ALLERGIES:   Allergies   Allergen Reactions     Latex Rash      PAST MEDICAL HISTORY:   Past Medical History:   Diagnosis Date     Elevated prostate specific antigen (PSA)      Essential hypertension, benign      Malignant neoplasm (H) 10/2011    prostate cancer     Malignant neoplasm of right kidney (H)      Mumps      Other and unspecified hyperlipidemia      Parkinsons disease      Prostate infection      Spider veins      Type II or unspecified type diabetes  mellitus without mention of complication, not stated as uncontrolled       PAST SURGICAL HISTORY:   has a past surgical history that includes NONSPECIFIC PROCEDURE (1998); NONSPECIFIC PROCEDURE (1998); VASECTOMY UNILAT/BILAT W POSTOP SEMEN; Eye surgery; Phacoemulsification clear cornea with standard intraocular lens implant, endoscopic cyclophotocoagulation, combined (07/31/2014); Cystoscopy; Vasectomy; colonoscopy (2009 & 2014); colonoscopy (04/08/2019); REMOVAL OF TONSILS,<11 Y/O; daVINCI nephrectomy partial (Right, 09/04/2019); biopsy; Optical tracking system fusion spine posterior lumbar three+ levels (N/A, 10/24/2023); and Laminectomy lumbar posterior microscopic two levels (N/A, 10/24/2023).  FAMILY HISTORY: family history includes Cancer - colorectal in his father; Colon Cancer in his father; Musculoskeletal Disorder in his brother; Prostate Cancer in his father and paternal grandfather.  SOCIAL HISTORY:   reports that he has never smoked. He has never used smokeless tobacco. He reports that he does not currently use alcohol. He reports that he does not use drugs.  Patient's living condition: lives with spouse    Current medications were reviewed by me today    Current Outpatient Medications   Medication Sig     acetaminophen (TYLENOL) 325 MG tablet Take 3 tablets (975 mg) by mouth 4 times daily     amLODIPine-benazepril (LOTREL) 10-20 MG capsule TAKE 1 CAPSULE EVERY DAY     atenolol (TENORMIN) 25 MG tablet TAKE 1 TABLET EVERY DAY     atorvastatin (LIPITOR) 80 MG tablet Take 0.5 tablets (40 mg) by mouth daily     Calcium Carb-Cholecalciferol (CALTRATE 600+D3 SOFT) 600-800 MG-UNIT CHEW Take by mouth every 24 hours     carbidopa-levodopa (SINEMET)  MG per tablet Take 2 tablets by mouth 3 times daily Take at 7 am, 11 am and 3 pm     finasteride (PROSCAR) 5 MG tablet TAKE 1 TABLET EVERY DAY     metFORMIN (GLUCOPHAGE) 1000 MG tablet TAKE 1 TABLET EVERY DAY WITH DINNER     methocarbamol (ROBAXIN) 500 MG  tablet Take 1 tablet (500 mg) by mouth every 6 hours as needed for muscle spasms     multivitamin, therapeutic (THERA-VIT) TABS tablet Take 1 tablet by mouth every morning     oxyCODONE (ROXICODONE) 5 MG tablet Take 1-2 tablets (5-10 mg) by mouth every 6 hours as needed for moderate to severe pain     senna-docusate (SENOKOT-S/PERICOLACE) 8.6-50 MG tablet Take 1-2 tablets by mouth 2 times daily Take while on oral narcotics to prevent or treat constipation.     Vitamin D3 (CHOLECALCIFEROL) 25 mcg (1000 units) tablet Take 1 tablet (25 mcg) by mouth 2 times daily     No current facility-administered medications for this visit.       ROS:  10 point ROS of systems including Constitutional, Eyes, Respiratory, Cardiovascular, Gastroenterology, Genitourinary, Integumentary, Musculoskeletal, Psychiatric were all negative except for pertinent positives noted in my HPI.    Vitals:  BP (!) 153/77   Pulse 68   Temp 98.5  F (36.9  C)   Resp 20   Ht 1.829 m (6')   Wt 85.1 kg (187 lb 9.6 oz)   SpO2 96%   BMI 25.44 kg/m    Exam:  Pleasant, well-nourished appearing male, lying in bed  Appears comfortable  HEENT: Pharynx benign  Lungs clear  CVs regular rhythm  Abdomen soft, nondistended  Extremities without edema  Neuro: Alert, fully oriented.  Face slightly masked.  Speech soft.  No tremors.  No rigidity.  Lower extremity strength grossly intact as assessed the patient lying in bed    Lab/Diagnostic data:  Most Recent 3 CBC's:  Recent Labs   Lab Test 10/25/23  0643 10/17/23  1417 03/22/23  0933   WBC 11.0 8.8 7.9   HGB 10.2* 12.9* 12.0*   MCV 75* 72* 67*    254 296     Most Recent 3 BMP's:  Recent Labs   Lab Test 10/27/23  1151 10/27/23  0803 10/27/23  0228 10/25/23  1246 10/25/23  0643 10/24/23  0957 10/17/23  1417 04/25/23  1310 03/22/23  0933   NA  --   --   --   --  134*  --  131*  --  137   POTASSIUM  --   --   --   --  4.5  --  4.6  --  4.5   CHLORIDE  --   --   --   --  100  --  94*  --  99   CO2  --   --   --    --  25  --  25  --  22   BUN  --   --   --   --  13.4  --  22.8  --  17.8   CR  --   --   --   --  0.72  --  0.74 0.9 0.83   ANIONGAP  --   --   --   --  9  --  12  --  16*   BETH  --   --   --   --  8.6*  --  9.5  --  9.9   * 107* 127*   < > 199*  199*   < > 121*  --  122*    < > = values in this interval not displayed.     Most Recent 2 LFT's:  Recent Labs   Lab Test 10/17/23  1417 03/22/23  0933   AST 22 19   ALT 8 6*   ALKPHOS 70 76   BILITOTAL 0.3 0.5       ASSESSMENT/PLAN:      (M50.30) DDD (degenerative disc disease), cervical  (primary encounter diagnosis)  (Z98.1) S/P lumbar fusion  (R53.81) Physical deconditioning  Comment: Pain control fair.  Patient is progressing in therapy  S/p L2-L3 TILF, central laminectomy of L1-L5  Plan: PT and OT, continue tylenol 975mg QID, robaxin 500mg q 6 hours prn, oxycodone 5-10mg q 6 hours prn,   F/u with spine surgery as directed     (G20.A1) Parkinson's disease, unspecified whether dyskinesia present, unspecified whether manifestations fluctuate  Comment: ongoing  Plan: continue sinemet 25/100mg 2 tabs TID,      (C61) Prostate cancer (H)  (R33.9) Urinary retention  Comment: acute/ongoing  Plan: continue proscar 5mg QD, thornton catheter in place, will attempt TOV once patient is ambulatory  Avoid constipation     (I10) Essential hypertension, benign  Comment: Stable  Plan:  continue amlodipine-benazepril 10-20mg QD, atenolol 25mg every day (per chart, atenolol had been changed to metoprolol the patient was discharged on atenolol)  Monitor blood pressure and heart rate, at rest and with activity     (E11.21) Type 2 diabetes mellitus with diabetic nephropathy, without long-term current use of insulin (H)  Hemoglobin A1c was 6.7 on 9/21/2023  Comment: Stable  Plan: Monitor blood glucoses, currently on no medications         Pb Rodriguez MD      Sincerely,        Pb Rodriguez MD

## 2023-11-01 NOTE — PROGRESS NOTES
[facility] GERIATRICS    Chief Complaint   Patient presents with    RECHECK     HPI:  Kyler Rodriguez is a 78 year old  (1945), who is being seen today for an episodic care visit at: [facility] ([facility]) [25]. Today's concern is:   DDD s/p lumbar fusion: on exam today patient states pain in back is rated 5/10 in low back, no radiation   In therapy patient is walking up to 150 feet using a RW with CGA  Urinary retention: continues with Cantu catheter, will continue until patient is more mobile  HTN: /77, 106/67, 144/72 with HR 60 range, denies CP, palpitations, SOB  DMII: see labs      Allergies, and PMH/PSH reviewed in [facility] today.  REVIEW OF SYSTEMS:  10 point ROS of systems including Constitutional, Eyes, Respiratory, Cardiovascular, Gastroenterology, Genitourinary, Integumentary, Musculoskeletal, Psychiatric were all negative except for pertinent positives noted in my HPI.    Objective:   Ht 1.829 m (6')   Wt 84.8 kg (187 lb)   BMI 25.36 kg/m    GENERAL APPEARANCE:  Alert, in no distress, morbidly obese  ENT:  Mouth and posterior oropharynx normal, moist mucous membranes, normal hearing acuity  EYES:  EOM, conjunctivae, lids, pupils and irises normal, PERRL  RESP:  respiratory effort and palpation of chest normal, lungs clear to auscultation , no respiratory distress  CV:  Palpation and auscultation of heart done , regular rate and rhythm, no murmur, rub, or gallop, no edema  ABDOMEN:  normal bowel sounds, soft, nontender, no hepatosplenomegaly or other masses  M/S:   patient resting in bed  SKIN:  Inspection of skin and subcutaneous tissue baseline, did not visualize surgical incision  NEURO:   speech wnl  PSYCH:  affect and mood normal    Recent labs in [facility] reviewed by me today.  and Most Recent 3 CBC's:  Recent Labs   Lab Test 10/25/23  0643 10/17/23  1417 03/22/23  0933   WBC 11.0 8.8 7.9   HGB 10.2* 12.9* 12.0*   MCV 75* 72* 67*    254 296     Most Recent 3 BMP's:  Recent Labs    Lab Test 10/27/23  1151 10/27/23  0803 10/27/23  0228 10/25/23  1246 10/25/23  0643 10/24/23  0957 10/17/23  1417 04/25/23  1310 03/22/23  0933   NA  --   --   --   --  134*  --  131*  --  137   POTASSIUM  --   --   --   --  4.5  --  4.6  --  4.5   CHLORIDE  --   --   --   --  100  --  94*  --  99   CO2  --   --   --   --  25  --  25  --  22   BUN  --   --   --   --  13.4  --  22.8  --  17.8   CR  --   --   --   --  0.72  --  0.74 0.9 0.83   ANIONGAP  --   --   --   --  9  --  12  --  16*   BETH  --   --   --   --  8.6*  --  9.5  --  9.9   * 107* 127*   < > 199*  199*   < > 121*  --  122*    < > = values in this interval not displayed.       Assessment/Plan:  (M50.30) DDD (degenerative disc disease), cervical  (primary encounter diagnosis)  (Z98.1) S/P lumbar fusion  (R53.81) Physical deconditioning  Comment: acute/ongoing  S/p L2-L3 TILF, central laminectomy of L1-L5  Plan: PT and OT, continue tylenol 975mg QID, robaxin 500mg q 6 hours prn, oxycodone 5-10mg q 6 hours prn,   F/u with spine surgery as directed     (G20.A1) Parkinson's disease, unspecified whether dyskinesia present, unspecified whether manifestations fluctuate  Comment: ongoing  Plan: continue sinemet 25/100mg 2 tabs TID,      (C61) Prostate cancer (H)  (R33.9) Urinary retention  Comment: acute/ongoing  Plan: continue proscar 5mg QD, thornton catheter in place, will attempt TOV once patient is ambulatory     (I10) Essential hypertension, benign  Comment: acute/ongoing  Plan: BMP follow, continue amlodipine-benazepril 10-20mg QD, atenolol 25mg QD     (E11.21) Type 2 diabetes mellitus with diabetic nephropathy, without long-term current use of insulin (H)  Comment: acute/ongoing  Plan: BMP follow, continue to follow off Rx       MED REC REQUIRED  Post Medication Reconciliation Status: medication reconcilation previously completed during another office visit      Orders:  No new orders      Electronically signed by: Tonya Lynn Haase, APRN CNP

## 2023-11-02 ENCOUNTER — TRANSITIONAL CARE UNIT VISIT (OUTPATIENT)
Dept: GERIATRICS | Facility: CLINIC | Age: 78
End: 2023-11-02
Payer: MEDICARE

## 2023-11-02 VITALS
HEIGHT: 72 IN | SYSTOLIC BLOOD PRESSURE: 153 MMHG | BODY MASS INDEX: 25.33 KG/M2 | RESPIRATION RATE: 20 BRPM | TEMPERATURE: 98.5 F | DIASTOLIC BLOOD PRESSURE: 77 MMHG | OXYGEN SATURATION: 96 % | WEIGHT: 187 LBS | HEART RATE: 68 BPM

## 2023-11-02 DIAGNOSIS — E11.21 TYPE 2 DIABETES MELLITUS WITH DIABETIC NEPHROPATHY, WITHOUT LONG-TERM CURRENT USE OF INSULIN (H): ICD-10-CM

## 2023-11-02 DIAGNOSIS — C61 PROSTATE CANCER (H): ICD-10-CM

## 2023-11-02 DIAGNOSIS — R33.9 URINARY RETENTION: ICD-10-CM

## 2023-11-02 DIAGNOSIS — G20.A1 PARKINSON'S DISEASE, UNSPECIFIED WHETHER DYSKINESIA PRESENT, UNSPECIFIED WHETHER MANIFESTATIONS FLUCTUATE (H): ICD-10-CM

## 2023-11-02 DIAGNOSIS — R53.81 PHYSICAL DECONDITIONING: ICD-10-CM

## 2023-11-02 DIAGNOSIS — I10 ESSENTIAL HYPERTENSION, BENIGN: ICD-10-CM

## 2023-11-02 DIAGNOSIS — M50.30 DDD (DEGENERATIVE DISC DISEASE), CERVICAL: Primary | ICD-10-CM

## 2023-11-02 DIAGNOSIS — Z98.1 S/P LUMBAR FUSION: ICD-10-CM

## 2023-11-02 PROCEDURE — 99310 SBSQ NF CARE HIGH MDM 45: CPT | Performed by: NURSE PRACTITIONER

## 2023-11-02 NOTE — LETTER
11/2/2023        RE: Kyler Rodriguez  [address] [address] [address]        [facility]    Chief Complaint   Patient presents with     RECHECK     HPI:  Kyler Rodriguez is a 78 year old  (1945), who is being seen today for an episodic care visit at: [facility]) [25]. Today's concern is:   DDD s/p lumbar fusion: on exam today patient states pain in back is rated 5/10 in low back, no radiation   In therapy patient is walking up to 150 feet using a RW with CGA  Urinary retention: continues with Cantu catheter, will continue until patient is more mobile  HTN: /77, 106/67, 144/72 with HR 60 range, denies CP, palpitations, SOB  DMII: see labs      Allergies, and PMH/PSH reviewed in [facility] today.  REVIEW OF SYSTEMS:  10 point ROS of systems including Constitutional, Eyes, Respiratory, Cardiovascular, Gastroenterology, Genitourinary, Integumentary, Musculoskeletal, Psychiatric were all negative except for pertinent positives noted in my HPI.    Objective:   Ht 1.829 m (6')   Wt 84.8 kg (187 lb)   BMI 25.36 kg/m    GENERAL APPEARANCE:  Alert, in no distress, morbidly obese  ENT:  Mouth and posterior oropharynx normal, moist mucous membranes, normal hearing acuity  EYES:  EOM, conjunctivae, lids, pupils and irises normal, PERRL  RESP:  respiratory effort and palpation of chest normal, lungs clear to auscultation , no respiratory distress  CV:  Palpation and auscultation of heart done , regular rate and rhythm, no murmur, rub, or gallop, no edema  ABDOMEN:  normal bowel sounds, soft, nontender, no hepatosplenomegaly or other masses  M/S:   patient resting in bed  SKIN:  Inspection of skin and subcutaneous tissue baseline, did not visualize surgical incision  NEURO:   speech wnl  PSYCH:  affect and mood normal    Recent labs in [facility] reviewed by me today.  and Most Recent 3 CBC's:  Recent Labs   Lab Test 10/25/23  0643 10/17/23  1417 03/22/23  0933   WBC 11.0 8.8 7.9   HGB  10.2* 12.9* 12.0*   MCV 75* 72* 67*    254 296     Most Recent 3 BMP's:  Recent Labs   Lab Test 10/27/23  1151 10/27/23  0803 10/27/23  0228 10/25/23  1246 10/25/23  0643 10/24/23  0957 10/17/23  1417 04/25/23  1310 03/22/23  0933   NA  --   --   --   --  134*  --  131*  --  137   POTASSIUM  --   --   --   --  4.5  --  4.6  --  4.5   CHLORIDE  --   --   --   --  100  --  94*  --  99   CO2  --   --   --   --  25  --  25  --  22   BUN  --   --   --   --  13.4  --  22.8  --  17.8   CR  --   --   --   --  0.72  --  0.74 0.9 0.83   ANIONGAP  --   --   --   --  9  --  12  --  16*   BETH  --   --   --   --  8.6*  --  9.5  --  9.9   * 107* 127*   < > 199*  199*   < > 121*  --  122*    < > = values in this interval not displayed.       Assessment/Plan:  (M50.30) DDD (degenerative disc disease), cervical  (primary encounter diagnosis)  (Z98.1) S/P lumbar fusion  (R53.81) Physical deconditioning  Comment: acute/ongoing  S/p L2-L3 TILF, central laminectomy of L1-L5  Plan: PT and OT, continue tylenol 975mg QID, robaxin 500mg q 6 hours prn, oxycodone 5-10mg q 6 hours prn,   F/u with spine surgery as directed     (G20.A1) Parkinson's disease, unspecified whether dyskinesia present, unspecified whether manifestations fluctuate  Comment: ongoing  Plan: continue sinemet 25/100mg 2 tabs TID,      (C61) Prostate cancer (H)  (R33.9) Urinary retention  Comment: acute/ongoing  Plan: continue proscar 5mg QD, thornton catheter in place, will attempt TOV once patient is ambulatory     (I10) Essential hypertension, benign  Comment: acute/ongoing  Plan: BMP follow, continue amlodipine-benazepril 10-20mg QD, atenolol 25mg QD     (E11.21) Type 2 diabetes mellitus with diabetic nephropathy, without long-term current use of insulin (H)  Comment: acute/ongoing  Plan: BMP follow, continue to follow off Rx       MED REC REQUIRED  Post Medication Reconciliation Status: medication reconcilation previously completed during another office  visit      Orders:  No new orders      Electronically signed by: Tonya Lynn Haase, APRN CNP           Sincerely,        Tonya Lynn Haase, APRN CNP

## 2023-11-03 ENCOUNTER — TELEPHONE (OUTPATIENT)
Dept: GERIATRICS | Facility: CLINIC | Age: 78
End: 2023-11-03
Payer: MEDICARE

## 2023-11-03 NOTE — TELEPHONE ENCOUNTER
[facility] Geriatrics Lab Note     Provider: Tonya Haase, APRN CNP  Facility: [facility] Facility Type:  TCU    Allergies   Allergen Reactions    Latex Rash       Labs Reviewed by provider:            Verbal Order/Direction given by Provider: NNO    Provider giving Order:  Tonya Haase, APRN CNP    Verbal Order given to: Jazlyn [phone]    Ashley Arias RN

## 2023-11-06 VITALS
HEIGHT: 72 IN | OXYGEN SATURATION: 96 % | WEIGHT: 187 LBS | SYSTOLIC BLOOD PRESSURE: 130 MMHG | HEART RATE: 68 BPM | BODY MASS INDEX: 25.33 KG/M2 | RESPIRATION RATE: 16 BRPM | DIASTOLIC BLOOD PRESSURE: 66 MMHG | TEMPERATURE: 97.9 F

## 2023-11-06 NOTE — PROGRESS NOTES
[facility] GERIATRICS    Chief Complaint   Patient presents with    RECHECK     HPI:  Kyler Rodriguez is a 78 year old  (1945), who is being seen today for an episodic care visit at: [facility]) [25]. Today's concern is:   DDD s/p lumbar fusion: on exam today patient states pain in back is very little at rest, does increase with movement but has improved overall.    In therapy patient is walking up to 200 feet using a RW with CGA  Urinary retention: continues with Cantu catheter, will continue until patient is more mobile  HTN: /66, 114/68, 130/66 with HR 60 range, denies CP, palpitations, SOB  DMII: hgbA1C of 6.8 on 11/2/23    Allergies, and PMH/PSH reviewed in [facility] today.  REVIEW OF SYSTEMS:  10 point ROS of systems including Constitutional, Eyes, Respiratory, Cardiovascular, Gastroenterology, Genitourinary, Integumentary, Musculoskeletal, Psychiatric were all negative except for pertinent positives noted in my HPI.    Objective:   /66   Pulse 68   Temp 97.9  F (36.6  C)   Resp 16   Ht 1.829 m (6')   Wt 84.8 kg (187 lb)   SpO2 96%   BMI 25.36 kg/m    GENERAL APPEARANCE:  Alert, in no distress  ENT:  Mouth and posterior oropharynx normal, moist mucous membranes, [place]  EYES:  EOM, conjunctivae, lids, pupils and irises normal, PERRL  RESP:  respiratory effort and palpation of chest normal, lungs clear to auscultation , no respiratory distress  CV:  Palpation and auscultation of heart done , regular rate and rhythm, no murmur, rub, or gallop, peripheral edema trace+ in LE bilaterally  ABDOMEN:  normal bowel sounds, soft, nontender, no hepatosplenomegaly or other masses  M/S:   patient resting in bed  SKIN:  Inspection of skin and subcutaneous tissue baseline  NEURO:   speech wnl  PSYCH:  affect and mood normal    Recent labs in [facility] reviewed by me today.  and Most Recent 3 CBC's:  Recent Labs   Lab Test 10/25/23  0643 10/17/23  1417 03/22/23  0933   WBC 11.0 8.8 7.9   HGB 10.2*  12.9* 12.0*   MCV 75* 72* 67*    254 296     Most Recent 3 BMP's:  Recent Labs   Lab Test 10/27/23  1151 10/27/23  0803 10/27/23  0228 10/25/23  1246 10/25/23  0643 10/24/23  0957 10/17/23  1417 04/25/23  1310 03/22/23  0933   NA  --   --   --   --  134*  --  131*  --  137   POTASSIUM  --   --   --   --  4.5  --  4.6  --  4.5   CHLORIDE  --   --   --   --  100  --  94*  --  99   CO2  --   --   --   --  25  --  25  --  22   BUN  --   --   --   --  13.4  --  22.8  --  17.8   CR  --   --   --   --  0.72  --  0.74 0.9 0.83   ANIONGAP  --   --   --   --  9  --  12  --  16*   BETH  --   --   --   --  8.6*  --  9.5  --  9.9   * 107* 127*   < > 199*  199*   < > 121*  --  122*    < > = values in this interval not displayed.       Assessment/Plan:  (M50.30) DDD (degenerative disc disease), cervical  (primary encounter diagnosis)  (Z98.1) S/P lumbar fusion  (R53.81) Physical deconditioning  Comment: acute/ongoing, no change  S/p L2-L3 TILF, central laminectomy of L1-L5  Plan: PT and OT, continue tylenol 975mg QID, robaxin 500mg q 6 hours prn, oxycodone 5-10mg q 6 hours prn,   F/u with spine surgery as directed     (G20.A1) Parkinson's disease, unspecified whether dyskinesia present, unspecified whether manifestations fluctuate  Comment: ongoing, no change  Plan: continue sinemet 25/100mg 2 tabs TID,      (C61) Prostate cancer (H)  (R33.9) Urinary retention  Comment: acute/ongoing  Plan: continue proscar 5mg QD, thornton catheter in place, will attempt TOV once patient is ambulatory     (I10) Essential hypertension, benign  Comment: acute/ongoing, no change  Plan: BMP follow, continue , amlodipine-benazepril 10-20mg QD, atenolol 25mg QD     (E11.21) Type 2 diabetes mellitus with diabetic nephropathy, without long-term current use of insulin (H)  Comment: acute/ongoing  HgbA1C of 6.87 on 11/2/23  Plan: BMP follow, continue to follow off Rx          MED REC REQUIRED  Post Medication Reconciliation Status: medication  reconcilation previously completed during another office visit      Orders:  No new orders      Electronically signed by: Tonya Lynn Haase, APRN CNP

## 2023-11-07 ENCOUNTER — TRANSITIONAL CARE UNIT VISIT (OUTPATIENT)
Dept: GERIATRICS | Facility: CLINIC | Age: 78
End: 2023-11-07
Payer: MEDICARE

## 2023-11-07 DIAGNOSIS — C61 PROSTATE CANCER (H): ICD-10-CM

## 2023-11-07 DIAGNOSIS — R53.81 PHYSICAL DECONDITIONING: ICD-10-CM

## 2023-11-07 DIAGNOSIS — Z98.1 S/P LUMBAR FUSION: ICD-10-CM

## 2023-11-07 DIAGNOSIS — G20.A1 PARKINSON'S DISEASE, UNSPECIFIED WHETHER DYSKINESIA PRESENT, UNSPECIFIED WHETHER MANIFESTATIONS FLUCTUATE (H): ICD-10-CM

## 2023-11-07 DIAGNOSIS — E11.21 TYPE 2 DIABETES MELLITUS WITH DIABETIC NEPHROPATHY, WITHOUT LONG-TERM CURRENT USE OF INSULIN (H): ICD-10-CM

## 2023-11-07 DIAGNOSIS — I10 ESSENTIAL HYPERTENSION, BENIGN: ICD-10-CM

## 2023-11-07 DIAGNOSIS — R33.9 URINARY RETENTION: ICD-10-CM

## 2023-11-07 DIAGNOSIS — M50.30 DDD (DEGENERATIVE DISC DISEASE), CERVICAL: Primary | ICD-10-CM

## 2023-11-07 PROCEDURE — 99310 SBSQ NF CARE HIGH MDM 45: CPT | Performed by: NURSE PRACTITIONER

## 2023-11-07 NOTE — LETTER
11/7/2023        RE: Kyler Rodriguez  [address] [facility] [address]        [facility]    Chief Complaint   Patient presents with     RECHECK     HPI:  Kyler Rodriguez is a 78 year old  (1945), who is being seen today for an episodic care visit at: [facility]) [25]. Today's concern is:   DDD s/p lumbar fusion: on exam today patient states pain in back is very little at rest, does increase with movement but has improved overall.    In therapy patient is walking up to 200 feet using a RW with CGA  Urinary retention: continues with Cantu catheter, will continue until patient is more mobile  HTN: /66, 114/68, 130/66 with HR 60 range, denies CP, palpitations, SOB  DMII: hgbA1C of 6.8 on 11/2/23    Allergies, and PMH/PSH reviewed in [facility] today.  REVIEW OF SYSTEMS:  10 point ROS of systems including Constitutional, Eyes, Respiratory, Cardiovascular, Gastroenterology, Genitourinary, Integumentary, Musculoskeletal, Psychiatric were all negative except for pertinent positives noted in my HPI.    Objective:   /66   Pulse 68   Temp 97.9  F (36.6  C)   Resp 16   Ht 1.829 m (6')   Wt 84.8 kg (187 lb)   SpO2 96%   BMI 25.36 kg/m    GENERAL APPEARANCE:  Alert, in no distress  ENT:  Mouth and posterior oropharynx normal, moist mucous membranes, [place]  EYES:  EOM, conjunctivae, lids, pupils and irises normal, PERRL  RESP:  respiratory effort and palpation of chest normal, lungs clear to auscultation , no respiratory distress  CV:  Palpation and auscultation of heart done , regular rate and rhythm, no murmur, rub, or gallop, peripheral edema trace+ in LE bilaterally  ABDOMEN:  normal bowel sounds, soft, nontender, no hepatosplenomegaly or other masses  M/S:   patient resting in bed  SKIN:  Inspection of skin and subcutaneous tissue baseline  NEURO:   speech wnl  PSYCH:  affect and mood normal    Recent labs in [facility] reviewed by me today.  and Most Recent 3 CBC's:  Recent  Labs   Lab Test 10/25/23  0643 10/17/23  1417 03/22/23  0933   WBC 11.0 8.8 7.9   HGB 10.2* 12.9* 12.0*   MCV 75* 72* 67*    254 296     Most Recent 3 BMP's:  Recent Labs   Lab Test 10/27/23  1151 10/27/23  0803 10/27/23  0228 10/25/23  1246 10/25/23  0643 10/24/23  0957 10/17/23  1417 04/25/23  1310 03/22/23  0933   NA  --   --   --   --  134*  --  131*  --  137   POTASSIUM  --   --   --   --  4.5  --  4.6  --  4.5   CHLORIDE  --   --   --   --  100  --  94*  --  99   CO2  --   --   --   --  25  --  25  --  22   BUN  --   --   --   --  13.4  --  22.8  --  17.8   CR  --   --   --   --  0.72  --  0.74 0.9 0.83   ANIONGAP  --   --   --   --  9  --  12  --  16*   BETH  --   --   --   --  8.6*  --  9.5  --  9.9   * 107* 127*   < > 199*  199*   < > 121*  --  122*    < > = values in this interval not displayed.       Assessment/Plan:  (M50.30) DDD (degenerative disc disease), cervical  (primary encounter diagnosis)  (Z98.1) S/P lumbar fusion  (R53.81) Physical deconditioning  Comment: acute/ongoing, no change  S/p L2-L3 TILF, central laminectomy of L1-L5  Plan: PT and OT, continue tylenol 975mg QID, robaxin 500mg q 6 hours prn, oxycodone 5-10mg q 6 hours prn,   F/u with spine surgery as directed     (G20.A1) Parkinson's disease, unspecified whether dyskinesia present, unspecified whether manifestations fluctuate  Comment: ongoing, no change  Plan: continue sinemet 25/100mg 2 tabs TID,      (C61) Prostate cancer (H)  (R33.9) Urinary retention  Comment: acute/ongoing  Plan: continue proscar 5mg QD, thornton catheter in place, will attempt TOV once patient is ambulatory     (I10) Essential hypertension, benign  Comment: acute/ongoing, no change  Plan: BMP follow, continue , amlodipine-benazepril 10-20mg QD, atenolol 25mg QD     (E11.21) Type 2 diabetes mellitus with diabetic nephropathy, without long-term current use of insulin (H)  Comment: acute/ongoing  HgbA1C of 6.87 on 11/2/23  Plan: BMP follow, continue to  follow off Rx          MED REC REQUIRED  Post Medication Reconciliation Status: medication reconcilation previously completed during another office visit      Orders:  No new orders      Electronically signed by: Tonya Lynn Haase, APRN CNP         Sincerely,        Tonya Lynn Haase, APRN CNP

## 2023-11-08 ENCOUNTER — TRANSFERRED RECORDS (OUTPATIENT)
Dept: HEALTH INFORMATION MANAGEMENT | Facility: CLINIC | Age: 78
End: 2023-11-08
Payer: MEDICARE

## 2023-11-09 ENCOUNTER — TRANSITIONAL CARE UNIT VISIT (OUTPATIENT)
Dept: GERIATRICS | Facility: CLINIC | Age: 78
End: 2023-11-09
Payer: MEDICARE

## 2023-11-09 VITALS
SYSTOLIC BLOOD PRESSURE: 125 MMHG | OXYGEN SATURATION: 93 % | BODY MASS INDEX: 25.5 KG/M2 | TEMPERATURE: 98.8 F | WEIGHT: 188 LBS | DIASTOLIC BLOOD PRESSURE: 72 MMHG | HEART RATE: 67 BPM | RESPIRATION RATE: 16 BRPM

## 2023-11-09 DIAGNOSIS — R53.81 PHYSICAL DECONDITIONING: ICD-10-CM

## 2023-11-09 DIAGNOSIS — R33.9 URINARY RETENTION: ICD-10-CM

## 2023-11-09 DIAGNOSIS — E11.21 TYPE 2 DIABETES MELLITUS WITH DIABETIC NEPHROPATHY, WITHOUT LONG-TERM CURRENT USE OF INSULIN (H): ICD-10-CM

## 2023-11-09 DIAGNOSIS — I10 ESSENTIAL HYPERTENSION, BENIGN: ICD-10-CM

## 2023-11-09 DIAGNOSIS — M50.30 DDD (DEGENERATIVE DISC DISEASE), CERVICAL: Primary | ICD-10-CM

## 2023-11-09 DIAGNOSIS — G20.A1 PARKINSON'S DISEASE, UNSPECIFIED WHETHER DYSKINESIA PRESENT, UNSPECIFIED WHETHER MANIFESTATIONS FLUCTUATE (H): ICD-10-CM

## 2023-11-09 DIAGNOSIS — Z98.1 S/P LUMBAR FUSION: ICD-10-CM

## 2023-11-09 DIAGNOSIS — C61 PROSTATE CANCER (H): ICD-10-CM

## 2023-11-09 PROCEDURE — 99310 SBSQ NF CARE HIGH MDM 45: CPT | Performed by: NURSE PRACTITIONER

## 2023-11-09 NOTE — PROGRESS NOTES
"[facility] GERIATRICS    Chief Complaint   Patient presents with    RECHECK     HPI:  Kyler Rodriguez is a 78 year old  (1945), who is being seen today for an episodic care visit at: [facility]) [25]. Today's concern is:   DDD s/p lumbar fusion: on exam today patient is sitting up in w/c, denies pain at rest, has some pain with movement, states walking \"feels good\"  In therapy patient is walking up to 200 feet using a RW with CGA, assist with ADL's  Urinary retention: thornton catheter DC this AM, will monitor PVR's  HTN: /72, 98/60, 120/66  with HR 60 range, denies CP, palpitations, SOB  DMII: hgbA1C of 6.8 on 11/2/23       Allergies, and PMH/PSH reviewed in [facility] today.  REVIEW OF SYSTEMS:  10 point ROS of systems including Constitutional, Eyes, Respiratory, Cardiovascular, Gastroenterology, Genitourinary, Integumentary, Musculoskeletal, Psychiatric were all negative except for pertinent positives noted in my HPI.    Objective:   /72   Pulse 67   Temp 98.8  F (37.1  C)   Resp 16   Wt 85.3 kg (188 lb)   SpO2 93%   BMI 25.50 kg/m    GENERAL APPEARANCE:  Alert, in no distress, morbidly obese  ENT:  Mouth and posterior oropharynx normal, moist mucous membranes, normal hearing acuity  EYES:  EOM, conjunctivae, lids, pupils and irises normal, PERRL  RESP:  respiratory effort and palpation of chest normal, lungs clear to auscultation , no respiratory distress  CV:  Palpation and auscultation of heart done , regular rate and rhythm, no murmur, rub, or gallop, peripheral edema trace+ in LE bilaterally  ABDOMEN:  normal bowel sounds, soft, nontender, no hepatosplenomegaly or other masses  M/S:   patient sitting up in w/c, able to move all 4 extremities  SKIN:  Inspection of skin and subcutaneous tissue baseline, did not visualize surgical incision  NEURO:   speech wnl  PSYCH:  affect and mood normal    Recent labs in [facility] reviewed by me today.  and Most Recent 3 CBC's:  Recent Labs   Lab " Test 10/25/23  0643 10/17/23  1417 03/22/23  0933   WBC 11.0 8.8 7.9   HGB 10.2* 12.9* 12.0*   MCV 75* 72* 67*    254 296     Most Recent 3 BMP's:  Recent Labs   Lab Test 10/27/23  1151 10/27/23  0803 10/27/23  0228 10/25/23  1246 10/25/23  0643 10/24/23  0957 10/17/23  1417 04/25/23  1310 03/22/23  0933   NA  --   --   --   --  134*  --  131*  --  137   POTASSIUM  --   --   --   --  4.5  --  4.6  --  4.5   CHLORIDE  --   --   --   --  100  --  94*  --  99   CO2  --   --   --   --  25  --  25  --  22   BUN  --   --   --   --  13.4  --  22.8  --  17.8   CR  --   --   --   --  0.72  --  0.74 0.9 0.83   ANIONGAP  --   --   --   --  9  --  12  --  16*   BETH  --   --   --   --  8.6*  --  9.5  --  9.9   * 107* 127*   < > 199*  199*   < > 121*  --  122*    < > = values in this interval not displayed.       Assessment/Plan:  (M50.30) DDD (degenerative disc disease), cervical  (primary encounter diagnosis)  (Z98.1) S/P lumbar fusion  (R53.81) Physical deconditioning  Comment: acute/ongoing, no change  S/p L2-L3 TILF, central laminectomy of L1-L5  Plan: PT and OT, continue tylenol 975mg QID, robaxin 500mg q 6 hours prn, oxycodone 5-10mg q 6 hours prn,   F/u with spine surgery as directed     (G20.A1) Parkinson's disease, unspecified whether dyskinesia present, unspecified whether manifestations fluctuate  Comment: ongoing, no change  Plan: continue sinemet 25/100mg 2 tabs TID,      (C61) Prostate cancer (H)  (R33.9) Urinary retention  Comment: acute/ongoing  Plan: continue proscar 5mg QD,   [place] catheter DC 11/9/23, PVR q shift, straight cath for PVR > 350cc     (I10) Essential hypertension, benign  Comment: acute/ongoing, no change  Plan: BMP follow, continue , amlodipine-benazepril 10-20mg QD, atenolol 25mg QD     (E11.21) Type 2 diabetes mellitus with diabetic nephropathy, without long-term current use of insulin (H)  Comment: acute/ongoing, no change  HgbA1C of 6.87 on 11/2/23  Plan: BMP follow, continue  to follow off Rx       MED REC REQUIRED  Post Medication Reconciliation Status: medication reconcilation previously completed during another office visit      Orders:  No new orders      Electronically signed by: Tonya Lynn Haase, APRN CNP

## 2023-11-09 NOTE — LETTER
"    11/9/2023        RE: Kyler Rodriguez  [address] [facility] [facility] [address]        [facility] GERIATRICS    Chief Complaint   Patient presents with     RECHECK     HPI:  Kyler Rodriguez is a 78 year old  (1945), who is being seen today for an episodic care visit at: [facility]) [25]. Today's concern is:   DDD s/p lumbar fusion: on exam today patient is sitting up in w/c, denies pain at rest, has some pain with movement, states walking \"feels good\"  In therapy patient is walking up to 200 feet using a RW with CGA, assist with ADL's  Urinary retention: thornton catheter DC this AM, will monitor PVR's  HTN: /72, 98/60, 120/66  with HR 60 range, denies CP, palpitations, SOB  DMII: hgbA1C of 6.8 on 11/2/23       Allergies, and PMH/PSH reviewed in [facility] today.  REVIEW OF SYSTEMS:  10 point ROS of systems including Constitutional, Eyes, Respiratory, Cardiovascular, Gastroenterology, Genitourinary, Integumentary, Musculoskeletal, Psychiatric were all negative except for pertinent positives noted in my HPI.    Objective:   /72   Pulse 67   Temp 98.8  F (37.1  C)   Resp 16   Wt 85.3 kg (188 lb)   SpO2 93%   BMI 25.50 kg/m    GENERAL APPEARANCE:  Alert, in no distress, morbidly obese  ENT:  Mouth and posterior oropharynx normal, moist mucous membranes, normal hearing acuity  EYES:  EOM, conjunctivae, lids, pupils and irises normal, PERRL  RESP:  respiratory effort and palpation of chest normal, lungs clear to auscultation , no respiratory distress  CV:  Palpation and auscultation of heart done , regular rate and rhythm, no murmur, rub, or gallop, peripheral edema trace+ in LE bilaterally  ABDOMEN:  normal bowel sounds, soft, nontender, no hepatosplenomegaly or other masses  M/S:   patient sitting up in w/c, able to move all 4 extremities  SKIN:  Inspection of skin and subcutaneous tissue baseline, did not visualize surgical incision  NEURO:   speech wnl  PSYCH:  affect and mood " normal    Recent labs in [facility] reviewed by me today.  and Most Recent 3 CBC's:  Recent Labs   Lab Test 10/25/23  0643 10/17/23  1417 03/22/23  0933   WBC 11.0 8.8 7.9   HGB 10.2* 12.9* 12.0*   MCV 75* 72* 67*    254 296     Most Recent 3 BMP's:  Recent Labs   Lab Test 10/27/23  1151 10/27/23  0803 10/27/23  0228 10/25/23  1246 10/25/23  0643 10/24/23  0957 10/17/23  1417 04/25/23  1310 03/22/23  0933   NA  --   --   --   --  134*  --  131*  --  137   POTASSIUM  --   --   --   --  4.5  --  4.6  --  4.5   CHLORIDE  --   --   --   --  100  --  94*  --  99   CO2  --   --   --   --  25  --  25  --  22   BUN  --   --   --   --  13.4  --  22.8  --  17.8   CR  --   --   --   --  0.72  --  0.74 0.9 0.83   ANIONGAP  --   --   --   --  9  --  12  --  16*   BETH  --   --   --   --  8.6*  --  9.5  --  9.9   * 107* 127*   < > 199*  199*   < > 121*  --  122*    < > = values in this interval not displayed.       Assessment/Plan:  (M50.30) DDD (degenerative disc disease), cervical  (primary encounter diagnosis)  (Z98.1) S/P lumbar fusion  (R53.81) Physical deconditioning  Comment: acute/ongoing, no change  S/p L2-L3 TILF, central laminectomy of L1-L5  Plan: PT and OT, continue tylenol 975mg QID, robaxin 500mg q 6 hours prn, oxycodone 5-10mg q 6 hours prn,   F/u with spine surgery as directed     (G20.A1) Parkinson's disease, unspecified whether dyskinesia present, unspecified whether manifestations fluctuate  Comment: ongoing, no change  Plan: continue sinemet 25/100mg 2 tabs TID,      (C61) Prostate cancer (H)  (R33.9) Urinary retention  Comment: acute/ongoing  Plan: continue proscar 5mg QD,   Cantu catheter DC 11/9/23, PVR q shift, straight cath for PVR > 350cc     (I10) Essential hypertension, benign  Comment: acute/ongoing, no change  Plan: BMP follow, continue , amlodipine-benazepril 10-20mg QD, atenolol 25mg QD     (E11.21) Type 2 diabetes mellitus with diabetic nephropathy, without long-term current use of  insulin (H)  Comment: acute/ongoing, no change  HgbA1C of 6.87 on 11/2/23  Plan: BMP follow, continue to follow off Rx       MED REC REQUIRED  Post Medication Reconciliation Status: medication reconcilation previously completed during another office visit      Orders:  No new orders      Electronically signed by: Tonya Lynn Haase, APRN CNP         Sincerely,        Tonya Lynn Haase, APRN CNP

## 2023-11-13 ENCOUNTER — TRANSITIONAL CARE UNIT VISIT (OUTPATIENT)
Dept: GERIATRICS | Facility: CLINIC | Age: 78
End: 2023-11-13
Payer: MEDICARE

## 2023-11-13 VITALS
WEIGHT: 188.7 LBS | TEMPERATURE: 97.3 F | RESPIRATION RATE: 16 BRPM | HEIGHT: 72 IN | HEART RATE: 60 BPM | SYSTOLIC BLOOD PRESSURE: 91 MMHG | BODY MASS INDEX: 25.56 KG/M2 | DIASTOLIC BLOOD PRESSURE: 51 MMHG | OXYGEN SATURATION: 93 %

## 2023-11-13 DIAGNOSIS — M50.30 DDD (DEGENERATIVE DISC DISEASE), CERVICAL: Primary | ICD-10-CM

## 2023-11-13 DIAGNOSIS — G20.A1 PARKINSON'S DISEASE, UNSPECIFIED WHETHER DYSKINESIA PRESENT, UNSPECIFIED WHETHER MANIFESTATIONS FLUCTUATE (H): ICD-10-CM

## 2023-11-13 DIAGNOSIS — R33.9 URINARY RETENTION: ICD-10-CM

## 2023-11-13 DIAGNOSIS — R53.81 PHYSICAL DECONDITIONING: ICD-10-CM

## 2023-11-13 DIAGNOSIS — I10 ESSENTIAL HYPERTENSION, BENIGN: ICD-10-CM

## 2023-11-13 DIAGNOSIS — E11.21 TYPE 2 DIABETES MELLITUS WITH DIABETIC NEPHROPATHY, WITHOUT LONG-TERM CURRENT USE OF INSULIN (H): ICD-10-CM

## 2023-11-13 DIAGNOSIS — Z98.1 S/P LUMBAR FUSION: ICD-10-CM

## 2023-11-13 DIAGNOSIS — C61 PROSTATE CANCER (H): ICD-10-CM

## 2023-11-13 PROCEDURE — 99310 SBSQ NF CARE HIGH MDM 45: CPT | Performed by: NURSE PRACTITIONER

## 2023-11-13 NOTE — LETTER
11/13/2023        RE: Kyler Rodriguez  [address] [address] [address]        [facility] GERIATRICS    Chief Complaint   Patient presents with     RECHECK     HPI:  Kyler Rodriguez is a 78 year old  (1945), who is being seen today for an episodic care visit at: [facility]) [25]. Today's concern is:   DDD s/p lumbar fusion: on exam today patient is sitting up in w/c, states low back is a little sore this AM, rates as 2/10 at rest, midline low back pain.  In therapy patient is walking between 100-200 feet using a RW with CGA, assist with ADL's  Urinary retention: thornton catheter is out, patient voiding without difficulty  HTN: /62, 136/72, 125/72  with HR 60 range, denies CP, palpitations, SOB  DMII: hgbA1C of 6.8 on 11/2/23  Allergies, and PMH/PSH reviewed in [facility] today.  REVIEW OF SYSTEMS:  10 point ROS of systems including Constitutional, Eyes, Respiratory, Cardiovascular, Gastroenterology, Genitourinary, Integumentary, Musculoskeletal, Psychiatric were all negative except for pertinent positives noted in my HPI.    Objective:   BP 91/51   Pulse 60   Temp 97.3  F (36.3  C)   Resp 16   Ht 1.829 m (6')   Wt 85.6 kg (188 lb 11.2 oz)   SpO2 93%   BMI 25.59 kg/m    GENERAL APPEARANCE:  Alert, in no distress  ENT:  Mouth and posterior oropharynx normal, moist mucous membranes, normal hearing acuity  EYES:  EOM, conjunctivae, lids, pupils and irises normal, PERRL  RESP:  respiratory effort and palpation of chest normal, lungs clear to auscultation , no respiratory distress  CV:  Palpation and auscultation of heart done , regular rate and rhythm, no murmur, rub, or gallop, peripheral edema trace+ in LE bilaterally  ABDOMEN:  normal bowel sounds, soft, nontender, no hepatosplenomegaly or other masses  M/S:   patient sitting up on edge of bed  SKIN:  Inspection of skin and subcutaneous tissue baseline, did not visualize surgical incision  NEURO:   speech wnl  PSYCH:  affect  and mood normal    Recent labs in [facility] reviewed by me today.  and Most Recent 3 CBC's:  Recent Labs   Lab Test 10/25/23  0643 10/17/23  1417 03/22/23  0933   WBC 11.0 8.8 7.9   HGB 10.2* 12.9* 12.0*   MCV 75* 72* 67*    254 296     Most Recent 3 BMP's:  Recent Labs   Lab Test 10/27/23  1151 10/27/23  0803 10/27/23  0228 10/25/23  1246 10/25/23  0643 10/24/23  0957 10/17/23  1417 04/25/23  1310 03/22/23  0933   NA  --   --   --   --  134*  --  131*  --  137   POTASSIUM  --   --   --   --  4.5  --  4.6  --  4.5   CHLORIDE  --   --   --   --  100  --  94*  --  99   CO2  --   --   --   --  25  --  25  --  22   BUN  --   --   --   --  13.4  --  22.8  --  17.8   CR  --   --   --   --  0.72  --  0.74 0.9 0.83   ANIONGAP  --   --   --   --  9  --  12  --  16*   BETH  --   --   --   --  8.6*  --  9.5  --  9.9   * 107* 127*   < > 199*  199*   < > 121*  --  122*    < > = values in this interval not displayed.       Assessment/Plan:  (M50.30) DDD (degenerative disc disease), cervical  (primary encounter diagnosis)  (Z98.1) S/P lumbar fusion  (R53.81) Physical deconditioning  Comment: acute/ongoing, no change  S/p L2-L3 TILF, central laminectomy of L1-L5  Plan: PT and OT, continue tylenol 975mg QID, robaxin 500mg q 6 hours prn, oxycodone 5-10mg q 6 hours prn,   F/u with spine surgery as directed     (G20.A1) Parkinson's disease, unspecified whether dyskinesia present, unspecified whether manifestations fluctuate  Comment: ongoing, no change  Plan: continue sinemet 25/100mg 2 tabs TID,      (C61) Prostate cancer (H)  (R33.9) Urinary retention  Comment: acute/ongoing  Plan: continue proscar 5mg QD,   Cantu catheter DC 11/9/23 patient voiding without difficulty     (I10) Essential hypertension, benign  Comment: acute/ongoing, no change  Plan: BMP follow, continue , amlodipine-benazepril 10-20mg QD, atenolol 25mg QD     (E11.21) Type 2 diabetes mellitus with diabetic nephropathy, without long-term current use of  insulin (H)  Comment: acute/ongoing, no change  HgbA1C of 6.87 on 11/2/23  Plan: BMP follow, continue to follow off Rx       MED REC REQUIRED  Post Medication Reconciliation Status: medication reconcilation previously completed during another office visit      Orders:  No new orders      Electronically signed by: Tonya Lynn Haase, APRN CNP         Sincerely,        Tonya Lynn Haase, APRN CNP

## 2023-11-13 NOTE — PROGRESS NOTES
[facility] GERIATRICS    Chief Complaint   Patient presents with    RECHECK     HPI:  Kyler Rodriguez is a 78 year old  (1945), who is being seen today for an episodic care visit at: [facility]) [25]. Today's concern is:   DDD s/p lumbar fusion: on exam today patient is sitting up in w/c, states low back is a little sore this AM, rates as 2/10 at rest, midline low back pain.  In therapy patient is walking between 100-200 feet using a RW with CGA, assist with ADL's  Urinary retention: thornton catheter is out, patient voiding without difficulty  HTN: /62, 136/72, 125/72  with HR 60 range, denies CP, palpitations, SOB  DMII: hgbA1C of 6.8 on 11/2/23  Allergies, and PMH/PSH reviewed in [facility] today.  REVIEW OF SYSTEMS:  10 point ROS of systems including Constitutional, Eyes, Respiratory, Cardiovascular, Gastroenterology, Genitourinary, Integumentary, Musculoskeletal, Psychiatric were all negative except for pertinent positives noted in my HPI.    Objective:   BP 91/51   Pulse 60   Temp 97.3  F (36.3  C)   Resp 16   Ht 1.829 m (6')   Wt 85.6 kg (188 lb 11.2 oz)   SpO2 93%   BMI 25.59 kg/m    GENERAL APPEARANCE:  Alert, in no distress  ENT:  Mouth and posterior oropharynx normal, moist mucous membranes, normal hearing acuity  EYES:  EOM, conjunctivae, lids, pupils and irises normal, PERRL  RESP:  respiratory effort and palpation of chest normal, lungs clear to auscultation , no respiratory distress  CV:  Palpation and auscultation of heart done , regular rate and rhythm, no murmur, rub, or gallop, peripheral edema trace+ in LE bilaterally  ABDOMEN:  normal bowel sounds, soft, nontender, no hepatosplenomegaly or other masses  M/S:   patient sitting up on edge of bed  SKIN:  Inspection of skin and subcutaneous tissue baseline, did not visualize surgical incision  NEURO:   speech wnl  PSYCH:  affect and mood normal    Recent labs in [facility] reviewed by me today.  and Most Recent 3 CBC's:  Recent  Labs   Lab Test 10/25/23  0643 10/17/23  1417 03/22/23  0933   WBC 11.0 8.8 7.9   HGB 10.2* 12.9* 12.0*   MCV 75* 72* 67*    254 296     Most Recent 3 BMP's:  Recent Labs   Lab Test 10/27/23  1151 10/27/23  0803 10/27/23  0228 10/25/23  1246 10/25/23  0643 10/24/23  0957 10/17/23  1417 04/25/23  1310 03/22/23  0933   NA  --   --   --   --  134*  --  131*  --  137   POTASSIUM  --   --   --   --  4.5  --  4.6  --  4.5   CHLORIDE  --   --   --   --  100  --  94*  --  99   CO2  --   --   --   --  25  --  25  --  22   BUN  --   --   --   --  13.4  --  22.8  --  17.8   CR  --   --   --   --  0.72  --  0.74 0.9 0.83   ANIONGAP  --   --   --   --  9  --  12  --  16*   BETH  --   --   --   --  8.6*  --  9.5  --  9.9   * 107* 127*   < > 199*  199*   < > 121*  --  122*    < > = values in this interval not displayed.       Assessment/Plan:  (M50.30) DDD (degenerative disc disease), cervical  (primary encounter diagnosis)  (Z98.1) S/P lumbar fusion  (R53.81) Physical deconditioning  Comment: acute/ongoing, no change  S/p L2-L3 TILF, central laminectomy of L1-L5  Plan: PT and OT, continue tylenol 975mg QID, robaxin 500mg q 6 hours prn, oxycodone 5-10mg q 6 hours prn,   F/u with spine surgery as directed     (G20.A1) Parkinson's disease, unspecified whether dyskinesia present, unspecified whether manifestations fluctuate  Comment: ongoing, no change  Plan: continue sinemet 25/100mg 2 tabs TID,      (C61) Prostate cancer (H)  (R33.9) Urinary retention  Comment: acute/ongoing  Plan: continue proscar 5mg QD,   Cantu catheter DC 11/9/23 patient voiding without difficulty     (I10) Essential hypertension, benign  Comment: acute/ongoing, no change  Plan: BMP follow, continue , amlodipine-benazepril 10-20mg QD, atenolol 25mg QD     (E11.21) Type 2 diabetes mellitus with diabetic nephropathy, without long-term current use of insulin (H)  Comment: acute/ongoing, no change  HgbA1C of 6.87 on 11/2/23  Plan: BMP follow, continue  to follow off Rx       MED REC REQUIRED  Post Medication Reconciliation Status: medication reconcilation previously completed during another office visit      Orders:  No new orders      Electronically signed by: Tonya Lynn Haase, APRN CNP

## 2023-11-15 ENCOUNTER — TRANSITIONAL CARE UNIT VISIT (OUTPATIENT)
Dept: GERIATRICS | Facility: CLINIC | Age: 78
End: 2023-11-15
Payer: MEDICARE

## 2023-11-15 VITALS
TEMPERATURE: 98.2 F | DIASTOLIC BLOOD PRESSURE: 68 MMHG | SYSTOLIC BLOOD PRESSURE: 110 MMHG | RESPIRATION RATE: 16 BRPM | HEART RATE: 65 BPM | HEIGHT: 72 IN | OXYGEN SATURATION: 95 % | BODY MASS INDEX: 24.11 KG/M2 | WEIGHT: 178 LBS

## 2023-11-15 DIAGNOSIS — Z98.1 S/P LUMBAR FUSION: ICD-10-CM

## 2023-11-15 DIAGNOSIS — M50.30 DDD (DEGENERATIVE DISC DISEASE), CERVICAL: Primary | ICD-10-CM

## 2023-11-15 DIAGNOSIS — C61 PROSTATE CANCER (H): ICD-10-CM

## 2023-11-15 DIAGNOSIS — E11.21 TYPE 2 DIABETES MELLITUS WITH DIABETIC NEPHROPATHY, WITHOUT LONG-TERM CURRENT USE OF INSULIN (H): ICD-10-CM

## 2023-11-15 DIAGNOSIS — R53.81 PHYSICAL DECONDITIONING: ICD-10-CM

## 2023-11-15 DIAGNOSIS — I10 ESSENTIAL HYPERTENSION, BENIGN: ICD-10-CM

## 2023-11-15 DIAGNOSIS — G20.A1 PARKINSON'S DISEASE, UNSPECIFIED WHETHER DYSKINESIA PRESENT, UNSPECIFIED WHETHER MANIFESTATIONS FLUCTUATE (H): ICD-10-CM

## 2023-11-15 DIAGNOSIS — R33.9 URINARY RETENTION: ICD-10-CM

## 2023-11-15 PROCEDURE — 99310 SBSQ NF CARE HIGH MDM 45: CPT | Performed by: NURSE PRACTITIONER

## 2023-11-15 NOTE — PROGRESS NOTES
[facility] GERIATRICS    Chief Complaint   Patient presents with    RECHECK     HPI:  Kyler Rodriguez is a 78 year old  (1945), who is being seen today for an episodic care visit at: [facility]) [25]. Today's concern is:   DDD s/p lumbar fusion: on exam today patient is sitting up in w/c, states low back pain is rated 2/10, no c/o radiating pain  In therapy patient is walking up to 200 feet using a RW with CGA, assist with ADL's  Urinary retention: thornton catheter is out, patient voiding without difficulty  HTN: /68, 136/70, 101/62 with HR 60-70 range, denies CP, palpitations, SOB  DMII: hgbA1C of 6.8 on 11/2/23    Allergies, and PMH/PSH reviewed in [facility] today.  REVIEW OF SYSTEMS:  10 point ROS of systems including Constitutional, Eyes, Respiratory, Cardiovascular, Gastroenterology, Genitourinary, Integumentary, Musculoskeletal, Psychiatric were all negative except for pertinent positives noted in my HPI.    Objective:   /68   Pulse 65   Temp 98.2  F (36.8  C)   Resp 16   Ht 1.829 m (6')   Wt 80.7 kg (178 lb)   SpO2 95%   BMI 24.14 kg/m    GENERAL APPEARANCE:  Alert, in no distress, sitting up in w/c  ENT:  Mouth and posterior oropharynx normal, moist mucous membranes, normal hearing acuity  EYES:  EOM, conjunctivae, lids, pupils and irises normal, PERRL  RESP:  respiratory effort and palpation of chest normal, lungs clear to auscultation , no respiratory distress  CV:  Palpation and auscultation of heart done , regular rate and rhythm, no murmur, rub, or gallop, peripheral edema trace+ in LE bilaterally  ABDOMEN:  normal bowel sounds, soft, nontender, no hepatosplenomegaly or other masses  M/S:   patient sitting up in w/c, able to move all 4 extremities  SKIN:  Inspection of skin and subcutaneous tissue baseline, did not visualize surgical incision  NEURO:   speech wnl  PSYCH:  affect and mood normal    Recent labs in [facility] reviewed by me today.  and Most Recent 3  CBC's:  Recent Labs   Lab Test 10/25/23  0643 10/17/23  1417 03/22/23  0933   WBC 11.0 8.8 7.9   HGB 10.2* 12.9* 12.0*   MCV 75* 72* 67*    254 296     Most Recent 3 BMP's:  Recent Labs   Lab Test 10/27/23  1151 10/27/23  0803 10/27/23  0228 10/25/23  1246 10/25/23  0643 10/24/23  0957 10/17/23  1417 04/25/23  1310 03/22/23  0933   NA  --   --   --   --  134*  --  131*  --  137   POTASSIUM  --   --   --   --  4.5  --  4.6  --  4.5   CHLORIDE  --   --   --   --  100  --  94*  --  99   CO2  --   --   --   --  25  --  25  --  22   BUN  --   --   --   --  13.4  --  22.8  --  17.8   CR  --   --   --   --  0.72  --  0.74 0.9 0.83   ANIONGAP  --   --   --   --  9  --  12  --  16*   BETH  --   --   --   --  8.6*  --  9.5  --  9.9   * 107* 127*   < > 199*  199*   < > 121*  --  122*    < > = values in this interval not displayed.       Assessment/Plan:  (M50.30) DDD (degenerative disc disease), cervical  (primary encounter diagnosis)  (Z98.1) S/P lumbar fusion  (R53.81) Physical deconditioning  Comment: acute/ongoing, no change  S/p L2-L3 TILF, central laminectomy of L1-L5  Plan: PT and OT, continue tylenol 975mg QID, DC robaxin and oxycodone patient not taking  F/u with spine surgery as directed     (G20.A1) Parkinson's disease, unspecified whether dyskinesia present, unspecified whether manifestations fluctuate  Comment: ongoing, no change  Plan: continue sinemet 25/100mg 2 tabs TID,      (C61) Prostate cancer (H)  (R33.9) Urinary retention  Comment: acute/ongoing, no change  Plan: continue proscar 5mg QD,   Cantu catheter DC 11/9/23 patient voiding without difficulty     (I10) Essential hypertension, benign  Comment: acute/ongoing, no change  Plan: BMP follow, continue , amlodipine-benazepril 10-20mg QD, atenolol 25mg QD     (E11.21) Type 2 diabetes mellitus with diabetic nephropathy, without long-term current use of insulin (H)  Comment: acute/ongoing, no change  HgbA1C of 6.87 on 11/2/23  Plan: BMP follow,  continue to follow off Rx       MED REC REQUIRED  Post Medication Reconciliation Status: medication reconcilation previously completed during another office visit      Orders:  DC oxycodone and robaxin      Electronically signed by: Tonya Lynn Haase, APRN CNP

## 2023-11-15 NOTE — LETTER
11/15/2023        RE: yKler Rodriguez  [address] [address] [address]        [facility] GERIATRICS    Chief Complaint   Patient presents with     RECHECK     HPI:  Kyler Rodriguez is a 78 year old  (1945), who is being seen today for an episodic care visit at: [facility]) [25]. Today's concern is:   DDD s/p lumbar fusion: on exam today patient is sitting up in w/c, states low back pain is rated 2/10, no c/o radiating pain  In therapy patient is walking up to 200 feet using a RW with CGA, assist with ADL's  Urinary retention: thornton catheter is out, patient voiding without difficulty  HTN: /68, 136/70, 101/62 with HR 60-70 range, denies CP, palpitations, SOB  DMII: hgbA1C of 6.8 on 11/2/23    Allergies, and PMH/PSH reviewed in [facility] today.  REVIEW OF SYSTEMS:  10 point ROS of systems including Constitutional, Eyes, Respiratory, Cardiovascular, Gastroenterology, Genitourinary, Integumentary, Musculoskeletal, Psychiatric were all negative except for pertinent positives noted in my HPI.    Objective:   /68   Pulse 65   Temp 98.2  F (36.8  C)   Resp 16   Ht 1.829 m (6')   Wt 80.7 kg (178 lb)   SpO2 95%   BMI 24.14 kg/m    GENERAL APPEARANCE:  Alert, in no distress, sitting up in w/c  ENT:  Mouth and posterior oropharynx normal, moist mucous membranes, normal hearing acuity  EYES:  EOM, conjunctivae, lids, pupils and irises normal, PERRL  RESP:  respiratory effort and palpation of chest normal, lungs clear to auscultation , no respiratory distress  CV:  Palpation and auscultation of heart done , regular rate and rhythm, no murmur, rub, or gallop, peripheral edema trace+ in LE bilaterally  ABDOMEN:  normal bowel sounds, soft, nontender, no hepatosplenomegaly or other masses  M/S:   patient sitting up in w/c, able to move all 4 extremities  SKIN:  Inspection of skin and subcutaneous tissue baseline, did not visualize surgical incision  NEURO:   speech wnl  PSYCH:   affect and mood normal    Recent labs in [facility] reviewed by me today.  and Most Recent 3 CBC's:  Recent Labs   Lab Test 10/25/23  0643 10/17/23  1417 03/22/23  0933   WBC 11.0 8.8 7.9   HGB 10.2* 12.9* 12.0*   MCV 75* 72* 67*    254 296     Most Recent 3 BMP's:  Recent Labs   Lab Test 10/27/23  1151 10/27/23  0803 10/27/23  0228 10/25/23  1246 10/25/23  0643 10/24/23  0957 10/17/23  1417 04/25/23  1310 03/22/23  0933   NA  --   --   --   --  134*  --  131*  --  137   POTASSIUM  --   --   --   --  4.5  --  4.6  --  4.5   CHLORIDE  --   --   --   --  100  --  94*  --  99   CO2  --   --   --   --  25  --  25  --  22   BUN  --   --   --   --  13.4  --  22.8  --  17.8   CR  --   --   --   --  0.72  --  0.74 0.9 0.83   ANIONGAP  --   --   --   --  9  --  12  --  16*   BETH  --   --   --   --  8.6*  --  9.5  --  9.9   * 107* 127*   < > 199*  199*   < > 121*  --  122*    < > = values in this interval not displayed.       Assessment/Plan:  (M50.30) DDD (degenerative disc disease), cervical  (primary encounter diagnosis)  (Z98.1) S/P lumbar fusion  (R53.81) Physical deconditioning  Comment: acute/ongoing, no change  S/p L2-L3 TILF, central laminectomy of L1-L5  Plan: PT and OT, continue tylenol 975mg QID, DC robaxin and oxycodone patient not taking  F/u with spine surgery as directed     (G20.A1) Parkinson's disease, unspecified whether dyskinesia present, unspecified whether manifestations fluctuate  Comment: ongoing, no change  Plan: continue sinemet 25/100mg 2 tabs TID,      (C61) Prostate cancer (H)  (R33.9) Urinary retention  Comment: acute/ongoing, no change  Plan: continue proscar 5mg QD,   Cantu catheter DC 11/9/23 patient voiding without difficulty     (I10) Essential hypertension, benign  Comment: acute/ongoing, no change  Plan: BMP follow, continue , amlodipine-benazepril 10-20mg QD, atenolol 25mg QD     (E11.21) Type 2 diabetes mellitus with diabetic nephropathy, without long-term current use of  insulin (H)  Comment: acute/ongoing, no change  HgbA1C of 6.87 on 11/2/23  Plan: BMP follow, continue to follow off Rx       MED REC REQUIRED  Post Medication Reconciliation Status: medication reconcilation previously completed during another office visit      Orders:  DC oxycodone and robaxin      Electronically signed by: Tonya Lynn Haase, APRN CNP         Sincerely,        Tonya Lynn Haase, APRN CNP

## 2023-11-16 ENCOUNTER — PATIENT OUTREACH (OUTPATIENT)
Dept: CARE COORDINATION | Facility: CLINIC | Age: 78
End: 2023-11-16
Payer: MEDICARE

## 2023-11-16 NOTE — PROGRESS NOTES
Clinic Care Coordination Contact  Care Team Conversations    SWCC received call/vm from TCU Ericka BAILEY sharing patient will likely discharge home 11/21/2023 with homecare services via [facility] for PT, OT, and HHA services. No further CC needs identified at this time. SWCC will attempt to reach patient within 1-2 business days following discharge to establish contact and offer care management services.    ESCOBAR Ward/LICLEO  Social Work Care Coordinator  [facility], and [place]  Phone: [phone]

## 2023-11-20 ENCOUNTER — DISCHARGE SUMMARY NURSING HOME (OUTPATIENT)
Dept: GERIATRICS | Facility: CLINIC | Age: 78
End: 2023-11-20
Payer: MEDICARE

## 2023-11-20 VITALS
SYSTOLIC BLOOD PRESSURE: 125 MMHG | HEART RATE: 63 BPM | OXYGEN SATURATION: 96 % | TEMPERATURE: 97.8 F | DIASTOLIC BLOOD PRESSURE: 72 MMHG | WEIGHT: 178 LBS | HEIGHT: 72 IN | BODY MASS INDEX: 24.11 KG/M2 | RESPIRATION RATE: 16 BRPM

## 2023-11-20 DIAGNOSIS — C61 PROSTATE CANCER (H): ICD-10-CM

## 2023-11-20 DIAGNOSIS — G20.A1 PARKINSON'S DISEASE, UNSPECIFIED WHETHER DYSKINESIA PRESENT, UNSPECIFIED WHETHER MANIFESTATIONS FLUCTUATE (H): ICD-10-CM

## 2023-11-20 DIAGNOSIS — E11.21 TYPE 2 DIABETES MELLITUS WITH DIABETIC NEPHROPATHY, WITHOUT LONG-TERM CURRENT USE OF INSULIN (H): ICD-10-CM

## 2023-11-20 DIAGNOSIS — I10 ESSENTIAL HYPERTENSION, BENIGN: ICD-10-CM

## 2023-11-20 DIAGNOSIS — R53.81 PHYSICAL DECONDITIONING: ICD-10-CM

## 2023-11-20 DIAGNOSIS — R33.9 URINARY RETENTION: ICD-10-CM

## 2023-11-20 DIAGNOSIS — Z98.1 S/P LUMBAR FUSION: ICD-10-CM

## 2023-11-20 DIAGNOSIS — M50.30 DDD (DEGENERATIVE DISC DISEASE), CERVICAL: Primary | ICD-10-CM

## 2023-11-20 PROCEDURE — 99316 NF DSCHRG MGMT 30 MIN+: CPT | Performed by: NURSE PRACTITIONER

## 2023-11-20 NOTE — PROGRESS NOTES
[facility] GERIATRICS DISCHARGE SUMMARY  PATIENT'S NAME: Kyler Rodriguez  YOB: 1945  MEDICAL RECORD NUMBER:  [id number]  Place of Service where encounter took place:  [facility]) [25]    PRIMARY CARE PROVIDER AND CLINIC RESPONSIBLE AFTER TRANSFER:   [facility]akesh Clark MD[facility] [address] [facility] [address]    [facility] Provider     Transferring providers: Tonya Lynn Haase, FRANCE COSTA, Pb Rodriguez MD  Recent Hospitalization/ED:  [facility] stay 10/24/23 to 10/27/23.  Date of SNF Admission: October 27, 2023  Date of SNF (anticipated) Discharge: November 21, 2023  Discharged to: previous independent home  Cognitive Scores: BIMS: 15/15 and Short blessed: 2/282  Physical Function: Ambulating 250 ft with RW  DME: Walker    CODE STATUS/ADVANCE DIRECTIVES DISCUSSION:  Prior   ALLERGIES: Latex  NURSING FACILITY COURSE   Medication Changes/Rationale:   See assessment and plan    Summary of nursing facility stay:   Patient progressed to walking up to 250 feet using a RW independently, independent with ADL's and toileting will discharge to home with home PT, OT, HHA through [facility]    Discharge Medications:  MED REC REQUIRED  Post Medication Reconciliation Status:          Current Outpatient Medications   Medication Sig Dispense Refill    acetaminophen (TYLENOL) 325 MG tablet Take 3 tablets (975 mg) by mouth 4 times daily 100 tablet 0    amLODIPine-benazepril (LOTREL) 10-20 MG capsule TAKE 1 CAPSULE EVERY DAY 90 capsule 0    atenolol (TENORMIN) 25 MG tablet TAKE 1 TABLET EVERY DAY 90 tablet 2    atorvastatin (LIPITOR) 80 MG tablet Take 0.5 tablets (40 mg) by mouth daily 45 tablet 1    Calcium Carb-Cholecalciferol (CALTRATE 600+D3 SOFT) 600-800 MG-UNIT CHEW Take by mouth every 24 hours      carbidopa-levodopa (SINEMET)  MG per tablet Take 2 tablets by mouth 3 times daily Take at 7 am, 11 am and 3 pm      finasteride (PROSCAR) 5 MG tablet TAKE 1 TABLET EVERY  DAY 90 tablet 2    metFORMIN (GLUCOPHAGE) 1000 MG tablet TAKE 1 TABLET EVERY DAY WITH DINNER 90 tablet 1    multivitamin, therapeutic (THERA-VIT) TABS tablet Take 1 tablet by mouth every morning      senna-docusate (SENOKOT-S/PERICOLACE) 8.6-50 MG tablet Take 1-2 tablets by mouth 2 times daily Take while on oral narcotics to prevent or treat constipation. 30 tablet 0    Vitamin D3 (CHOLECALCIFEROL) 25 mcg (1000 units) tablet Take 1 tablet (25 mcg) by mouth 2 times daily 180 tablet 0          Controlled medications:   not applicable/none     Past Medical History:   Past Medical History:   Diagnosis Date    Elevated prostate specific antigen (PSA)     Essential hypertension, benign     Malignant neoplasm (H) 10/2011    prostate cancer    Malignant neoplasm of right kidney (H)     Mumps     Other and unspecified hyperlipidemia     Parkinsons disease     Prostate infection     Spider veins     Type II or unspecified type diabetes mellitus without mention of complication, not stated as uncontrolled      Physical Exam:   Vitals: /72   Pulse 63   Temp 97.8  F (36.6  C)   Resp 16   Ht 1.829 m (6')   Wt 80.7 kg (178 lb)   SpO2 96%   BMI 24.14 kg/m    BMI: Body mass index is 24.14 kg/m .  GENERAL APPEARANCE:  Alert, in no distress  ENT:  Mouth and posterior oropharynx normal, moist mucous membranes, [place]  EYES:  EOM, conjunctivae, lids, pupils and irises normal, PERRL  RESP:  respiratory effort and palpation of chest normal, lungs clear to auscultation , no respiratory distress  CV:  Palpation and auscultation of heart done , regular rate and rhythm, no murmur, rub, or gallop, peripheral edema trace+ in LE bilaterally  ABDOMEN:  normal bowel sounds, soft, nontender, no hepatosplenomegaly or other masses  M/S:   patient sitting up in w/c  SKIN:  Inspection of skin and subcutaneous tissue baseline  NEURO:   speech wnl  PSYCH:  affect and mood normal     SNF labs: Recent labs in [facility] reviewed by me today.  and Most  Recent 3 BMP's:  Recent Labs   Lab Test 10/27/23  1151 10/27/23  0803 10/27/23  0228 10/25/23  1246 10/25/23  0643 10/24/23  0957 10/17/23  1417 04/25/23  1310 03/22/23  0933   NA  --   --   --   --  134*  --  131*  --  137   POTASSIUM  --   --   --   --  4.5  --  4.6  --  4.5   CHLORIDE  --   --   --   --  100  --  94*  --  99   CO2  --   --   --   --  25  --  25  --  22   BUN  --   --   --   --  13.4  --  22.8  --  17.8   CR  --   --   --   --  0.72  --  0.74 0.9 0.83   ANIONGAP  --   --   --   --  9  --  12  --  16*   BETH  --   --   --   --  8.6*  --  9.5  --  9.9   * 107* 127*   < > 199*  199*   < > 121*  --  122*    < > = values in this interval not displayed.     Most Recent 3 INR's:No lab results found.    Assessment/Plan:  (M50.30) DDD (degenerative disc disease), cervical  (primary encounter diagnosis)  (Z98.1) S/P lumbar fusion  (R53.81) Physical deconditioning  Comment: ongoing  S/p L2-L3 TILF, central laminectomy of L1-L5  Plan: Discharge to home with home PT, OT and HHA through [facility],  continue tylenol 975mg QID prn  F/u with spine surgery as directed     (G20.A1) Parkinson's disease, unspecified whether dyskinesia present, unspecified whether manifestations fluctuate  Comment: ongoing  Plan: continue sinemet 25/100mg 2 tabs TID,      (C61) Prostate cancer (H)  (R33.9) Urinary retention  Comment: ongoing  Plan: continue proscar 5mg QD,   Cantu catheter DC 11/9/23 patient voiding without difficulty     (I10) Essential hypertension, benign  Comment: ongoing  Plan: continue  amlodipine-benazepril 10-20mg QD, atenolol 25mg QD     (E11.21) Type 2 diabetes mellitus with diabetic nephropathy, without long-term current use of insulin (H)  Comment: ongoing  HgbA1C of 6.87 on 11/2/23  Plan: continue to follow off Rx          DISCHARGE PLAN:  Follow up labs: No labs orders/due  Medical Follow Up:      Follow up with primary care provider in 1-2 weeks  [facility] scheduled appointments:     Discharge  Services: Home Care:  Occupational Therapy, Physical Therapy, and Home Health Aide  Discharge Instructions Verbalized to Patient at Discharge:   None    TOTAL DISCHARGE TIME:   Greater than 30 minutes  Electronically signed by:  Tonya Lynn Haase, APRN CNP

## 2023-11-20 NOTE — LETTER
11/20/2023        RE: Kyler Rodriguez  [address] [address] [address]        [facility] GERIATRICS DISCHARGE SUMMARY  PATIENT'S NAME: Kyler Rodriguez  YOB: 1945  MEDICAL RECORD NUMBER:  [id number]  Place of Service where encounter took place:  [facility]) [25]    PRIMARY CARE PROVIDER AND CLINIC RESPONSIBLE AFTER TRANSFER:   Rakesh Clark MD, [address] / [facility] [address]    [facility]     Transferring providers: Tonya Lynn Haase, FRANCE COSTA, Pb Rodriguez MD  Recent Hospitalization/ED:  [facility] Hospital stay 10/24/23 to 10/27/23.  Date of SNF Admission: October 27, 2023  Date of SNF (anticipated) Discharge: November 21, 2023  Discharged to: previous independent home  Cognitive Scores: BIMS: 15/15 and Short blessed: 2/282  Physical Function: Ambulating 250 ft with RW  DME: Walker    CODE STATUS/ADVANCE DIRECTIVES DISCUSSION:  Prior   ALLERGIES: Latex  NURSING FACILITY COURSE   Medication Changes/Rationale:   See assessment and plan    Summary of nursing facility stay:   Patient progressed to walking up to 250 feet using a RW independently, independent with ADL's and toileting will discharge to home with home PT, OT, HHA through [facility]    Discharge Medications:  MED REC REQUIRED  Post Medication Reconciliation Status:          Current Outpatient Medications   Medication Sig Dispense Refill     acetaminophen (TYLENOL) 325 MG tablet Take 3 tablets (975 mg) by mouth 4 times daily 100 tablet 0     amLODIPine-benazepril (LOTREL) 10-20 MG capsule TAKE 1 CAPSULE EVERY DAY 90 capsule 0     atenolol (TENORMIN) 25 MG tablet TAKE 1 TABLET EVERY DAY 90 tablet 2     atorvastatin (LIPITOR) 80 MG tablet Take 0.5 tablets (40 mg) by mouth daily 45 tablet 1     Calcium Carb-Cholecalciferol (CALTRATE 600+D3 SOFT) 600-800 MG-UNIT CHEW Take by mouth every 24 hours       carbidopa-levodopa (SINEMET)  MG per tablet Take 2 tablets by mouth 3  times daily Take at 7 am, 11 am and 3 pm       finasteride (PROSCAR) 5 MG tablet TAKE 1 TABLET EVERY DAY 90 tablet 2     metFORMIN (GLUCOPHAGE) 1000 MG tablet TAKE 1 TABLET EVERY DAY WITH DINNER 90 tablet 1     multivitamin, therapeutic (THERA-VIT) TABS tablet Take 1 tablet by mouth every morning       senna-docusate (SENOKOT-S/PERICOLACE) 8.6-50 MG tablet Take 1-2 tablets by mouth 2 times daily Take while on oral narcotics to prevent or treat constipation. 30 tablet 0     Vitamin D3 (CHOLECALCIFEROL) 25 mcg (1000 units) tablet Take 1 tablet (25 mcg) by mouth 2 times daily 180 tablet 0          Controlled medications:   not applicable/none     Past Medical History:   Past Medical History:   Diagnosis Date     Elevated prostate specific antigen (PSA)      Essential hypertension, benign      Malignant neoplasm (H) 10/2011    prostate cancer     Malignant neoplasm of right kidney (H)      Mumps      Other and unspecified hyperlipidemia      Parkinsons disease      Prostate infection      Spider veins      Type II or unspecified type diabetes mellitus without mention of complication, not stated as uncontrolled      Physical Exam:   Vitals: /72   Pulse 63   Temp 97.8  F (36.6  C)   Resp 16   Ht 1.829 m (6')   Wt 80.7 kg (178 lb)   SpO2 96%   BMI 24.14 kg/m    BMI: Body mass index is 24.14 kg/m .  GENERAL APPEARANCE:  Alert, in no distress  ENT:  Mouth and posterior oropharynx normal, moist mucous membranes, [place]  EYES:  EOM, conjunctivae, lids, pupils and irises normal, PERRL  RESP:  respiratory effort and palpation of chest normal, lungs clear to auscultation , no respiratory distress  CV:  Palpation and auscultation of heart done , regular rate and rhythm, no murmur, rub, or gallop, peripheral edema trace+ in LE bilaterally  ABDOMEN:  normal bowel sounds, soft, nontender, no hepatosplenomegaly or other masses  M/S:   patient sitting up in w/c  SKIN:  Inspection of skin and subcutaneous tissue  baseline  NEURO:   speech wnl  PSYCH:  affect and mood normal     SNF labs: Recent labs in [facility] reviewed by me today.  and Most Recent 3 BMP's:  Recent Labs   Lab Test 10/27/23  1151 10/27/23  0803 10/27/23  0228 10/25/23  1246 10/25/23  0643 10/24/23  0957 10/17/23  1417 04/25/23  1310 03/22/23  0933   NA  --   --   --   --  134*  --  131*  --  137   POTASSIUM  --   --   --   --  4.5  --  4.6  --  4.5   CHLORIDE  --   --   --   --  100  --  94*  --  99   CO2  --   --   --   --  25  --  25  --  22   BUN  --   --   --   --  13.4  --  22.8  --  17.8   CR  --   --   --   --  0.72  --  0.74 0.9 0.83   ANIONGAP  --   --   --   --  9  --  12  --  16*   BETH  --   --   --   --  8.6*  --  9.5  --  9.9   * 107* 127*   < > 199*  199*   < > 121*  --  122*    < > = values in this interval not displayed.     Most Recent 3 INR's:No lab results found.    Assessment/Plan:  (M50.30) DDD (degenerative disc disease), cervical  (primary encounter diagnosis)  (Z98.1) S/P lumbar fusion  (R53.81) Physical deconditioning  Comment: ongoing  S/p L2-L3 TILF, central laminectomy of L1-L5  Plan: Discharge to home with home PT, OT and HHA through [facility],  continue tylenol 975mg QID prn  F/u with spine surgery as directed     (G20.A1) Parkinson's disease, unspecified whether dyskinesia present, unspecified whether manifestations fluctuate  Comment: ongoing  Plan: continue sinemet 25/100mg 2 tabs TID,      (C61) Prostate cancer (H)  (R33.9) Urinary retention  Comment: ongoing  Plan: continue proscar 5mg QD,   Cantu catheter DC 11/9/23 patient voiding without difficulty     (I10) Essential hypertension, benign  Comment: ongoing  Plan: continue  amlodipine-benazepril 10-20mg QD, atenolol 25mg QD     (E11.21) Type 2 diabetes mellitus with diabetic nephropathy, without long-term current use of insulin (H)  Comment: ongoing  HgbA1C of 6.87 on 11/2/23  Plan: continue to follow off Rx          DISCHARGE PLAN:  Follow up labs: No labs  orders/due  Medical Follow Up:      Follow up with primary care provider in 1-2 weeks  [facility] scheduled appointments:     Discharge Services: Home Care:  Occupational Therapy, Physical Therapy, and Home Health Aide  Discharge Instructions Verbalized to Patient at Discharge:   None    TOTAL DISCHARGE TIME:   Greater than 30 minutes  Electronically signed by:  Tonya Lynn Haase, APRN CNP                   Sincerely,        Tonya Lynn Haase, APRN CNP

## 2023-11-22 ENCOUNTER — MEDICAL CORRESPONDENCE (OUTPATIENT)
Dept: HEALTH INFORMATION MANAGEMENT | Facility: CLINIC | Age: 78
End: 2023-11-22
Payer: MEDICARE

## 2023-11-22 ENCOUNTER — TELEPHONE (OUTPATIENT)
Dept: INTERNAL MEDICINE | Facility: CLINIC | Age: 78
End: 2023-11-22
Payer: MEDICARE

## 2023-11-22 ENCOUNTER — PATIENT OUTREACH (OUTPATIENT)
Dept: CARE COORDINATION | Facility: CLINIC | Age: 78
End: 2023-11-22
Payer: MEDICARE

## 2023-11-22 NOTE — TELEPHONE ENCOUNTER
Neal from [facility] calling for verbal orders for  PT  6 visits over the next 8 weeks  Ok to call and lm [phone]

## 2023-11-22 NOTE — PROGRESS NOTES
Clinic Care Coordination Contact  [facility]/Voicemail    Clinical Data: Care Coordinator Outreach  Outreach Documentation Number of Outreach Attempt   11/22/2023   8:48 AM 1     Left message on patient's voicemail with call back information and requested return call.    Plan: Care Coordinator will try to reach patient again in 1-2 business days.    ESCOBAR Ward/[facility]  Social Work Care Coordinator  [facility] - [place], [place], and [place]  Phone: [phone]

## 2023-11-24 ENCOUNTER — PATIENT OUTREACH (OUTPATIENT)
Dept: CARE COORDINATION | Facility: CLINIC | Age: 78
End: 2023-11-24
Payer: MEDICARE

## 2023-11-24 NOTE — LETTER
[facility] CARE COORDINATION  [address]  [facility] [address]    November 24, 2023      Kyler Rodriguez  [address] [facility] [address]          Dear Kyler,    I am a clinic care coordinator who works with Rakesh Clark MD with the [facility]. I wanted to introduce myself and provide you with my contact information for you to be able to call me with any questions or concerns. Below is a description of clinic care coordination and how I can further assist you shall any needs arise.        The clinic care coordination team is made up of a registered nurse, , financial resource worker and community health worker who understand the health care system. The goal of clinic care coordination is to help you manage your health and improve access to the health care system. Our team works alongside your provider to assist you in determining your health and social needs. We can help you obtain health care and community resources, providing you with necessary information and education. We can work with you through any barriers and develop a care plan that helps coordinate and strengthen the communication between you and your care team.  Our services are voluntary and are offered without charge to you personally.    Please feel free to contact me with any questions or concerns regarding care coordination and what we can offer.      We are focused on providing you with the highest-quality healthcare experience possible.    Sincerely,     ESCOBAR Ward/[facility]  Social Work Care Coordinator  [facility] - [facility], and [place]  Phone: [phone]

## 2023-11-24 NOTE — PROGRESS NOTES
Clinic Care Coordination Contact  [facility]/Voicemail    Clinical Data: Care Coordinator Outreach  Outreach Documentation Number of Outreach Attempt   11/22/2023   8:48 AM 1   11/24/2023   1:10 PM 2     Left message on patient's voicemail with call back information and requested return call.    Plan: Care Coordinator will send care coordination introduction letter with care coordinator contact information and explanation of care coordination services via [employer]. Care Coordinator will do no further outreaches at this time.    ESCOBAR Ward[facility]  Social Work Care Coordinator  [facility], [place], and [place]  Phone: [phone]

## 2023-11-29 ENCOUNTER — TELEPHONE (OUTPATIENT)
Dept: INTERNAL MEDICINE | Facility: CLINIC | Age: 78
End: 2023-11-29
Payer: MEDICARE

## 2023-11-29 NOTE — TELEPHONE ENCOUNTER
Maya with [facility] calling for verbal order:    Patient being discharged from home care and transitioning to outpatient care at [facility], patient's last visit with [facility] will be 12/4/2023.    OK to leave detailed message on secure line [phone]

## 2023-12-22 ENCOUNTER — MEDICAL CORRESPONDENCE (OUTPATIENT)
Dept: HEALTH INFORMATION MANAGEMENT | Facility: CLINIC | Age: 78
End: 2023-12-22

## 2023-12-22 ENCOUNTER — TELEPHONE (OUTPATIENT)
Dept: INTERNAL MEDICINE | Facility: CLINIC | Age: 78
End: 2023-12-22

## 2023-12-22 ENCOUNTER — TELEPHONE (OUTPATIENT)
Dept: INTERNAL MEDICINE | Facility: CLINIC | Age: 78
End: 2023-12-22
Payer: MEDICARE

## 2023-12-22 DIAGNOSIS — Z53.9 DIAGNOSIS NOT YET DEFINED: Primary | ICD-10-CM

## 2023-12-22 PROCEDURE — G0180 MD CERTIFICATION HHA PATIENT: HCPCS | Performed by: INTERNAL MEDICINE

## 2023-12-22 NOTE — TELEPHONE ENCOUNTER
Fax received from [facility] 11/29/23 / Order Nbr. [id number] for review and signature.  Put in Dr. Clark's in basket.

## 2023-12-22 NOTE — TELEPHONE ENCOUNTER
Fax received from [facility] 11/22/23 / Order Nbr. [id number] for review and signature.  Put in Dr. Clark's in basket.

## 2024-02-06 ENCOUNTER — MYC MEDICAL ADVICE (OUTPATIENT)
Dept: INTERNAL MEDICINE | Facility: CLINIC | Age: 79
End: 2024-02-06
Payer: MEDICARE

## 2024-02-15 ENCOUNTER — IMMUNIZATION (OUTPATIENT)
Dept: INTERNAL MEDICINE | Facility: CLINIC | Age: 79
End: 2024-02-15
Payer: MEDICARE

## 2024-02-15 DIAGNOSIS — Z23 HIGH PRIORITY FOR 2019-NCOV VACCINE: Primary | ICD-10-CM

## 2024-02-15 PROCEDURE — 90480 ADMN SARSCOV2 VAC 1/ONLY CMP: CPT

## 2024-02-15 PROCEDURE — 91320 SARSCV2 VAC 30MCG TRS-SUC IM: CPT

## 2024-02-21 ENCOUNTER — PATIENT OUTREACH (OUTPATIENT)
Dept: CARE COORDINATION | Facility: CLINIC | Age: 79
End: 2024-02-21
Payer: MEDICARE

## 2024-02-21 DIAGNOSIS — I10 ESSENTIAL HYPERTENSION, BENIGN: ICD-10-CM

## 2024-02-21 RX ORDER — AMLODIPINE AND BENAZEPRIL HYDROCHLORIDE 10; 20 MG/1; MG/1
1 CAPSULE ORAL DAILY
Qty: 90 CAPSULE | Refills: 0 | Status: SHIPPED | OUTPATIENT
Start: 2024-02-21 | End: 2024-04-17

## 2024-02-21 NOTE — TELEPHONE ENCOUNTER
Prescription approved per [facility] Refill Protocol.  Abi Negron, RN  [facility] Triage Nurse

## 2024-03-06 ENCOUNTER — PATIENT OUTREACH (OUTPATIENT)
Dept: CARE COORDINATION | Facility: CLINIC | Age: 79
End: 2024-03-06
Payer: MEDICARE

## 2024-03-24 ENCOUNTER — HEALTH MAINTENANCE LETTER (OUTPATIENT)
Age: 79
End: 2024-03-24

## 2024-04-17 ENCOUNTER — OFFICE VISIT (OUTPATIENT)
Dept: INTERNAL MEDICINE | Facility: CLINIC | Age: 79
End: 2024-04-17
Attending: INTERNAL MEDICINE
Payer: MEDICARE

## 2024-04-17 VITALS
HEART RATE: 70 BPM | WEIGHT: 169.6 LBS | BODY MASS INDEX: 23 KG/M2 | RESPIRATION RATE: 16 BRPM | SYSTOLIC BLOOD PRESSURE: 108 MMHG | OXYGEN SATURATION: 99 % | TEMPERATURE: 97.1 F | DIASTOLIC BLOOD PRESSURE: 64 MMHG

## 2024-04-17 DIAGNOSIS — I48.0 PAROXYSMAL ATRIAL FIBRILLATION (H): ICD-10-CM

## 2024-04-17 DIAGNOSIS — Z00.00 ENCOUNTER FOR PREVENTATIVE ADULT HEALTH CARE EXAMINATION: Primary | ICD-10-CM

## 2024-04-17 DIAGNOSIS — N39.0 URINARY TRACT INFECTION WITHOUT HEMATURIA, SITE UNSPECIFIED: ICD-10-CM

## 2024-04-17 DIAGNOSIS — G20.C PARKINSONISM, UNSPECIFIED PARKINSONISM TYPE (H): ICD-10-CM

## 2024-04-17 DIAGNOSIS — I10 ESSENTIAL HYPERTENSION, BENIGN: ICD-10-CM

## 2024-04-17 DIAGNOSIS — N42.1: ICD-10-CM

## 2024-04-17 DIAGNOSIS — E11.21 TYPE 2 DIABETES MELLITUS WITH DIABETIC NEPHROPATHY, WITHOUT LONG-TERM CURRENT USE OF INSULIN (H): ICD-10-CM

## 2024-04-17 DIAGNOSIS — Z12.5 SCREENING FOR PROSTATE CANCER: ICD-10-CM

## 2024-04-17 DIAGNOSIS — E78.5 HYPERLIPIDEMIA LDL GOAL <100: ICD-10-CM

## 2024-04-17 PROBLEM — C64.1: Status: RESOLVED | Noted: 2019-09-04 | Resolved: 2024-04-17

## 2024-04-17 LAB
ALBUMIN UR-MCNC: 30 MG/DL
APPEARANCE UR: ABNORMAL
BACTERIA #/AREA URNS HPF: ABNORMAL /HPF
BILIRUB UR QL STRIP: NEGATIVE
COLOR UR AUTO: YELLOW
ERYTHROCYTE [DISTWIDTH] IN BLOOD BY AUTOMATED COUNT: 21.9 % (ref 10–15)
GLUCOSE UR STRIP-MCNC: NEGATIVE MG/DL
HBA1C MFR BLD: 5.7 % (ref 0–5.6)
HCT VFR BLD AUTO: 36.9 % (ref 40–53)
HGB BLD-MCNC: 11 G/DL (ref 13.3–17.7)
HGB UR QL STRIP: ABNORMAL
KETONES UR STRIP-MCNC: NEGATIVE MG/DL
LEUKOCYTE ESTERASE UR QL STRIP: ABNORMAL
MCH RBC QN AUTO: 19.8 PG (ref 26.5–33)
MCHC RBC AUTO-ENTMCNC: 29.8 G/DL (ref 31.5–36.5)
MCV RBC AUTO: 66 FL (ref 78–100)
NITRATE UR QL: POSITIVE
PH UR STRIP: 5.5 [PH] (ref 5–7)
PLATELET # BLD AUTO: 382 10E3/UL (ref 150–450)
RBC # BLD AUTO: 5.56 10E6/UL (ref 4.4–5.9)
RBC #/AREA URNS AUTO: ABNORMAL /HPF
SP GR UR STRIP: 1.01 (ref 1–1.03)
SQUAMOUS #/AREA URNS AUTO: ABNORMAL /LPF
UROBILINOGEN UR STRIP-ACNC: 0.2 E.U./DL
WBC # BLD AUTO: 6.8 10E3/UL (ref 4–11)
WBC #/AREA URNS AUTO: >100 /HPF
WBC CLUMPS #/AREA URNS HPF: PRESENT /HPF

## 2024-04-17 PROCEDURE — 81001 URINALYSIS AUTO W/SCOPE: CPT | Performed by: INTERNAL MEDICINE

## 2024-04-17 PROCEDURE — 80053 COMPREHEN METABOLIC PANEL: CPT | Performed by: INTERNAL MEDICINE

## 2024-04-17 PROCEDURE — 87186 SC STD MICRODIL/AGAR DIL: CPT | Performed by: INTERNAL MEDICINE

## 2024-04-17 PROCEDURE — 82570 ASSAY OF URINE CREATININE: CPT | Performed by: INTERNAL MEDICINE

## 2024-04-17 PROCEDURE — 84443 ASSAY THYROID STIM HORMONE: CPT | Performed by: INTERNAL MEDICINE

## 2024-04-17 PROCEDURE — G0103 PSA SCREENING: HCPCS | Performed by: INTERNAL MEDICINE

## 2024-04-17 PROCEDURE — G0439 PPPS, SUBSEQ VISIT: HCPCS | Performed by: INTERNAL MEDICINE

## 2024-04-17 PROCEDURE — 85027 COMPLETE CBC AUTOMATED: CPT | Performed by: INTERNAL MEDICINE

## 2024-04-17 PROCEDURE — 83036 HEMOGLOBIN GLYCOSYLATED A1C: CPT | Performed by: INTERNAL MEDICINE

## 2024-04-17 PROCEDURE — 87086 URINE CULTURE/COLONY COUNT: CPT | Performed by: INTERNAL MEDICINE

## 2024-04-17 PROCEDURE — 80061 LIPID PANEL: CPT | Performed by: INTERNAL MEDICINE

## 2024-04-17 PROCEDURE — 36415 COLL VENOUS BLD VENIPUNCTURE: CPT | Performed by: INTERNAL MEDICINE

## 2024-04-17 PROCEDURE — 99214 OFFICE O/P EST MOD 30 MIN: CPT | Mod: 25 | Performed by: INTERNAL MEDICINE

## 2024-04-17 PROCEDURE — 82043 UR ALBUMIN QUANTITATIVE: CPT | Performed by: INTERNAL MEDICINE

## 2024-04-17 RX ORDER — FINASTERIDE 5 MG/1
TABLET, FILM COATED ORAL
Qty: 90 TABLET | Refills: 3 | Status: SHIPPED | OUTPATIENT
Start: 2024-04-17

## 2024-04-17 RX ORDER — ATORVASTATIN CALCIUM 80 MG/1
40 TABLET, FILM COATED ORAL DAILY
Qty: 45 TABLET | Refills: 3 | Status: SHIPPED | OUTPATIENT
Start: 2024-04-17

## 2024-04-17 RX ORDER — AMLODIPINE AND BENAZEPRIL HYDROCHLORIDE 10; 20 MG/1; MG/1
1 CAPSULE ORAL DAILY
Qty: 90 CAPSULE | Refills: 3 | Status: SHIPPED | OUTPATIENT
Start: 2024-04-17

## 2024-04-17 RX ORDER — ATENOLOL 25 MG/1
25 TABLET ORAL DAILY
Qty: 90 TABLET | Refills: 3 | Status: SHIPPED | OUTPATIENT
Start: 2024-04-17

## 2024-04-17 ASSESSMENT — PAIN SCALES - GENERAL: PAINLEVEL: MODERATE PAIN (4)

## 2024-04-17 NOTE — PROGRESS NOTES
Assessment & Plan     Type 2 diabetes mellitus with diabetic nephropathy, without long-term current use of insulin (H)  Continue treatment  Controlled   - metFORMIN (GLUCOPHAGE) 1000 MG tablet; Take 1 tablet (1,000 mg) by mouth daily (with dinner)  - Hemoglobin A1c  - Albumin Random Urine Quantitative with Creat Ratio  - OFFICE/OUTPT VISIT,EST,LEVL III    BENIGN HYPERTENSION  Continue treatment   Controlled BP   - atenolol (TENORMIN) 25 MG tablet; Take 1 tablet (25 mg) by mouth daily  - amLODIPine-benazepril (LOTREL) 10-20 MG capsule; Take 1 capsule by mouth daily  - CBC with platelets  - Comprehensive metabolic panel (BMP + Alb, Alk Phos, ALT, AST, Total. Bili, TP)  - TSH with free T4 reflex  - Albumin Random Urine Quantitative with Creat Ratio  - OFFICE/OUTPT VISIT,EST,LEVL III    Prostatic hemorrhage  On proscar  - finasteride (PROSCAR) 5 MG tablet; TAKE 1 TABLET EVERY DAY    Hyperlipidemia LDL goal <100  Continue statin   - atorvastatin (LIPITOR) 80 MG tablet; Take 0.5 tablets (40 mg) by mouth daily  - Lipid panel reflex to direct LDL Fasting  - OFFICE/OUTPT VISIT,EST,LEVL III    Encounter for preventative adult health care examination  advised regular aerobic activity, low cholesterol, low salt diet, wearing seat belt,  self examinations, sunscreen protection.Obtain screening cholesterol, immunizations reviewed.    - Lipid panel reflex to direct LDL Fasting  - CBC with platelets  - Comprehensive metabolic panel (BMP + Alb, Alk Phos, ALT, AST, Total. Bili, TP)  - Hemoglobin A1c  - PSA, screen  - TSH with free T4 reflex  - Albumin Random Urine Quantitative with Creat Ratio    Screening for prostate cancer    - PSA, screen    Paroxysmal atrial fibrillation (H)  No recurrence     Parkinsonism, unspecified Parkinsonism type (H)  Follow up with neurology               See Patient Instructions    Jesus Chávez is a 78 year old, presenting for the following health issues:  Diabetes        4/17/2024     9:25 AM    Additional Questions   Roomed by imer   Accompanied by self         4/17/2024     9:25 AM   Patient Reported Additional Medications   Patient reports taking the following new medications none     History of Present Illness       Diabetes:   He presents for follow up of diabetes.    He is not checking blood glucose.         He has no concerns regarding his diabetes at this time.   He is not experiencing numbness or burning in feet, excessive thirst, blurry vision, weight changes or redness, sores or blisters on feet.                 Annual Wellness Visit     Patient has been advised of split billing requirements and indicates understanding: Yes         Health Care Directive  Patient has a Health Care Directive on file  Advance care planning document is on file and is current.  In general, how would you rate your overall physical health? good  Do you have a special diet?  Diabetic         No data to display              Do you see a dentist two times every year?  Yes  Have you been more tired than usual lately?  No  If you drink alcohol do you typically have >3 drinks per day or >7 drinks per week? No  Do you have a current opioid prescription? No  Do you use any other controlled substances or medications that are not prescribed by a provider? None  Social History     Tobacco Use    Smoking status: Never    Smokeless tobacco: Never   Vaping Use    Vaping status: Never Used   Substance Use Topics    Alcohol use: Not Currently     Comment: 1-2 drinks/daily    Drug use: No       Needs assistance for the following daily activities: no assistance needed  Which of the following safety concerns are present in your home?  none identified   Do you (or your family members) have any concerns about your safety while driving?  No  Do you have any of the following hearing concerns?: No hearing concerns  In the past 6 months, have you been bothered by leaking of urine? No        9/20/2023   Social Factors   Worry food won't last  until get money to buy more No   Food not last or not have enough money for food? No   Do you have housing?  Yes   Are you worried about losing your housing? No   Lack of transportation? No   Unable to get utilities (heat,electricity)? No          4/10/2024   Fall Risk   Fallen 2 or more times in the past year? No   Trouble with walking or balance? No          Today's PHQ-2 Score:       4/16/2024    10:10 AM   PHQ-2 ( 1999 [employer])   Q1: Little interest or pleasure in doing things 0   Q2: Feeling down, depressed or hopeless 0   PHQ-2 Score 0   Q1: Little interest or pleasure in doing things Not at all   Q2: Feeling down, depressed or hopeless Not at all   PHQ-2 Score 0       ASCVD Risk   The 10-year ASCVD risk score (Char ARRIOLA, et al., 2019) is: 36.4%    Values used to calculate the score:      Age: 78 years      Sex: Male      Is Non- : No      Diabetic: Yes      Tobacco smoker: No      Systolic Blood Pressure: 108 mmHg      Is BP treated: Yes      HDL Cholesterol: 69 mg/dL      Total Cholesterol: 148 mg/dL            Reviewed and updated as needed this visit by Provider   Tobacco  Allergies  Meds  Problems  Med Hx  Surg Hx  Fam Hx            Lab work is in process  Labs reviewed in EPIC    Current providers sharing in care for this patient include:  Patient Care Team:  Rakesh Clark MD as PCP - General (Internal Medicine)  Rakesh Clark MD as Assigned PCP  Taqueria Amador MD as MD (Neurology)  Colten Messina MD as MD (Clinical Neurophysiology)  Binu Yoon MD as Assigned Surgical Provider    The following health maintenance items are reviewed in Epic and correct as of today:  Health Maintenance   Topic Date Due    RSV VACCINE (Pregnancy & 60+) (1 - 1-dose 60+ series) Never done    DIABETIC FOOT EXAM  10/13/2015    LIPID  09/22/2023    A1C  03/21/2024    MICROALBUMIN  03/22/2024    ANNUAL REVIEW OF HM ORDERS  03/22/2024    BMP  10/25/2024    EYE EXAM   01/11/2025    MEDICARE ANNUAL WELLNESS VISIT  04/17/2025    FALL RISK ASSESSMENT  04/17/2025    COLORECTAL CANCER SCREENING  08/11/2025    DTAP/TDAP/TD IMMUNIZATION (4 - Td or Tdap) 02/20/2028    ADVANCE CARE PLANNING  03/22/2028    HEPATITIS C SCREENING  Completed    PHQ-2 (once per calendar year)  Completed    INFLUENZA VACCINE  Completed    Pneumococcal Vaccine: 65+ Years  Completed    ZOSTER IMMUNIZATION  Completed    COVID-19 Vaccine  Completed    IPV IMMUNIZATION  Aged Out    HPV IMMUNIZATION  Aged Out    MENINGITIS IMMUNIZATION  Aged Out    RSV MONOCLONAL ANTIBODY  Aged Out       Appropriate preventive services were discussed with this patient, including applicable screening as appropriate for fall prevention, nutrition, physical activity, Tobacco-use cessation, weight loss and cognition.  Checklist reviewing preventive services available has been given to the patient.           No data to display                     Has h/o HTN. on medical treatment. BP has been controlled. No side effects from medications. No CP, HA, dizziness. good compliance with medications and low salt diet.  Has H/O DM. On diet , exercise and oral treatment. Blood sugars are controlled. No parestesias. No hypoglycemias.  Has H/O hyperlipidemia. On medical treatment and diet. No side effects. No muscle weakness, myalgias or upset stomach.   Has H/O BPH. On treatment with Proscar . No  symptoms of frequency, decreased urinary stream or dysuria. No side effects from medications.  Has parkinson's disease. On Sinemet. Follows with neurology  Has h/o LS spine fusion, 6 months ago, gradually improving. Uses a cane.       Review of Systems  Constitutional, HEENT, cardiovascular, pulmonary, GI, , musculoskeletal, neuro, skin, endocrine and psych systems are negative, except as otherwise noted.      Objective    /64 (BP Location: Right arm, Patient Position: Sitting, Cuff Size: Adult Regular)   Pulse 70   Temp 97.1  F (36.2  C) (Oral)    Resp 16   Wt 76.9 kg (169 lb 9.6 oz)   SpO2 99%   BMI 23.00 kg/m    Body mass index is 23 kg/m .  Physical Exam   GENERAL: alert and no distress, frail   EYES: Eyes grossly normal to inspection, PERRL and conjunctivae and sclerae normal  HENT: ear canals and TM's normal, nose and mouth without ulcers or lesions  NECK: no adenopathy, no asymmetry, masses, or scars  RESP: lungs clear to auscultation - no rales, rhonchi or wheezes  CV: regular rate and rhythm, normal S1 S2, no S3 or S4, no murmur, click or rub, no peripheral edema  ABDOMEN: soft, nontender, no hepatosplenomegaly, no masses and bowel sounds normal  MS: no gross musculoskeletal defects noted, no edema  SKIN: no suspicious lesions or rashes  NEURO: Normal strength and tone, mentation intact and speech normal. Resting hand tremor, unstable gait, uses cane  PSYCH: mentation appears normal, affect normal/bright    Admission on 10/24/2023, Discharged on 10/27/2023   Component Date Value Ref Range Status    GLUCOSE BY METER POCT 10/24/2023 118 (H)  70 - 99 mg/dL Final    ABO/RH(D) 10/24/2023 AB POS   Final    Antibody Screen 10/24/2023 Negative  Negative Final    SPECIMEN EXPIRATION DATE 10/24/2023 [id number]   Final    GLUCOSE BY METER POCT 10/24/2023 133 (H)  70 - 99 mg/dL Final    GLUCOSE BY METER POCT 10/24/2023 171 (H)  70 - 99 mg/dL Final    WBC Count 10/25/2023 11.0  4.0 - 11.0 10e3/uL Final    RBC Count 10/25/2023 4.41  4.40 - 5.90 10e6/uL Final    Hemoglobin 10/25/2023 10.2 (L)  13.3 - 17.7 g/dL Final    Hematocrit 10/25/2023 32.9 (L)  40.0 - 53.0 % Final    MCV 10/25/2023 75 (L)  78 - 100 fL Final    MCH 10/25/2023 23.1 (L)  26.5 - 33.0 pg Final    MCHC 10/25/2023 31.0 (L)  31.5 - 36.5 g/dL Final    RDW 10/25/2023 17.2 (H)  10.0 - 15.0 % Final    Platelet Count 10/25/2023 190  150 - 450 10e3/uL Final    Sodium 10/25/2023 134 (L)  135 - 145 mmol/L Final    Reference intervals for this test were updated on 09/26/2023 to more accurately  reflect our healthy population. There may be differences in the flagging of prior results with similar values performed with this method. Interpretation of those prior results can be made in the context of the updated reference intervals.     Potassium 10/25/2023 4.5  3.4 - 5.3 mmol/L Final    Chloride 10/25/2023 100  98 - 107 mmol/L Final    Carbon Dioxide (CO2) 10/25/2023 25  22 - 29 mmol/L Final    Anion Gap 10/25/2023 9  7 - 15 mmol/L Final    Urea Nitrogen 10/25/2023 13.4  8.0 - 23.0 mg/dL Final    Creatinine 10/25/2023 0.72  0.67 - 1.17 mg/dL Final    GFR Estimate 10/25/2023 >90  >60 mL/min/1.73m2 Final    Calcium 10/25/2023 8.6 (L)  8.8 - 10.2 mg/dL Final    Glucose 10/25/2023 199 (H)  70 - 99 mg/dL Final    Glucose 10/25/2023 199 (H)  70 - 99 mg/dL Final    GLUCOSE BY METER POCT 10/25/2023 238 (H)  70 - 99 mg/dL Final    GLUCOSE BY METER POCT 10/25/2023 158 (H)  70 - 99 mg/dL Final    GLUCOSE BY METER POCT 10/25/2023 207 (H)  70 - 99 mg/dL Final    GLUCOSE BY METER POCT 10/26/2023 167 (H)  70 - 99 mg/dL Final    GLUCOSE BY METER POCT 10/26/2023 161 (H)  70 - 99 mg/dL Final    GLUCOSE BY METER POCT 10/26/2023 157 (H)  70 - 99 mg/dL Final    GLUCOSE BY METER POCT 10/26/2023 156 (H)  70 - 99 mg/dL Final    GLUCOSE BY METER POCT 10/26/2023 159 (H)  70 - 99 mg/dL Final    GLUCOSE BY METER POCT 10/27/2023 127 (H)  70 - 99 mg/dL Final    GLUCOSE BY METER POCT 10/27/2023 107 (H)  70 - 99 mg/dL Final    GLUCOSE BY METER POCT 10/27/2023 169 (H)  70 - 99 mg/dL Final           Signed Electronically by: Rakesh Clark MD

## 2024-04-18 LAB
CREAT UR-MCNC: 90.9 MG/DL
MICROALBUMIN UR-MCNC: 106.8 MG/L
MICROALBUMIN/CREAT UR: 117.49 MG/G CR (ref 0–17)

## 2024-04-19 LAB
ALBUMIN SERPL BCG-MCNC: 4.3 G/DL (ref 3.5–5.2)
ALP SERPL-CCNC: 100 U/L (ref 40–150)
ALT SERPL W P-5'-P-CCNC: 9 U/L (ref 0–70)
ANION GAP SERPL CALCULATED.3IONS-SCNC: 12 MMOL/L (ref 7–15)
AST SERPL W P-5'-P-CCNC: 19 U/L (ref 0–45)
BILIRUB SERPL-MCNC: 0.5 MG/DL
BUN SERPL-MCNC: 22.4 MG/DL (ref 8–23)
CALCIUM SERPL-MCNC: 9.5 MG/DL (ref 8.8–10.2)
CHLORIDE SERPL-SCNC: 98 MMOL/L (ref 98–107)
CHOLEST SERPL-MCNC: 130 MG/DL
CREAT SERPL-MCNC: 0.87 MG/DL (ref 0.67–1.17)
DEPRECATED HCO3 PLAS-SCNC: 24 MMOL/L (ref 22–29)
EGFRCR SERPLBLD CKD-EPI 2021: 89 ML/MIN/1.73M2
FASTING STATUS PATIENT QL REPORTED: YES
GLUCOSE SERPL-MCNC: 103 MG/DL (ref 70–99)
HDLC SERPL-MCNC: 53 MG/DL
LDLC SERPL CALC-MCNC: 56 MG/DL
NONHDLC SERPL-MCNC: 77 MG/DL
POTASSIUM SERPL-SCNC: 4.5 MMOL/L (ref 3.4–5.3)
PROT SERPL-MCNC: 7.2 G/DL (ref 6.4–8.3)
SODIUM SERPL-SCNC: 134 MMOL/L (ref 135–145)
TRIGL SERPL-MCNC: 104 MG/DL
TSH SERPL DL<=0.005 MIU/L-ACNC: 3.09 UIU/ML (ref 0.3–4.2)

## 2024-04-20 LAB — PSA SERPL DL<=0.01 NG/ML-MCNC: 0.04 NG/ML (ref 0–6.5)

## 2024-04-21 LAB
BACTERIA UR CULT: ABNORMAL
BACTERIA UR CULT: ABNORMAL

## 2024-04-22 RX ORDER — SULFAMETHOXAZOLE/TRIMETHOPRIM 800-160 MG
1 TABLET ORAL 2 TIMES DAILY
Qty: 14 TABLET | Refills: 0 | Status: SHIPPED | OUTPATIENT
Start: 2024-04-22 | End: 2024-04-26

## 2024-04-26 ENCOUNTER — MYC MEDICAL ADVICE (OUTPATIENT)
Dept: INTERNAL MEDICINE | Facility: CLINIC | Age: 79
End: 2024-04-26
Payer: MEDICARE

## 2024-04-26 DIAGNOSIS — N39.0 URINARY TRACT INFECTION WITHOUT HEMATURIA, SITE UNSPECIFIED: ICD-10-CM

## 2024-04-26 NOTE — TELEPHONE ENCOUNTER
See MyChart message from patient, medication and pharmacy pended please review and sign if appropriate    Vonnie MALIK

## 2024-04-29 RX ORDER — SULFAMETHOXAZOLE/TRIMETHOPRIM 800-160 MG
1 TABLET ORAL 2 TIMES DAILY
Qty: 14 TABLET | Refills: 0 | Status: SHIPPED | OUTPATIENT
Start: 2024-04-29

## 2024-05-08 ENCOUNTER — TRANSFERRED RECORDS (OUTPATIENT)
Dept: HEALTH INFORMATION MANAGEMENT | Facility: CLINIC | Age: 79
End: 2024-05-08
Payer: MEDICARE

## 2024-06-13 ENCOUNTER — TRANSFERRED RECORDS (OUTPATIENT)
Dept: HEALTH INFORMATION MANAGEMENT | Facility: CLINIC | Age: 79
End: 2024-06-13
Payer: MEDICARE

## 2024-07-31 DIAGNOSIS — C64.1 MALIGNANT NEOPLASM OF RIGHT KIDNEY, EXCEPT RENAL PELVIS (H): Primary | ICD-10-CM

## 2024-09-30 ENCOUNTER — HOSPITAL ENCOUNTER (OUTPATIENT)
Dept: CT IMAGING | Facility: CLINIC | Age: 79
Discharge: HOME | End: 2024-09-30
Attending: UROLOGY | Admitting: UROLOGY
Payer: MEDICARE

## 2024-09-30 DIAGNOSIS — C64.1 MALIGNANT NEOPLASM OF RIGHT KIDNEY, EXCEPT RENAL PELVIS (H): ICD-10-CM

## 2024-09-30 LAB
CREAT BLD-MCNC: 0.9 MG/DL (ref 0.7–1.3)
EGFRCR SERPLBLD CKD-EPI 2021: >60 ML/MIN/1.73M2

## 2024-09-30 PROCEDURE — 74178 CT ABD&PLV WO CNTR FLWD CNTR: CPT | Mod: MG

## 2024-09-30 PROCEDURE — 82565 ASSAY OF CREATININE: CPT

## 2024-09-30 PROCEDURE — 250N000011 HC RX IP 250 OP 636: Performed by: UROLOGY

## 2024-09-30 PROCEDURE — 250N000009 HC RX 250: Performed by: UROLOGY

## 2024-09-30 RX ORDER — IOPAMIDOL 755 MG/ML
500 INJECTION, SOLUTION INTRAVASCULAR ONCE
Status: COMPLETED | OUTPATIENT
Start: 2024-09-30 | End: 2024-09-30

## 2024-09-30 RX ADMIN — IOPAMIDOL 85 ML: 755 INJECTION, SOLUTION INTRAVENOUS at 10:05

## 2024-09-30 RX ADMIN — SODIUM CHLORIDE 58 ML: 9 INJECTION, SOLUTION INTRAVENOUS at 10:05

## 2024-10-10 ENCOUNTER — TRANSFERRED RECORDS (OUTPATIENT)
Dept: HEALTH INFORMATION MANAGEMENT | Facility: CLINIC | Age: 79
End: 2024-10-10
Payer: MEDICARE

## 2024-10-17 ENCOUNTER — TRANSFERRED RECORDS (OUTPATIENT)
Dept: HEALTH INFORMATION MANAGEMENT | Facility: CLINIC | Age: 79
End: 2024-10-17
Payer: MEDICARE

## 2024-10-20 ENCOUNTER — HEALTH MAINTENANCE LETTER (OUTPATIENT)
Age: 79
End: 2024-10-20

## 2024-10-25 ENCOUNTER — OFFICE VISIT (OUTPATIENT)
Dept: INTERNAL MEDICINE | Facility: CLINIC | Age: 79
End: 2024-10-25
Attending: INTERNAL MEDICINE
Payer: MEDICARE

## 2024-10-25 VITALS
SYSTOLIC BLOOD PRESSURE: 121 MMHG | OXYGEN SATURATION: 100 % | HEIGHT: 72 IN | HEART RATE: 55 BPM | WEIGHT: 175.1 LBS | TEMPERATURE: 96.5 F | DIASTOLIC BLOOD PRESSURE: 69 MMHG | BODY MASS INDEX: 23.72 KG/M2 | RESPIRATION RATE: 20 BRPM

## 2024-10-25 DIAGNOSIS — G20.C PARKINSONISM, UNSPECIFIED PARKINSONISM TYPE (H): ICD-10-CM

## 2024-10-25 DIAGNOSIS — Z29.11 NEED FOR VACCINATION AGAINST RESPIRATORY SYNCYTIAL VIRUS: ICD-10-CM

## 2024-10-25 DIAGNOSIS — E11.21 TYPE 2 DIABETES MELLITUS WITH DIABETIC NEPHROPATHY, WITHOUT LONG-TERM CURRENT USE OF INSULIN (H): Primary | ICD-10-CM

## 2024-10-25 DIAGNOSIS — I10 ESSENTIAL HYPERTENSION, BENIGN: ICD-10-CM

## 2024-10-25 DIAGNOSIS — Z98.1 S/P LUMBAR FUSION: ICD-10-CM

## 2024-10-25 DIAGNOSIS — D64.9 ANEMIA, UNSPECIFIED TYPE: ICD-10-CM

## 2024-10-25 DIAGNOSIS — E78.5 HYPERLIPIDEMIA LDL GOAL <100: ICD-10-CM

## 2024-10-25 LAB
ERYTHROCYTE [DISTWIDTH] IN BLOOD BY AUTOMATED COUNT: 19.1 % (ref 10–15)
EST. AVERAGE GLUCOSE BLD GHB EST-MCNC: 108 MG/DL
FERRITIN SERPL-MCNC: 13 NG/ML (ref 31–409)
FOLATE SERPL-MCNC: 27.8 NG/ML (ref 4.6–34.8)
HBA1C MFR BLD: 5.4 % (ref 0–5.6)
HCT VFR BLD AUTO: 39.6 % (ref 40–53)
HGB BLD-MCNC: 12.6 G/DL (ref 13.3–17.7)
LDH SERPL L TO P-CCNC: 191 U/L (ref 0–250)
MCH RBC QN AUTO: 21.8 PG (ref 26.5–33)
MCHC RBC AUTO-ENTMCNC: 31.8 G/DL (ref 31.5–36.5)
MCV RBC AUTO: 69 FL (ref 78–100)
PLATELET # BLD AUTO: 243 10E3/UL (ref 150–450)
RBC # BLD AUTO: 5.77 10E6/UL (ref 4.4–5.9)
RETICS # AUTO: 0.05 10E6/UL (ref 0.03–0.1)
RETICS/RBC NFR AUTO: 0.9 % (ref 0.5–2)
VIT B12 SERPL-MCNC: 758 PG/ML (ref 232–1245)
WBC # BLD AUTO: 6.6 10E3/UL (ref 4–11)

## 2024-10-25 PROCEDURE — 99214 OFFICE O/P EST MOD 30 MIN: CPT | Performed by: INTERNAL MEDICINE

## 2024-10-25 PROCEDURE — 85027 COMPLETE CBC AUTOMATED: CPT | Performed by: INTERNAL MEDICINE

## 2024-10-25 PROCEDURE — 85045 AUTOMATED RETICULOCYTE COUNT: CPT | Performed by: INTERNAL MEDICINE

## 2024-10-25 PROCEDURE — 82728 ASSAY OF FERRITIN: CPT | Performed by: INTERNAL MEDICINE

## 2024-10-25 PROCEDURE — 82607 VITAMIN B-12: CPT | Performed by: INTERNAL MEDICINE

## 2024-10-25 PROCEDURE — 83036 HEMOGLOBIN GLYCOSYLATED A1C: CPT | Performed by: INTERNAL MEDICINE

## 2024-10-25 PROCEDURE — 82746 ASSAY OF FOLIC ACID SERUM: CPT | Performed by: INTERNAL MEDICINE

## 2024-10-25 PROCEDURE — 36415 COLL VENOUS BLD VENIPUNCTURE: CPT | Performed by: INTERNAL MEDICINE

## 2024-10-25 PROCEDURE — 83615 LACTATE (LD) (LDH) ENZYME: CPT | Performed by: INTERNAL MEDICINE

## 2024-10-25 PROCEDURE — 83540 ASSAY OF IRON: CPT | Performed by: INTERNAL MEDICINE

## 2024-10-25 PROCEDURE — 83550 IRON BINDING TEST: CPT | Performed by: INTERNAL MEDICINE

## 2024-10-25 RX ORDER — FAMOTIDINE 40 MG/1
40 TABLET, FILM COATED ORAL DAILY
COMMUNITY

## 2024-10-25 RX ORDER — POLYETHYLENE GLYCOL 3350 17 G/17G
1 POWDER, FOR SOLUTION ORAL DAILY
COMMUNITY

## 2024-10-25 RX ORDER — ACETAMINOPHEN 500 MG
500-1000 TABLET ORAL EVERY 6 HOURS PRN
COMMUNITY

## 2024-10-25 ASSESSMENT — PAIN SCALES - GENERAL: PAINLEVEL_OUTOF10: MILD PAIN (3)

## 2024-10-25 NOTE — PROGRESS NOTES
Assessment & Plan     Need for vaccination against respiratory syncytial virus    - RSV vaccine, bivalent, ABRYSVO, injection; Inject 0.5 mLs into the muscle once for 1 dose. Pharmacist administered    Type 2 diabetes mellitus with diabetic nephropathy, without long-term current use of insulin (H)  Assess lab, continue treatment   - HEMOGLOBIN A1C    Anemia, unspecified type  Assess anemia, monitor   - CBC with platelets  - Iron and iron binding capacity  - Ferritin  - Vitamin B12  - Folate  - Lactate Dehydrogenase  - Reticulocyte count    Parkinsonism, unspecified Parkinsonism type (H)  Follow up with neurology     HYPERLIPIDEMIA LDL GOAL <100  On statin, controlled     S/P lumbar fusion  Continue PT, plan for steroid injection     Essential hypertension, benign  Controlled on treatment             See Patient Instructions    Jesus Chávez is a 79 year old, presenting for the following health issues:  No chief complaint on file.        10/25/2024    10:03 AM   Additional Questions   Roomed by Miriam ROMANO   Accompanied by n/a     History of Present Illness       Diabetes:   He presents for follow up of diabetes.    He is not checking blood glucose.        He is concerned about none and other. He has no concerns regarding his diabetes at this time.   He is not experiencing numbness or burning in feet, excessive thirst, blurry vision, weight changes or redness, sores or blisters on feet.           Hyperlipidemia:  He presents for follow up of hyperlipidemia.   He is taking medication to lower cholesterol. He is not having myalgia or other side effects to statin medications.    Hypertension: He presents for follow up of hypertension.  He does not check blood pressure  regularly outside of the clinic. Outpatient blood pressures have not been over 140/90. He does not follow a low salt diet.     He eats 2-3 servings of fruits and vegetables daily.He consumes 1 sweetened beverage(s) daily.He exercises with enough effort  to increase his heart rate 9 or less minutes per day.  He exercises with enough effort to increase his heart rate 3 or less days per week.   He is taking medications regularly.       Has H/O DM. On diet , exercise and oral treatment . Blood sugars are controlled. No parestesias. No hypoglycemias.  Has h/o HTN. on medical treatment. BP has been controlled. No side effects from medications. No CP, HA, dizziness. good compliance with medications and low salt diet.  Has H/O hyperlipidemia. On medical treatment and diet. No side effects. No muscle weakness, myalgias or upset stomach.   Has h/o parkinson's disease, on Sinemet, follows with neurology.   Has h/o mild anemia. No bleeding reported.           Review of Systems  Constitutional, HEENT, cardiovascular, pulmonary, gi and gu systems are negative, except as otherwise noted.      Objective    /69 (BP Location: Left arm, Cuff Size: Adult Regular)   Pulse 55   Temp (!) 96.5  F (35.8  C) (Tympanic)   Resp 20   Ht 1.829 m (6')   Wt 79.4 kg (175 lb 1.6 oz)   SpO2 100%   BMI 23.75 kg/m    Body mass index is 23.75 kg/m .  Physical Exam   GENERAL: alert and no distress, frail   NECK: no adenopathy, no asymmetry, masses, or scars  RESP: lungs clear to auscultation - no rales, rhonchi or wheezes  CV: regular rate and rhythm, normal S1 S2, no S3 or S4, no murmur, click or rub, no peripheral edema  ABDOMEN: soft, nontender, no hepatosplenomegaly, no masses and bowel sounds normal  MS: no gross musculoskeletal defects noted, no edema, resting hands tremor     Hospital Outpatient Visit on 09/30/2024   Component Date Value Ref Range Status    Creatinine POCT 09/30/2024 0.9  0.7 - 1.3 mg/dL Final    GFR, ESTIMATED POCT 09/30/2024 >60  >60 mL/min/1.73m2 Final           Signed Electronically by: Rakesh Clark MD

## 2024-10-25 NOTE — LETTER
October 28, 2024      Kyler Rodriguez  [address] [facility] [address]        Dear ,    We are writing to inform you of your test results.    Mild anemia and low iron level.   Recommend to assess test for occult GI bleed and refer for EGD.     Resulted Orders   HEMOGLOBIN A1C   Result Value Ref Range    Estimated Average Glucose 108 <117 mg/dL    Hemoglobin A1C 5.4 0.0 - 5.6 %      Comment:      Normal <5.7%   Prediabetes 5.7-6.4%    Diabetes 6.5% or higher     Note: Adopted from ADA consensus guidelines.   CBC with platelets   Result Value Ref Range    WBC Count 6.6 4.0 - 11.0 10e3/uL    RBC Count 5.77 4.40 - 5.90 10e6/uL    Hemoglobin 12.6 (L) 13.3 - 17.7 g/dL    Hematocrit 39.6 (L) 40.0 - 53.0 %    MCV 69 (L) 78 - 100 fL    MCH 21.8 (L) 26.5 - 33.0 pg    MCHC 31.8 31.5 - 36.5 g/dL    RDW 19.1 (H) 10.0 - 15.0 %    Platelet Count 243 150 - 450 10e3/uL   Iron and iron binding capacity   Result Value Ref Range    Iron 29 (L) 61 - 157 ug/dL    Iron Binding Capacity 367 240 - 430 ug/dL    Iron Sat Index 8 (L) 15 - 46 %   Ferritin   Result Value Ref Range    Ferritin 13 (L) 31 - 409 ng/mL   Vitamin B12   Result Value Ref Range    Vitamin B12 758 232 - 1,245 pg/mL   Folate   Result Value Ref Range    Folic Acid 27.8 4.6 - 34.8 ng/mL   Lactate Dehydrogenase   Result Value Ref Range    Lactate Dehydrogenase 191 0 - 250 U/L   Reticulocyte count   Result Value Ref Range    % Reticulocyte 0.9 0.5 - 2.0 %    Absolute Reticulocyte 0.051 0.025 - 0.095 10e6/uL       If you have any questions or concerns, please call the clinic at the number listed above.       Sincerely,      Rakesh Clark MD

## 2024-10-26 LAB
IRON BINDING CAPACITY (ROCHE): 367 UG/DL (ref 240–430)
IRON SATN MFR SERPL: 8 % (ref 15–46)
IRON SERPL-MCNC: 29 UG/DL (ref 61–157)

## 2024-11-01 ENCOUNTER — LAB (OUTPATIENT)
Dept: LAB | Facility: CLINIC | Age: 79
End: 2024-11-01
Payer: MEDICARE

## 2024-11-01 ENCOUNTER — TELEPHONE (OUTPATIENT)
Dept: INTERNAL MEDICINE | Facility: CLINIC | Age: 79
End: 2024-11-01
Payer: MEDICARE

## 2024-11-01 DIAGNOSIS — D50.0 IRON DEFICIENCY ANEMIA DUE TO CHRONIC BLOOD LOSS: Primary | ICD-10-CM

## 2024-11-01 DIAGNOSIS — D50.0 IRON DEFICIENCY ANEMIA DUE TO CHRONIC BLOOD LOSS: ICD-10-CM

## 2024-11-01 RX ORDER — FERROUS GLUCONATE 324(38)MG
324 TABLET ORAL
Qty: 90 TABLET | Refills: 3 | Status: SHIPPED | OUTPATIENT
Start: 2024-11-01

## 2024-11-01 NOTE — TELEPHONE ENCOUNTER
Patient calling regarding his lab results.  Received letter in the mail.    He is ok with the EGD and the occult blood test.    Please place future orders.  Then will call patient back.    Please advise, thanks.

## 2024-11-01 NOTE — TELEPHONE ENCOUNTER
Patient calls back. Relayed Dr. Clark's message to patient. Patient verbalizes understanding of instructions and indicates no further questions at this time.    Thank you,  Aniket Samaniego, Triage RN Ama Ott  3:19 PM 11/1/2024

## 2024-11-01 NOTE — TELEPHONE ENCOUNTER
Attempt #1    Left voicemail for patient to call clinic back.     Summer RN 2:28 PM November 1, 2024   [facility]

## 2024-11-04 ENCOUNTER — OFFICE VISIT (OUTPATIENT)
Dept: UROLOGY | Facility: CLINIC | Age: 79
End: 2024-11-04
Payer: MEDICARE

## 2024-11-04 VITALS
WEIGHT: 175 LBS | OXYGEN SATURATION: 99 % | HEART RATE: 62 BPM | BODY MASS INDEX: 23.7 KG/M2 | DIASTOLIC BLOOD PRESSURE: 70 MMHG | HEIGHT: 72 IN | SYSTOLIC BLOOD PRESSURE: 118 MMHG

## 2024-11-04 DIAGNOSIS — R33.9 INCOMPLETE BLADDER EMPTYING: ICD-10-CM

## 2024-11-04 DIAGNOSIS — R35.1 NOCTURIA: Primary | ICD-10-CM

## 2024-11-04 DIAGNOSIS — C61 PROSTATE CANCER (H): ICD-10-CM

## 2024-11-04 DIAGNOSIS — C64.1 MALIGNANT NEOPLASM OF RIGHT KIDNEY, EXCEPT RENAL PELVIS (H): ICD-10-CM

## 2024-11-04 DIAGNOSIS — R33.9 URINARY RETENTION: ICD-10-CM

## 2024-11-04 LAB
ALBUMIN UR-MCNC: NEGATIVE MG/DL
APPEARANCE UR: CLEAR
BILIRUB UR QL STRIP: NEGATIVE
COLOR UR AUTO: YELLOW
GLUCOSE UR STRIP-MCNC: NEGATIVE MG/DL
HGB UR QL STRIP: NEGATIVE
KETONES UR STRIP-MCNC: NEGATIVE MG/DL
LEUKOCYTE ESTERASE UR QL STRIP: NEGATIVE
NITRATE UR QL: NEGATIVE
PH UR STRIP: 6 [PH] (ref 5–7)
RESIDUAL VOLUME (RV) (EXTERNAL): 538
SP GR UR STRIP: 1.01 (ref 1–1.03)
UROBILINOGEN UR STRIP-ACNC: 0.2 E.U./DL

## 2024-11-04 PROCEDURE — 99214 OFFICE O/P EST MOD 30 MIN: CPT | Mod: 25 | Performed by: UROLOGY

## 2024-11-04 PROCEDURE — 51798 US URINE CAPACITY MEASURE: CPT | Performed by: UROLOGY

## 2024-11-04 PROCEDURE — 81003 URINALYSIS AUTO W/O SCOPE: CPT | Mod: QW | Performed by: UROLOGY

## 2024-11-04 RX ORDER — TAMSULOSIN HYDROCHLORIDE 0.4 MG/1
0.4 CAPSULE ORAL DAILY
Qty: 90 CAPSULE | Refills: 1 | Status: SHIPPED | OUTPATIENT
Start: 2024-11-04

## 2024-11-04 ASSESSMENT — PAIN SCALES - GENERAL: PAINLEVEL_OUTOF10: MODERATE PAIN (4)

## 2024-11-04 NOTE — PROGRESS NOTES
[place]   CHIEF COMPLAINT   It was my pleasure to see Kyler Rodriguez who is a 79 year old male for follow-up of s/p RIGHT PNx.      HPI   Kyler Rodriguez is a very pleasant 79 year old male who presents with a history of prostate cancer s/p radiation and RIGHT renal mass now s/p robotic partial nephrectomy.    ##Kidney Cancer Follow up##  Patient is status post Robotic Partial Nephrectomy on 9/4/19.   Tumor was on the right side.   Maximal tumor dimension was 1.2 cm.    The histologic subtype was Clear Cell.    ISUP Grade 2.    Pathologic Stage T1a.    Tumor thrombus level 0 (renal vein).    Tumor necrosis present: No.  Sarcomatoid features present: No.  Lymph Nodes Removed No.   Number of nodes removed n/a, number of node positive for cancer n/a.   Was the ipsilateral adrenal gland removed? No.  Neoadjuvant Chemotherapy? No.  Was Margin Positive? No.    There were not deviations from a normal post-operative course or complications?.    The patient was not readmitted to the hospital since post-operative discharge.    2/15/21:  He is doing well with no issues.   He is still waiting for his COVID vaccine     3/11/22:  He is doing well with no issues over the last year    9/29/2022:  Seen as a hospital consult at [facility] for gross hematuria.  A three-way catheter was placed with irrigation    10/3/2022:  RN visit for trial of void which was successful though noted to have continued bloody urine    10/28/2022:  He has been doing very well and his urine has been fairly clear  He follows up today for surveillance cystoscopy    4/28/2023:  He has been doing well  Urine has remained clear  Follow up to review surveillance CT    TODAY 11/4/2024:  Had a UTI in April - treated with Bactrim   He feels he has fairly normal urination during the day  At times at night, he will wake and decide to go urinate 1 or 2 times per night  In the last couple of weeks, he has had a few episodes of nocturnal enuresis  He denies any symptoms  of a UTI, though notes he did not have many symptoms in April    AUASS: 7-9-5-0-0-0-2 = 2  QOL = 3  PVR = 500cc    PHYSICAL EXAM  Patient is a 79 year old  male   Vitals: There were no vitals taken for this visit.  General Appearance Adult: There is no height or weight on file to calculate BMI.  Alert, no acute distress, oriented  HENT: throat/mouth:normal, good dentition  Lungs: no respiratory distress, or pursed lip breathing  Heart: No obvious jugular venous distension present  Abdomen: soft, nontender, no organomegaly or masses  Neuro: Alert, oriented, speech and mentation normal  Psych: affect and mood normal  Gait: Normal     PSA PSA Diag Urologic Phys Prostate Specific Antigen Screen   Latest Ref Rng 0.00 - 4.00 ug/L 0.00 - 4.00 ng/mL 0.00 - 6.50 ng/mL   9/26/2002 7.5 (H)      12/13/2002 8.8 (H)      7/19/2004 10.85 (H)      10/13/2006 7.62 (H)      9/11/2007 10.30 (H)      8/18/2008 15.40 (H)      2/4/2015 0.1 (E)     2/3/2017  0.04     8/16/2017  <0.04     2/13/2018 0.02      8/17/2018  <0.04     3/13/2019  <0.04     2/5/2020 0.02      2/8/2021 0.03      3/11/2022 <0.04      3/22/2023   0.03    4/17/2024   0.04      Creatinine   Date Value Ref Range Status   04/17/2024 0.87 0.67 - 1.17 mg/dL Final   02/08/2021 0.73 0.66 - 1.25 mg/dL Final     Creatinine POCT   Date Value Ref Range Status   09/30/2024 0.9 0.7 - 1.3 mg/dL Final      UA RESULTS:  Recent Labs   Lab Test 11/04/24  0929 04/17/24  1045   COLOR Yellow Yellow   APPEARANCE Clear Cloudy*   URINEGLC Negative Negative   URINEBILI Negative Negative   URINEKETONE Negative Negative   SG 1.010 1.010   UBLD Negative Trace*   URINEPH 6.0 5.5   PROTEIN Negative 30*   UROBILINOGEN 0.2 0.2   NITRITE Negative Positive*   LEUKEST Negative Moderate*   RBCU  --  2-5*   WBCU  --  >100*        PATHOLOGY:  FINAL DIAGNOSIS:   A: Kidney, right, renal mass base, margin, excision   - Negative for malignancy     B: Kidney, right, partial nephrectomy   - Clear cell renal  cell carcinoma, WHO/ISUP grade 2   - Tumor size: 1.2 cm   - Renal parenchymal margin negative   - Tumor extends to the inked, cauterized margin of the perirenal tissue   (see comment)   - Please see below for tumor synopsis     IMAGING:  I reviewed all pertinent available imaging  CT RENAL MASS 9/30/2024:  FINDINGS: Partial nephrectomy on the right again evident and  unchanged. No residual malignancy demonstrated. No filling defects to  suggest a urothelial malignancy demonstrated. Left kidney demonstrates  subcentimeter stable probable cysts.     Stable transient enhancement phenomenon in the liver on arterial phase  imaging. No suspicious focal liver lesions demonstrated. Pancreas,  spleen, and adrenal glands are negative for worrisome focal lesion.                                                      IMPRESSION: Stable exam without recurrent or residual malignancy  demonstrated.         ASSESSMENT and PLAN  79 year old man with history of prostate cancer s/p radiation therapy and hormonal therapy several years ago and now s/p RIGHT robotic pnx on 9/4/19, as well as some nocturia and no evidence of incomplete bladder emptying and chronic urinary retention    Right clear cell renal cell  - We discussed that his pathology was reassuring with pT1a disease. Reported as positive margin, however this is at the capsule with no evidence of capsular invasion  -I reviewed his updated CT scan with no evidence of recurrence  - We discussed that this is now 5 years out from his partial nephrectomy and no further surveillance imaging is needed    Prostate cancer   -We reviewed his PSA history and noted that his last PSA was 0.04 which is not concerning  -Recommend continued annual PSA monitoring    Nocturnal enuresis with incomplete bladder emptying and chronic urinary retention  - Reviewed his PVR today which is elevated above 500 cc  - I reviewed his CT scan from his surveillance imaging with a distended urinary bladder  - We  discussed the need for initiation of tamsulosin 0.4 mg daily.  We discussed the common side effects of this medication as well as the mechanism of action prescription sent to the pharmacy  - We also discussed the need for him to learn intermittent self-catheterization and teaching was performed by my RN team today  - I would like him to track his postvoid catheterized residual volumes and we will monitor this over time  - Follow-up in 3 months for symptom check  - Urinalysis today without evidence of infection    Time spent: 25 minutes spent on the date of the encounter doing chart review, history and exam, documentation and further activities as noted above.    Note copy attestation: the elements have been reviewed, edited as needed, and remain pertinent to today's visit.     Binu Yoon MD   Urology  [facility]  Clinic Phone [phone]

## 2024-11-04 NOTE — LETTER
11/4/2024       RE: Kyler Rodriguez  [address] [address] [address]     Dear Colleague,    Thank you for referring your patient, Kyler Rodriguez, to the [facility] at [facility]. Please see a copy of my visit note below.    [place]   CHIEF COMPLAINT   It was my pleasure to see Kyler Rodriguez who is a 79 year old male for follow-up of s/p RIGHT PNx.      HPI   Kyler Rodriguez is a very pleasant 79 year old male who presents with a history of prostate cancer s/p radiation and RIGHT renal mass now s/p robotic partial nephrectomy.    ##Kidney Cancer Follow up##  Patient is status post Robotic Partial Nephrectomy on 9/4/19.   Tumor was on the right side.   Maximal tumor dimension was 1.2 cm.    The histologic subtype was Clear Cell.    ISUP Grade 2.    Pathologic Stage T1a.    Tumor thrombus level 0 (renal vein).    Tumor necrosis present: No.  Sarcomatoid features present: No.  Lymph Nodes Removed No.   Number of nodes removed n/a, number of node positive for cancer n/a.   Was the ipsilateral adrenal gland removed? No.  Neoadjuvant Chemotherapy? No.  Was Margin Positive? No.    There were not deviations from a normal post-operative course or complications?.    The patient was not readmitted to the hospital since post-operative discharge.    2/15/21:  He is doing well with no issues.   He is still waiting for his COVID vaccine     3/11/22:  He is doing well with no issues over the last year    9/29/2022:  Seen as a hospital consult at [facility] for gross hematuria.  A three-way catheter was placed with irrigation    10/3/2022:  RN visit for trial of void which was successful though noted to have continued bloody urine    10/28/2022:  He has been doing very well and his urine has been fairly clear  He follows up today for surveillance cystoscopy    4/28/2023:  He has been doing well  Urine has remained clear  Follow up to review surveillance  CT    TODAY 11/4/2024:  Had a UTI in April - treated with Bactrim   He feels he has fairly normal urination during the day  At times at night, he will wake and decide to go urinate 1 or 2 times per night  In the last couple of weeks, he has had a few episodes of nocturnal enuresis  He denies any symptoms of a UTI, though notes he did not have many symptoms in April    AUASS: 9-8-1-0-0-0-2 = 2  QOL = 3  PVR = 500cc    PHYSICAL EXAM  Patient is a 79 year old  male   Vitals: There were no vitals taken for this visit.  General Appearance Adult: There is no height or weight on file to calculate BMI.  Alert, no acute distress, oriented  HENT: throat/mouth:normal, good dentition  Lungs: no respiratory distress, or pursed lip breathing  Heart: No obvious jugular venous distension present  Abdomen: soft, nontender, no organomegaly or masses  Neuro: Alert, oriented, speech and mentation normal  Psych: affect and mood normal  Gait: Normal     PSA PSA Diag Urologic Phys Prostate Specific Antigen Screen   Latest Ref Rng 0.00 - 4.00 ug/L 0.00 - 4.00 ng/mL 0.00 - 6.50 ng/mL   9/26/2002 7.5 (H)      12/13/2002 8.8 (H)      7/19/2004 10.85 (H)      10/13/2006 7.62 (H)      9/11/2007 10.30 (H)      8/18/2008 15.40 (H)      2/4/2015 0.1 (E)     2/3/2017  0.04     8/16/2017  <0.04     2/13/2018 0.02      8/17/2018  <0.04     3/13/2019  <0.04     2/5/2020 0.02      2/8/2021 0.03      3/11/2022 <0.04      3/22/2023   0.03    4/17/2024   0.04      Creatinine   Date Value Ref Range Status   04/17/2024 0.87 0.67 - 1.17 mg/dL Final   02/08/2021 0.73 0.66 - 1.25 mg/dL Final     Creatinine POCT   Date Value Ref Range Status   09/30/2024 0.9 0.7 - 1.3 mg/dL Final      UA RESULTS:  Recent Labs   Lab Test 11/04/24  0929 04/17/24  1045   COLOR Yellow Yellow   APPEARANCE Clear Cloudy*   URINEGLC Negative Negative   URINEBILI Negative Negative   URINEKETONE Negative Negative   SG 1.010 1.010   UBLD Negative Trace*   URINEPH 6.0 5.5   PROTEIN  Negative 30*   UROBILINOGEN 0.2 0.2   NITRITE Negative Positive*   LEUKEST Negative Moderate*   RBCU  --  2-5*   WBCU  --  >100*        PATHOLOGY:  FINAL DIAGNOSIS:   A: Kidney, right, renal mass base, margin, excision   - Negative for malignancy     B: Kidney, right, partial nephrectomy   - Clear cell renal cell carcinoma, WHO/ISUP grade 2   - Tumor size: 1.2 cm   - Renal parenchymal margin negative   - Tumor extends to the inked, cauterized margin of the perirenal tissue   (see comment)   - Please see below for tumor synopsis     IMAGING:  I reviewed all pertinent available imaging  CT RENAL MASS 9/30/2024:  FINDINGS: Partial nephrectomy on the right again evident and  unchanged. No residual malignancy demonstrated. No filling defects to  suggest a urothelial malignancy demonstrated. Left kidney demonstrates  subcentimeter stable probable cysts.     Stable transient enhancement phenomenon in the liver on arterial phase  imaging. No suspicious focal liver lesions demonstrated. Pancreas,  spleen, and adrenal glands are negative for worrisome focal lesion.                                                      IMPRESSION: Stable exam without recurrent or residual malignancy  demonstrated.         ASSESSMENT and PLAN  79 year old man with history of prostate cancer s/p radiation therapy and hormonal therapy several years ago and now s/p RIGHT robotic pnx on 9/4/19, as well as some nocturia and no evidence of incomplete bladder emptying and chronic urinary retention    Right clear cell renal cell  - We discussed that his pathology was reassuring with pT1a disease. Reported as positive margin, however this is at the capsule with no evidence of capsular invasion  -I reviewed his updated CT scan with no evidence of recurrence  - We discussed that this is now 5 years out from his partial nephrectomy and no further surveillance imaging is needed    Prostate cancer   -We reviewed his PSA history and noted that his last PSA was  0.04 which is not concerning  -Recommend continued annual PSA monitoring    Nocturnal enuresis with incomplete bladder emptying and chronic urinary retention  - Reviewed his PVR today which is elevated above 500 cc  - I reviewed his CT scan from his surveillance imaging with a distended urinary bladder  - We discussed the need for initiation of tamsulosin 0.4 mg daily.  We discussed the common side effects of this medication as well as the mechanism of action prescription sent to the pharmacy  - We also discussed the need for him to learn intermittent self-catheterization and teaching was performed by my RN team today  - I would like him to track his postvoid catheterized residual volumes and we will monitor this over time  - Follow-up in 3 months for symptom check  - Urinalysis today without evidence of infection    Time spent: 25 minutes spent on the date of the encounter doing chart review, history and exam, documentation and further activities as noted above.    Note copy attestation: the elements have been reviewed, edited as needed, and remain pertinent to today's visit.     Binu Yoon MD   Urology  [facility]  Clinic Phone [phone]      Again, thank you for allowing me to participate in the care of your patient.      Sincerely,    Binu Yoon MD

## 2024-11-07 ENCOUNTER — TELEPHONE (OUTPATIENT)
Dept: UROLOGY | Facility: CLINIC | Age: 79
End: 2024-11-07
Payer: MEDICARE

## 2024-11-07 NOTE — TELEPHONE ENCOUNTER
Patient called [facility]OP[facility]  reports he is doing [facility]   bid  . Orders placed for catheter supplies

## 2024-11-08 ENCOUNTER — TELEPHONE (OUTPATIENT)
Dept: INTERNAL MEDICINE | Facility: CLINIC | Age: 79
End: 2024-11-08

## 2024-11-08 LAB — HEMOCCULT SP1 STL QL: NEGATIVE

## 2024-11-08 PROCEDURE — 82270 OCCULT BLOOD FECES: CPT

## 2024-11-08 NOTE — TELEPHONE ENCOUNTER
Order/Referral Request    Who is requesting: Pt    Orders being requested: Upper GI referral     Reason service is needed/diagnosis: Iron deficiency anemia due to chronic blood loss     When are orders needed by: ASAP    Has this been discussed with Provider: Yes    Does patient have a preference on a Group/Provider/Facility? [facility] in [place], [place]     Does patient have an appointment scheduled?: No    Where to send orders: Fax - [phone]    Could we send this information to you in [facility] [place] or would you prefer to receive a phone call?:   Patient would prefer a phone call   Okay to leave a detailed message?: Yes at Cell number on file:    Telephone Information:   Mobile [phone]

## 2025-01-15 ENCOUNTER — TRANSFERRED RECORDS (OUTPATIENT)
Dept: HEALTH INFORMATION MANAGEMENT | Facility: CLINIC | Age: 80
End: 2025-01-15
Payer: MEDICARE

## 2025-01-30 SDOH — HEALTH STABILITY: PHYSICAL HEALTH: ON AVERAGE, HOW MANY DAYS PER WEEK DO YOU ENGAGE IN MODERATE TO STRENUOUS EXERCISE (LIKE A BRISK WALK)?: 0 DAYS

## 2025-01-30 SDOH — HEALTH STABILITY: PHYSICAL HEALTH: ON AVERAGE, HOW MANY MINUTES DO YOU ENGAGE IN EXERCISE AT THIS LEVEL?: 0 MIN

## 2025-01-30 ASSESSMENT — SOCIAL DETERMINANTS OF HEALTH (SDOH): HOW OFTEN DO YOU GET TOGETHER WITH FRIENDS OR RELATIVES?: MORE THAN THREE TIMES A WEEK

## 2025-02-03 ENCOUNTER — OFFICE VISIT (OUTPATIENT)
Dept: UROLOGY | Facility: CLINIC | Age: 80
End: 2025-02-03
Payer: MEDICARE

## 2025-02-03 VITALS
HEIGHT: 72 IN | OXYGEN SATURATION: 98 % | HEART RATE: 60 BPM | WEIGHT: 180 LBS | SYSTOLIC BLOOD PRESSURE: 120 MMHG | DIASTOLIC BLOOD PRESSURE: 69 MMHG | BODY MASS INDEX: 24.38 KG/M2

## 2025-02-03 DIAGNOSIS — R33.9 INCOMPLETE BLADDER EMPTYING: ICD-10-CM

## 2025-02-03 DIAGNOSIS — R33.9 URINARY RETENTION: ICD-10-CM

## 2025-02-03 DIAGNOSIS — R35.1 NOCTURIA: ICD-10-CM

## 2025-02-03 PROCEDURE — 99214 OFFICE O/P EST MOD 30 MIN: CPT | Performed by: UROLOGY

## 2025-02-03 RX ORDER — TAMSULOSIN HYDROCHLORIDE 0.4 MG/1
0.4 CAPSULE ORAL DAILY
Qty: 90 CAPSULE | Refills: 3 | Status: SHIPPED | OUTPATIENT
Start: 2025-02-03

## 2025-02-03 ASSESSMENT — PAIN SCALES - GENERAL: PAINLEVEL_OUTOF10: MILD PAIN (3)

## 2025-02-03 NOTE — LETTER
2/3/2025       RE: Kyler Rodriguez  [address] [address] [address]     Dear Colleague,    Thank you for referring your patient, Kyler Rodriguez, to the [facility] at [facility]. Please see a copy of my visit note below.    [place]   CHIEF COMPLAINT   It was my pleasure to see Kyler Rodriguez who is a 79 year old male for follow-up of s/p RIGHT PNx.      HPI   Kyler Rodriguez is a very pleasant 79 year old male who presents with a history of prostate cancer s/p radiation and RIGHT renal mass now s/p robotic partial nephrectomy.    ##Kidney Cancer Follow up##  Patient is status post Robotic Partial Nephrectomy on 9/4/19.   Tumor was on the right side.   Maximal tumor dimension was 1.2 cm.    The histologic subtype was Clear Cell.    ISUP Grade 2.    Pathologic Stage T1a.    Tumor thrombus level 0 (renal vein).    Tumor necrosis present: No.  Sarcomatoid features present: No.  Lymph Nodes Removed No.   Number of nodes removed n/a, number of node positive for cancer n/a.   Was the ipsilateral adrenal gland removed? No.  Neoadjuvant Chemotherapy? No.  Was Margin Positive? No.    There were not deviations from a normal post-operative course or complications?.    The patient was not readmitted to the hospital since post-operative discharge.    2/15/21:  He is doing well with no issues.   He is still waiting for his COVID vaccine     3/11/22:  He is doing well with no issues over the last year    9/29/2022:  Seen as a hospital consult at [facility] for gross hematuria.  A three-way catheter was placed with irrigation    10/3/2022:  RN visit for trial of void which was successful though noted to have continued bloody urine    10/28/2022:  He has been doing very well and his urine has been fairly clear  He follows up today for surveillance cystoscopy    4/28/2023:  He has been doing well  Urine has remained clear  Follow up to review surveillance  CT    11/4/2024:  Had a UTI in April - treated with Bactrim   He feels he has fairly normal urination during the day  At times at night, he will wake and decide to go urinate 1 or 2 times per night  In the last couple of weeks, he has had a few episodes of nocturnal enuresis  He denies any symptoms of a UTI, though notes he did not have many symptoms in April    AUASS: 6-2-2-0-0-0-2 = 2  QOL = 3  PVR = 500cc    TODAY 2/3/2025:  He is now sleeping through the night     He has been doing intermittent self-catheterization twice daily, once in the morning and once before bed and keeping track of his postvoid volumes  This has decreased from 700 cc down to 250 to 300 cc  He is doing well with the catheters, though would like to stop them if possible    PHYSICAL EXAM  Patient is a 79 year old  male   Vitals: Blood pressure 120/69, pulse 60, height 1.829 m (6'), weight 81.6 kg (180 lb), SpO2 98%.  General Appearance Adult: Body mass index is 24.41 kg/m .  Alert, no acute distress, oriented  HENT: throat/mouth:normal, good dentition  Lungs: no respiratory distress, or pursed lip breathing  Heart: No obvious jugular venous distension present  Abdomen: soft, nontender, no organomegaly or masses  Neuro: Alert, oriented, speech and mentation normal, parkinsonian tremor  Psych: affect and mood normal  Gait: Normal     PSA PSA Diag Urologic Phys Prostate Specific Antigen Screen   Latest Ref Rng 0.00 - 4.00 ug/L 0.00 - 4.00 ng/mL 0.00 - 6.50 ng/mL   9/26/2002 7.5 (H)      12/13/2002 8.8 (H)      7/19/2004 10.85 (H)      10/13/2006 7.62 (H)      9/11/2007 10.30 (H)      8/18/2008 15.40 (H)      2/4/2015 0.1 (E)     2/3/2017  0.04     8/16/2017  <0.04     2/13/2018 0.02      8/17/2018  <0.04     3/13/2019  <0.04     2/5/2020 0.02      2/8/2021 0.03      3/11/2022 <0.04      3/22/2023   0.03    4/17/2024   0.04      Creatinine   Date Value Ref Range Status   04/17/2024 0.87 0.67 - 1.17 mg/dL Final   02/08/2021 0.73 0.66 - 1.25 mg/dL  Final     Creatinine POCT   Date Value Ref Range Status   09/30/2024 0.9 0.7 - 1.3 mg/dL Final      UA RESULTS:  Recent Labs   Lab Test 11/04/24  0929 04/17/24  1045   COLOR Yellow Yellow   APPEARANCE Clear Cloudy*   URINEGLC Negative Negative   URINEBILI Negative Negative   URINEKETONE Negative Negative   SG 1.010 1.010   UBLD Negative Trace*   URINEPH 6.0 5.5   PROTEIN Negative 30*   UROBILINOGEN 0.2 0.2   NITRITE Negative Positive*   LEUKEST Negative Moderate*   RBCU  --  2-5*   WBCU  --  >100*        PATHOLOGY:  FINAL DIAGNOSIS:   A: Kidney, right, renal mass base, margin, excision   - Negative for malignancy     B: Kidney, right, partial nephrectomy   - Clear cell renal cell carcinoma, WHO/ISUP grade 2   - Tumor size: 1.2 cm   - Renal parenchymal margin negative   - Tumor extends to the inked, cauterized margin of the perirenal tissue   (see comment)   - Please see below for tumor synopsis     IMAGING:  I reviewed all pertinent available imaging  CT RENAL MASS 9/30/2024:  FINDINGS: Partial nephrectomy on the right again evident and  unchanged. No residual malignancy demonstrated. No filling defects to  suggest a urothelial malignancy demonstrated. Left kidney demonstrates  subcentimeter stable probable cysts.     Stable transient enhancement phenomenon in the liver on arterial phase  imaging. No suspicious focal liver lesions demonstrated. Pancreas,  spleen, and adrenal glands are negative for worrisome focal lesion.                                                      IMPRESSION: Stable exam without recurrent or residual malignancy  demonstrated.         ASSESSMENT and PLAN  79 year old man with history of prostate cancer s/p radiation therapy and hormonal therapy several years ago and now s/p RIGHT robotic pnx on 9/4/19, as well as some nocturia and no evidence of incomplete bladder emptying and chronic urinary retention      Prostate cancer   -We reviewed his PSA history and noted that his last PSA was 0.04  which is not concerning  -Recommend continued annual PSA monitoring    Nocturnal enuresis with incomplete bladder emptying and chronic urinary retention  -He has been doing better with intermittent self-catheterization twice daily  - We discussed that ideally his postvoid volumes would be less than 200 cc prior to cutting back his frequency of intermittent self-catheterization  - I would recommend he continue at least with intermittent self-catheterization prior to bedtime given that this is allowing him to sleep through the night  - At this time I would recommend continued twice daily intermittent self-catheterization  - Follow-up in 1 year for symptom check    Time spent: 15 minutes spent on the date of the encounter doing chart review, history and exam, documentation and further activities as noted above.    Note copy attestation: the elements have been reviewed, edited as needed, and remain pertinent to today's visit.     Binu Yoon MD   Urology  [facility]  Clinic Phone [phone]      Again, thank you for allowing me to participate in the care of your patient.      Sincerely,    Binu Yoon MD

## 2025-02-03 NOTE — PROGRESS NOTES
TITUS   CHIEF COMPLAINT   It was my pleasure to see Kyler Rodriguez who is a 79 year old male for follow-up of s/p RIGHT PNx.      HPI   Kyler Rodriguez is a very pleasant 79 year old male who presents with a history of prostate cancer s/p radiation and RIGHT renal mass now s/p robotic partial nephrectomy.    ##Kidney Cancer Follow up##  Patient is status post Robotic Partial Nephrectomy on 9/4/19.   Tumor was on the right side.   Maximal tumor dimension was 1.2 cm.    The histologic subtype was Clear Cell.    ISUP Grade 2.    Pathologic Stage T1a.    Tumor thrombus level 0 (renal vein).    Tumor necrosis present: No.  Sarcomatoid features present: No.  Lymph Nodes Removed No.   Number of nodes removed n/a, number of node positive for cancer n/a.   Was the ipsilateral adrenal gland removed? No.  Neoadjuvant Chemotherapy? No.  Was Margin Positive? No.    There were not deviations from a normal post-operative course or complications?.    The patient was not readmitted to the hospital since post-operative discharge.    2/15/21:  He is doing well with no issues.   He is still waiting for his COVID vaccine     3/11/22:  He is doing well with no issues over the last year    9/29/2022:  Seen as a hospital consult at [facility] for gross hematuria.  A three-way catheter was placed with irrigation    10/3/2022:  RN visit for trial of void which was successful though noted to have continued bloody urine    10/28/2022:  He has been doing very well and his urine has been fairly clear  He follows up today for surveillance cystoscopy    4/28/2023:  He has been doing well  Urine has remained clear  Follow up to review surveillance CT    11/4/2024:  Had a UTI in April - treated with Bactrim   He feels he has fairly normal urination during the day  At times at night, he will wake and decide to go urinate 1 or 2 times per night  In the last couple of weeks, he has had a few episodes of nocturnal enuresis  He denies any symptoms of a  UTI, though notes he did not have many symptoms in April    AUASS: 1-5-7-0-0-0-2 = 2  QOL = 3  PVR = 500cc    TODAY 2/3/2025:  He is now sleeping through the night     He has been doing intermittent self-catheterization twice daily, once in the morning and once before bed and keeping track of his postvoid volumes  This has decreased from 700 cc down to 250 to 300 cc  He is doing well with the catheters, though would like to stop them if possible    PHYSICAL EXAM  Patient is a 79 year old  male   Vitals: Blood pressure 120/69, pulse 60, height 1.829 m (6'), weight 81.6 kg (180 lb), SpO2 98%.  General Appearance Adult: Body mass index is 24.41 kg/m .  Alert, no acute distress, oriented  HENT: throat/mouth:normal, good dentition  Lungs: no respiratory distress, or pursed lip breathing  Heart: No obvious jugular venous distension present  Abdomen: soft, nontender, no organomegaly or masses  Neuro: Alert, oriented, speech and mentation normal, parkinsonian tremor  Psych: affect and mood normal  Gait: Normal     PSA PSA Diag Urologic Phys Prostate Specific Antigen Screen   Latest Ref Rng 0.00 - 4.00 ug/L 0.00 - 4.00 ng/mL 0.00 - 6.50 ng/mL   9/26/2002 7.5 (H)      12/13/2002 8.8 (H)      7/19/2004 10.85 (H)      10/13/2006 7.62 (H)      9/11/2007 10.30 (H)      8/18/2008 15.40 (H)      2/4/2015 0.1 (E)     2/3/2017  0.04     8/16/2017  <0.04     2/13/2018 0.02      8/17/2018  <0.04     3/13/2019  <0.04     2/5/2020 0.02      2/8/2021 0.03      3/11/2022 <0.04      3/22/2023   0.03    4/17/2024   0.04      Creatinine   Date Value Ref Range Status   04/17/2024 0.87 0.67 - 1.17 mg/dL Final   02/08/2021 0.73 0.66 - 1.25 mg/dL Final     Creatinine POCT   Date Value Ref Range Status   09/30/2024 0.9 0.7 - 1.3 mg/dL Final      UA RESULTS:  Recent Labs   Lab Test 11/04/24  0929 04/17/24  1045   COLOR Yellow Yellow   APPEARANCE Clear Cloudy*   URINEGLC Negative Negative   URINEBILI Negative Negative   URINEKETONE Negative Negative    SG 1.010 1.010   UBLD Negative Trace*   URINEPH 6.0 5.5   PROTEIN Negative 30*   UROBILINOGEN 0.2 0.2   NITRITE Negative Positive*   LEUKEST Negative Moderate*   RBCU  --  2-5*   WBCU  --  >100*        PATHOLOGY:  FINAL DIAGNOSIS:   A: Kidney, right, renal mass base, margin, excision   - Negative for malignancy     B: Kidney, right, partial nephrectomy   - Clear cell renal cell carcinoma, WHO/ISUP grade 2   - Tumor size: 1.2 cm   - Renal parenchymal margin negative   - Tumor extends to the inked, cauterized margin of the perirenal tissue   (see comment)   - Please see below for tumor synopsis     IMAGING:  I reviewed all pertinent available imaging  CT RENAL MASS 9/30/2024:  FINDINGS: Partial nephrectomy on the right again evident and  unchanged. No residual malignancy demonstrated. No filling defects to  suggest a urothelial malignancy demonstrated. Left kidney demonstrates  subcentimeter stable probable cysts.     Stable transient enhancement phenomenon in the liver on arterial phase  imaging. No suspicious focal liver lesions demonstrated. Pancreas,  spleen, and adrenal glands are negative for worrisome focal lesion.                                                      IMPRESSION: Stable exam without recurrent or residual malignancy  demonstrated.         ASSESSMENT and PLAN  79 year old man with history of prostate cancer s/p radiation therapy and hormonal therapy several years ago and now s/p RIGHT robotic pnx on 9/4/19, as well as some nocturia and no evidence of incomplete bladder emptying and chronic urinary retention      Prostate cancer   -We reviewed his PSA history and noted that his last PSA was 0.04 which is not concerning  -Recommend continued annual PSA monitoring    Nocturnal enuresis with incomplete bladder emptying and chronic urinary retention  -He has been doing better with intermittent self-catheterization twice daily  - We discussed that ideally his postvoid volumes would be less than 200 cc  prior to cutting back his frequency of intermittent self-catheterization  - I would recommend he continue at least with intermittent self-catheterization prior to bedtime given that this is allowing him to sleep through the night  - At this time I would recommend continued twice daily intermittent self-catheterization  - Follow-up in 1 year for symptom check    Time spent: 15 minutes spent on the date of the encounter doing chart review, history and exam, documentation and further activities as noted above.    Note copy attestation: the elements have been reviewed, edited as needed, and remain pertinent to today's visit.     Binu Yoon MD   Urology  [facility]  Clinic Phone [phone]

## 2025-02-04 ENCOUNTER — OFFICE VISIT (OUTPATIENT)
Dept: INTERNAL MEDICINE | Facility: CLINIC | Age: 80
End: 2025-02-04
Payer: MEDICARE

## 2025-02-04 VITALS
WEIGHT: 181.9 LBS | HEIGHT: 72 IN | OXYGEN SATURATION: 100 % | TEMPERATURE: 97.4 F | DIASTOLIC BLOOD PRESSURE: 62 MMHG | HEART RATE: 58 BPM | RESPIRATION RATE: 16 BRPM | BODY MASS INDEX: 24.64 KG/M2 | SYSTOLIC BLOOD PRESSURE: 112 MMHG

## 2025-02-04 DIAGNOSIS — D50.9 IRON DEFICIENCY ANEMIA, UNSPECIFIED IRON DEFICIENCY ANEMIA TYPE: Primary | ICD-10-CM

## 2025-02-04 DIAGNOSIS — I10 ESSENTIAL HYPERTENSION, BENIGN: ICD-10-CM

## 2025-02-04 DIAGNOSIS — E11.21 TYPE 2 DIABETES MELLITUS WITH DIABETIC NEPHROPATHY, WITHOUT LONG-TERM CURRENT USE OF INSULIN (H): ICD-10-CM

## 2025-02-04 LAB
BASOPHILS # BLD AUTO: 0 10E3/UL (ref 0–0.2)
BASOPHILS NFR BLD AUTO: 1 %
EOSINOPHIL # BLD AUTO: 0.3 10E3/UL (ref 0–0.7)
EOSINOPHIL NFR BLD AUTO: 4 %
ERYTHROCYTE [DISTWIDTH] IN BLOOD BY AUTOMATED COUNT: 19.5 % (ref 10–15)
FERRITIN SERPL-MCNC: 26 NG/ML (ref 31–409)
HCT VFR BLD AUTO: 40.7 % (ref 40–53)
HGB BLD-MCNC: 13.7 G/DL (ref 13.3–17.7)
IMM GRANULOCYTES # BLD: 0 10E3/UL
IMM GRANULOCYTES NFR BLD: 0 %
IRON BINDING CAPACITY (ROCHE): 304 UG/DL (ref 240–430)
IRON SATN MFR SERPL: 16 % (ref 15–46)
IRON SERPL-MCNC: 50 UG/DL (ref 61–157)
LYMPHOCYTES # BLD AUTO: 1.4 10E3/UL (ref 0.8–5.3)
LYMPHOCYTES NFR BLD AUTO: 25 %
MCH RBC QN AUTO: 25.8 PG (ref 26.5–33)
MCHC RBC AUTO-ENTMCNC: 33.7 G/DL (ref 31.5–36.5)
MCV RBC AUTO: 77 FL (ref 78–100)
MONOCYTES # BLD AUTO: 0.6 10E3/UL (ref 0–1.3)
MONOCYTES NFR BLD AUTO: 11 %
NEUTROPHILS # BLD AUTO: 3.4 10E3/UL (ref 1.6–8.3)
NEUTROPHILS NFR BLD AUTO: 59 %
PLATELET # BLD AUTO: 206 10E3/UL (ref 150–450)
RBC # BLD AUTO: 5.32 10E6/UL (ref 4.4–5.9)
WBC # BLD AUTO: 5.7 10E3/UL (ref 4–11)

## 2025-02-04 PROCEDURE — 99214 OFFICE O/P EST MOD 30 MIN: CPT | Performed by: INTERNAL MEDICINE

## 2025-02-04 PROCEDURE — 36415 COLL VENOUS BLD VENIPUNCTURE: CPT | Performed by: INTERNAL MEDICINE

## 2025-02-04 PROCEDURE — 82728 ASSAY OF FERRITIN: CPT | Performed by: INTERNAL MEDICINE

## 2025-02-04 PROCEDURE — 83550 IRON BINDING TEST: CPT | Performed by: INTERNAL MEDICINE

## 2025-02-04 PROCEDURE — 83540 ASSAY OF IRON: CPT | Performed by: INTERNAL MEDICINE

## 2025-02-04 PROCEDURE — 85025 COMPLETE CBC W/AUTO DIFF WBC: CPT | Performed by: INTERNAL MEDICINE

## 2025-02-04 RX ORDER — OMEPRAZOLE 20 MG/1
20 CAPSULE, DELAYED RELEASE ORAL DAILY
COMMUNITY
Start: 2025-02-04

## 2025-02-04 ASSESSMENT — PAIN SCALES - GENERAL: PAINLEVEL_OUTOF10: MODERATE PAIN (4)

## 2025-02-04 NOTE — PROGRESS NOTES
Assessment & Plan     Type 2 diabetes mellitus with diabetic nephropathy, without long-term current use of insulin (H)  Controlled, on treatment     Iron deficiency anemia, unspecified iron deficiency anemia type  Assess lab, improved with PO iron   - CBC with platelets and differential  - Ferritin  - Iron and iron binding capacity    Essential hypertension, benign  Controlled, on treatment     Patient has been advised of split billing requirements and indicates understanding: Yes        Counseling  Appropriate preventive services were addressed with this patient via screening, questionnaire, or discussion as appropriate for fall prevention, nutrition, physical activity, Tobacco-use cessation, social engagement, weight loss and cognition.  Checklist reviewing preventive services available has been given to the patient.  Reviewed patient's diet, addressing concerns and/or questions.   Updated plan of care.  Patient reported difficulty with activities of daily living were addressed today.Information on urinary incontinence and treatment options given to patient.       See Patient Instructions    Jesus Chávez is a 79 year old, presenting for the following health issues:  RECHECK        2/4/2025     8:19 AM   Additional Questions   Roomed by Miriam ROMANO   Accompanied by n/a     History of Present Illness       Reason for visit:  Follow up from test ordered at the last appointment with Dr. Clark in 2024.  Had upper GI on November 29.He exercises with enough effort to increase his heart rate 9 or less minutes per day.  He exercises with enough effort to increase his heart rate 3 or less days per week.   He is taking medications regularly.         Diabetes Follow-up  Has  history of  DM. On diet , exercise and oral treatment. Blood sugars are controlled. No paresthesias. No hypoglycemias.    How often are you checking your blood sugar? Not at all  What concerns do you have today about your diabetes? None   Do you  have any of these symptoms? (Select all that apply)  No numbness or tingling in feet.  No redness, sores or blisters on feet.  No complaints of excessive thirst.  No reports of blurry vision.  No significant changes to weight.          Hyperlipidemia Follow-Up  Has H/O hyperlipidemia. On medical treatment and diet. No side effects. No muscle weakness, myalgias or upset stomach.     Are you regularly taking any medication or supplement to lower your cholesterol?   Yes- atorvastatin  Are you having muscle aches or other side effects that you think could be caused by your cholesterol lowering medication?  No    Hypertension Follow-up  Has h/o HTN. on medical treatment. BP has been controlled. No side effects from medications. No CP, HA, dizziness. good compliance with medications and low salt diet.    Do you check your blood pressure regularly outside of the clinic? No   Are you following a low salt diet? Yes  Are your blood pressures ever more than 140 on the top number (systolic) OR more   than 90 on the bottom number (diastolic), for example 140/90? N/A    BP Readings from Last 2 Encounters:   02/04/25 112/62   02/03/25 120/69     Hemoglobin A1C (%)   Date Value   10/25/2024 5.4   04/17/2024 5.7 (H)   02/08/2021 5.2   09/23/2020 5.5     LDL Cholesterol Calculated (mg/dL)   Date Value   04/17/2024 56   03/22/2023 65   02/08/2021 26   02/05/2020 46       Patient has anemia with iron deficiency. Has been treated with PO iron supplement. Improved. Has had EGD and colonoscopy. Has mild gastritis. Started on PPI from GI.       Review of Systems  Constitutional, HEENT, cardiovascular, pulmonary, gi and gu systems are negative, except as otherwise noted.      Objective    /62 (BP Location: Left arm, Cuff Size: Adult Regular)   Pulse 58   Temp 97.4  F (36.3  C) (Tympanic)   Resp 16   Ht 1.829 m (6')   Wt 82.5 kg (181 lb 14.4 oz)   SpO2 100%   BMI 24.67 kg/m    Body mass index is 24.67 kg/m .  Physical Exam    GENERAL: alert and no distress  NECK: no adenopathy, no asymmetry, masses, or scars  RESP: lungs clear to auscultation - no rales, rhonchi or wheezes  CV: regular rate and rhythm, normal S1 S2, no S3 or S4, no murmur, click or rub, no peripheral edema  ABDOMEN: soft, nontender, no hepatosplenomegaly, no masses and bowel sounds normal  MS: no gross musculoskeletal defects noted, no edema    Office Visit on 11/04/2024   Component Date Value Ref Range Status    Residual Volume (RV) (External) 11/04/2024 538   Final    Color Urine 11/04/2024 Yellow  Colorless, Straw, Light Yellow, Yellow Final    Appearance Urine 11/04/2024 Clear  Clear Final    Glucose Urine 11/04/2024 Negative  Negative mg/dL Final    Bilirubin Urine 11/04/2024 Negative  Negative Final    Ketones Urine 11/04/2024 Negative  Negative mg/dL Final    Specific Gravity Urine 11/04/2024 1.010  1.003 - 1.035 Final    Blood Urine 11/04/2024 Negative  Negative Final    pH Urine 11/04/2024 6.0  5.0 - 7.0 Final    Protein Albumin Urine 11/04/2024 Negative  Negative mg/dL Final    Urobilinogen Urine 11/04/2024 0.2  0.2, 1.0 E.U./dL Final    Nitrite Urine 11/04/2024 Negative  Negative Final    Leukocyte Esterase Urine 11/04/2024 Negative  Negative Final           Signed Electronically by: Rakesh Clark MD

## 2025-03-22 ENCOUNTER — HEALTH MAINTENANCE LETTER (OUTPATIENT)
Age: 80
End: 2025-03-22

## 2025-04-17 ENCOUNTER — TRANSFERRED RECORDS (OUTPATIENT)
Dept: HEALTH INFORMATION MANAGEMENT | Facility: CLINIC | Age: 80
End: 2025-04-17
Payer: MEDICARE

## 2025-04-18 PROBLEM — I48.0 PAROXYSMAL ATRIAL FIBRILLATION (H): Status: RESOLVED | Noted: 2024-04-17 | Resolved: 2025-04-18

## 2025-04-22 ENCOUNTER — TELEPHONE (OUTPATIENT)
Dept: INTERNAL MEDICINE | Facility: CLINIC | Age: 80
End: 2025-04-22
Payer: MEDICARE

## 2025-04-22 NOTE — TELEPHONE ENCOUNTER
Left message on cell voicemail asking patient to return call to clinic to speak with an RN. LILI Silverio R.N.

## 2025-04-22 NOTE — TELEPHONE ENCOUNTER
Result Notes     Rakesh Clark MD  4/21/2025  5:31 PM CDT Back to Top      Normal result reviewed, please notify patient.  Slightly low sodium. Try to restrict fluid intake to 1500 ml daily.

## 2025-04-24 ENCOUNTER — TRANSFERRED RECORDS (OUTPATIENT)
Dept: HEALTH INFORMATION MANAGEMENT | Facility: CLINIC | Age: 80
End: 2025-04-24
Payer: MEDICARE

## 2025-05-27 ENCOUNTER — RESULTS FOLLOW-UP (OUTPATIENT)
Dept: FAMILY MEDICINE | Facility: CLINIC | Age: 80
End: 2025-05-27

## 2025-05-27 ENCOUNTER — OFFICE VISIT (OUTPATIENT)
Dept: FAMILY MEDICINE | Facility: CLINIC | Age: 80
End: 2025-05-27
Payer: MEDICARE

## 2025-05-27 VITALS
TEMPERATURE: 98.1 F | WEIGHT: 170 LBS | DIASTOLIC BLOOD PRESSURE: 60 MMHG | BODY MASS INDEX: 23.03 KG/M2 | RESPIRATION RATE: 16 BRPM | OXYGEN SATURATION: 96 % | SYSTOLIC BLOOD PRESSURE: 112 MMHG | HEART RATE: 63 BPM | HEIGHT: 72 IN

## 2025-05-27 DIAGNOSIS — N30.00 ACUTE CYSTITIS WITHOUT HEMATURIA: Primary | ICD-10-CM

## 2025-05-27 LAB
ALBUMIN UR-MCNC: ABNORMAL MG/DL
APPEARANCE UR: ABNORMAL
BACTERIA #/AREA URNS HPF: ABNORMAL /HPF
BILIRUB UR QL STRIP: NEGATIVE
COLOR UR AUTO: YELLOW
GLUCOSE UR STRIP-MCNC: NEGATIVE MG/DL
HGB UR QL STRIP: ABNORMAL
KETONES UR STRIP-MCNC: ABNORMAL MG/DL
LEUKOCYTE ESTERASE UR QL STRIP: ABNORMAL
NITRATE UR QL: NEGATIVE
PH UR STRIP: 5.5 [PH] (ref 5–7)
RBC #/AREA URNS AUTO: ABNORMAL /HPF
SP GR UR STRIP: 1.01 (ref 1–1.03)
SQUAMOUS #/AREA URNS AUTO: ABNORMAL /LPF
UROBILINOGEN UR STRIP-ACNC: 0.2 E.U./DL
WBC #/AREA URNS AUTO: >100 /HPF
WBC CLUMPS #/AREA URNS HPF: PRESENT /HPF

## 2025-05-27 PROCEDURE — 99214 OFFICE O/P EST MOD 30 MIN: CPT | Performed by: PHYSICIAN ASSISTANT

## 2025-05-27 PROCEDURE — 3078F DIAST BP <80 MM HG: CPT | Performed by: PHYSICIAN ASSISTANT

## 2025-05-27 PROCEDURE — 3074F SYST BP LT 130 MM HG: CPT | Performed by: PHYSICIAN ASSISTANT

## 2025-05-27 PROCEDURE — 81001 URINALYSIS AUTO W/SCOPE: CPT | Performed by: PHYSICIAN ASSISTANT

## 2025-05-27 PROCEDURE — 87086 URINE CULTURE/COLONY COUNT: CPT | Performed by: PHYSICIAN ASSISTANT

## 2025-05-27 PROCEDURE — 87186 SC STD MICRODIL/AGAR DIL: CPT | Performed by: PHYSICIAN ASSISTANT

## 2025-05-27 RX ORDER — SULFAMETHOXAZOLE AND TRIMETHOPRIM 800; 160 MG/1; MG/1
1 TABLET ORAL 2 TIMES DAILY
Qty: 14 TABLET | Refills: 0 | Status: SHIPPED | OUTPATIENT
Start: 2025-05-27 | End: 2025-06-03

## 2025-05-27 NOTE — PROGRESS NOTES
Assessment & Plan     Acute cystitis without hematuria  Urine consistent with infection. Treated with Bactrim as noted below.  Patient does straight catheterize at night and this can place him at greater risk for future bladder infections as well. He does have a urologist he has seen previously.  - UA Macroscopic with reflex to Microscopic and Culture - Lab Collect; Future  - UA Macroscopic with reflex to Microscopic and Culture - Lab Collect  - Urine Microscopic Exam  - Urine Culture  - sulfamethoxazole-trimethoprim (BACTRIM DS) 800-160 MG tablet; Take 1 tablet by mouth 2 times daily for 7 days.    Review of external notes as documented elsewhere in note  Review of the result(s) of each unique test - previous urine cultures  Ordering of each unique test  Prescription drug management    Jesus Chávez is a 79 year old, presenting for the following health issues:  Urinary Problem (Cloudy)        5/27/2025     1:10 PM   Additional Questions   Roomed by Amber FERMIN   Accompanied by Self     History of Present Illness       Reason for visit:  Urine cloudy  Symptoms include:  Cloudy urine  Symptom progression:  Staying the same  Had these symptoms before:  Yes  Has tried/received treatment for these symptoms:  Yes  Previous treatment was successful:  Yes  Prior treatment description:  Prescription  What makes it worse:  No  What makes it better:  No   He is taking medications regularly.        Genitourinary - Male  Onset/Duration: 1 week  Description: with some clouds   Dysuria (painful urination): YES  Hematuria (blood in urine): no  Frequency: YES  Waking at night to urinate: YES  Hesitancy (delay in urine): no  Retention (unable to empty): yes  Decrease in urinary flow: YES  Incontinence: YES, at night   Progression of Symptoms:  same  Accompanying Signs & Symptoms:  Fever: No  Back/Flank pain: YES- chronic  Urethral discharge: No  Testicle lumps/masses/pain: No  Nausea and/or vomiting: No  Abdominal pain: No  History:    History of frequent UTI s: YES  History of kidney stones: No  History of hernias: No  Personal or Family history of Prostate problems: YES  Sexually active: No  Precipitating or alleviating factors: None  Therapies tried and outcome: Tylenol        Objective    /60 (BP Location: Left arm, Patient Position: Chair, Cuff Size: Adult Regular)   Pulse 63   Temp 98.1  F (36.7  C) (Oral)   Resp 16   Ht 1.829 m (6')   Wt 77.1 kg (170 lb)   SpO2 96%   BMI 23.06 kg/m    Body mass index is 23.06 kg/m .  Physical Exam   GENERAL: No acute distress  HEENT: Normocephalic  : No CVA tenderness bilaterally.   NEURO: Alert and non-focal, occasional tremor          Signed Electronically by: Valeria oCsby PA-C

## 2025-05-28 LAB — BACTERIA UR CULT: ABNORMAL

## 2025-07-01 ENCOUNTER — OFFICE VISIT (OUTPATIENT)
Dept: FAMILY MEDICINE | Facility: CLINIC | Age: 80
End: 2025-07-01
Payer: MEDICARE

## 2025-07-01 VITALS
RESPIRATION RATE: 16 BRPM | OXYGEN SATURATION: 98 % | TEMPERATURE: 98.1 F | WEIGHT: 167.4 LBS | HEIGHT: 72 IN | DIASTOLIC BLOOD PRESSURE: 67 MMHG | SYSTOLIC BLOOD PRESSURE: 109 MMHG | BODY MASS INDEX: 22.67 KG/M2 | HEART RATE: 68 BPM

## 2025-07-01 DIAGNOSIS — H61.23 BILATERAL IMPACTED CERUMEN: Primary | ICD-10-CM

## 2025-07-01 PROCEDURE — 3078F DIAST BP <80 MM HG: CPT | Performed by: PHYSICIAN ASSISTANT

## 2025-07-01 PROCEDURE — 69209 REMOVE IMPACTED EAR WAX UNI: CPT | Mod: 50 | Performed by: PHYSICIAN ASSISTANT

## 2025-07-01 PROCEDURE — 3074F SYST BP LT 130 MM HG: CPT | Performed by: PHYSICIAN ASSISTANT

## 2025-07-01 NOTE — PROGRESS NOTES
Assessment & Plan     Bilateral impacted cerumen  Improved following lavage. He still reports some muffled hearing. Can follow up with ENT if not improving in the next week or two.  No signs of infection on exam.  - REMOVE IMPACTED CERUMEN      Jesus Chávez is a 79 year old, presenting for the following health issues:  Ear Problem (Plugged, can't hear )        7/1/2025    10:23 AM   Additional Questions   Roomed by Amber FERMIN   Accompanied by Self     Ear Problem    History of Present Illness       Reason for visit:  My left year is plugged. I can't hear anything out of it.  Symptom onset:  3-4 weeks ago  Symptoms include:  Ear is plugged. I can't hear.  Symptom intensity:  Severe  Symptom progression:  Staying the same  Had these symptoms before:  No   He is taking medications regularly.        Concern - ear plugged can't hear  Onset: 3 weeks   Description: plugged feels like water is in there   Intensity: 9/10 cent hear  Progression of Symptoms:  same  Accompanying Signs & Symptoms: just plugged   Previous history of similar problem: none   Precipitating factors:        Worsened by: none   Alleviating factors:        Improved by: none  Therapies tried and outcome: None        Objective    /67 (BP Location: Left arm, Patient Position: Chair, Cuff Size: Adult Regular)   Pulse 68   Temp 98.1  F (36.7  C) (Oral)   Resp 16   Ht 1.829 m (6')   Wt 75.9 kg (167 lb 6.4 oz)   SpO2 98%   BMI 22.70 kg/m    Body mass index is 22.7 kg/m .  Physical Exam   GENERAL: No acute distress  HEENT: Normocephalic, PERRL, Canals occluded bilaterally. Following lavage: bilateral TM's non-erythematous and non-bulging.  NEURO: Alert and non-focal          Signed Electronically by: Valeria Cosby PA-C

## 2025-07-17 ENCOUNTER — TRANSFERRED RECORDS (OUTPATIENT)
Dept: HEALTH INFORMATION MANAGEMENT | Facility: CLINIC | Age: 80
End: 2025-07-17
Payer: MEDICARE

## (undated) DEVICE — MARKER SPHERES PASSIVE MEDT PACK 5 8801075

## (undated) DEVICE — SU ETHILON 2-0 PS 18" 585H

## (undated) DEVICE — LINEN POUCH DBL 5427

## (undated) DEVICE — DRAIN HEMOVAC RESERVOIR KIT 10FR 1/8" MED 00-2550-002-10

## (undated) DEVICE — SU VICRYL 2-0 CT-1 27" UND J259H

## (undated) DEVICE — NDL BLUNT 18GA 1" W/O FILTER 305181

## (undated) DEVICE — ENDO TRAP POLYP QUICK CATCH 710201

## (undated) DEVICE — DAVINCI S NDL DRIVER LARGE 420006

## (undated) DEVICE — SUCTION TIP YANKAUER W/O VENT K86

## (undated) DEVICE — PACK DAVINCI UROLOGY SBA15UDFSG

## (undated) DEVICE — GLOVE PROTEXIS MICRO 7.5  2D73PM75

## (undated) DEVICE — PACK SMALL SPINE RIDGES

## (undated) DEVICE — ESU CORD MONOPOLAR 10'  E0510

## (undated) DEVICE — TAPE DURAPORE 3" SILK 1538-3

## (undated) DEVICE — TROCAR AIRSEAL BLADELESS 12X120MM IAS12-120

## (undated) DEVICE — SU VICRYL 0 CT-1 27" J340H

## (undated) DEVICE — ADH SKIN CLOSURE PREMIERPRO EXOFIN 1.0ML 3470

## (undated) DEVICE — SU VICRYL 0 CT-1 27" UND J260H

## (undated) DEVICE — GLOVE BIOGEL PI MICRO SZ 8.0 48580

## (undated) DEVICE — LINEN TOWEL PACK X5 5464

## (undated) DEVICE — DAVINCI S MONOPOLAR SCISSORS PRECURVED HOT SHEARS 420179

## (undated) DEVICE — TOOL DISSECT MIDAS MR8 14CM MATCH HEAD 3MM MR8-14MH30

## (undated) DEVICE — DAVINCI SI DRAPE ACCESSORY KIT 3-ARM 420290

## (undated) DEVICE — SUCTION MANIFOLD NEPTUNE 2 SYS 4 PORT 0702-020-000

## (undated) DEVICE — SU DERMABOND ADVANCED .7ML DNX12

## (undated) DEVICE — TUBING CONMED AIRSEAL SMOKE EVAC INSUFFLATION ASM-EVAC

## (undated) DEVICE — CUSHION INSERT LG PRONE VIEW JACKSON TABLE

## (undated) DEVICE — GOWN IMPERVIOUS SPECIALTY XLG/XLONG 32474

## (undated) DEVICE — IMP SCREW BN MEDT SPINE 45X6.5MM 5.5/6MM ROD: Type: IMPLANTABLE DEVICE | Site: SPINE LUMBAR | Status: NON-FUNCTIONAL

## (undated) DEVICE — ESU GROUND PAD ADULT W/CORD E7507

## (undated) DEVICE — DAVINCI HOT SHEARS TIP COVER  400180

## (undated) DEVICE — CATH TRAY FOLEY SURESTEP 16FR DRAIN BAG STATOCK A899916

## (undated) DEVICE — SU STRATAFIX PDS PLUS 1 CT-1 12" SXPP1A443

## (undated) DEVICE — DAVINCI S GRASPER ENDOWRIST PROGRASP 420093

## (undated) DEVICE — DRSG TEGADERM 4X4 3/4" 1626W

## (undated) DEVICE — ESU ELEC BLADE 2.75" COATED/INSULATED E1455

## (undated) DEVICE — NDL INSUFFLATION 120MM VERRES 172015

## (undated) DEVICE — SUCTION IRR STRYKERFLOW II W/TIP 250-070-520

## (undated) DEVICE — KIT ENDO TURNOVER/PROCEDURE W/CLEAN A SCOPE LINERS 103888

## (undated) DEVICE — PACK SET-UP STD 9102

## (undated) DEVICE — DRAPE LAP W/ARMBOARD 29410

## (undated) DEVICE — GLOVE PROTEXIS BLUE W/NEU-THERA 6.5  2D73EB65

## (undated) DEVICE — SOL NACL 0.9% INJ 1000ML BAG 2B1324X

## (undated) DEVICE — DRAPE X-RAY TUBE 00-901169-01-OEC

## (undated) DEVICE — SPONGE SURGIFOAM 01GM POWDER 1978

## (undated) DEVICE — SOL NACL 0.9% IRRIG 1000ML BOTTLE 2F7124

## (undated) DEVICE — LINEN DRAPE 54X72" 5467

## (undated) DEVICE — DEVICE DUST COLLECTOR BONE BOX S-3500

## (undated) DEVICE — BLADE KNIFE SURG 10 371110

## (undated) DEVICE — PAD PROAXIS TABLE KIT SPK10182

## (undated) DEVICE — LINEN ORTHO ACL PACK 5447

## (undated) DEVICE — SOL WATER IRRIG 1000ML BOTTLE 2F7114

## (undated) DEVICE — CATH IV ANGIO INTRO 12GA 382277

## (undated) DEVICE — ENDO POUCH UNIV RETRIEVAL SYSTEM INZII 10MM CD001

## (undated) DEVICE — SU MONOCRYL 4-0 PS-2 18" UND Y496G

## (undated) DEVICE — GLOVE PROTEXIS BLUE W/NEU-THERA 8.0  2D73EB80

## (undated) DEVICE — ESU PENCIL W/SMOKE EVAC CVPLP2000

## (undated) DEVICE — SU MONOCRYL 3-0 PS-2 27" Y427H

## (undated) DEVICE — ENDO SNARE POLYPECTOMY OVAL 15MM LOOP SD-240U-15

## (undated) DEVICE — KIT SURGICAL TURNOVER FVSD-01D

## (undated) DEVICE — DAVINCI S CANNULA SEAL 8.5-13MM 420206

## (undated) DEVICE — PREP DURAPREP 26ML APL 8630

## (undated) DEVICE — GLOVE PROTEXIS W/NEU-THERA 7.5  2D73TE75

## (undated) DEVICE — CLIP ENDO HEMO-LOC PURPLE LG 544240

## (undated) DEVICE — SPONGE COTTONOID 1/2X1" 80-1402

## (undated) DEVICE — ENDO TROCAR FIRST ENTRY KII FIOS Z-THRD 05X100MM CTF03

## (undated) DEVICE — SU PDS II 0 CT-2 27" Z334H

## (undated) DEVICE — CATH TRAY FOLEY SURESTEP 16FR W/URINE MTR STATLK LF A303416A

## (undated) DEVICE — GLOVE BIOGEL PI MICRO INDICATOR UNDERGLOVE SZ 8.5 48985

## (undated) DEVICE — SOL NACL 0.9% 10ML VIAL 0409-4888-02

## (undated) DEVICE — SU VICRYL 2-0 SH 27" J317H

## (undated) DEVICE — SUCTION FRAZIER 12FR W/OBTURATOR 33120

## (undated) DEVICE — GEL ULTRASOUND AQUASONIC 20GM 01-01

## (undated) RX ORDER — PROPOFOL 10 MG/ML
INJECTION, EMULSION INTRAVENOUS
Status: DISPENSED
Start: 2019-09-04

## (undated) RX ORDER — CEFAZOLIN SODIUM 2 G/100ML
INJECTION, SOLUTION INTRAVENOUS
Status: DISPENSED
Start: 2019-09-04

## (undated) RX ORDER — DEXAMETHASONE SODIUM PHOSPHATE 4 MG/ML
INJECTION, SOLUTION INTRA-ARTICULAR; INTRALESIONAL; INTRAMUSCULAR; INTRAVENOUS; SOFT TISSUE
Status: DISPENSED
Start: 2023-10-24

## (undated) RX ORDER — HYDRALAZINE HYDROCHLORIDE 20 MG/ML
INJECTION INTRAMUSCULAR; INTRAVENOUS
Status: DISPENSED
Start: 2023-10-24

## (undated) RX ORDER — LIDOCAINE HYDROCHLORIDE 10 MG/ML
INJECTION, SOLUTION EPIDURAL; INFILTRATION; INTRACAUDAL; PERINEURAL
Status: DISPENSED
Start: 2023-10-24

## (undated) RX ORDER — OXYCODONE HYDROCHLORIDE 5 MG/1
TABLET ORAL
Status: DISPENSED
Start: 2023-10-24

## (undated) RX ORDER — CEFAZOLIN SODIUM/WATER 2 G/20 ML
SYRINGE (ML) INTRAVENOUS
Status: DISPENSED
Start: 2023-10-24

## (undated) RX ORDER — HYDROMORPHONE HYDROCHLORIDE 1 MG/ML
INJECTION, SOLUTION INTRAMUSCULAR; INTRAVENOUS; SUBCUTANEOUS
Status: DISPENSED
Start: 2019-09-04

## (undated) RX ORDER — PROPOFOL 10 MG/ML
INJECTION, EMULSION INTRAVENOUS
Status: DISPENSED
Start: 2023-10-24

## (undated) RX ORDER — ONDANSETRON 2 MG/ML
INJECTION INTRAMUSCULAR; INTRAVENOUS
Status: DISPENSED
Start: 2019-09-04

## (undated) RX ORDER — ONDANSETRON 2 MG/ML
INJECTION INTRAMUSCULAR; INTRAVENOUS
Status: DISPENSED
Start: 2023-10-24

## (undated) RX ORDER — VANCOMYCIN HYDROCHLORIDE 1 G/20ML
INJECTION, POWDER, LYOPHILIZED, FOR SOLUTION INTRAVENOUS
Status: DISPENSED
Start: 2023-10-24

## (undated) RX ORDER — LIDOCAINE HYDROCHLORIDE 10 MG/ML
INJECTION, SOLUTION EPIDURAL; INFILTRATION; INTRACAUDAL; PERINEURAL
Status: DISPENSED
Start: 2019-09-04

## (undated) RX ORDER — FENTANYL CITRATE 50 UG/ML
INJECTION, SOLUTION INTRAMUSCULAR; INTRAVENOUS
Status: DISPENSED
Start: 2019-04-08

## (undated) RX ORDER — APREPITANT 40 MG/1
CAPSULE ORAL
Status: DISPENSED
Start: 2019-09-04

## (undated) RX ORDER — GLYCOPYRROLATE 0.2 MG/ML
INJECTION, SOLUTION INTRAMUSCULAR; INTRAVENOUS
Status: DISPENSED
Start: 2019-09-04

## (undated) RX ORDER — LIDOCAINE HYDROCHLORIDE 20 MG/ML
INJECTION, SOLUTION EPIDURAL; INFILTRATION; INTRACAUDAL; PERINEURAL
Status: DISPENSED
Start: 2019-09-04

## (undated) RX ORDER — GLYCOPYRROLATE 0.2 MG/ML
INJECTION INTRAMUSCULAR; INTRAVENOUS
Status: DISPENSED
Start: 2023-10-24

## (undated) RX ORDER — FENTANYL CITRATE 50 UG/ML
INJECTION, SOLUTION INTRAMUSCULAR; INTRAVENOUS
Status: DISPENSED
Start: 2023-10-24

## (undated) RX ORDER — GABAPENTIN 100 MG/1
CAPSULE ORAL
Status: DISPENSED
Start: 2023-10-24

## (undated) RX ORDER — VECURONIUM BROMIDE 1 MG/ML
INJECTION, POWDER, LYOPHILIZED, FOR SOLUTION INTRAVENOUS
Status: DISPENSED
Start: 2019-09-04

## (undated) RX ORDER — EPHEDRINE SULFATE 50 MG/ML
INJECTION, SOLUTION INTRAMUSCULAR; INTRAVENOUS; SUBCUTANEOUS
Status: DISPENSED
Start: 2023-10-24

## (undated) RX ORDER — DEXAMETHASONE SODIUM PHOSPHATE 4 MG/ML
INJECTION, SOLUTION INTRA-ARTICULAR; INTRALESIONAL; INTRAMUSCULAR; INTRAVENOUS; SOFT TISSUE
Status: DISPENSED
Start: 2019-09-04

## (undated) RX ORDER — CEFAZOLIN SODIUM 1 G/3ML
INJECTION, POWDER, FOR SOLUTION INTRAMUSCULAR; INTRAVENOUS
Status: DISPENSED
Start: 2019-09-04

## (undated) RX ORDER — FENTANYL CITRATE 50 UG/ML
INJECTION, SOLUTION INTRAMUSCULAR; INTRAVENOUS
Status: DISPENSED
Start: 2019-09-04

## (undated) RX ORDER — BUPIVACAINE HYDROCHLORIDE 2.5 MG/ML
INJECTION, SOLUTION EPIDURAL; INFILTRATION; INTRACAUDAL
Status: DISPENSED
Start: 2019-09-04

## (undated) RX ORDER — HYDROMORPHONE HCL IN WATER/PF 6 MG/30 ML
PATIENT CONTROLLED ANALGESIA SYRINGE INTRAVENOUS
Status: DISPENSED
Start: 2023-10-24

## (undated) RX ORDER — NEOSTIGMINE METHYLSULFATE 1 MG/ML
VIAL (ML) INJECTION
Status: DISPENSED
Start: 2019-09-04

## (undated) RX ORDER — BUPIVACAINE HYDROCHLORIDE AND EPINEPHRINE 2.5; 5 MG/ML; UG/ML
INJECTION, SOLUTION EPIDURAL; INFILTRATION; INTRACAUDAL; PERINEURAL
Status: DISPENSED
Start: 2023-10-24